# Patient Record
Sex: FEMALE | Race: WHITE | NOT HISPANIC OR LATINO | URBAN - METROPOLITAN AREA
[De-identification: names, ages, dates, MRNs, and addresses within clinical notes are randomized per-mention and may not be internally consistent; named-entity substitution may affect disease eponyms.]

---

## 2017-04-27 ENCOUNTER — EMERGENCY (EMERGENCY)
Facility: HOSPITAL | Age: 82
LOS: 1 days | Discharge: HOME | End: 2017-04-27
Attending: EMERGENCY MEDICINE | Admitting: EMERGENCY MEDICINE
Payer: MEDICARE

## 2017-04-27 VITALS
DIASTOLIC BLOOD PRESSURE: 70 MMHG | HEART RATE: 59 BPM | OXYGEN SATURATION: 98 % | RESPIRATION RATE: 17 BRPM | TEMPERATURE: 98 F | SYSTOLIC BLOOD PRESSURE: 178 MMHG

## 2017-04-27 VITALS
WEIGHT: 158.07 LBS | HEART RATE: 84 BPM | OXYGEN SATURATION: 97 % | DIASTOLIC BLOOD PRESSURE: 74 MMHG | TEMPERATURE: 99 F | RESPIRATION RATE: 16 BRPM | SYSTOLIC BLOOD PRESSURE: 168 MMHG

## 2017-04-27 DIAGNOSIS — I10 ESSENTIAL (PRIMARY) HYPERTENSION: ICD-10-CM

## 2017-04-27 DIAGNOSIS — Z79.01 LONG TERM (CURRENT) USE OF ANTICOAGULANTS: ICD-10-CM

## 2017-04-27 DIAGNOSIS — Z88.0 ALLERGY STATUS TO PENICILLIN: ICD-10-CM

## 2017-04-27 DIAGNOSIS — Z96.642 PRESENCE OF LEFT ARTIFICIAL HIP JOINT: ICD-10-CM

## 2017-04-27 DIAGNOSIS — E78.5 HYPERLIPIDEMIA, UNSPECIFIED: ICD-10-CM

## 2017-04-27 DIAGNOSIS — F41.9 ANXIETY DISORDER, UNSPECIFIED: ICD-10-CM

## 2017-04-27 DIAGNOSIS — Z90.710 ACQUIRED ABSENCE OF BOTH CERVIX AND UTERUS: Chronic | ICD-10-CM

## 2017-04-27 DIAGNOSIS — I63.9 CEREBRAL INFARCTION, UNSPECIFIED: ICD-10-CM

## 2017-04-27 DIAGNOSIS — Z96.60 PRESENCE OF UNSPECIFIED ORTHOPEDIC JOINT IMPLANT: Chronic | ICD-10-CM

## 2017-04-27 DIAGNOSIS — R29.6 REPEATED FALLS: ICD-10-CM

## 2017-04-27 DIAGNOSIS — R47.81 SLURRED SPEECH: ICD-10-CM

## 2017-04-27 LAB
ALBUMIN SERPL ELPH-MCNC: 3 G/DL — LOW (ref 3.3–5)
ALP SERPL-CCNC: 76 U/L — SIGNIFICANT CHANGE UP (ref 40–120)
ALT FLD-CCNC: 31 U/L — SIGNIFICANT CHANGE UP (ref 12–78)
ANION GAP SERPL CALC-SCNC: 6 MMOL/L — SIGNIFICANT CHANGE UP (ref 5–17)
APPEARANCE UR: CLEAR — SIGNIFICANT CHANGE UP
APTT BLD: 21.8 SEC — LOW (ref 27.5–37.4)
AST SERPL-CCNC: 53 U/L — HIGH (ref 15–37)
BASOPHILS # BLD AUTO: 0.1 K/UL — SIGNIFICANT CHANGE UP (ref 0–0.2)
BASOPHILS NFR BLD AUTO: 0.7 % — SIGNIFICANT CHANGE UP (ref 0–2)
BILIRUB SERPL-MCNC: 0.5 MG/DL — SIGNIFICANT CHANGE UP (ref 0.2–1.2)
BILIRUB UR-MCNC: NEGATIVE — SIGNIFICANT CHANGE UP
BUN SERPL-MCNC: 26 MG/DL — HIGH (ref 7–23)
CALCIUM SERPL-MCNC: 8.6 MG/DL — SIGNIFICANT CHANGE UP (ref 8.5–10.1)
CHLORIDE SERPL-SCNC: 104 MMOL/L — SIGNIFICANT CHANGE UP (ref 96–108)
CO2 SERPL-SCNC: 30 MMOL/L — SIGNIFICANT CHANGE UP (ref 22–31)
COLOR SPEC: YELLOW — SIGNIFICANT CHANGE UP
CREAT SERPL-MCNC: 0.88 MG/DL — SIGNIFICANT CHANGE UP (ref 0.5–1.3)
DIFF PNL FLD: NEGATIVE — SIGNIFICANT CHANGE UP
EOSINOPHIL # BLD AUTO: 0.2 K/UL — SIGNIFICANT CHANGE UP (ref 0–0.5)
EOSINOPHIL NFR BLD AUTO: 2.3 % — SIGNIFICANT CHANGE UP (ref 0–6)
GLUCOSE SERPL-MCNC: 108 MG/DL — HIGH (ref 70–99)
GLUCOSE UR QL: NEGATIVE — SIGNIFICANT CHANGE UP
HCT VFR BLD CALC: 36.9 % — SIGNIFICANT CHANGE UP (ref 34.5–45)
HGB BLD-MCNC: 12.1 G/DL — SIGNIFICANT CHANGE UP (ref 11.5–15.5)
INR BLD: 0.94 RATIO — SIGNIFICANT CHANGE UP (ref 0.88–1.16)
KETONES UR-MCNC: NEGATIVE — SIGNIFICANT CHANGE UP
LACTATE SERPL-SCNC: 0.8 MMOL/L — SIGNIFICANT CHANGE UP (ref 0.7–2)
LACTATE SERPL-SCNC: 2.1 MMOL/L — HIGH (ref 0.7–2)
LEUKOCYTE ESTERASE UR-ACNC: NEGATIVE — SIGNIFICANT CHANGE UP
LYMPHOCYTES # BLD AUTO: 1.7 K/UL — SIGNIFICANT CHANGE UP (ref 1–3.3)
LYMPHOCYTES # BLD AUTO: 17.4 % — SIGNIFICANT CHANGE UP (ref 13–44)
MCHC RBC-ENTMCNC: 32.8 GM/DL — SIGNIFICANT CHANGE UP (ref 32–36)
MCHC RBC-ENTMCNC: 34 PG — SIGNIFICANT CHANGE UP (ref 27–34)
MCV RBC AUTO: 103.4 FL — HIGH (ref 80–100)
MONOCYTES # BLD AUTO: 0.8 K/UL — SIGNIFICANT CHANGE UP (ref 0–0.9)
MONOCYTES NFR BLD AUTO: 7.9 % — SIGNIFICANT CHANGE UP (ref 1–9)
NEUTROPHILS # BLD AUTO: 7.1 K/UL — SIGNIFICANT CHANGE UP (ref 1.8–7.4)
NEUTROPHILS NFR BLD AUTO: 71.6 % — SIGNIFICANT CHANGE UP (ref 43–77)
NITRITE UR-MCNC: NEGATIVE — SIGNIFICANT CHANGE UP
PH UR: 7 — SIGNIFICANT CHANGE UP (ref 5–8)
PLATELET # BLD AUTO: 197 K/UL — SIGNIFICANT CHANGE UP (ref 150–400)
POTASSIUM SERPL-MCNC: 4.9 MMOL/L — SIGNIFICANT CHANGE UP (ref 3.5–5.3)
POTASSIUM SERPL-SCNC: 4.9 MMOL/L — SIGNIFICANT CHANGE UP (ref 3.5–5.3)
PROT SERPL-MCNC: 6.8 G/DL — SIGNIFICANT CHANGE UP (ref 6–8.3)
PROT UR-MCNC: NEGATIVE — SIGNIFICANT CHANGE UP
PROTHROM AB SERPL-ACNC: 10.2 SEC — SIGNIFICANT CHANGE UP (ref 9.8–12.7)
RBC # BLD: 3.57 M/UL — LOW (ref 3.8–5.2)
RBC # FLD: 12.8 % — SIGNIFICANT CHANGE UP (ref 10.3–14.5)
SODIUM SERPL-SCNC: 140 MMOL/L — SIGNIFICANT CHANGE UP (ref 135–145)
SP GR SPEC: 1.01 — SIGNIFICANT CHANGE UP (ref 1.01–1.02)
UROBILINOGEN FLD QL: NEGATIVE — SIGNIFICANT CHANGE UP
WBC # BLD: 9.9 K/UL — SIGNIFICANT CHANGE UP (ref 3.8–10.5)
WBC # FLD AUTO: 9.9 K/UL — SIGNIFICANT CHANGE UP (ref 3.8–10.5)

## 2017-04-27 PROCEDURE — 85610 PROTHROMBIN TIME: CPT

## 2017-04-27 PROCEDURE — 87040 BLOOD CULTURE FOR BACTERIA: CPT

## 2017-04-27 PROCEDURE — 99285 EMERGENCY DEPT VISIT HI MDM: CPT

## 2017-04-27 PROCEDURE — 99284 EMERGENCY DEPT VISIT MOD MDM: CPT | Mod: 25

## 2017-04-27 PROCEDURE — G8980: CPT | Mod: CK

## 2017-04-27 PROCEDURE — 85730 THROMBOPLASTIN TIME PARTIAL: CPT

## 2017-04-27 PROCEDURE — 71010: CPT | Mod: 26

## 2017-04-27 PROCEDURE — 93005 ELECTROCARDIOGRAM TRACING: CPT

## 2017-04-27 PROCEDURE — G8978: CPT | Mod: CK

## 2017-04-27 PROCEDURE — 80053 COMPREHEN METABOLIC PANEL: CPT

## 2017-04-27 PROCEDURE — 97162 PT EVAL MOD COMPLEX 30 MIN: CPT

## 2017-04-27 PROCEDURE — 71045 X-RAY EXAM CHEST 1 VIEW: CPT

## 2017-04-27 PROCEDURE — 85027 COMPLETE CBC AUTOMATED: CPT

## 2017-04-27 PROCEDURE — 83605 ASSAY OF LACTIC ACID: CPT

## 2017-04-27 PROCEDURE — G8979: CPT | Mod: CK

## 2017-04-27 PROCEDURE — 87086 URINE CULTURE/COLONY COUNT: CPT

## 2017-04-27 PROCEDURE — 81003 URINALYSIS AUTO W/O SCOPE: CPT

## 2017-04-27 RX ORDER — ASPIRIN/CALCIUM CARB/MAGNESIUM 324 MG
325 TABLET ORAL ONCE
Qty: 0 | Refills: 0 | Status: COMPLETED | OUTPATIENT
Start: 2017-04-27 | End: 2017-04-27

## 2017-04-27 RX ORDER — AMLODIPINE BESYLATE 2.5 MG/1
2.5 TABLET ORAL ONCE
Qty: 0 | Refills: 0 | Status: COMPLETED | OUTPATIENT
Start: 2017-04-27 | End: 2017-04-27

## 2017-04-27 RX ORDER — AMLODIPINE BESYLATE 2.5 MG/1
1 TABLET ORAL
Qty: 28 | Refills: 0 | OUTPATIENT
Start: 2017-04-27 | End: 2017-05-11

## 2017-04-27 RX ORDER — SODIUM CHLORIDE 9 MG/ML
1000 INJECTION INTRAMUSCULAR; INTRAVENOUS; SUBCUTANEOUS ONCE
Qty: 0 | Refills: 0 | Status: COMPLETED | OUTPATIENT
Start: 2017-04-27 | End: 2017-04-27

## 2017-04-27 RX ORDER — METOPROLOL TARTRATE 50 MG
25 TABLET ORAL ONCE
Qty: 0 | Refills: 0 | Status: COMPLETED | OUTPATIENT
Start: 2017-04-27 | End: 2017-04-27

## 2017-04-27 RX ADMIN — Medication 25 MILLIGRAM(S): at 14:56

## 2017-04-27 RX ADMIN — SODIUM CHLORIDE 1000 MILLILITER(S): 9 INJECTION INTRAMUSCULAR; INTRAVENOUS; SUBCUTANEOUS at 12:01

## 2017-04-27 RX ADMIN — Medication 325 MILLIGRAM(S): at 14:59

## 2017-04-27 RX ADMIN — AMLODIPINE BESYLATE 2.5 MILLIGRAM(S): 2.5 TABLET ORAL at 16:46

## 2017-04-27 NOTE — PHYSICAL THERAPY INITIAL EVALUATION ADULT - LEVEL OF INDEPENDENCE: GAIT, REHAB EVAL
NT secondary to BP = 203/86, MD made aware. NT secondary to BP = 203/86, MD made aware, JN Kolb made aware.

## 2017-04-27 NOTE — ED PROVIDER NOTE - MUSCULOSKELETAL, MLM
Spine appears normal, No spinal tend (c,t,l). range of motion is not limited, no muscle or joint tenderness noted

## 2017-04-27 NOTE — PHYSICAL THERAPY INITIAL EVALUATION ADULT - DID THE PATIENT HAVE SURGERY?
n/a n/a/G-code information.  The patient's primary goal was to improve mobility and ambulation.  Based on the subjective reports and objective findings the primary functional issue is related to G code 8978 CK modifier. Because the patient will be followed in another setting, the goal and discharge status is as follows:  CK for goal,  CH for discharge.  Recommend HCPT.

## 2017-04-27 NOTE — ED PROVIDER NOTE - OBJECTIVE STATEMENT
88 yo F p/w recent occasional falls. Pt again fell ~ 2 - 3 days ago. no loc. No HA//dizzy. Pt reportedly was having slurred speech, improved now. Pt had outpt MRI through PMD 2 days ago, shows acute CVA. Pt sent for neuro eval. no numb/ting/focal weak. no cp/sob/palp. no abd pain., No n/v/d. No recent illness. NO agg/allev factors. No other inj or co.

## 2017-04-27 NOTE — ED PROVIDER NOTE - DIAGNOSTIC INTERPRETATION
MRI Mai (4/25/17) - Small acute infarct post R coronata radiata with microvasc white matter dz, mild hydrocephalus, l mastoid inf change (see attached)

## 2017-04-27 NOTE — ED ADULT NURSE NOTE - OBJECTIVE STATEMENT
pt states she was sent in by PMD because "he didn't like her tests she just took" pt unable to tell me what is bothering her today.

## 2017-04-27 NOTE — ED PROVIDER NOTE - NEUROLOGICAL, MLM
Alert and oriented, no focal deficits, no motor or sensory deficits. normal speech. nihss = 0, dysphagia pass

## 2017-04-27 NOTE — ED PROVIDER NOTE - ENMT, MLM
Airway patent, Nasal mucosa clear. Mouth with normal mucosa. Throat has no vesicles, no oropharyngeal exudates and uvula is midline. MM Moist. Non-toxic, well appearing. Neck supple. no signs of trauma.

## 2017-04-27 NOTE — ED PROVIDER NOTE - CHPI ED SYMPTOMS NEG
no weakness/no change in level of consciousness/no dizziness/no nausea/no loss of consciousness/no confusion/no blurred vision/no fever/no numbness/no vomiting

## 2017-04-27 NOTE — ED PROVIDER NOTE - PROGRESS NOTE DETAILS
[facility] Mendoza office, faxed results of MRI Pt doing well, no acute co or changes. Pt seen by Dr Amaya, no need for inpatient patricia. Will need outpt fu for Echo / Carotid doppler. Pt awaiting PT / SW eval for possible home PT. Pt doing well, no acute co or changes. Pt seen by Dr Amaya, no need for inpatient patricia. Will need outpt fu for Echo / Carotid doppler. Pt awaiting PT / SW eval for possible home PT.  Dw Dr Mendoza's PA, will arrange outtp tests including Echo , Carotid doppler. Gamal George (for caridadof) - give Norvasc 2.5 BID, keep atenolol same. Pt to fu tomorrow with Fabrizio. Gamal pt and family re HTN - wish to go home now, do not want to wait for BP to normalize. They will call fabrizio in am for prompt fu.  Reevaluated patient at bedside.  Patient feeling much improved.  Discussed the results of all diagnostic testing in ED and copies of all reports given.   An opportunity to ask questions was given.  Discussed the importance of prompt, close medical follow-up.  Patient will return with any changes, concerns or persistent / worsening symptoms.  Understanding of all instructions verbalized. Gamal George (for katof) - give Norvasc 2.5 BID, keep atenolol same. Pt to fu tomorrow with Fabrizio. Gamal pt and family re HTN - wish to go home now, do not want to wait for BP to normalize. They will call fabrizio in am for prompt fu.  Reevaluated patient at bedside.  Patient feeling much improved.  Discussed the results of all diagnostic testing in ED and copies of all reports given.   An opportunity to ask questions was given.  Discussed the importance of prompt, close medical follow-up.  Patient will return with any changes, concerns or persistent / worsening symptoms.  Understanding of all instructions verbalized.  Pt seen by case management, being set up for home PT.

## 2017-04-28 LAB
CULTURE RESULTS: SIGNIFICANT CHANGE UP
SPECIMEN SOURCE: SIGNIFICANT CHANGE UP

## 2017-05-02 LAB
CULTURE RESULTS: SIGNIFICANT CHANGE UP
CULTURE RESULTS: SIGNIFICANT CHANGE UP
SPECIMEN SOURCE: SIGNIFICANT CHANGE UP
SPECIMEN SOURCE: SIGNIFICANT CHANGE UP

## 2017-05-19 ENCOUNTER — EMERGENCY (EMERGENCY)
Facility: HOSPITAL | Age: 82
LOS: 1 days | Discharge: HOME | End: 2017-05-19
Attending: EMERGENCY MEDICINE | Admitting: EMERGENCY MEDICINE
Payer: MEDICARE

## 2017-05-19 VITALS
RESPIRATION RATE: 15 BRPM | DIASTOLIC BLOOD PRESSURE: 74 MMHG | TEMPERATURE: 98 F | HEART RATE: 60 BPM | SYSTOLIC BLOOD PRESSURE: 168 MMHG | OXYGEN SATURATION: 96 %

## 2017-05-19 VITALS
WEIGHT: 125 LBS | OXYGEN SATURATION: 94 % | RESPIRATION RATE: 16 BRPM | DIASTOLIC BLOOD PRESSURE: 77 MMHG | SYSTOLIC BLOOD PRESSURE: 128 MMHG | HEIGHT: 58 IN | TEMPERATURE: 98 F | HEART RATE: 59 BPM

## 2017-05-19 DIAGNOSIS — R47.81 SLURRED SPEECH: ICD-10-CM

## 2017-05-19 DIAGNOSIS — Z90.710 ACQUIRED ABSENCE OF BOTH CERVIX AND UTERUS: Chronic | ICD-10-CM

## 2017-05-19 DIAGNOSIS — Z88.0 ALLERGY STATUS TO PENICILLIN: ICD-10-CM

## 2017-05-19 DIAGNOSIS — Z96.642 PRESENCE OF LEFT ARTIFICIAL HIP JOINT: ICD-10-CM

## 2017-05-19 DIAGNOSIS — Z90.710 ACQUIRED ABSENCE OF BOTH CERVIX AND UTERUS: ICD-10-CM

## 2017-05-19 DIAGNOSIS — Z96.60 PRESENCE OF UNSPECIFIED ORTHOPEDIC JOINT IMPLANT: Chronic | ICD-10-CM

## 2017-05-19 DIAGNOSIS — E78.5 HYPERLIPIDEMIA, UNSPECIFIED: ICD-10-CM

## 2017-05-19 DIAGNOSIS — I10 ESSENTIAL (PRIMARY) HYPERTENSION: ICD-10-CM

## 2017-05-19 LAB
ALBUMIN SERPL ELPH-MCNC: 3 G/DL — LOW (ref 3.3–5)
ALP SERPL-CCNC: 73 U/L — SIGNIFICANT CHANGE UP (ref 40–120)
ALT FLD-CCNC: 22 U/L — SIGNIFICANT CHANGE UP (ref 12–78)
ANION GAP SERPL CALC-SCNC: 8 MMOL/L — SIGNIFICANT CHANGE UP (ref 5–17)
APTT BLD: 26.7 SEC — LOW (ref 27.5–37.4)
AST SERPL-CCNC: 26 U/L — SIGNIFICANT CHANGE UP (ref 15–37)
BASOPHILS # BLD AUTO: 0.1 K/UL — SIGNIFICANT CHANGE UP (ref 0–0.2)
BASOPHILS NFR BLD AUTO: 0.8 % — SIGNIFICANT CHANGE UP (ref 0–2)
BILIRUB SERPL-MCNC: 0.3 MG/DL — SIGNIFICANT CHANGE UP (ref 0.2–1.2)
BUN SERPL-MCNC: 19 MG/DL — SIGNIFICANT CHANGE UP (ref 7–23)
CALCIUM SERPL-MCNC: 8.9 MG/DL — SIGNIFICANT CHANGE UP (ref 8.5–10.1)
CHLORIDE SERPL-SCNC: 101 MMOL/L — SIGNIFICANT CHANGE UP (ref 96–108)
CO2 SERPL-SCNC: 31 MMOL/L — SIGNIFICANT CHANGE UP (ref 22–31)
CREAT SERPL-MCNC: 0.84 MG/DL — SIGNIFICANT CHANGE UP (ref 0.5–1.3)
EOSINOPHIL # BLD AUTO: 0.2 K/UL — SIGNIFICANT CHANGE UP (ref 0–0.5)
EOSINOPHIL NFR BLD AUTO: 2.9 % — SIGNIFICANT CHANGE UP (ref 0–6)
GLUCOSE SERPL-MCNC: 85 MG/DL — SIGNIFICANT CHANGE UP (ref 70–99)
HCT VFR BLD CALC: 33 % — LOW (ref 34.5–45)
HGB BLD-MCNC: 11.1 G/DL — LOW (ref 11.5–15.5)
INR BLD: 0.99 RATIO — SIGNIFICANT CHANGE UP (ref 0.88–1.16)
LYMPHOCYTES # BLD AUTO: 1.4 K/UL — SIGNIFICANT CHANGE UP (ref 1–3.3)
LYMPHOCYTES # BLD AUTO: 17 % — SIGNIFICANT CHANGE UP (ref 13–44)
MCHC RBC-ENTMCNC: 33.6 GM/DL — SIGNIFICANT CHANGE UP (ref 32–36)
MCHC RBC-ENTMCNC: 34 PG — SIGNIFICANT CHANGE UP (ref 27–34)
MCV RBC AUTO: 101.1 FL — HIGH (ref 80–100)
MONOCYTES # BLD AUTO: 0.8 K/UL — SIGNIFICANT CHANGE UP (ref 0–0.9)
MONOCYTES NFR BLD AUTO: 9.4 % — HIGH (ref 1–9)
NEUTROPHILS # BLD AUTO: 5.7 K/UL — SIGNIFICANT CHANGE UP (ref 1.8–7.4)
NEUTROPHILS NFR BLD AUTO: 69.9 % — SIGNIFICANT CHANGE UP (ref 43–77)
PLATELET # BLD AUTO: 204 K/UL — SIGNIFICANT CHANGE UP (ref 150–400)
POTASSIUM SERPL-MCNC: 5 MMOL/L — SIGNIFICANT CHANGE UP (ref 3.5–5.3)
POTASSIUM SERPL-SCNC: 5 MMOL/L — SIGNIFICANT CHANGE UP (ref 3.5–5.3)
PROT SERPL-MCNC: 6.4 G/DL — SIGNIFICANT CHANGE UP (ref 6–8.3)
PROTHROM AB SERPL-ACNC: 10.8 SEC — SIGNIFICANT CHANGE UP (ref 9.8–12.7)
RBC # BLD: 3.27 M/UL — LOW (ref 3.8–5.2)
RBC # FLD: 13 % — SIGNIFICANT CHANGE UP (ref 10.3–14.5)
SODIUM SERPL-SCNC: 140 MMOL/L — SIGNIFICANT CHANGE UP (ref 135–145)
WBC # BLD: 8.1 K/UL — SIGNIFICANT CHANGE UP (ref 3.8–10.5)
WBC # FLD AUTO: 8.1 K/UL — SIGNIFICANT CHANGE UP (ref 3.8–10.5)

## 2017-05-19 PROCEDURE — 70544 MR ANGIOGRAPHY HEAD W/O DYE: CPT | Mod: 26,59

## 2017-05-19 PROCEDURE — 85610 PROTHROMBIN TIME: CPT

## 2017-05-19 PROCEDURE — 82962 GLUCOSE BLOOD TEST: CPT

## 2017-05-19 PROCEDURE — 85730 THROMBOPLASTIN TIME PARTIAL: CPT

## 2017-05-19 PROCEDURE — 36416 COLLJ CAPILLARY BLOOD SPEC: CPT

## 2017-05-19 PROCEDURE — 70551 MRI BRAIN STEM W/O DYE: CPT

## 2017-05-19 PROCEDURE — 70450 CT HEAD/BRAIN W/O DYE: CPT | Mod: 26

## 2017-05-19 PROCEDURE — 70551 MRI BRAIN STEM W/O DYE: CPT | Mod: 26

## 2017-05-19 PROCEDURE — 85027 COMPLETE CBC AUTOMATED: CPT

## 2017-05-19 PROCEDURE — 80053 COMPREHEN METABOLIC PANEL: CPT

## 2017-05-19 PROCEDURE — 70544 MR ANGIOGRAPHY HEAD W/O DYE: CPT

## 2017-05-19 PROCEDURE — 93005 ELECTROCARDIOGRAM TRACING: CPT

## 2017-05-19 PROCEDURE — 70450 CT HEAD/BRAIN W/O DYE: CPT

## 2017-05-19 PROCEDURE — 99284 EMERGENCY DEPT VISIT MOD MDM: CPT

## 2017-05-19 PROCEDURE — 36000 PLACE NEEDLE IN VEIN: CPT

## 2017-05-19 PROCEDURE — 99285 EMERGENCY DEPT VISIT HI MDM: CPT

## 2017-05-19 NOTE — ED ADULT NURSE NOTE - CHPI ED SYMPTOMS NEG
no change in level of consciousness/no dizziness/no numbness/no confusion/no fever/no nausea/no weakness/no loss of consciousness/no blurred vision/no vomiting

## 2017-05-19 NOTE — ED ADULT NURSE NOTE - OBJECTIVE STATEMENT
pt came in ED from home BIBA with daughter at the bedside. as per the daughter, the patient's Aide noted the patient having slurred speech at around 11:00am today. pt had Acute CVA 4 weeks ago. alert and oriented X 4. no facial droop, no slurred speech and no arm drift noted. non-labored respiration noted. patient denies chest pain. able to follow command.

## 2017-05-19 NOTE — CONSULT NOTE ADULT - PROBLEM SELECTOR RECOMMENDATION 9
Patient was seen and examined earlier. Case d/w neuro. If MRI negative, which it was, patient's symptoms are most likely related to extra dose of xanax. Patient symptoms actually resolved prior to discharge. She will f/u with PCP and neuro next week.

## 2017-05-19 NOTE — CONSULT NOTE ADULT - SUBJECTIVE AND OBJECTIVE BOX
HPI: This is an 87 F, who I saw earlier today in the ER, with PMH of HTN CVA (3 weeks ago) who was brought in by family because of slurred speech which they noticed at 11am. Patient was last seen normal at 930am. No other neurological deficits. No n/v/d, cp, sob, palpitations, h/a, or blurry vision. Patient also reports she took an extra dose of xanax this morning because her aide aggravates her.       PAST MEDICAL & SURGICAL HISTORY:  CVA (cerebral vascular accident)  Anxiety  Hip fracture  Fall  Hypertension  Hyperlipidemia  S/P hip replacement: left  S/P hysterectomy      REVIEW OF SYSTEMS: as above    CONSTITUTIONAL: No fever, weight loss, or fatigue  EYES: No eye pain, visual disturbances, or discharge  ENMT:  No difficulty hearing, tinnitus, vertigo; No sinus or throat pain  NECK: No pain or stiffness  BREASTS: No pain, masses, or nipple discharge  RESPIRATORY: No cough, wheezing, chills or hemoptysis; No shortness of breath  CARDIOVASCULAR: No chest pain, palpitations, dizziness, or leg swelling  GASTROINTESTINAL: No abdominal or epigastric pain. No nausea, vomiting, or hematemesis; No diarrhea or constipation. No melena or hematochezia.  GENITOURINARY: No dysuria, frequency, hematuria, or incontinence  NEUROLOGICAL: slurred speech  SKIN: No itching, burning, rashes, or lesions   LYMPH NODES: No enlarged glands  ENDOCRINE: No heat or cold intolerance; No hair loss  MUSCULOSKELETAL: No joint pain or swelling; No muscle, back, or extremity pain  PSYCHIATRIC: + anxiety  HEME/LYMPH: No easy bruising, or bleeding gums  ALLERGY AND IMMUNOLOGIC: No hives or eczema      MEDICATIONS  (STANDING):    MEDICATIONS  (PRN):      Allergies    mold, cheese and wine (Unknown)  penicillins (Unknown)    Intolerances        SOCIAL HISTORY:    FAMILY HISTORY:  No pertinent family history in first degree relatives      Vital Signs Last 24 Hrs  T(C): 36.7, Max: 36.9 (05-19 @ 13:09)  T(F): 98, Max: 98.4 (05-19 @ 13:09)  HR: 60 (58 - 60)  BP: 168/74 (128/77 - 213/85)  BP(mean): --  RR: 15 (14 - 16)  SpO2: 96% (94% - 96%)    PHYSICAL EXAM:    GENERAL: NAD, well-groomed, well-developed  HEAD:  Atraumatic, Normocephalic  EYES: EOMI, PERRLA, conjunctiva and sclera clear  ENMT: No tonsillar erythema, exudates, or enlargement; Moist mucous membranes, Good dentition, No lesions  NECK: Supple, No JVD, Normal thyroid  NERVOUS SYSTEM:  Alert & Oriented X3, Good concentration; Motor Strength 5/5 B/L upper and lower extremities; DTRs 2+ intact and symmetric; slurred speech  CHEST/LUNG: Clear to percussion bilaterally; No rales, rhonchi, wheezing, or rubs  HEART: Regular rate and rhythm; No murmurs, rubs, or gallops  ABDOMEN: Soft, Nontender, Nondistended; Bowel sounds present  EXTREMITIES:  2+ Peripheral Pulses, No clubbing, cyanosis, or edema      LABS:                        11.1   8.1   )-----------( 204      ( 19 May 2017 13:31 )             33.0     05-19    140  |  101  |  19  ----------------------------<  85  5.0   |  31  |  0.84    Ca    8.9      19 May 2017 13:32    TPro  6.4  /  Alb  3.0<L>  /  TBili  0.3  /  DBili  x   /  AST  26  /  ALT  22  /  AlkPhos  73  05-19    PT/INR - ( 19 May 2017 13:34 )   PT: 10.8 sec;   INR: 0.99 ratio         PTT - ( 19 May 2017 13:34 )  PTT:26.7 sec      RADIOLOGY & ADDITIONAL STUDIES:    EXAM:  MRI BRAIN W O CONTRAST                            PROCEDURE DATE:  05/19/2017        INTERPRETATION:  MRI brain without contrast    History CVA, slurred speech    Comparison CT performed at 1325 hours    There is moderately pronounced central and mild peripheral cortical   atrophy without cortical edema, mass effect or hydrocephalus. There is   mild to moderate chronic microvascular ischemic change in the   periventricular white matter. There is no evidence of acute infarct or   previousparenchymal hemorrhage. The orbital and sellar contents and   cerebellar tonsils are within normal limits. There is fluid signal in the   mastoid air cells on the left    IMPRESSION:    No acute intracranial abnormality. Left mastoiditis              CARLEY MCLEAN M.D., ATTENDING RADIOLOGIST  This document has been electronically signed. May 19 2017  4:18PM    EXAM:  CT BRAIN STROKE PROTOCOL                            PROCEDURE DATE:  05/19/2017        INTERPRETATION:  Clinical History: Slurred speech.    Noncontrast CT scan of brain is performed with axial images obtained from   skull base to vertex.    Motion artifact degrades image quality limiting evaluation; some   diagnostic information can still be obtained.    There is cerebral volume loss, without mass effect or midline shift.  Prominent periventricular white matter low attenuation extendinginto the   deep subcortical white matter tracts most compatible with chronic   microvascular ischemic disease.  There are chronic appearing lacunar infarcts right basal ganglion and   right corona radiata.    No acute parenchymal hemorrhage or large territorial infarction is noted.   No extra-axial fluid collection is demonstrated.    The visualized paranasal sinuses appear unremarkable.    Impression:    No acute intracranial pathology noted.  MRI is more sensitive for the evaluation of acute ischemic changes if   there are no clinical contraindications.    Findings were discussed by telephone with Dr. Wallace at the time of   interpretation on 5/19/2017.
dysarthria --? medication rule out CVA  plan for MRI head if normal only form neurologic standpoint cleared for discharge  continue asprin and statin  spoke to daughter at bedside   will follow up with DR LLAMAS neurology

## 2017-05-19 NOTE — ED PROVIDER NOTE - CHPI ED SYMPTOMS NEG
no fever/no nausea/no loss of consciousness/no numbness/no confusion/no weakness/no dizziness/no vomiting

## 2017-05-19 NOTE — ED PROVIDER NOTE - MEDICAL DECISION MAKING DETAILS
pt with cva 3 weeks ago bib ems for slurred speech noted by aide at 1100, last normal speech 0930 - ekg/ct/xr/labs/neuro

## 2017-05-19 NOTE — ED PROVIDER NOTE - OBJECTIVE STATEMENT
pt with cva 3 weeks ago bib ems for slurred speech noted by aide at 1100, last normal speech 0930, when spoke to daughter on phone. pt denies ha fevers, chills, d/n/v, cp, sob, abd pain, focal weakness or numbness. pt's family thought right arm might be weaker than left.  pmd - fabrizio pena - [facility]

## 2017-05-19 NOTE — ED ADULT NURSE NOTE - PMH
Anxiety    CVA (cerebral vascular accident)    Fall    Hip fracture    Hyperlipidemia    Hypertension

## 2017-05-19 NOTE — ED PROVIDER NOTE - PROGRESS NOTE DETAILS
chapito (neuro) seen and eval pt, will et MRI Pt resting comfortably.  No distress. Feels fine. Back to her USOH per daughter.  Wants to go home.  D/W neurology Dr Amaya. Will d/c home.  Pt will f/u with neurology Dr Chu.

## 2017-06-14 ENCOUNTER — EMERGENCY (EMERGENCY)
Facility: HOSPITAL | Age: 82
LOS: 1 days | Discharge: HOME | End: 2017-06-14
Attending: EMERGENCY MEDICINE | Admitting: EMERGENCY MEDICINE
Payer: MEDICARE

## 2017-06-14 VITALS
TEMPERATURE: 98 F | OXYGEN SATURATION: 97 % | WEIGHT: 113.98 LBS | HEIGHT: 58 IN | SYSTOLIC BLOOD PRESSURE: 168 MMHG | DIASTOLIC BLOOD PRESSURE: 100 MMHG | RESPIRATION RATE: 15 BRPM | HEART RATE: 66 BPM

## 2017-06-14 VITALS
TEMPERATURE: 98 F | HEART RATE: 53 BPM | SYSTOLIC BLOOD PRESSURE: 178 MMHG | DIASTOLIC BLOOD PRESSURE: 88 MMHG | OXYGEN SATURATION: 97 % | RESPIRATION RATE: 14 BRPM

## 2017-06-14 DIAGNOSIS — S09.90XA UNSPECIFIED INJURY OF HEAD, INITIAL ENCOUNTER: ICD-10-CM

## 2017-06-14 DIAGNOSIS — Z90.710 ACQUIRED ABSENCE OF BOTH CERVIX AND UTERUS: Chronic | ICD-10-CM

## 2017-06-14 DIAGNOSIS — E78.5 HYPERLIPIDEMIA, UNSPECIFIED: ICD-10-CM

## 2017-06-14 DIAGNOSIS — I10 ESSENTIAL (PRIMARY) HYPERTENSION: ICD-10-CM
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
## 2017-06-14 DIAGNOSIS — R47.9 UNSPECIFIED SPEECH DISTURBANCES: ICD-10-CM

## 2017-06-14 DIAGNOSIS — R53.1 WEAKNESS: ICD-10-CM

## 2017-06-14 DIAGNOSIS — H60.91 UNSPECIFIED OTITIS EXTERNA, RIGHT EAR: ICD-10-CM

## 2017-06-14 DIAGNOSIS — Z96.60 PRESENCE OF UNSPECIFIED ORTHOPEDIC JOINT IMPLANT: Chronic | ICD-10-CM
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
## 2017-06-14 PROCEDURE — 70450 CT HEAD/BRAIN W/O DYE: CPT | Mod: 26

## 2017-06-14 PROCEDURE — 70450 CT HEAD/BRAIN W/O DYE: CPT

## 2017-06-14 PROCEDURE — 99284 EMERGENCY DEPT VISIT MOD MDM: CPT

## 2017-06-14 PROCEDURE — 72125 CT NECK SPINE W/O DYE: CPT

## 2017-06-14 PROCEDURE — 72125 CT NECK SPINE W/O DYE: CPT | Mod: 26

## 2017-06-14 RX ORDER — CIPROFLOXACIN AND DEXAMETHASONE 3; 1 MG/ML; MG/ML
4 SUSPENSION/ DROPS AURICULAR (OTIC)
Qty: 1 | Refills: 0 | OUTPATIENT
Start: 2017-06-14 | End: 2017-06-21

## 2017-06-14 RX ORDER — CIPROFLOXACIN AND DEXAMETHASONE 3; 1 MG/ML; MG/ML
4 SUSPENSION/ DROPS AURICULAR (OTIC)
Qty: 1 | Refills: 0
Start: 2017-06-14 | End: 2017-06-21

## 2017-06-14 NOTE — ED PROVIDER NOTE - PROGRESS NOTE DETAILS
patient to f/u with PMD patient to f/u with PMD, feeling well, no acute distress, wants to go home, has 24 hour home health aide

## 2017-06-14 NOTE — ED PROVIDER NOTE - CARE PLAN
Principal Discharge DX:	Fall, initial encounter Principal Discharge DX:	Fall, initial encounter  Secondary Diagnosis:	Otitis externa of right ear, unspecified chronicity, unspecified type

## 2017-06-14 NOTE — ED PROVIDER NOTE - OBJECTIVE STATEMENT
87 female presents to ER by ambulance with daughter at the bedside, states she tripped and fell last night, hitting back of head, was helped up by the aides. Daughter called her PMD Dr. Mendoza and was advised to go to the ER. Patient states she feels well, denies headache, no syncope, no vomiting. Daughter was concerned patient was slow to speak, respond today.

## 2017-06-14 NOTE — ED ADULT NURSE NOTE - OBJECTIVE STATEMENT
Family states patient fell and hit her head yesterday and they think she is altered.  On assessment, she answers questions appropriately.

## 2018-02-12 ENCOUNTER — INPATIENT (INPATIENT)
Facility: HOSPITAL | Age: 83
LOS: 2 days | Discharge: HOME | DRG: 378 | End: 2018-02-15
Attending: FAMILY MEDICINE | Admitting: FAMILY MEDICINE
Payer: MEDICARE

## 2018-02-12 ENCOUNTER — TRANSCRIPTION ENCOUNTER (OUTPATIENT)
Age: 83
End: 2018-02-12

## 2018-02-12 VITALS
RESPIRATION RATE: 18 BRPM | WEIGHT: 119.93 LBS | HEIGHT: 59 IN | TEMPERATURE: 98 F | HEART RATE: 91 BPM | OXYGEN SATURATION: 96 % | DIASTOLIC BLOOD PRESSURE: 70 MMHG | SYSTOLIC BLOOD PRESSURE: 96 MMHG

## 2018-02-12 DIAGNOSIS — D72.829 ELEVATED WHITE BLOOD CELL COUNT, UNSPECIFIED: ICD-10-CM

## 2018-02-12 DIAGNOSIS — K92.2 GASTROINTESTINAL HEMORRHAGE, UNSPECIFIED: ICD-10-CM

## 2018-02-12 DIAGNOSIS — Z90.710 ACQUIRED ABSENCE OF BOTH CERVIX AND UTERUS: Chronic | ICD-10-CM

## 2018-02-12 DIAGNOSIS — Z96.60 PRESENCE OF UNSPECIFIED ORTHOPEDIC JOINT IMPLANT: Chronic | ICD-10-CM

## 2018-02-12 DIAGNOSIS — Z29.9 ENCOUNTER FOR PROPHYLACTIC MEASURES, UNSPECIFIED: ICD-10-CM

## 2018-02-12 DIAGNOSIS — E78.5 HYPERLIPIDEMIA, UNSPECIFIED: ICD-10-CM

## 2018-02-12 DIAGNOSIS — I10 ESSENTIAL (PRIMARY) HYPERTENSION: ICD-10-CM

## 2018-02-12 LAB
ALBUMIN SERPL ELPH-MCNC: 2.5 G/DL — LOW (ref 3.3–5)
ALP SERPL-CCNC: 68 U/L — SIGNIFICANT CHANGE UP (ref 40–120)
ALT FLD-CCNC: 18 U/L — SIGNIFICANT CHANGE UP (ref 12–78)
ANION GAP SERPL CALC-SCNC: 10 MMOL/L — SIGNIFICANT CHANGE UP (ref 5–17)
APPEARANCE UR: ABNORMAL
APTT BLD: 24.8 SEC — LOW (ref 27.5–37.4)
AST SERPL-CCNC: 19 U/L — SIGNIFICANT CHANGE UP (ref 15–37)
BACTERIA # UR AUTO: ABNORMAL
BASOPHILS # BLD AUTO: 0.1 K/UL — SIGNIFICANT CHANGE UP (ref 0–0.2)
BASOPHILS NFR BLD AUTO: 0.3 % — SIGNIFICANT CHANGE UP (ref 0–2)
BILIRUB SERPL-MCNC: 0.4 MG/DL — SIGNIFICANT CHANGE UP (ref 0.2–1.2)
BILIRUB UR-MCNC: NEGATIVE — SIGNIFICANT CHANGE UP
BUN SERPL-MCNC: 56 MG/DL — HIGH (ref 7–23)
CALCIUM SERPL-MCNC: 8.8 MG/DL — SIGNIFICANT CHANGE UP (ref 8.5–10.1)
CHLORIDE SERPL-SCNC: 101 MMOL/L — SIGNIFICANT CHANGE UP (ref 96–108)
CO2 SERPL-SCNC: 28 MMOL/L — SIGNIFICANT CHANGE UP (ref 22–31)
COLOR SPEC: YELLOW — SIGNIFICANT CHANGE UP
CREAT SERPL-MCNC: 0.9 MG/DL — SIGNIFICANT CHANGE UP (ref 0.5–1.3)
DIFF PNL FLD: ABNORMAL
EOSINOPHIL # BLD AUTO: 0.1 K/UL — SIGNIFICANT CHANGE UP (ref 0–0.5)
EOSINOPHIL NFR BLD AUTO: 0.4 % — SIGNIFICANT CHANGE UP (ref 0–6)
EPI CELLS # UR: SIGNIFICANT CHANGE UP
GLUCOSE SERPL-MCNC: 106 MG/DL — HIGH (ref 70–99)
GLUCOSE UR QL: NEGATIVE — SIGNIFICANT CHANGE UP
HCT VFR BLD CALC: 22.5 % — LOW (ref 34.5–45)
HCT VFR BLD CALC: 22.9 % — LOW (ref 34.5–45)
HCT VFR BLD CALC: 26.5 % — LOW (ref 34.5–45)
HGB BLD-MCNC: 7.5 G/DL — LOW (ref 11.5–15.5)
HGB BLD-MCNC: 7.8 G/DL — LOW (ref 11.5–15.5)
HGB BLD-MCNC: 8.8 G/DL — LOW (ref 11.5–15.5)
INR BLD: 1.2 RATIO — HIGH (ref 0.88–1.16)
KETONES UR-MCNC: ABNORMAL
LEUKOCYTE ESTERASE UR-ACNC: ABNORMAL
LYMPHOCYTES # BLD AUTO: 1.1 K/UL — SIGNIFICANT CHANGE UP (ref 1–3.3)
LYMPHOCYTES # BLD AUTO: 6.3 % — LOW (ref 13–44)
MCHC RBC-ENTMCNC: 32.4 PG — SIGNIFICANT CHANGE UP (ref 27–34)
MCHC RBC-ENTMCNC: 32.4 PG — SIGNIFICANT CHANGE UP (ref 27–34)
MCHC RBC-ENTMCNC: 32.8 PG — SIGNIFICANT CHANGE UP (ref 27–34)
MCHC RBC-ENTMCNC: 33.4 GM/DL — SIGNIFICANT CHANGE UP (ref 32–36)
MCHC RBC-ENTMCNC: 33.5 GM/DL — SIGNIFICANT CHANGE UP (ref 32–36)
MCHC RBC-ENTMCNC: 34.2 GM/DL — SIGNIFICANT CHANGE UP (ref 32–36)
MCV RBC AUTO: 95.9 FL — SIGNIFICANT CHANGE UP (ref 80–100)
MCV RBC AUTO: 96.7 FL — SIGNIFICANT CHANGE UP (ref 80–100)
MCV RBC AUTO: 97.2 FL — SIGNIFICANT CHANGE UP (ref 80–100)
MONOCYTES # BLD AUTO: 1.2 K/UL — HIGH (ref 0–0.9)
MONOCYTES NFR BLD AUTO: 6.7 % — SIGNIFICANT CHANGE UP (ref 1–9)
NEUTROPHILS # BLD AUTO: 15 K/UL — HIGH (ref 1.8–7.4)
NEUTROPHILS NFR BLD AUTO: 86.3 % — HIGH (ref 43–77)
NITRITE UR-MCNC: NEGATIVE — SIGNIFICANT CHANGE UP
OB PNL STL: POSITIVE
PH UR: 5 — SIGNIFICANT CHANGE UP (ref 5–8)
PLATELET # BLD AUTO: 231 K/UL — SIGNIFICANT CHANGE UP (ref 150–400)
PLATELET # BLD AUTO: 234 K/UL — SIGNIFICANT CHANGE UP (ref 150–400)
PLATELET # BLD AUTO: 280 K/UL — SIGNIFICANT CHANGE UP (ref 150–400)
POTASSIUM SERPL-MCNC: 4.3 MMOL/L — SIGNIFICANT CHANGE UP (ref 3.5–5.3)
POTASSIUM SERPL-SCNC: 4.3 MMOL/L — SIGNIFICANT CHANGE UP (ref 3.5–5.3)
PROT SERPL-MCNC: 6.2 G/DL — SIGNIFICANT CHANGE UP (ref 6–8.3)
PROT UR-MCNC: 25 MG/DL
PROTHROM AB SERPL-ACNC: 13.1 SEC — HIGH (ref 9.8–12.7)
RBC # BLD: 2.33 M/UL — LOW (ref 3.8–5.2)
RBC # BLD: 2.38 M/UL — LOW (ref 3.8–5.2)
RBC # BLD: 2.72 M/UL — LOW (ref 3.8–5.2)
RBC # FLD: 12.2 % — SIGNIFICANT CHANGE UP (ref 10.3–14.5)
RBC # FLD: 12.3 % — SIGNIFICANT CHANGE UP (ref 10.3–14.5)
RBC # FLD: 12.4 % — SIGNIFICANT CHANGE UP (ref 10.3–14.5)
RBC CASTS # UR COMP ASSIST: ABNORMAL /HPF (ref 0–4)
SODIUM SERPL-SCNC: 139 MMOL/L — SIGNIFICANT CHANGE UP (ref 135–145)
SP GR SPEC: 1.01 — SIGNIFICANT CHANGE UP (ref 1.01–1.02)
UROBILINOGEN FLD QL: NEGATIVE — SIGNIFICANT CHANGE UP
WBC # BLD: 15.3 K/UL — HIGH (ref 3.8–10.5)
WBC # BLD: 17.3 K/UL — HIGH (ref 3.8–10.5)
WBC # BLD: 18.6 K/UL — HIGH (ref 3.8–10.5)
WBC # FLD AUTO: 15.3 K/UL — HIGH (ref 3.8–10.5)
WBC # FLD AUTO: 17.3 K/UL — HIGH (ref 3.8–10.5)
WBC # FLD AUTO: 18.6 K/UL — HIGH (ref 3.8–10.5)
WBC UR QL: SIGNIFICANT CHANGE UP

## 2018-02-12 PROCEDURE — 71045 X-RAY EXAM CHEST 1 VIEW: CPT | Mod: 26

## 2018-02-12 PROCEDURE — 99285 EMERGENCY DEPT VISIT HI MDM: CPT | Mod: 25

## 2018-02-12 PROCEDURE — 99222 1ST HOSP IP/OBS MODERATE 55: CPT

## 2018-02-12 PROCEDURE — 93010 ELECTROCARDIOGRAM REPORT: CPT

## 2018-02-12 RX ORDER — PANTOPRAZOLE SODIUM 20 MG/1
8 TABLET, DELAYED RELEASE ORAL
Qty: 80 | Refills: 0 | Status: DISCONTINUED | OUTPATIENT
Start: 2018-02-12 | End: 2018-02-14

## 2018-02-12 RX ORDER — ACETAMINOPHEN 500 MG
650 TABLET ORAL EVERY 6 HOURS
Qty: 0 | Refills: 0 | Status: DISCONTINUED | OUTPATIENT
Start: 2018-02-12 | End: 2018-02-14

## 2018-02-12 RX ORDER — GABAPENTIN 400 MG/1
0 CAPSULE ORAL
Qty: 0 | Refills: 0 | COMMUNITY

## 2018-02-12 RX ORDER — GABAPENTIN 400 MG/1
100 CAPSULE ORAL AT BEDTIME
Qty: 0 | Refills: 0 | Status: DISCONTINUED | OUTPATIENT
Start: 2018-02-12 | End: 2018-02-15

## 2018-02-12 RX ORDER — SUCRALFATE 1 G
1 TABLET ORAL
Qty: 0 | Refills: 0 | Status: DISCONTINUED | OUTPATIENT
Start: 2018-02-12 | End: 2018-02-15

## 2018-02-12 RX ORDER — SODIUM CHLORIDE 9 MG/ML
1000 INJECTION INTRAMUSCULAR; INTRAVENOUS; SUBCUTANEOUS ONCE
Qty: 0 | Refills: 0 | Status: COMPLETED | OUTPATIENT
Start: 2018-02-12 | End: 2018-02-12

## 2018-02-12 RX ORDER — SODIUM CHLORIDE 9 MG/ML
1000 INJECTION INTRAMUSCULAR; INTRAVENOUS; SUBCUTANEOUS
Qty: 0 | Refills: 0 | Status: DISCONTINUED | OUTPATIENT
Start: 2018-02-12 | End: 2018-02-14

## 2018-02-12 RX ORDER — PANTOPRAZOLE SODIUM 20 MG/1
80 TABLET, DELAYED RELEASE ORAL ONCE
Qty: 0 | Refills: 0 | Status: COMPLETED | OUTPATIENT
Start: 2018-02-12 | End: 2018-02-12

## 2018-02-12 RX ORDER — ALPRAZOLAM 0.25 MG
0.25 TABLET ORAL
Qty: 0 | Refills: 0 | Status: DISCONTINUED | OUTPATIENT
Start: 2018-02-12 | End: 2018-02-15

## 2018-02-12 RX ORDER — CHOLECALCIFEROL (VITAMIN D3) 125 MCG
1000 CAPSULE ORAL DAILY
Qty: 0 | Refills: 0 | Status: DISCONTINUED | OUTPATIENT
Start: 2018-02-12 | End: 2018-02-15

## 2018-02-12 RX ORDER — ATORVASTATIN CALCIUM 80 MG/1
20 TABLET, FILM COATED ORAL AT BEDTIME
Qty: 0 | Refills: 0 | Status: DISCONTINUED | OUTPATIENT
Start: 2018-02-12 | End: 2018-02-15

## 2018-02-12 RX ADMIN — SODIUM CHLORIDE 80 MILLILITER(S): 9 INJECTION INTRAMUSCULAR; INTRAVENOUS; SUBCUTANEOUS at 18:43

## 2018-02-12 RX ADMIN — Medication 1 GRAM(S): at 21:27

## 2018-02-12 RX ADMIN — SODIUM CHLORIDE 1000 MILLILITER(S): 9 INJECTION INTRAMUSCULAR; INTRAVENOUS; SUBCUTANEOUS at 10:13

## 2018-02-12 RX ADMIN — PANTOPRAZOLE SODIUM 10 MG/HR: 20 TABLET, DELAYED RELEASE ORAL at 21:24

## 2018-02-12 RX ADMIN — GABAPENTIN 100 MILLIGRAM(S): 400 CAPSULE ORAL at 21:27

## 2018-02-12 RX ADMIN — ATORVASTATIN CALCIUM 20 MILLIGRAM(S): 80 TABLET, FILM COATED ORAL at 21:27

## 2018-02-12 RX ADMIN — PANTOPRAZOLE SODIUM 10 MG/HR: 20 TABLET, DELAYED RELEASE ORAL at 10:43

## 2018-02-12 RX ADMIN — PANTOPRAZOLE SODIUM 80 MILLIGRAM(S): 20 TABLET, DELAYED RELEASE ORAL at 10:13

## 2018-02-12 NOTE — CONSULT NOTE ADULT - ASSESSMENT
88F with PMH of HLD, HTN, and CVA presents to ED for melena admitted for GIB.     - Plan for EGD/Colonscopy in AM  - BP controlled. Continue amlodipine 2.5mg QD  - Continue atorvastatin 20mg QD   - Montior and replete electrolytes as needed  - RCRI index shows class III risk (6.6%)   - METS.. 88F with PMH of HLD, HTN, and CVA presents to ED for melena admitted for GIB.     - Plan for EGD/Colonscopy today   - BP controlled. Continue amlodipine 2.5mg QD  - Continue atorvastatin 20mg QD   - Continue aspirin 81mg QD   - Monitor and replete electrolytes as needed  - RCRI index shows class III risk (6.6%)   - No acute cardiovascular disease and able to perform 3 METS of exercise without symptoms   - Patient is low risk for low risk procedure 88F with PMH of HLD, HTN, and CVA presents to ED for melena admitted for GIB.     - Plan for EGD/Colonscopy today   - BP controlled. Continue amlodipine 2.5mg QD  - Continue atorvastatin 20mg QD   - Continue aspirin 81mg QD   - Monitor and replete electrolytes as needed  - RCRI index shows class III risk (6.6%)   - No acute cardiovascular disease and able to perform 2.5 METS of exercise without symptoms   - Patient is low risk for low risk procedure 88F with PMH of HLD, HTN, and CVA presents to ED for melena admitted for GIB.     - EGD today showed esophageal ulcers  - No anticoagulation or antiplatelets   - Avoid NSAIDS   - Monitor H/H, transfuse as needed  - BP controlled. Continue amlodipine 2.5mg QD  - Continue atorvastatin 20mg QD   - Continue aspirin 81mg QD   - Monitor and replete electrolytes as needed  - Will follow 88F with PMH of HLD, HTN, and CVA presents to ED for melena admitted for GIB.     - EGD today showed esophageal ulcers  - No anticoagulation or antiplatelets   - Avoid NSAIDS   - Trend H/H, transfuse as needed  - BP controlled. Continue amlodipine 2.5mg QD  - Check ECHO, may get as outpatient   - Continue atorvastatin 20mg QD   - Continue aspirin 81mg QD   - Monitor and replete electrolytes as needed  - Will follow 88F with PMH of HLD, HTN, and CVA presents to ED for melena admitted for GIB.     - EGD today showed esophageal ulcers  - No anticoagulation or antiplatelets   - Avoid NSAIDS   - Trend H/H, transfuse as needed  - BP controlled. Continue amlodipine 2.5mg QD  - Check ECHO, may get as outpatient in our office  - Continue atorvastatin 20mg QD   - Continue aspirin 81mg QD   - Monitor and replete electrolytes as needed  - Will follow

## 2018-02-12 NOTE — H&P ADULT - HISTORY OF PRESENT ILLNESS
This is an 88 F with PMH of HTN HLD PUD (>20 y/a secondary to ASA) who came in c/o one episode of maroon stool this morning. She also had an episode of coffee ground emesis afterwards. She reports that her stool has been dark over the past few days. She attributed it to being started on iron recently for anemia. She feels slightly dizzy. She denies CP, [place]/SOB, palpitations, dysuria, fevers, chills, cough or abdominal pain. She takes ASA 81mg qd and meloxicam prn.

## 2018-02-12 NOTE — ED ADULT NURSE NOTE - OBJECTIVE STATEMENT
Pt received on stretcher via EMS, alert and oriented x 3, [place] bilateral ears. As per aide recatal bleed and coffee ground emesis x 1 day. Pt denies ab pain, fever, chills, sob, chest pain. Pt c/o n/v/d. 20 g inserted to right ac, 1 liter ns infusing, blood specimen obtained sent to lab. No acute s/s of distress noted.

## 2018-02-12 NOTE — ED PROVIDER NOTE - OBJECTIVE STATEMENT
87 yo female BIBEMS for rectal bleeding and an episode of hematemesis this morning.  As per daughter, patient feeling weak, not eating for past few days, had h/h recently done by Dr. Mendoza her PMD who said it was low.  No fever/chills.  No chest pain, no shortness of breath.  No nausea/vomiting/diarrhea.  No abdominal pain.

## 2018-02-12 NOTE — CONSULT NOTE ADULT - PROBLEM SELECTOR RECOMMENDATION 9
NPO   IVF  PPI drip  Bun/Cr ratio elevated cw UGIB  will schedule pt for EGD today  if negative will need colonoscopy

## 2018-02-12 NOTE — CONSULT NOTE ADULT - SUBJECTIVE AND OBJECTIVE BOX
[facility] - Reena Cardenas, Ruddy, Dyllan, Marci, Bria Hooker  Office Number: [phone]    Initial Consult Note    CHIEF COMPLAINT: Patient is a 88y old  Female who presents with a chief complaint of GIB (12 Feb 2018 12:25)    HPI:  This is an 88 F with PMH of HTN HLD PUD (>20 y/a secondary to ASA) who came in c/o one episode of maroon stool this morning. She also had an episode of coffee ground emesis afterwards. She reports that her stool has been dark over the past few days. She attributed it to being started on iron recently for anemia. She feels slightly dizzy. She denies CP, VELÁZQUEZ/SOB, palpitations, dysuria, fevers, chills, cough or abdominal pain. She takes ASA 81mg qd and meloxicam prn.    EKG: NSR @77, Nonspecific ST changes     PAST MEDICAL & SURGICAL HISTORY:  CVA (cerebral vascular accident)  Anxiety  Hip fracture  Fall  Hypertension  Hyperlipidemia  S/P hip replacement: left  S/P hysterectomy    FAMILY HISTORY:  No pertinent family history in first degree relatives    No family history of acute MI or sudden cardiac death.    MEDICATIONS  (STANDING):  atorvastatin 20 milliGRAM(s) Oral at bedtime  cholecalciferol 1000 Unit(s) Oral daily  gabapentin 100 milliGRAM(s) Oral at bedtime  multivitamin 1 Tablet(s) Oral daily  pantoprazole Infusion 8 mG/Hr (10 mL/Hr) IV Continuous <Continuous>  sodium chloride 0.9%. 1000 milliLiter(s) (80 mL/Hr) IV Continuous <Continuous>  sucralfate suspension 1 Gram(s) Oral four times a day    MEDICATIONS  (PRN):  acetaminophen   Tablet 650 milliGRAM(s) Oral every 6 hours PRN For Temp greater than 38 C (100.4 F)  acetaminophen   Tablet. 650 milliGRAM(s) Oral every 6 hours PRN Mild Pain (1 - 3)  ALPRAZolam 0.25 milliGRAM(s) Oral two times a day PRN anxiety      Allergies:   mold, cheese and wine (Unknown)  penicillins (Unknown)      REVIEW OF SYSTEMS:  CONSTITUTIONAL: No weakness, fevers or chills  EYES/ENT: No visual changes;  No vertigo or throat pain   NECK: No pain or stiffness  RESPIRATORY: No cough, wheezing, hemoptysis; No shortness of breath  CARDIOVASCULAR: No chest pain or palpitations  GASTROINTESTINAL: No abdominal pain. No nausea, vomiting, or hematemesis; No diarrhea or constipation. No melena or hematochezia.  GENITOURINARY: No dysuria, frequency or hematuria  NEUROLOGICAL: No numbness or weakness  SKIN: No itching or rash  All other review of systems is negative unless indicated above    VITAL SIGNS:   Vital Signs Last 24 Hrs  T(C): 37.4 (12 Feb 2018 12:51), Max: 37.4 (12 Feb 2018 12:51)  T(F): 99.3 (12 Feb 2018 12:51), Max: 99.3 (12 Feb 2018 12:51)  HR: 85 (12 Feb 2018 12:51) (80 - 91)  BP: 114/66 (12 Feb 2018 12:51) (96/70 - 139/76)  RR: 14 (12 Feb 2018 12:51) (14 - 18)  SpO2: 98% (12 Feb 2018 12:51) (95% - 98%)      On Exam:  Constitutional: NAD, alert and oriented x 3  Lungs:  Non-labored, breath sounds are clear bilaterally, No wheezing, rales or rhonchi  Cardiovascular: RRR.  S1 and S2 positive.  No murmurs, rubs, gallops or clicks  Gastrointestinal: Bowel Sounds present, soft, nontender.   Lymph: No peripheral edema. No cervical lymphadenopathy.  Neurological: Alert, no focal deficits  Skin: No rashes or ulcers   Psych:  Mood & affect appropriate.      LABS: All Labs Reviewed:                        8.8    17.3  )-----------( 280      ( 12 Feb 2018 10:18 )             26.5     139    |  101    |  56     ----------------------------<  106   (12 Feb 2018 10:18)  4.3     |  28     |  0.90     Ca    8.8        12 Feb 2018 10:18    TPro  6.2    /  Alb  2.5    /  TBili  0.4    /  DBili  x      /  AST  19     /  ALT  18     /  AlkPhos  68     12 Feb 2018 10:18    PT/INR - ( 12 Feb 2018 10:18 )   PT: 13.1 sec;   INR: 1.20 ratio    PTT - ( 12 Feb 2018 10:18 )  PTT:24.8 sec    FOBT: Positive     RADIOLOGY:    EXAM:  XR CHEST AP OR PA 1V                        PROCEDURE DATE:  02/12/2018     INTERPRETATION:    INDICATION: July 20.    Single frontal view of chest dated 2/12/2018 9:40 AM, compared to prior   study of 4/27/2017.    FINDINGS/IMPRESSION:     There is shallow inspiration. Subsegmental atelectasis left lung base.   Cardiac and mediastinal structures are difficult to assess portable   technique.    There is osteopenia degenerative changes.     WILFRED PAYNE M.D., ATTENDING RADIOLOGIST  This document has been electronically signed. Feb 12 2018  9:43AM [facility] - Reena Cardenas, Ruddy, Dyllan, Marci, Bria Hooker  Office Number: [phone]    Initial Consult Note    CHIEF COMPLAINT: Patient is a 88y old  Female who presents with a chief complaint of GIB (12 Feb 2018 12:25)    HPI:  This is an 88 F with PMH of HTN HLD PUD (>20 y/a secondary to ASA) who came in c/o one episode of maroon stool this morning. She also had an episode of coffee ground emesis afterwards. She reports that her stool has been dark over the past few days. She attributed it to being started on iron recently for anemia. She feels slightly dizzy. She denies CP, VELÁZQUEZ/SOB, palpitations, dysuria, fevers, chills, cough or abdominal pain. She takes ASA 81mg qd and meloxicam prn.    EKG: NSR @77, Nonspecific ST changes     PAST MEDICAL & SURGICAL HISTORY:  CVA (cerebral vascular accident)  Anxiety  Hip fracture  Fall  Hypertension  Hyperlipidemia  S/P hip replacement: left  S/P hysterectomy    FAMILY HISTORY:  No pertinent family history in first degree relatives    No family history of acute MI or sudden cardiac death.    MEDICATIONS  (STANDING):  atorvastatin 20 milliGRAM(s) Oral at bedtime  cholecalciferol 1000 Unit(s) Oral daily  gabapentin 100 milliGRAM(s) Oral at bedtime  multivitamin 1 Tablet(s) Oral daily  pantoprazole Infusion 8 mG/Hr (10 mL/Hr) IV Continuous <Continuous>  sodium chloride 0.9%. 1000 milliLiter(s) (80 mL/Hr) IV Continuous <Continuous>  sucralfate suspension 1 Gram(s) Oral four times a day    MEDICATIONS  (PRN):  acetaminophen   Tablet 650 milliGRAM(s) Oral every 6 hours PRN For Temp greater than 38 C (100.4 F)  acetaminophen   Tablet. 650 milliGRAM(s) Oral every 6 hours PRN Mild Pain (1 - 3)  ALPRAZolam 0.25 milliGRAM(s) Oral two times a day PRN anxiety      Allergies:   mold, cheese and wine (Unknown)  penicillins (Unknown)      REVIEW OF SYSTEMS:  CONSTITUTIONAL: No weakness, fevers or chills  EYES/ENT: No visual changes;  No vertigo or throat pain   NECK: No pain or stiffness  RESPIRATORY: No cough, wheezing, hemoptysis; No shortness of breath  CARDIOVASCULAR: No chest pain or palpitations  GASTROINTESTINAL: No abdominal pain. No nausea, vomiting, or hematemesis; No diarrhea or constipation. No melena or hematochezia.  GENITOURINARY: No dysuria, frequency or hematuria  NEUROLOGICAL: No numbness or weakness  SKIN: No itching or rash  All other review of systems is negative unless indicated above    VITAL SIGNS:   Vital Signs Last 24 Hrs  T(C): 37.4 (12 Feb 2018 12:51), Max: 37.4 (12 Feb 2018 12:51)  T(F): 99.3 (12 Feb 2018 12:51), Max: 99.3 (12 Feb 2018 12:51)  HR: 85 (12 Feb 2018 12:51) (80 - 91)  BP: 114/66 (12 Feb 2018 12:51) (96/70 - 139/76)  RR: 14 (12 Feb 2018 12:51) (14 - 18)  SpO2: 98% (12 Feb 2018 12:51) (95% - 98%)      On Exam:  Constitutional: Pale, NAD, alert and oriented x 3  Lungs:  Non-labored, breath sounds are clear bilaterally, No wheezing, rales or rhonchi  Cardiovascular: RRR.  S1 and S2 positive.  No murmurs, rubs, gallops or clicks  Gastrointestinal: Bowel Sounds present, soft, nontender.   Lymph: No peripheral edema. No cervical lymphadenopathy.  Neurological: Alert, no focal deficits  Skin: No rashes or ulcers   Psych:  Mood & affect appropriate.      LABS: All Labs Reviewed:                        8.8    17.3  )-----------( 280      ( 12 Feb 2018 10:18 )             26.5     139    |  101    |  56     ----------------------------<  106   (12 Feb 2018 10:18)  4.3     |  28     |  0.90     Ca    8.8        12 Feb 2018 10:18    TPro  6.2    /  Alb  2.5    /  TBili  0.4    /  DBili  x      /  AST  19     /  ALT  18     /  AlkPhos  68     12 Feb 2018 10:18    PT/INR - ( 12 Feb 2018 10:18 )   PT: 13.1 sec;   INR: 1.20 ratio    PTT - ( 12 Feb 2018 10:18 )  PTT:24.8 sec    FOBT: Positive     RADIOLOGY:    EXAM:  XR CHEST AP OR PA 1V                        PROCEDURE DATE:  02/12/2018     INTERPRETATION:    INDICATION: July 20.    Single frontal view of chest dated 2/12/2018 9:40 AM, compared to prior   study of 4/27/2017.    FINDINGS/IMPRESSION:     There is shallow inspiration. Subsegmental atelectasis left lung base.   Cardiac and mediastinal structures are difficult to assess portable   technique.    There is osteopenia degenerative changes.     WILFRED PAYNE M.D., ATTENDING RADIOLOGIST  This document has been electronically signed. Feb 12 2018  9:43AM [facility] - Reena Cardenas, Ruddy, Dyllan, Marci, Bria Hooker  Office Number: [phone]    Initial Consult Note    CHIEF COMPLAINT: Patient is a 88y old  Female who presents with a chief complaint of GIB (12 Feb 2018 12:25)    HPI:  This is an 88 F with PMH of HTN HLD PUD (>20 y/a secondary to ASA) who came in c/o one episode of maroon stool this morning. She also had an episode of coffee ground emesis afterwards. She reports that her stool has been dark over the past few days. She attributed it to being started on iron recently for anemia. She feels slightly dizzy. She denies CP, VELÁZQUEZ/SOB, palpitations, dysuria, fevers, chills, cough or abdominal pain. She takes ASA 81mg qd and meloxicam prn.    EKG: NSR @77, Nonspecific ST changes     PAST MEDICAL & SURGICAL HISTORY:  CVA (cerebral vascular accident)  Anxiety  Hip fracture  Fall  Hypertension  Hyperlipidemia  S/P hip replacement: left  S/P hysterectomy    FAMILY HISTORY:  No pertinent family history in first degree relatives    No family history of acute MI or sudden cardiac death.    MEDICATIONS  (STANDING):  atorvastatin 20 milliGRAM(s) Oral at bedtime  cholecalciferol 1000 Unit(s) Oral daily  gabapentin 100 milliGRAM(s) Oral at bedtime  multivitamin 1 Tablet(s) Oral daily  pantoprazole Infusion 8 mG/Hr (10 mL/Hr) IV Continuous <Continuous>  sodium chloride 0.9%. 1000 milliLiter(s) (80 mL/Hr) IV Continuous <Continuous>  sucralfate suspension 1 Gram(s) Oral four times a day    MEDICATIONS  (PRN):  acetaminophen   Tablet 650 milliGRAM(s) Oral every 6 hours PRN For Temp greater than 38 C (100.4 F)  acetaminophen   Tablet. 650 milliGRAM(s) Oral every 6 hours PRN Mild Pain (1 - 3)  ALPRAZolam 0.25 milliGRAM(s) Oral two times a day PRN anxiety      Allergies:   mold, cheese and wine (Unknown)  penicillins (Unknown)      REVIEW OF SYSTEMS:  CONSTITUTIONAL: No weakness, fevers or chills  EYES/ENT: No visual changes;  No vertigo or throat pain   NECK: No pain or stiffness  RESPIRATORY: No cough, wheezing, hemoptysis; No shortness of breath  CARDIOVASCULAR: No chest pain or palpitations  GASTROINTESTINAL: No abdominal pain. No nausea, vomiting, or hematemesis; No diarrhea or constipation. No melena or hematochezia.  GENITOURINARY: No dysuria, frequency or hematuria  NEUROLOGICAL: No numbness or weakness  SKIN: No itching or rash  All other review of systems is negative unless indicated above    VITAL SIGNS:   Vital Signs Last 24 Hrs  T(C): 37.4 (12 Feb 2018 12:51), Max: 37.4 (12 Feb 2018 12:51)  T(F): 99.3 (12 Feb 2018 12:51), Max: 99.3 (12 Feb 2018 12:51)  HR: 85 (12 Feb 2018 12:51) (80 - 91)  BP: 114/66 (12 Feb 2018 12:51) (96/70 - 139/76)  RR: 14 (12 Feb 2018 12:51) (14 - 18)  SpO2: 98% (12 Feb 2018 12:51) (95% - 98%)      On Exam:  Constitutional: Pale, NAD, alert and oriented x 3  Lungs:  Non-labored, breath sounds are clear bilaterally, No wheezing, rales or rhonchi  Cardiovascular: RRR.  S1 and S2 positive. Systolic murmur +2  Gastrointestinal: Bowel Sounds present, soft, nontender.   Lymph: No peripheral edema. No cervical lymphadenopathy.  Neurological: Alert, no focal deficits  Skin: No rashes or ulcers   Psych:  Mood & affect appropriate.      LABS: All Labs Reviewed:                        8.8    17.3  )-----------( 280      ( 12 Feb 2018 10:18 )             26.5     139    |  101    |  56     ----------------------------<  106   (12 Feb 2018 10:18)  4.3     |  28     |  0.90     Ca    8.8        12 Feb 2018 10:18    TPro  6.2    /  Alb  2.5    /  TBili  0.4    /  DBili  x      /  AST  19     /  ALT  18     /  AlkPhos  68     12 Feb 2018 10:18    PT/INR - ( 12 Feb 2018 10:18 )   PT: 13.1 sec;   INR: 1.20 ratio    PTT - ( 12 Feb 2018 10:18 )  PTT:24.8 sec    FOBT: Positive     RADIOLOGY:    EXAM:  XR CHEST AP OR PA 1V                        PROCEDURE DATE:  02/12/2018     INTERPRETATION:    INDICATION: July 20.    Single frontal view of chest dated 2/12/2018 9:40 AM, compared to prior   study of 4/27/2017.    FINDINGS/IMPRESSION:     There is shallow inspiration. Subsegmental atelectasis left lung base.   Cardiac and mediastinal structures are difficult to assess portable   technique.    There is osteopenia degenerative changes.     WILFRED PAYNE M.D., ATTENDING RADIOLOGIST  This document has been electronically signed. Feb 12 2018  9:43AM

## 2018-02-12 NOTE — H&P ADULT - PROBLEM SELECTOR PLAN 1
Admit  NPO  IVF  IV PPI gtt  Serial cbcs  Transfuse prn  GI consult  May proceed for EGD once cleared by cardio  D/C aspirin and NSAIDs

## 2018-02-12 NOTE — ED PROVIDER NOTE - CONSTITUTIONAL, MLM
normal... pale appearing, awake, alert, oriented to person, place, time/situation and in no apparent distress.

## 2018-02-12 NOTE — CONSULT NOTE ADULT - SUBJECTIVE AND OBJECTIVE BOX
Chief Complaint:  Patient is a 88y old  Female who presents with a chief complaint of   CVA (cerebral vascular accident)  Anxiety  Hip fracture  Fall  Hypertension  Hyperlipidemia  S/P hip replacement  S/P hysterectomy     HPI:      mold, cheese and wine (Unknown)  penicillins (Unknown)      pantoprazole Infusion 8 mG/Hr IV Continuous <Continuous>        FAMILY HISTORY:  No pertinent family history in first degree relatives        Review of Systems:    General:  No wt loss, fevers, chills, night sweats,fatigue,   Eyes:  Good vision, no reported pain  ENT:  No sore throat, pain, runny nose, dysphagia  CV:  No pain, palpitatioins, hypo/hypertension  Resp:  No dyspnea, cough, tachypnea, wheezing  :  No pain, bleeding, incontinence, nocturia  Muscle:  No pain, weakness  Neuro:  No weakness, tingling, memory problems  Psych:  No fatigue, insomnia, mood problems, depression  Endocrine:  No polyuria, polydypsia, cold/heat intolerance  Heme:  No petechiae, ecchymosis, easy bruisability  Skin:  No rash, tattoos, scars, edema    Relevant Family History:       Relevant Social History:       Physical Exam:    Vital Signs:  Vital Signs Last 24 Hrs  T(C): 36.7 (12 Feb 2018 11:15), Max: 36.8 (12 Feb 2018 09:13)  T(F): 98.1 (12 Feb 2018 11:15), Max: 98.3 (12 Feb 2018 09:13)  HR: 80 (12 Feb 2018 11:15) (80 - 91)  BP: 139/76 (12 Feb 2018 11:15) (96/70 - 139/76)  BP(mean): --  RR: 18 (12 Feb 2018 11:15) (18 - 18)  SpO2: 96% (12 Feb 2018 11:15) (96% - 96%)  Daily Height in cm: 149.86 (12 Feb 2018 09:13)    Daily     General:  Appears stated age, well-groomed, well-nourished, no distress  HEENT:  NC/AT,  conjunctivae clear and pink, no thyromegaly, nodules, adenopathy, no JVD  Chest:  Full & symmetric excursion, no increased effort, breath sounds clear  Cardiovascular:  Regular rhythm, S1, S2, no murmur/rub/S3/S4, no abdominal bruit, no edema  Abdomen:  Soft, non-tender, non-distended, normoactive bowel sounds,  no masses ,no hepatosplenomeagaly, no signs of chronic liver disease  Extremities:  no cyanosis,clubbing or edema  Skin:  No rash/erythema/ecchymoses/petechiae/wounds/abscess/warm/dry  Neuro/Psych:  Alert, oriented, no asterixis, no tremor, no encephalopathy    Laboratory:                            8.8    17.3  )-----------( 280      ( 12 Feb 2018 10:18 )             26.5     02-12    139  |  101  |  56<H>  ----------------------------<  106<H>  4.3   |  28  |  0.90    Ca    8.8      12 Feb 2018 10:18    TPro  6.2  /  Alb  2.5<L>  /  TBili  0.4  /  DBili  x   /  AST  19  /  ALT  18  /  AlkPhos  68  02-12    LIVER FUNCTIONS - ( 12 Feb 2018 10:18 )  Alb: 2.5 g/dL / Pro: 6.2 g/dL / ALK PHOS: 68 U/L / ALT: 18 U/L / AST: 19 U/L / GGT: x           PT/INR - ( 12 Feb 2018 10:18 )   PT: 13.1 sec;   INR: 1.20 ratio         PTT - ( 12 Feb 2018 10:18 )  PTT:24.8 sec      Imaging:

## 2018-02-12 NOTE — H&P ADULT - PROBLEM SELECTOR PLAN 4
?etiology  Check U/A  Monitor CBC  Hold off abx for now as there is no source  Further work-up/management pending clinical course.

## 2018-02-12 NOTE — ED PROVIDER NOTE - GASTROINTESTINAL, MLM
Abdomen soft, non-tender, no guarding. rectal exam: + gross blood/melena on exam chaperone [facility] tech

## 2018-02-13 DIAGNOSIS — K26.4 CHRONIC OR UNSPECIFIED DUODENAL ULCER WITH HEMORRHAGE: ICD-10-CM

## 2018-02-13 LAB
ANION GAP SERPL CALC-SCNC: 7 MMOL/L — SIGNIFICANT CHANGE UP (ref 5–17)
BUN SERPL-MCNC: 29 MG/DL — HIGH (ref 7–23)
CALCIUM SERPL-MCNC: 8.1 MG/DL — LOW (ref 8.5–10.1)
CHLORIDE SERPL-SCNC: 104 MMOL/L — SIGNIFICANT CHANGE UP (ref 96–108)
CO2 SERPL-SCNC: 28 MMOL/L — SIGNIFICANT CHANGE UP (ref 22–31)
CREAT SERPL-MCNC: 0.64 MG/DL — SIGNIFICANT CHANGE UP (ref 0.5–1.3)
GLUCOSE SERPL-MCNC: 92 MG/DL — SIGNIFICANT CHANGE UP (ref 70–99)
HCT VFR BLD CALC: 20 % — CRITICAL LOW (ref 34.5–45)
HCT VFR BLD CALC: 28.1 % — LOW (ref 34.5–45)
HGB BLD-MCNC: 6.7 G/DL — CRITICAL LOW (ref 11.5–15.5)
HGB BLD-MCNC: 9.4 G/DL — LOW (ref 11.5–15.5)
MAGNESIUM SERPL-MCNC: 1.9 MG/DL — SIGNIFICANT CHANGE UP (ref 1.6–2.6)
MCHC RBC-ENTMCNC: 31.3 PG — SIGNIFICANT CHANGE UP (ref 27–34)
MCHC RBC-ENTMCNC: 32.7 PG — SIGNIFICANT CHANGE UP (ref 27–34)
MCHC RBC-ENTMCNC: 33.5 GM/DL — SIGNIFICANT CHANGE UP (ref 32–36)
MCHC RBC-ENTMCNC: 33.8 GM/DL — SIGNIFICANT CHANGE UP (ref 32–36)
MCV RBC AUTO: 93.5 FL — SIGNIFICANT CHANGE UP (ref 80–100)
MCV RBC AUTO: 96.8 FL — SIGNIFICANT CHANGE UP (ref 80–100)
PLATELET # BLD AUTO: 205 K/UL — SIGNIFICANT CHANGE UP (ref 150–400)
PLATELET # BLD AUTO: 210 K/UL — SIGNIFICANT CHANGE UP (ref 150–400)
POTASSIUM SERPL-MCNC: 3.6 MMOL/L — SIGNIFICANT CHANGE UP (ref 3.5–5.3)
POTASSIUM SERPL-SCNC: 3.6 MMOL/L — SIGNIFICANT CHANGE UP (ref 3.5–5.3)
RBC # BLD: 2.06 M/UL — LOW (ref 3.8–5.2)
RBC # BLD: 3 M/UL — LOW (ref 3.8–5.2)
RBC # FLD: 12.7 % — SIGNIFICANT CHANGE UP (ref 10.3–14.5)
RBC # FLD: 14.6 % — HIGH (ref 10.3–14.5)
SODIUM SERPL-SCNC: 139 MMOL/L — SIGNIFICANT CHANGE UP (ref 135–145)
WBC # BLD: 12.1 K/UL — HIGH (ref 3.8–10.5)
WBC # BLD: 13.7 K/UL — HIGH (ref 3.8–10.5)
WBC # FLD AUTO: 12.1 K/UL — HIGH (ref 3.8–10.5)
WBC # FLD AUTO: 13.7 K/UL — HIGH (ref 3.8–10.5)

## 2018-02-13 PROCEDURE — 99232 SBSQ HOSP IP/OBS MODERATE 35: CPT

## 2018-02-13 RX ORDER — POTASSIUM CHLORIDE 20 MEQ
10 PACKET (EA) ORAL DAILY
Qty: 0 | Refills: 0 | Status: DISCONTINUED | OUTPATIENT
Start: 2018-02-13 | End: 2018-02-15

## 2018-02-13 RX ADMIN — Medication 1 GRAM(S): at 17:44

## 2018-02-13 RX ADMIN — Medication 1 GRAM(S): at 05:19

## 2018-02-13 RX ADMIN — Medication 1 GRAM(S): at 00:30

## 2018-02-13 RX ADMIN — Medication 1 GRAM(S): at 12:23

## 2018-02-13 RX ADMIN — Medication 10 MILLIEQUIVALENT(S): at 12:23

## 2018-02-13 RX ADMIN — Medication 1 TABLET(S): at 12:23

## 2018-02-13 RX ADMIN — SODIUM CHLORIDE 50 MILLILITER(S): 9 INJECTION INTRAMUSCULAR; INTRAVENOUS; SUBCUTANEOUS at 21:45

## 2018-02-13 RX ADMIN — PANTOPRAZOLE SODIUM 10 MG/HR: 20 TABLET, DELAYED RELEASE ORAL at 10:10

## 2018-02-13 RX ADMIN — SODIUM CHLORIDE 50 MILLILITER(S): 9 INJECTION INTRAMUSCULAR; INTRAVENOUS; SUBCUTANEOUS at 10:09

## 2018-02-13 RX ADMIN — PANTOPRAZOLE SODIUM 10 MG/HR: 20 TABLET, DELAYED RELEASE ORAL at 21:45

## 2018-02-13 RX ADMIN — Medication 1 GRAM(S): at 23:14

## 2018-02-13 RX ADMIN — ATORVASTATIN CALCIUM 20 MILLIGRAM(S): 80 TABLET, FILM COATED ORAL at 21:41

## 2018-02-13 RX ADMIN — Medication 1000 UNIT(S): at 12:23

## 2018-02-13 NOTE — CHART NOTE - NSCHARTNOTEFT_GEN_A_CORE
Called by RN with recent CBC results with H/H 6.7/20. Patient admitted with GI bleed, had endoscopy yesterday which showed esophageal ulcers. Patient denies any bloody BMs or hematemesis since admission. Patient states she currently feels well, just a bit tired.        T(C): 36.6 (18 @ 20:39), Max: 37.4 (18 @ 12:51)  HR: 84 (18 @ 20:39) (80 - 91)  BP: 164/83 (18 @ 20:39) (96/70 - 164/83)  RR: 17 (18 @ 20:39) (14 - 18)  SpO2: 94% (18 @ 20:39) (94% - 98%)  Wt(kg): --    Physical :  Gen- NAD, ncat  Cardio - s+1,s+2, rrr  Lung - cta b/l, no wheeze, no rhonchi, no rales   Abdomen- +BS, NT/ND, no guarding, no rebound, no masses      LABS:                        6.7    13.7  )-----------( 205      ( 2018 01:44 )             20.0     02-12    139  |  101  |  56<H>  ----------------------------<  106<H>  4.3   |  28  |  0.90    Ca    8.8      2018 10:18    TPro  6.2  /  Alb  2.5<L>  /  TBili  0.4  /  DBili  x   /  AST  19  /  ALT  18  /  AlkPhos  68  02-12    PT/INR - ( 2018 10:18 )   PT: 13.1 sec;   INR: 1.20 ratio         PTT - ( 2018 10:18 )  PTT:24.8 sec  Urinalysis Basic - ( 2018 22:27 )    Color: Yellow / Appearance: Slightly Turbid / S.015 / pH: x  Gluc: x / Ketone: Small  / Bili: Negative / Urobili: Negative   Blood: x / Protein: 25 mg/dL / Nitrite: Negative   Leuk Esterase: Trace / RBC: 3-5 /HPF / WBC 3-5   Sq Epi: x / Non Sq Epi: Occasional / Bacteria: Moderate              Assessment/Plan  88yFemale admitted for GI bleed, now with H/H 6.7  1. Spoke with patient and patient's daughter (Kathy) regarding risks and benefits of blood transfusion. Patient and daughter agreeable for transfusion  2. Will transfuse 1 unit PRBCs STAT  3. Monitor H/H  4. Dr. Bañuelos made aware and agrees with plan  5. RN to call with any changes

## 2018-02-13 NOTE — PHYSICAL THERAPY INITIAL EVALUATION ADULT - ADDITIONAL COMMENTS
Pt ambulated with rolling walker at home. Patient lives in a private home with 3 steps to enter, +rails. Patient states she never does outside steps without assist.  Patient has a chair lift indoors.

## 2018-02-13 NOTE — GOALS OF CARE CONVERSATION - PERSONAL ADVANCE DIRECTIVE - CONVERSATION DETAILS
met pt, confirmed hcp from old chart, Johana is hcp, wants all measures at this time. contacted Johana on phone, family has had discussions w pt of her wishes. continue present treatment plan.

## 2018-02-14 ENCOUNTER — TRANSCRIPTION ENCOUNTER (OUTPATIENT)
Age: 83
End: 2018-02-14

## 2018-02-14 LAB
HCT VFR BLD CALC: 30 % — LOW (ref 34.5–45)
HGB BLD-MCNC: 10.2 G/DL — LOW (ref 11.5–15.5)
MCHC RBC-ENTMCNC: 31.6 PG — SIGNIFICANT CHANGE UP (ref 27–34)
MCHC RBC-ENTMCNC: 34.1 GM/DL — SIGNIFICANT CHANGE UP (ref 32–36)
MCV RBC AUTO: 92.7 FL — SIGNIFICANT CHANGE UP (ref 80–100)
PLATELET # BLD AUTO: 235 K/UL — SIGNIFICANT CHANGE UP (ref 150–400)
RBC # BLD: 3.24 M/UL — LOW (ref 3.8–5.2)
RBC # FLD: 13.8 % — SIGNIFICANT CHANGE UP (ref 10.3–14.5)
WBC # BLD: 12.7 K/UL — HIGH (ref 3.8–10.5)
WBC # FLD AUTO: 12.7 K/UL — HIGH (ref 3.8–10.5)

## 2018-02-14 PROCEDURE — 93306 TTE W/DOPPLER COMPLETE: CPT | Mod: 26

## 2018-02-14 PROCEDURE — 99232 SBSQ HOSP IP/OBS MODERATE 35: CPT

## 2018-02-14 RX ORDER — PANTOPRAZOLE SODIUM 20 MG/1
40 TABLET, DELAYED RELEASE ORAL
Qty: 0 | Refills: 0 | Status: DISCONTINUED | OUTPATIENT
Start: 2018-02-14 | End: 2018-02-15

## 2018-02-14 RX ORDER — ACETAMINOPHEN 500 MG
2 TABLET ORAL
Qty: 0 | Refills: 0 | COMMUNITY
Start: 2018-02-14

## 2018-02-14 RX ORDER — SUCRALFATE 1 G
10 TABLET ORAL
Qty: 0 | Refills: 0 | COMMUNITY
Start: 2018-02-14

## 2018-02-14 RX ORDER — PANTOPRAZOLE SODIUM 20 MG/1
1 TABLET, DELAYED RELEASE ORAL
Qty: 60 | Refills: 0 | OUTPATIENT
Start: 2018-02-14 | End: 2018-03-15

## 2018-02-14 RX ORDER — ONDANSETRON 8 MG/1
4 TABLET, FILM COATED ORAL EVERY 6 HOURS
Qty: 0 | Refills: 0 | Status: DISCONTINUED | OUTPATIENT
Start: 2018-02-14 | End: 2018-02-15

## 2018-02-14 RX ORDER — ASPIRIN/CALCIUM CARB/MAGNESIUM 324 MG
81 TABLET ORAL
Qty: 0 | Refills: 0 | COMMUNITY

## 2018-02-14 RX ORDER — MELOXICAM 15 MG/1
15 TABLET ORAL
Qty: 0 | Refills: 0 | COMMUNITY

## 2018-02-14 RX ADMIN — Medication 10 MILLIEQUIVALENT(S): at 12:21

## 2018-02-14 RX ADMIN — ONDANSETRON 4 MILLIGRAM(S): 8 TABLET, FILM COATED ORAL at 09:58

## 2018-02-14 RX ADMIN — Medication 1 GRAM(S): at 12:21

## 2018-02-14 RX ADMIN — Medication 1 GRAM(S): at 18:06

## 2018-02-14 RX ADMIN — PANTOPRAZOLE SODIUM 10 MG/HR: 20 TABLET, DELAYED RELEASE ORAL at 08:48

## 2018-02-14 RX ADMIN — PANTOPRAZOLE SODIUM 40 MILLIGRAM(S): 20 TABLET, DELAYED RELEASE ORAL at 18:05

## 2018-02-14 RX ADMIN — Medication 1000 UNIT(S): at 12:21

## 2018-02-14 RX ADMIN — Medication 1 TABLET(S): at 12:21

## 2018-02-14 RX ADMIN — Medication 1 GRAM(S): at 23:11

## 2018-02-14 RX ADMIN — Medication 1 GRAM(S): at 05:09

## 2018-02-14 RX ADMIN — ATORVASTATIN CALCIUM 20 MILLIGRAM(S): 80 TABLET, FILM COATED ORAL at 21:25

## 2018-02-14 NOTE — DISCHARGE NOTE ADULT - PATIENT PORTAL LINK FT
You can access the [facility] Portal, offered by [facility], by registering with the following website: [email or web address]

## 2018-02-14 NOTE — DISCHARGE NOTE ADULT - HOSPITAL COURSE
admitted for anemia  found to have GIB  underwent EGD  revealed gastric ulcer  diet advance per GI  S/p PRBC and H and H stable  Dc after GI clearance

## 2018-02-14 NOTE — DISCHARGE NOTE ADULT - MEDICATION SUMMARY - MEDICATIONS TO STOP TAKING
I will STOP taking the medications listed below when I get home from the hospital:    Aspirin Low Strength 81 mg oral tablet  -- 81 milligram(s) by mouth once a day    meloxicam 15 mg oral tablet  -- 15 milligram(s) by mouth once a day, As Needed

## 2018-02-14 NOTE — DISCHARGE NOTE ADULT - CARE PROVIDER_API CALL
Josue Mendoza (DO), Medicine  [address] [place] [address] [address]  [place] [address]  Phone: [phone]  Fax: [phone]    Kartik Bañuelos), Gastroenterology  [address] [place] [address]  Phone: [phone]  Fax: [phone]

## 2018-02-14 NOTE — DISCHARGE NOTE ADULT - CARE PLAN
Principal Discharge DX:	Gastrointestinal hemorrhage, unspecified gastrointestinal hemorrhage type  Goal:	free from bleeding  Assessment and plan of treatment:	follow up with GI Dr. BELL  Secondary Diagnosis:	Anxiety  Secondary Diagnosis:	Hyperlipidemia  Secondary Diagnosis:	Hypertension  Secondary Diagnosis:	Leukocytosis, unspecified type

## 2018-02-14 NOTE — DISCHARGE NOTE ADULT - MEDICATION SUMMARY - MEDICATIONS TO TAKE
I will START or STAY ON the medications listed below when I get home from the hospital:    acetaminophen 325 mg oral tablet  -- 2 tab(s) by mouth every 6 hours, As needed, Mild Pain (1 - 3)  -- Indication: For Pain    gabapentin 100 mg oral capsule  -- 100 milligram(s) by mouth once (at bedtime)    patient must use own, will bring in from home  -- Indication: For neurpathy    escitalopram 10 mg oral tablet  -- 10 milligram(s) by mouth once a day  -- Indication: For Depression    rosuvastatin 5 mg oral tablet  -- 5 milligram(s) by mouth once a day (at bedtime)  -- Indication: For Hyperlipidemia, unspecified hyperlipidemia type    ALPRAZolam 0.25 mg oral tablet  -- 0.25 milligram(s) by mouth 2 times a day    verified by Bautista Navarrete  -- Indication: For anxiety    Norvasc 2.5 mg oral tablet  -- 1 tab(s) by mouth 2 times a day  -- It is very important that you take or use this exactly as directed.  Do not skip doses or discontinue unless directed by your doctor.  Some non-prescription drugs may aggravate your condition.  Read all labels carefully.  If a warning appears, check with your doctor before taking.    -- Indication: For Hypertension    sucralfate 1 g/10 mL oral suspension  -- 10 milliliter(s) by mouth 4 times a day  -- Indication: For Gastritis    pantoprazole 40 mg oral delayed release tablet  -- 1 tab(s) by mouth 2 times a day   -- Indication: For PUD    Daily Multiple Vitamins oral tablet  -- 1 tab(s) by mouth once a day  -- Indication: For suplement    Vitamin D3 1000 intl units oral tablet  -- 1000 unit(s) by mouth once a day  -- Indication: For suplement I will START or STAY ON the medications listed below when I get home from the hospital:    acetaminophen 325 mg oral tablet  -- 2 tab(s) by mouth every 6 hours, As needed, Mild Pain (1 - 3)  -- Indication: For Pain    gabapentin 100 mg oral capsule  -- 100 milligram(s) by mouth once (at bedtime)    patient must use own, will bring in from home  -- Indication: For neurpathy    escitalopram 10 mg oral tablet  -- 10 milligram(s) by mouth once a day  -- Indication: For Depression    rosuvastatin 5 mg oral tablet  -- 5 milligram(s) by mouth once a day (at bedtime)  -- Indication: For Hyperlipidemia, unspecified hyperlipidemia type    ALPRAZolam 0.25 mg oral tablet  -- 0.25 milligram(s) by mouth 2 times a day    verified by Bautista Navarrete  -- Indication: For anxiety    Norvasc 2.5 mg oral tablet  -- 1 tab(s) by mouth 2 times a day  -- It is very important that you take or use this exactly as directed.  Do not skip doses or discontinue unless directed by your doctor.  Some non-prescription drugs may aggravate your condition.  Read all labels carefully.  If a warning appears, check with your doctor before taking.    -- Indication: For Hypertension    sucralfate 1 g/10 mL oral suspension  -- 10 milliliter(s) by mouth 4 times a day  -- Indication: For Gastritis    sucralfate 1 g oral tablet  -- 1 tab(s) by mouth 3 times a day (with meals)   -- Do not take dairy products, antacids, or iron preparations within one hour of this medication.  It is very important that you take or use this exactly as directed.  Do not skip doses or discontinue unless directed by your doctor.  Take medication on an empty stomach 1 hour before or 2 to 3 hours after a meal unless otherwise directed by your doctor.    -- Indication: For Gastritis    pantoprazole 40 mg oral delayed release tablet  -- 1 tab(s) by mouth 2 times a day   -- Indication: For PUD    Daily Multiple Vitamins oral tablet  -- 1 tab(s) by mouth once a day  -- Indication: For suplement    Vitamin D3 1000 intl units oral tablet  -- 1000 unit(s) by mouth once a day  -- Indication: For suplement

## 2018-02-14 NOTE — DISCHARGE NOTE ADULT - CARE PROVIDERS DIRECT ADDRESSES
,[email or web address]

## 2018-02-15 VITALS
DIASTOLIC BLOOD PRESSURE: 75 MMHG | SYSTOLIC BLOOD PRESSURE: 115 MMHG | RESPIRATION RATE: 18 BRPM | TEMPERATURE: 99 F | OXYGEN SATURATION: 92 % | HEART RATE: 104 BPM

## 2018-02-15 LAB
HCT VFR BLD CALC: 28.2 % — LOW (ref 34.5–45)
HGB BLD-MCNC: 9.6 G/DL — LOW (ref 11.5–15.5)

## 2018-02-15 PROCEDURE — 85018 HEMOGLOBIN: CPT

## 2018-02-15 PROCEDURE — 86900 BLOOD TYPING SEROLOGIC ABO: CPT

## 2018-02-15 PROCEDURE — 97162 PT EVAL MOD COMPLEX 30 MIN: CPT

## 2018-02-15 PROCEDURE — 81001 URINALYSIS AUTO W/SCOPE: CPT

## 2018-02-15 PROCEDURE — 86850 RBC ANTIBODY SCREEN: CPT

## 2018-02-15 PROCEDURE — 93005 ELECTROCARDIOGRAM TRACING: CPT

## 2018-02-15 PROCEDURE — 99285 EMERGENCY DEPT VISIT HI MDM: CPT | Mod: 25

## 2018-02-15 PROCEDURE — 80053 COMPREHEN METABOLIC PANEL: CPT

## 2018-02-15 PROCEDURE — 85027 COMPLETE CBC AUTOMATED: CPT

## 2018-02-15 PROCEDURE — P9016: CPT

## 2018-02-15 PROCEDURE — 85730 THROMBOPLASTIN TIME PARTIAL: CPT

## 2018-02-15 PROCEDURE — 96376 TX/PRO/DX INJ SAME DRUG ADON: CPT

## 2018-02-15 PROCEDURE — 80048 BASIC METABOLIC PNL TOTAL CA: CPT

## 2018-02-15 PROCEDURE — 82272 OCCULT BLD FECES 1-3 TESTS: CPT

## 2018-02-15 PROCEDURE — 96374 THER/PROPH/DIAG INJ IV PUSH: CPT | Mod: 59

## 2018-02-15 PROCEDURE — 85610 PROTHROMBIN TIME: CPT

## 2018-02-15 PROCEDURE — 93306 TTE W/DOPPLER COMPLETE: CPT

## 2018-02-15 PROCEDURE — 83735 ASSAY OF MAGNESIUM: CPT

## 2018-02-15 PROCEDURE — 99232 SBSQ HOSP IP/OBS MODERATE 35: CPT

## 2018-02-15 PROCEDURE — 36430 TRANSFUSION BLD/BLD COMPNT: CPT

## 2018-02-15 PROCEDURE — 71045 X-RAY EXAM CHEST 1 VIEW: CPT

## 2018-02-15 PROCEDURE — 86923 COMPATIBILITY TEST ELECTRIC: CPT

## 2018-02-15 PROCEDURE — 86901 BLOOD TYPING SEROLOGIC RH(D): CPT

## 2018-02-15 RX ORDER — SUCRALFATE 1 G
1 TABLET ORAL
Qty: 90 | Refills: 0 | OUTPATIENT
Start: 2018-02-15 | End: 2018-03-16

## 2018-02-15 RX ADMIN — Medication 1 TABLET(S): at 12:50

## 2018-02-15 RX ADMIN — Medication 0.25 MILLIGRAM(S): at 12:50

## 2018-02-15 RX ADMIN — Medication 1 GRAM(S): at 05:08

## 2018-02-15 RX ADMIN — Medication 10 MILLIEQUIVALENT(S): at 12:50

## 2018-02-15 RX ADMIN — Medication 1 GRAM(S): at 12:50

## 2018-02-15 RX ADMIN — Medication 1000 UNIT(S): at 12:50

## 2018-02-15 RX ADMIN — PANTOPRAZOLE SODIUM 40 MILLIGRAM(S): 20 TABLET, DELAYED RELEASE ORAL at 05:08

## 2018-02-15 NOTE — CHART NOTE - NSCHARTNOTEFT_GEN_A_CORE

## 2018-02-15 NOTE — PROGRESS NOTE ADULT - PROVIDER SPECIALTY LIST ADULT
Anesthesia
Cardiology
Cardiology
Family Medicine
Family Medicine
Gastroenterology
Gastroenterology
Cardiology

## 2018-02-15 NOTE — PROGRESS NOTE ADULT - PROBLEM SELECTOR PLAN 1
ppi bid  carafate qid  stable for dc
secondary to DUs  ppi drip x 72 hours  carafate qid  no nsaids  liquid diet today also   dw daughter at bedside
Admit  NPO  IVF  IV PPI gtt  Serial cbcs  Transfuse prn  GI consult with Dr. BLACKWELL  May proceed for EGD once cleared by cardio  D/C aspirin and NSAIDs
Admit  s/p egd  IV PPI gtt, change to PO  Serial cbcs  Transfuse prn  GI consult with Dr. BLACKWELL appreciated  May proceed for EGD once cleared by cardio  D/C aspirin and NSAIDs

## 2018-02-15 NOTE — PROGRESS NOTE ADULT - ASSESSMENT
88F with PMH of HLD, HTN, and CVA presents to ED for melena admitted for GIB.     - s/p EGD that showed esophageal ulcers  - No anticoagulation or antiplatelets   - Avoid NSAIDS   - H/H stable    - BP controlled, even off amlodipine. would not add any bp meds at this time  - echo with av sclerosis, but nothing significanat  - Continue atorvastatin 20mg QD   - Continue aspirin 81mg QD   - Monitor and replete electrolytes as needed  - Will follow
88F with PMH of HLD, HTN, and CVA presents to ED for melena admitted for GIB.     - EGD today showed esophageal ulcers  - No anticoagulation or antiplatelets   - Avoid NSAIDS   - Trend H/H, transfuse as needed  - BP controlled. Amlodipine held.  - Check ECHO, may get as outpatient in our office  - Continue atorvastatin 20mg QD   - Continue aspirin 81mg QD   - Monitor and replete electrolytes as needed  - Will follow
88F with PMH of HLD, HTN, and CVA presents to ED for melena admitted for GIB.     - s/p EGD that showed esophageal ulcers  - No anticoagulation or antiplatelets   - Avoid NSAIDS   - H/H stable s/p 3 units of PRBCs, Monitor CBC and transfuse as needed.   - BP elevated. Restart amlodipine 2.5mg BID.   - Check ECHO, may get as outpatient in our office  - Continue atorvastatin 20mg QD   - Continue aspirin 81mg QD   - Monitor and replete electrolytes as needed  - Will follow
This is an 88 F comes with UGI bleed
This is an 88 F comes with UGI bleed

## 2018-02-15 NOTE — PROGRESS NOTE ADULT - SUBJECTIVE AND OBJECTIVE BOX
[facility]    Reena Cardenas, Ruddy, Dyllan, Marci, Morro, Bria      [phone]    CHIEF COMPLAINT: Patient is a 88y old  Female who presents with a chief complaint of GIB (14 Feb 2018 16:08)      Follow Up: cardiac murmur, s/p gib    Interim history: The patient reports no new symptoms.  Denies chest discomfort and shortness of breath.  No abdominal pain.  No new neurologic symptoms.      MEDICATIONS  (STANDING):  atorvastatin 20 milliGRAM(s) Oral at bedtime  cholecalciferol 1000 Unit(s) Oral daily  gabapentin 100 milliGRAM(s) Oral at bedtime  multivitamin 1 Tablet(s) Oral daily  pantoprazole    Tablet 40 milliGRAM(s) Oral two times a day before meals  potassium chloride    Tablet ER 10 milliEquivalent(s) Oral daily  sucralfate suspension 1 Gram(s) Oral four times a day    MEDICATIONS  (PRN):  ALPRAZolam 0.25 milliGRAM(s) Oral two times a day PRN anxiety  ondansetron Injectable 4 milliGRAM(s) IV Push every 6 hours PRN Nausea and/or Vomiting      REVIEW OF SYSTEMS:  eye, ent, GI, , allergic, dermatologic, musculoskeletal and neurologic are negative except as described above    Vital Signs Last 24 Hrs  T(C): 36.9 (15 Feb 2018 04:43), Max: 36.9 (15 Feb 2018 04:43)  T(F): 98.5 (15 Feb 2018 04:43), Max: 98.5 (15 Feb 2018 04:43)  HR: 98 (15 Feb 2018 04:43) (98 - 106)  BP: 127/80 (15 Feb 2018 04:43) (126/85 - 127/80)  BP(mean): --  RR: 17 (15 Feb 2018 04:43) (17 - 17)  SpO2: 98% (15 Feb 2018 04:43) (94% - 98%)    I&O's Summary    14 Feb 2018 07:01  -  15 Feb 2018 07:00  --------------------------------------------------------  IN: 164 mL / OUT: 200 mL / NET: -36 mL        Telemetry past 24h:    PHYSICAL EXAM:    Constitutional: well-nourished, well-developed, NAD   HEENT:  MMM, sclerae anicteric, conjunctivae clear, no oral cyanosis.  Pulmonary: Non-labored, breath sounds are clear bilaterally, No wheezing, rales or rhonchi  Cardiovascular: Regular, S1 and S2.  1/6 sys murmur.  No rubs, gallops or clicks  Gastrointestinal: Bowel Sounds present, soft, nontender.   Lymph: No peripheral edema.   Neurological: Alert, no focal deficits  Skin: No rashes.  Psych:  Mood & affect appropriate    LABS: All Labs Reviewed:                        9.6    x     )-----------( x        ( 15 Feb 2018 06:12 )             28.2                         10.2   12.7  )-----------( 235      ( 14 Feb 2018 07:31 )             30.0                         9.4    12.1  )-----------( 210      ( 13 Feb 2018 08:12 )             28.1     13 Feb 2018 08:12    139    |  104    |  29     ----------------------------<  92     3.6     |  28     |  0.64     Ca    8.1        13 Feb 2018 08:12  Mg     1.9       13 Feb 2018 08:12            Blood Culture:         RADIOLOGY:    EKG:    Echo:    < from: TTE Echo Doppler w/o Cont (02.14.18 @ 15:08) >     EXAM:  ECHO TTE W/O CON COMP W/DOPPLR         PROCEDURE DATE:  02/14/2018        INTERPRETATION:  INDICATION: Cardiac murmur    Blood Pressure 157/90    Height 149.8     Weight 54       BSA 1.6    Dimensions:    LA 3.3       Normal Values: 2.0 - 4.0 cm    Ao 3.1        Normal Values: 2.0 - 3.8 cm  SEPTUM 0.9       Normal Values: 0.6 - 1.2 cm  PWT 0.9       Normal Values: 0.6 - 1.1 cm  LVIDd 3.4         Normal Values: 3.0 - 5.6 cm  LVIDs 2.0         Normal Values: 1.8 - 4.0 cm    Derived Variables:  LVMI     g/m2  RWT      Fractional Short      Ejection Fraction >75    Doppler Peak v. AoV=   (m/sec)    OBSERVATIONS: This is a technically limited study with suboptimal   endocardial definition, without subcostal views, and with limited apical  views. Within these limitations, the left ventricle is normal in size   with vigorous systolic function, estimated LVEF >75%. Normal RV size and   systolic function. Grade 1 diastolic dysfunction. The aortic valve   leaflets are not well seen, but appear calcified without stenosis. Other   valves appear structurally and functionally normal as visualized.   Inadequate TR jet to estimate PA systolic pressure. No significant   pericardial effusion.                  AMALIA BROWNE M.D., ATTENDING CARDIOLOGIST  This document has been electronically signed. Feb 14 2018  7:53PM                < end of copied text >
[facility] - Reena Cardenas, Ruddy, Dyllan, Marci, Bria Hooker  Office Number:  [phone]    Patient resting comfortably in bed in NAD.  Laying flat with no respiratory distress.  No complaints of chest pain, dyspnea, palpitations, PND, or orthopnea. Hb is stable    ROS: negative unless otherwise mentioned.    Telemetry:  not on tele    MEDICATIONS  (STANDING):  atorvastatin 20 milliGRAM(s) Oral at bedtime  cholecalciferol 1000 Unit(s) Oral daily  gabapentin 100 milliGRAM(s) Oral at bedtime  multivitamin 1 Tablet(s) Oral daily  pantoprazole Infusion 8 mG/Hr (10 mL/Hr) IV Continuous <Continuous>  potassium chloride    Tablet ER 10 milliEquivalent(s) Oral daily  sodium chloride 0.9%. 1000 milliLiter(s) (50 mL/Hr) IV Continuous <Continuous>  sucralfate suspension 1 Gram(s) Oral four times a day    MEDICATIONS  (PRN):  acetaminophen   Tablet 650 milliGRAM(s) Oral every 6 hours PRN For Temp greater than 38 C (100.4 F)  acetaminophen   Tablet. 650 milliGRAM(s) Oral every 6 hours PRN Mild Pain (1 - 3)  ALPRAZolam 0.25 milliGRAM(s) Oral two times a day PRN anxiety      Allergies    mold, cheese and wine (Unknown)  penicillins (Unknown)    Intolerances        Vital Signs Last 24 Hrs  T(C): 36.9 (13 Feb 2018 04:57), Max: 37.3 (12 Feb 2018 18:18)  T(F): 98.4 (13 Feb 2018 04:57), Max: 99.2 (12 Feb 2018 18:18)  HR: 82 (13 Feb 2018 04:57) (82 - 87)  BP: 128/77 (13 Feb 2018 04:57) (128/77 - 164/83)  BP(mean): --  RR: 17 (13 Feb 2018 04:57) (16 - 17)  SpO2: 98% (13 Feb 2018 04:57) (94% - 98%)    I&O's Summary    12 Feb 2018 07:01  -  13 Feb 2018 07:00  --------------------------------------------------------  IN: 1440 mL / OUT: 900 mL / NET: 540 mL        ON EXAM:    Constitutional: Pale, NAD, alert and oriented x 3  Lungs:  Non-labored, breath sounds are clear bilaterally, No wheezing, rales or rhonchi  Cardiovascular: RRR.  S1 and S2 positive. Systolic murmur II/VI at RUSB  Gastrointestinal: Bowel Sounds present, soft, nontender.   Lymph: No peripheral edema. No cervical lymphadenopathy.  Neurological: Alert, no focal deficits  Skin: No rashes or ulcers   Psych:  Mood & affect appropriate.    LABS: All Labs Reviewed:                        9.4    12.1  )-----------( 210      ( 13 Feb 2018 08:12 )             28.1                         6.7    13.7  )-----------( 205      ( 13 Feb 2018 01:44 )             20.0                         7.8    18.6  )-----------( 234      ( 12 Feb 2018 21:02 )             22.9     13 Feb 2018 08:12    139    |  104    |  29     ----------------------------<  92     3.6     |  28     |  0.64   12 Feb 2018 10:18    139    |  101    |  56     ----------------------------<  106    4.3     |  28     |  0.90     Ca    8.1        13 Feb 2018 08:12  Ca    8.8        12 Feb 2018 10:18  Mg     1.9       13 Feb 2018 08:12    TPro  6.2    /  Alb  2.5    /  TBili  0.4    /  DBili  x      /  AST  19     /  ALT  18     /  AlkPhos  68     12 Feb 2018 10:18    PT/INR - ( 12 Feb 2018 10:18 )   PT: 13.1 sec;   INR: 1.20 ratio         PTT - ( 12 Feb 2018 10:18 )  PTT:24.8 sec      Blood Culture:
INTERVAL HPI/OVERNIGHT EVENTS:  No new overnight event.  No N/V/D.  Tolerating diet.     MEDICATIONS  (STANDING):  atorvastatin 20 milliGRAM(s) Oral at bedtime  cholecalciferol 1000 Unit(s) Oral daily  gabapentin 100 milliGRAM(s) Oral at bedtime  multivitamin 1 Tablet(s) Oral daily  pantoprazole    Tablet 40 milliGRAM(s) Oral two times a day before meals  potassium chloride    Tablet ER 10 milliEquivalent(s) Oral daily  sucralfate suspension 1 Gram(s) Oral four times a day    MEDICATIONS  (PRN):  ALPRAZolam 0.25 milliGRAM(s) Oral two times a day PRN anxiety  ondansetron Injectable 4 milliGRAM(s) IV Push every 6 hours PRN Nausea and/or Vomiting      Allergies    mold, cheese and wine (Unknown)  penicillins (Unknown)    Intolerances        Review of Systems:    General:  No wt loss, fevers, chills, night sweats,fatigue,   Eyes:  Good vision, no reported pain  ENT:  No sore throat, pain, runny nose, dysphagia  CV:  No pain, palpitatioins, hypo/hypertension  Resp:  No dyspnea, cough, tachypnea, wheezing  GI:  No pain, No nausea, No vomiting, No diarrhea, No constipatiion, No weight loss, No fever, No pruritis, No rectal bleeding, No tarry stools, No dysphagia,  :  No pain, bleeding, incontinence, nocturia  Muscle:  No pain, weakness  Neuro:  No weakness, tingling, memory problems  Psych:  No fatigue, insomnia, mood problems, depression  Endocrine:  No polyuria, polydypsia, cold/heat intolerance  Heme:  No petechiae, ecchymosis, easy bruisability  Skin:  No rash, tattoos, scars, edema      Vital Signs Last 24 Hrs  T(C): 36.9 (15 Feb 2018 04:43), Max: 36.9 (15 Feb 2018 04:43)  T(F): 98.5 (15 Feb 2018 04:43), Max: 98.5 (15 Feb 2018 04:43)  HR: 98 (15 Feb 2018 04:43) (98 - 106)  BP: 127/80 (15 Feb 2018 04:43) (126/85 - 127/80)  BP(mean): --  RR: 17 (15 Feb 2018 04:43) (17 - 17)  SpO2: 98% (15 Feb 2018 04:43) (94% - 98%)    PHYSICAL EXAM:    Constitutional: NAD, well-developed  HEENT: EOMI, throat clear  Neck: No LAD, supple  Respiratory: CTA and P  Cardiovascular: S1 and S2, RRR, no M  Gastrointestinal: BS+, soft, NT/ND, neg HSM,  Extremities: No peripheral edema, neg clubing, cyanosis  Vascular: 2+ peripheral pulses  Neurological: A/O x 3, no focal deficits  Psychiatric: Normal mood, normal affect  Skin: No rashes      LABS:                        9.6    x     )-----------( x        ( 15 Feb 2018 06:12 )             28.2                 RADIOLOGY & ADDITIONAL TESTS:
INTERVAL HPI/OVERNIGHT EVENTS:  No new overnight event.  No N/V/D.  Tolerating diet.   some black stool    MEDICATIONS  (STANDING):  atorvastatin 20 milliGRAM(s) Oral at bedtime  cholecalciferol 1000 Unit(s) Oral daily  gabapentin 100 milliGRAM(s) Oral at bedtime  multivitamin 1 Tablet(s) Oral daily  pantoprazole Infusion 8 mG/Hr (10 mL/Hr) IV Continuous <Continuous>  sodium chloride 0.9%. 1000 milliLiter(s) (50 mL/Hr) IV Continuous <Continuous>  sucralfate suspension 1 Gram(s) Oral four times a day    MEDICATIONS  (PRN):  acetaminophen   Tablet 650 milliGRAM(s) Oral every 6 hours PRN For Temp greater than 38 C (100.4 F)  acetaminophen   Tablet. 650 milliGRAM(s) Oral every 6 hours PRN Mild Pain (1 - 3)  ALPRAZolam 0.25 milliGRAM(s) Oral two times a day PRN anxiety      Allergies    mold, cheese and wine (Unknown)  penicillins (Unknown)    Intolerances        Review of Systems:    General:  No wt loss, fevers, chills, night sweats,fatigue,   Eyes:  Good vision, no reported pain  ENT:  No sore throat, pain, runny nose, dysphagia  CV:  No pain, palpitatioins, hypo/hypertension  Resp:  No dyspnea, cough, tachypnea, wheezing  GI:  No pain, No nausea, No vomiting, No diarrhea, No constipatiion, No weight loss, No fever, No pruritis, No rectal bleeding, No tarry stools, No dysphagia,  :  No pain, bleeding, incontinence, nocturia  Muscle:  No pain, weakness  Neuro:  No weakness, tingling, memory problems  Psych:  No fatigue, insomnia, mood problems, depression  Endocrine:  No polyuria, polydypsia, cold/heat intolerance  Heme:  No petechiae, ecchymosis, easy bruisability  Skin:  No rash, tattoos, scars, edema      Vital Signs Last 24 Hrs  T(C): 36.9 (2018 04:57), Max: 37.4 (2018 12:51)  T(F): 98.4 (2018 04:57), Max: 99.3 (2018 12:51)  HR: 82 (2018 04:57) (80 - 87)  BP: 128/77 (2018 04:57) (107/72 - 164/83)  BP(mean): --  RR: 17 (2018 04:57) (14 - 18)  SpO2: 98% (2018 04:57) (94% - 98%)    PHYSICAL EXAM:    Constitutional: NAD, well-developed  HEENT: EOMI, throat clear  Neck: No LAD, supple  Respiratory: CTA and P  Cardiovascular: S1 and S2, RRR, no M  Gastrointestinal: BS+, soft, NT/ND, neg HSM,  Extremities: No peripheral edema, neg clubing, cyanosis  Vascular: 2+ peripheral pulses  Neurological: A/O x 3, no focal deficits  Psychiatric: Normal mood, normal affect  Skin: No rashes      LABS:                        9.4    12.1  )-----------( 210      ( 2018 08:12 )             28.1     02-13    139  |  104  |  29<H>  ----------------------------<  92  3.6   |  28  |  0.64    Ca    8.1<L>      2018 08:12  Mg     1.9     02-13    TPro  6.2  /  Alb  2.5<L>  /  TBili  0.4  /  DBili  x   /  AST  19  /  ALT  18  /  AlkPhos  68  02-12    PT/INR - ( 2018 10:18 )   PT: 13.1 sec;   INR: 1.20 ratio         PTT - ( 2018 10:18 )  PTT:24.8 sec  Urinalysis Basic - ( 2018 22:27 )    Color: Yellow / Appearance: Slightly Turbid / S.015 / pH: x  Gluc: x / Ketone: Small  / Bili: Negative / Urobili: Negative   Blood: x / Protein: 25 mg/dL / Nitrite: Negative   Leuk Esterase: Trace / RBC: 3-5 /HPF / WBC 3-5   Sq Epi: x / Non Sq Epi: Occasional / Bacteria: Moderate        RADIOLOGY & ADDITIONAL TESTS:    Advanced care planning was discussed with patient and family.  Advanced care planning forms were reviewed and discussed.  Risks, benefits and alternatives of gastroenterologic procedures were discussed in detail and all questions were answered.    25 minutes spent.
Patient is a 88y old  Female who presents with a chief complaint of GIB (14 Feb 2018 16:08)      INTERVAL /OVERNIGHT EVENTS: no further bleeding    MEDICATIONS  (STANDING):  atorvastatin 20 milliGRAM(s) Oral at bedtime  cholecalciferol 1000 Unit(s) Oral daily  gabapentin 100 milliGRAM(s) Oral at bedtime  multivitamin 1 Tablet(s) Oral daily  pantoprazole    Tablet 40 milliGRAM(s) Oral two times a day before meals  potassium chloride    Tablet ER 10 milliEquivalent(s) Oral daily  sucralfate suspension 1 Gram(s) Oral four times a day    MEDICATIONS  (PRN):  ALPRAZolam 0.25 milliGRAM(s) Oral two times a day PRN anxiety  ondansetron Injectable 4 milliGRAM(s) IV Push every 6 hours PRN Nausea and/or Vomiting      Allergies    mold, cheese and wine (Unknown)  penicillins (Unknown)    Intolerances        REVIEW OF SYSTEMS:  CONSTITUTIONAL: No fever, weight loss, or fatigue  EYES: No eye pain, visual disturbances, or discharge  ENMT:  No difficulty hearing, tinnitus, vertigo; No sinus or throat pain  NECK: No pain or stiffness  RESPIRATORY: No cough, wheezing, chills or hemoptysis; No shortness of breath  CARDIOVASCULAR: No chest pain, palpitations, dizziness, or leg swelling  GASTROINTESTINAL: No abdominal or epigastric pain. No nausea, vomiting, or hematemesis; No diarrhea or constipation. No melena or hematochezia.  GENITOURINARY: No dysuria, frequency, hematuria, or incontinence  NEUROLOGICAL: No headaches, memory loss, loss of strength, numbness, or tremors  SKIN: No itching, burning, rashes, or lesions   LYMPH NODES: No enlarged glands  ENDOCRINE: No heat or cold intolerance; No hair loss; No polydipsia or polyuria  MUSCULOSKELETAL: No joint pain or swelling; No muscle, back, or extremity pain  PSYCHIATRIC: No depression, anxiety, mood swings, or difficulty sleeping  HEME/LYMPH: No easy bruising, or bleeding gums  ALLERGY AND IMMUNOLOGIC: No hives or eczema    Vital Signs Last 24 Hrs  T(C): 36.6 (14 Feb 2018 20:27), Max: 36.6 (14 Feb 2018 04:34)  T(F): 97.9 (14 Feb 2018 20:27), Max: 97.9 (14 Feb 2018 04:34)  HR: 106 (14 Feb 2018 20:27) (90 - 106)  BP: 126/85 (14 Feb 2018 20:27) (126/85 - 157/90)  BP(mean): --  RR: 17 (14 Feb 2018 20:27) (17 - 17)  SpO2: 94% (14 Feb 2018 20:27) (94% - 95%)    PHYSICAL EXAM:  GENERAL: NAD, well-groomed, well-developed  HEAD:  Atraumatic, Normocephalic  EYES: EOMI, PERRLA, conjunctiva and sclera clear  ENMT: No tonsillar erythema, exudates, or enlargement; Moist mucous membranes, Good dentition, No lesions  NECK: Supple, No JVD, Normal thyroid  NERVOUS SYSTEM:  Alert & Oriented X3, Good concentration; Motor Strength 5/5 B/L upper and lower extremities; DTRs 2+ intact and symmetric  CHEST/LUNG: Clear to auscultation bilaterally; No rales, rhonchi, wheezing, or rubs  HEART: Regular rate and rhythm; No murmurs, rubs, or gallops  ABDOMEN: Soft, Nontender, Nondistended; Bowel sounds present  EXTREMITIES:  2+ Peripheral Pulses, No clubbing, cyanosis, or edema  LYMPH: No lymphadenopathy noted  SKIN: No rashes or lesions    LABS:                        10.2   12.7  )-----------( 235      ( 14 Feb 2018 07:31 )             30.0       Ca    8.1        13 Feb 2018 08:12          CAPILLARY BLOOD GLUCOSE          RADIOLOGY & ADDITIONAL TESTS:    Notes Reviewed:  [x ] YES  [ ] NO    Care Discussed with Consultants/Other Providers [x ] YES  [ ] NO
Patient is a 88y old  Female who presents with a chief complaint of GIB (2018 12:25)      INTERVAL /OVERNIGHT EVENTS: feels better, denies dizziness    MEDICATIONS  (STANDING):  atorvastatin 20 milliGRAM(s) Oral at bedtime  cholecalciferol 1000 Unit(s) Oral daily  gabapentin 100 milliGRAM(s) Oral at bedtime  multivitamin 1 Tablet(s) Oral daily  pantoprazole Infusion 8 mG/Hr (10 mL/Hr) IV Continuous <Continuous>  potassium chloride    Tablet ER 10 milliEquivalent(s) Oral daily  sodium chloride 0.9%. 1000 milliLiter(s) (50 mL/Hr) IV Continuous <Continuous>  sucralfate suspension 1 Gram(s) Oral four times a day    MEDICATIONS  (PRN):  acetaminophen   Tablet 650 milliGRAM(s) Oral every 6 hours PRN For Temp greater than 38 C (100.4 F)  acetaminophen   Tablet. 650 milliGRAM(s) Oral every 6 hours PRN Mild Pain (1 - 3)  ALPRAZolam 0.25 milliGRAM(s) Oral two times a day PRN anxiety      Allergies    mold, cheese and wine (Unknown)  penicillins (Unknown)    Intolerances        REVIEW OF SYSTEMS:  CONSTITUTIONAL: No fever, weight loss, or fatigue  EYES: No eye pain, visual disturbances, or discharge  ENMT:  No difficulty hearing, tinnitus, vertigo; No sinus or throat pain  NECK: No pain or stiffness  RESPIRATORY: No cough, wheezing, chills or hemoptysis; No shortness of breath  CARDIOVASCULAR: No chest pain, palpitations, dizziness, or leg swelling  GASTROINTESTINAL: No abdominal or epigastric pain. No nausea, vomiting, or hematemesis; No diarrhea or constipation. No melena or hematochezia.  GENITOURINARY: No dysuria, frequency, hematuria, or incontinence  NEUROLOGICAL: No headaches, memory loss, loss of strength, numbness, or tremors  SKIN: No itching, burning, rashes, or lesions   LYMPH NODES: No enlarged glands  ENDOCRINE: No heat or cold intolerance; No hair loss; No polydipsia or polyuria  MUSCULOSKELETAL: No joint pain or swelling; No muscle, back, or extremity pain  PSYCHIATRIC: No depression, anxiety, mood swings, or difficulty sleeping  HEME/LYMPH: No easy bruising, or bleeding gums  ALLERGY AND IMMUNOLOGIC: No hives or eczema    Vital Signs Last 24 Hrs  T(C): 36.6 (2018 14:00), Max: 37.3 (2018 18:18)  T(F): 97.9 (2018 14:00), Max: 99.2 (2018 18:18)  HR: 78 (2018 14:00) (78 - 87)  BP: 176/94 (2018 14:00) (128/77 - 176/94)  BP(mean): --  RR: 17 (2018 14:00) (16 - 17)  SpO2: 96% (2018 14:00) (94% - 98%)    PHYSICAL EXAM:  GENERAL: NAD, well-groomed, well-developed  HEAD:  Atraumatic, Normocephalic  EYES: EOMI, PERRLA, conjunctiva and sclera clear  ENMT: No tonsillar erythema, exudates, or enlargement; Moist mucous membranes, Good dentition, No lesions  NECK: Supple, No JVD, Normal thyroid  NERVOUS SYSTEM:  Alert & Oriented X3, Good concentration; Motor Strength 5/5 B/L upper and lower extremities; DTRs 2+ intact and symmetric  CHEST/LUNG: Clear to auscultation bilaterally; No rales, rhonchi, wheezing, or rubs  HEART: Regular rate and rhythm; No murmurs, rubs, or gallops  ABDOMEN: Soft, Nontender, Nondistended; Bowel sounds present  EXTREMITIES:  2+ Peripheral Pulses, No clubbing, cyanosis, or edema  LYMPH: No lymphadenopathy noted  SKIN: No rashes or lesions    LABS:                        9.4    12.1  )-----------( 210      ( 2018 08:12 )             28.1     2018 08:12    139    |  104    |  29     ----------------------------<  92     3.6     |  28     |  0.64     Ca    8.1        2018 08:12  Mg     1.9       2018 08:12      PT/INR - ( 2018 10:18 )   PT: 13.1 sec;   INR: 1.20 ratio         PTT - ( 2018 10:18 )  PTT:24.8 sec  Urinalysis Basic - ( 2018 22:27 )    Color: Yellow / Appearance: Slightly Turbid / S.015 / pH: x  Gluc: x / Ketone: Small  / Bili: Negative / Urobili: Negative   Blood: x / Protein: 25 mg/dL / Nitrite: Negative   Leuk Esterase: Trace / RBC: 3-5 /HPF / WBC 3-5   Sq Epi: x / Non Sq Epi: Occasional / Bacteria: Moderate      CAPILLARY BLOOD GLUCOSE          RADIOLOGY & ADDITIONAL TESTS:    Notes Reviewed:  [x ] YES  [ ] NO    Care Discussed with Consultants/Other Providers [x ] YES  [ ] NO
[facility]    Reena Cardenas, Ruddy, Dyllan, Marci, Morro, Bria      [phone]    CHIEF COMPLAINT: Patient is a 88y old  Female who presents with a chief complaint of GIB (12 Feb 2018 12:25)    Follow Up: Patient doing well with no complains of further bleeding. Denies CP, SOB, palpitations, LE edema.     Telemetry: off monitor     MEDICATIONS  (STANDING):  atorvastatin 20 milliGRAM(s) Oral at bedtime  cholecalciferol 1000 Unit(s) Oral daily  gabapentin 100 milliGRAM(s) Oral at bedtime  multivitamin 1 Tablet(s) Oral daily  pantoprazole Infusion 8 mG/Hr (10 mL/Hr) IV Continuous <Continuous>  potassium chloride    Tablet ER 10 milliEquivalent(s) Oral daily  sucralfate suspension 1 Gram(s) Oral four times a day    MEDICATIONS  (PRN):  ALPRAZolam 0.25 milliGRAM(s) Oral two times a day PRN anxiety  ondansetron Injectable 4 milliGRAM(s) IV Push every 6 hours PRN Nausea and/or Vomiting      REVIEW OF SYSTEMS:  Negative except as described above    Vital Signs Last 24 Hrs  T(C): 36.6 (14 Feb 2018 04:34), Max: 36.6 (13 Feb 2018 14:00)  T(F): 97.9 (14 Feb 2018 04:34), Max: 97.9 (13 Feb 2018 14:00)  HR: 90 (14 Feb 2018 04:34) (78 - 92)  BP: 157/90 (14 Feb 2018 04:34) (157/90 - 176/94)  RR: 17 (14 Feb 2018 04:34) (17 - 17)  SpO2: 95% (14 Feb 2018 04:34) (95% - 96%)    I&O's Summary    13 Feb 2018 07:01  -  14 Feb 2018 07:00  --------------------------------------------------------  IN: 720 mL / OUT: 1125 mL / NET: -405 mL    PHYSICAL EXAM:    Constitutional: Still pale appearing, NAD, alert and oriented x 3  Lungs:  Non-labored, breath sounds are clear bilaterally, No wheezing, rales or rhonchi  Cardiovascular: RRR. S1 and S2 positive. Systolic murmur +2  Gastrointestinal: Bowel Sounds present, soft, nontender.   Lymph: No peripheral edema. No cervical lymphadenopathy.  Neurological: Alert, no focal deficits  Skin: No rashes or ulcers   Psych:  Mood & affect appropriate.    LABS: All Labs Reviewed:                        10.2   12.7  )-----------( 235      ( 14 Feb 2018 07:31 )             30.0                         9.4    12.1  )-----------( 210      ( 13 Feb 2018 08:12 )             28.1                         6.7    13.7  )-----------( 205      ( 13 Feb 2018 01:44 )             20.0     13 Feb 2018 08:12    139    |  104    |  29     ----------------------------<  92     3.6     |  28     |  0.64   12 Feb 2018 10:18    139    |  101    |  56     ----------------------------<  106    4.3     |  28     |  0.90     Ca    8.1        13 Feb 2018 08:12  Ca    8.8        12 Feb 2018 10:18  Mg     1.9       13 Feb 2018 08:12    TPro  6.2    /  Alb  2.5    /  TBili  0.4    /  DBili  x      /  AST  19     /  ALT  18     /  AlkPhos  68     12 Feb 2018 10:18    PT/INR - ( 12 Feb 2018 10:18 )   PT: 13.1 sec;   INR: 1.20 ratio    PTT - ( 12 Feb 2018 10:18 )  PTT:24.8 sec    RADIOLOGY:    EKG:    Echo: Ordered and pending.
s/p general anesthesia    No complications noted

## 2018-02-15 NOTE — PROGRESS NOTE ADULT - PROBLEM SELECTOR PROBLEM 1
Duodenal ulcer with hemorrhage
Gastrointestinal hemorrhage, unspecified gastrointestinal hemorrhage type

## 2018-02-17 ENCOUNTER — INPATIENT (INPATIENT)
Facility: HOSPITAL | Age: 83
LOS: 0 days | Discharge: ANOTHER HOSPITAL | DRG: 378 | End: 2018-02-18
Attending: INTERNAL MEDICINE | Admitting: INTERNAL MEDICINE
Payer: MEDICARE

## 2018-02-17 VITALS — RESPIRATION RATE: 20 BRPM | OXYGEN SATURATION: 89 % | TEMPERATURE: 87 F

## 2018-02-17 DIAGNOSIS — Z96.60 PRESENCE OF UNSPECIFIED ORTHOPEDIC JOINT IMPLANT: Chronic | ICD-10-CM

## 2018-02-17 DIAGNOSIS — I10 ESSENTIAL (PRIMARY) HYPERTENSION: ICD-10-CM

## 2018-02-17 DIAGNOSIS — Z88.0 ALLERGY STATUS TO PENICILLIN: ICD-10-CM

## 2018-02-17 DIAGNOSIS — F41.9 ANXIETY DISORDER, UNSPECIFIED: ICD-10-CM

## 2018-02-17 DIAGNOSIS — E78.5 HYPERLIPIDEMIA, UNSPECIFIED: ICD-10-CM

## 2018-02-17 DIAGNOSIS — K92.2 GASTROINTESTINAL HEMORRHAGE, UNSPECIFIED: ICD-10-CM

## 2018-02-17 DIAGNOSIS — N17.9 ACUTE KIDNEY FAILURE, UNSPECIFIED: ICD-10-CM

## 2018-02-17 DIAGNOSIS — I95.9 HYPOTENSION, UNSPECIFIED: ICD-10-CM

## 2018-02-17 DIAGNOSIS — Z90.710 ACQUIRED ABSENCE OF BOTH CERVIX AND UTERUS: Chronic | ICD-10-CM

## 2018-02-17 DIAGNOSIS — E86.0 DEHYDRATION: ICD-10-CM

## 2018-02-17 DIAGNOSIS — Z86.73 PERSONAL HISTORY OF TRANSIENT ISCHEMIC ATTACK (TIA), AND CEREBRAL INFARCTION WITHOUT RESIDUAL DEFICITS: ICD-10-CM

## 2018-02-17 DIAGNOSIS — E87.2 ACIDOSIS: ICD-10-CM

## 2018-02-17 DIAGNOSIS — K27.9 PEPTIC ULCER, SITE UNSPECIFIED, UNSPECIFIED AS ACUTE OR CHRONIC, WITHOUT HEMORRHAGE OR PERFORATION: ICD-10-CM

## 2018-02-17 DIAGNOSIS — K26.4 CHRONIC OR UNSPECIFIED DUODENAL ULCER WITH HEMORRHAGE: ICD-10-CM

## 2018-02-17 DIAGNOSIS — D62 ACUTE POSTHEMORRHAGIC ANEMIA: ICD-10-CM

## 2018-02-17 LAB
ALBUMIN SERPL ELPH-MCNC: 2.3 G/DL — LOW (ref 3.3–5)
ALP SERPL-CCNC: 60 U/L — SIGNIFICANT CHANGE UP (ref 40–120)
ALT FLD-CCNC: 22 U/L — SIGNIFICANT CHANGE UP (ref 12–78)
ANION GAP SERPL CALC-SCNC: 14 MMOL/L — SIGNIFICANT CHANGE UP (ref 5–17)
APTT BLD: 27.5 SEC — SIGNIFICANT CHANGE UP (ref 27.5–37.4)
AST SERPL-CCNC: 34 U/L — SIGNIFICANT CHANGE UP (ref 15–37)
BASE EXCESS BLDA CALC-SCNC: -3 MMOL/L — LOW (ref -2–2)
BILIRUB SERPL-MCNC: 0.2 MG/DL — SIGNIFICANT CHANGE UP (ref 0.2–1.2)
BLOOD GAS COMMENTS ARTERIAL: SIGNIFICANT CHANGE UP
BLOOD GAS COMMENTS ARTERIAL: SIGNIFICANT CHANGE UP
BUN SERPL-MCNC: 74 MG/DL — HIGH (ref 7–23)
CALCIUM SERPL-MCNC: 8.3 MG/DL — LOW (ref 8.5–10.1)
CHLORIDE SERPL-SCNC: 103 MMOL/L — SIGNIFICANT CHANGE UP (ref 96–108)
CO2 SERPL-SCNC: 23 MMOL/L — SIGNIFICANT CHANGE UP (ref 22–31)
CREAT SERPL-MCNC: 1.5 MG/DL — HIGH (ref 0.5–1.3)
GLUCOSE SERPL-MCNC: 152 MG/DL — HIGH (ref 70–99)
HCO3 BLDA-SCNC: 22 MMOL/L — LOW (ref 23–27)
HCT VFR BLD CALC: 23 % — LOW (ref 34.5–45)
HGB BLD-MCNC: 7.9 G/DL — LOW (ref 11.5–15.5)
HOROWITZ INDEX BLDA+IHG-RTO: 21 — SIGNIFICANT CHANGE UP
INR BLD: 1.13 RATIO — SIGNIFICANT CHANGE UP (ref 0.88–1.16)
LACTATE SERPL-SCNC: 5.1 MMOL/L — CRITICAL HIGH (ref 0.7–2)
LYMPHOCYTES # BLD AUTO: 9 % — LOW (ref 13–44)
MCHC RBC-ENTMCNC: 32.2 PG — SIGNIFICANT CHANGE UP (ref 27–34)
MCHC RBC-ENTMCNC: 34.2 GM/DL — SIGNIFICANT CHANGE UP (ref 32–36)
MCV RBC AUTO: 94.4 FL — SIGNIFICANT CHANGE UP (ref 80–100)
MONOCYTES NFR BLD AUTO: 3 % — SIGNIFICANT CHANGE UP (ref 1–9)
NEUTROPHILS NFR BLD AUTO: 71 % — SIGNIFICANT CHANGE UP (ref 43–77)
PCO2 BLDA: 36 MMHG — SIGNIFICANT CHANGE UP (ref 32–46)
PH BLDA: 7.39 — SIGNIFICANT CHANGE UP (ref 7.35–7.45)
PLATELET # BLD AUTO: 286 K/UL — SIGNIFICANT CHANGE UP (ref 150–400)
PO2 BLDA: 65 MMHG — LOW (ref 74–108)
POTASSIUM SERPL-MCNC: 3.6 MMOL/L — SIGNIFICANT CHANGE UP (ref 3.5–5.3)
POTASSIUM SERPL-SCNC: 3.6 MMOL/L — SIGNIFICANT CHANGE UP (ref 3.5–5.3)
PROT SERPL-MCNC: 6 G/DL — SIGNIFICANT CHANGE UP (ref 6–8.3)
PROTHROM AB SERPL-ACNC: 12.4 SEC — SIGNIFICANT CHANGE UP (ref 9.8–12.7)
RBC # BLD: 2.44 M/UL — LOW (ref 3.8–5.2)
RBC # FLD: 14 % — SIGNIFICANT CHANGE UP (ref 10.3–14.5)
SAO2 % BLDA: 92 % — SIGNIFICANT CHANGE UP (ref 92–96)
SODIUM SERPL-SCNC: 140 MMOL/L — SIGNIFICANT CHANGE UP (ref 135–145)
TROPONIN I SERPL-MCNC: 0.07 NG/ML — HIGH (ref 0.01–0.04)
WBC # BLD: 27.7 K/UL — HIGH (ref 3.8–10.5)
WBC # FLD AUTO: 27.7 K/UL — HIGH (ref 3.8–10.5)

## 2018-02-17 PROCEDURE — 71045 X-RAY EXAM CHEST 1 VIEW: CPT | Mod: 26

## 2018-02-17 PROCEDURE — 93010 ELECTROCARDIOGRAM REPORT: CPT

## 2018-02-17 PROCEDURE — 99291 CRITICAL CARE FIRST HOUR: CPT

## 2018-02-17 RX ORDER — ONDANSETRON 8 MG/1
4 TABLET, FILM COATED ORAL ONCE
Qty: 0 | Refills: 0 | Status: COMPLETED | OUTPATIENT
Start: 2018-02-17 | End: 2018-02-17

## 2018-02-17 RX ORDER — PANTOPRAZOLE SODIUM 20 MG/1
8 TABLET, DELAYED RELEASE ORAL
Qty: 80 | Refills: 0 | Status: DISCONTINUED | OUTPATIENT
Start: 2018-02-17 | End: 2018-02-18

## 2018-02-17 RX ORDER — SODIUM CHLORIDE 9 MG/ML
1000 INJECTION INTRAMUSCULAR; INTRAVENOUS; SUBCUTANEOUS
Qty: 0 | Refills: 0 | Status: DISCONTINUED | OUTPATIENT
Start: 2018-02-17 | End: 2018-02-18

## 2018-02-17 RX ORDER — SODIUM CHLORIDE 9 MG/ML
1000 INJECTION INTRAMUSCULAR; INTRAVENOUS; SUBCUTANEOUS ONCE
Qty: 0 | Refills: 0 | Status: COMPLETED | OUTPATIENT
Start: 2018-02-17 | End: 2018-02-17

## 2018-02-17 RX ORDER — SODIUM CHLORIDE 9 MG/ML
3 INJECTION INTRAMUSCULAR; INTRAVENOUS; SUBCUTANEOUS ONCE
Qty: 0 | Refills: 0 | Status: COMPLETED | OUTPATIENT
Start: 2018-02-17 | End: 2018-02-17

## 2018-02-17 RX ADMIN — SODIUM CHLORIDE 333.33 MILLILITER(S): 9 INJECTION INTRAMUSCULAR; INTRAVENOUS; SUBCUTANEOUS at 22:15

## 2018-02-17 RX ADMIN — PANTOPRAZOLE SODIUM 10 MG/HR: 20 TABLET, DELAYED RELEASE ORAL at 22:41

## 2018-02-17 RX ADMIN — ONDANSETRON 4 MILLIGRAM(S): 8 TABLET, FILM COATED ORAL at 22:30

## 2018-02-17 RX ADMIN — Medication 100 MILLIGRAM(S): at 21:30

## 2018-02-17 RX ADMIN — SODIUM CHLORIDE 3 MILLILITER(S): 9 INJECTION INTRAMUSCULAR; INTRAVENOUS; SUBCUTANEOUS at 22:32

## 2018-02-17 NOTE — PROVIDER CONTACT NOTE (EICU) - RECOMMENDATIONS
Per PA patient appears to be responding to resuscitation effort.  But organ dysfunction/lactic acidosis and history of change in Mental state ? syncope is concerning for brisk bleeding.   GI consult /ICU admission  Protonix gtt  hemodynamic support  correct coagulopathy

## 2018-02-17 NOTE — ED ADULT TRIAGE NOTE - CHIEF COMPLAINT QUOTE
"She has been coughing up bloody vomitus."  pt presents with blood and vomitus around mouth, some cyanosis around lips, skin cold and pale, pt recently D/C from this hospital after GI bleed

## 2018-02-17 NOTE — CONSULT NOTE ADULT - SUBJECTIVE AND OBJECTIVE BOX
88F with PMHx of anxiety, multiple falls. HIP fx s/p repair, htn, hld, s/p hysterectomy, PUD (due to ASA use), with recent admission [facility] for UGIB on 2/12/18, s/p EGD, PRBC transfusions. Pt DC'd home on 2/17/18. EMS called to Pt's house this PM as caretaker found her with melena. Upon arrival of EMS, Pt unresponsive, thready pulse, never lost pulse. In ER, Pt hypotensive, 1 episode of melena, received 1 of 2 prbc, 2 liters NS bolus with improvement in MS and BP. ER work up further revealed hbg 7.9, wbc 27.7, lactate 5.1, trop 0.066, abg 7.39/36/65/22/-3 on RA. Per Daughter (HCP), Pt has not been eating or drinking for the past week. Pt has had dark stools since friday, and productive cough that started on friday. Pt's daughter denies fever, chills, N/V/D, CP.      PAST MEDICAL & SURGICAL HISTORY:  CVA (cerebral vascular accident)  Anxiety  Hip fracture  Fall  Hypertension  Hyperlipidemia  S/P hip replacement: left  S/P hysterectomy      Review of Systems:  Unable to obtain    T(F): 98.3 (02-17-18 @ 21:25), Max: 98.3 (02-17-18 @ 21:25)  HR: 86 (02-17-18 @ 21:25) (86 - 86)  BP: 100/62 (02-17-18 @ 21:51) (100/62 - 107/61)  RR: 16 (02-17-18 @ 21:51) (16 - 20)  SpO2: 97% (02-17-18 @ 21:51) (89% - 97%)  Wt(kg): --        CAPILLARY BLOOD GLUCOSE          I&O's Summary      Physical Exam:     Gen: pale, confused  Neuro: Awake, confused  HEENT: NC/AT  Resp: Coarse BS bilaterally  CVS: nl S1/S2, RRR  Abd: soft, nt, nd, +bs  Ext: no edema, +pulses  Skin: Pale, warm, Poor skin turgor    Meds:    clindamycin IVPB IV Intermittent  pantoprazole Infusion IV Continuous  sodium chloride 0.9%. IV Continuous                              7.9    27.7  )-----------( 286      ( 17 Feb 2018 21:01 )             23.0     Bands 17.0    02-17    140  |  103  |  74<H>  ----------------------------<  152<H>  3.6   |  23  |  1.50<H>    Ca    8.3<L>      17 Feb 2018 21:01    TPro  6.0  /  Alb  2.3<L>  /  TBili  0.2  /  DBili  x   /  AST  34  /  ALT  22  /  AlkPhos  60  02-17    Lactate 5.1           02-17 @ 21:01      CARDIAC MARKERS ( 17 Feb 2018 21:01 )  .066 ng/mL / x     / 82 U/L / x     / x          PT/INR - ( 17 Feb 2018 21:01 )   PT: 12.4 sec;   INR: 1.13 ratio         PTT - ( 17 Feb 2018 21:01 )  PTT:27.5 sec        CXR: pending official report. No obvious infiltrate/effusion        CODE STATUS: Full  GOC discussion: Y  Critical care time spent (mins): 45  (Reviewing data, imaging, discussing with multidisciplinary team, non inclusive of procedures, discussing goals of care with patient/family)

## 2018-02-17 NOTE — ED PROVIDER NOTE - OBJECTIVE STATEMENT
89 yo female with with hx htn, hyperlipidemia bibems with bloody vomitus and black tarry stool. Patient c/o feeling weak and dizzy. EMS found patient in blood, with tarry stool in past. Patient became unresponsive when EMS took her out of bed. EMS was initially unable to palpate a pulse  patient denies any chest pain or shortness  denies abdominal pain  patient was recently admitted to [place] for GI bleed and had endoscopy  patient was discharged home x 2 days ago

## 2018-02-17 NOTE — PROVIDER CONTACT NOTE (EICU) - ASSESSMENT
GIB likely recurrent upper GIB from Duodenal ulcer, with hemodynamic instability and end organ dysfunction.

## 2018-02-17 NOTE — ED ADULT NURSE NOTE - ED STAT RN HANDOFF DETAILS 2
transported to unit accompanied by this writer without incident . pt reports improvement of symptoms

## 2018-02-17 NOTE — CONSULT NOTE ADULT - ASSESSMENT
88F with PMHx as above, admitted with ABLA due to UGIB, AMS, MARYLOU, dehydration, lactic acidosis. Pt being transfused 1 of 2 prbc and 2 liters NS given. Pt admitted to ICU for further management    -Neuro checks per ICU protocol  -Monitor HD's  -NPO. PPI gtt  -Serial cbc and transfuse prn  -GI consult in am. Possible EGD  -Monitor UOP/Lytes  -Empiric Abx. F/U cultures  -Repeat lactate  -Continue IVF's  -Supportive care

## 2018-02-17 NOTE — ED ADULT NURSE REASSESSMENT NOTE - NS ED NURSE REASSESS COMMENT FT1
1st unit of pRBC infusing without complication. micu consult at bedside, will continue to monitor closely

## 2018-02-17 NOTE — PROVIDER CONTACT NOTE (EICU) - SITUATION
89yo with recent UGIB, s/p Endoscopy on 2/12 which showed bleeding duodenal ulcer.   Pt readmitted with recurrent GIB, + melena and hematemesis.  Per report pt also had change in MS ? syncope.  Labs showing signs of multiorgan failure.

## 2018-02-17 NOTE — ED ADULT NURSE NOTE - OBJECTIVE STATEMENT
@2050 received and assumed care of pt at this time, 87 y/o f presents to ed bib ems c/o bloody emesis & black tarry stools c/o feeling dizziness. recent admission to icu. denies chest pain, fever, chills or any other distress. seen and evaluated on arrival by dr. gonzales. 2 large bore IV established with labs collected & sent, IVF's infusing, Will transfuse as ordered, ekg called, placed on continuous cardiac and pulse oximetry monitor, will continue to monitor closely

## 2018-02-17 NOTE — ED PROVIDER NOTE - CARE PLAN
Principal Discharge DX:	GIB (gastrointestinal bleeding)  Secondary Diagnosis:	Hypotension Principal Discharge DX:	GIB (gastrointestinal bleeding)  Secondary Diagnosis:	Hypotension  Secondary Diagnosis:	Aspiration pneumonia

## 2018-02-17 NOTE — ED PROVIDER NOTE - PROGRESS NOTE DETAILS
patient actively bleeding, hypotensive, passing out several times during evaluation  will transfuse uncrossed emergency blood due to severe hypotension  ICU eval  d/w Dr. Bañuelos

## 2018-02-17 NOTE — ED PROVIDER NOTE - MEDICAL DECISION MAKING DETAILS
gi bleed, syncope gi bleed, syncope Transfuse PRBCS, IV clinda to cover for possible aspiration pneumonia

## 2018-02-17 NOTE — ED ADULT NURSE NOTE - ED STAT RN HANDOFF DETAILS
patient admitted to icu for further management and care of GIB/aspiration PNA. Report given tony liu for continuum of care.

## 2018-02-18 ENCOUNTER — INPATIENT (INPATIENT)
Facility: HOSPITAL | Age: 83
LOS: 4 days | Discharge: HOME | DRG: 357 | End: 2018-02-23
Attending: SURGERY | Admitting: SURGERY
Payer: MEDICARE

## 2018-02-18 ENCOUNTER — TRANSCRIPTION ENCOUNTER (OUTPATIENT)
Age: 83
End: 2018-02-18

## 2018-02-18 VITALS
RESPIRATION RATE: 22 BRPM | DIASTOLIC BLOOD PRESSURE: 61 MMHG | OXYGEN SATURATION: 96 % | HEART RATE: 92 BPM | SYSTOLIC BLOOD PRESSURE: 136 MMHG

## 2018-02-18 DIAGNOSIS — K57.91 DIVERTICULOSIS OF INTESTINE, PART UNSPECIFIED, WITHOUT PERFORATION OR ABSCESS WITH BLEEDING: ICD-10-CM

## 2018-02-18 DIAGNOSIS — Z90.710 ACQUIRED ABSENCE OF BOTH CERVIX AND UTERUS: Chronic | ICD-10-CM

## 2018-02-18 DIAGNOSIS — K92.1 MELENA: ICD-10-CM

## 2018-02-18 DIAGNOSIS — Z96.60 PRESENCE OF UNSPECIFIED ORTHOPEDIC JOINT IMPLANT: Chronic | ICD-10-CM

## 2018-02-18 LAB
ALBUMIN SERPL ELPH-MCNC: 2.1 G/DL — LOW (ref 3.3–5)
ALP SERPL-CCNC: 50 U/L — SIGNIFICANT CHANGE UP (ref 40–120)
ALT FLD-CCNC: 20 U/L — SIGNIFICANT CHANGE UP (ref 12–78)
ANION GAP SERPL CALC-SCNC: 10 MMOL/L — SIGNIFICANT CHANGE UP (ref 5–17)
AST SERPL-CCNC: 31 U/L — SIGNIFICANT CHANGE UP (ref 15–37)
BILIRUB SERPL-MCNC: 0.4 MG/DL — SIGNIFICANT CHANGE UP (ref 0.2–1.2)
BUN SERPL-MCNC: 64 MG/DL — HIGH (ref 7–23)
CALCIUM SERPL-MCNC: 7.5 MG/DL — LOW (ref 8.5–10.1)
CHLORIDE SERPL-SCNC: 111 MMOL/L — HIGH (ref 96–108)
CO2 SERPL-SCNC: 23 MMOL/L — SIGNIFICANT CHANGE UP (ref 22–31)
CREAT SERPL-MCNC: 0.86 MG/DL — SIGNIFICANT CHANGE UP (ref 0.5–1.3)
GLUCOSE SERPL-MCNC: 102 MG/DL — HIGH (ref 70–99)
HCT VFR BLD CALC: 22.8 % — LOW (ref 34.5–45)
HCT VFR BLD CALC: 28.7 % — LOW (ref 34.5–45)
HCT VFR BLD CALC: 30.1 % — LOW (ref 34.5–45)
HCT VFR BLD CALC: 31.2 % — LOW (ref 34.5–45)
HGB BLD-MCNC: 10.5 G/DL — LOW (ref 11.5–15.5)
HGB BLD-MCNC: 10.7 G/DL — LOW (ref 11.5–15.5)
HGB BLD-MCNC: 8.3 G/DL — LOW (ref 11.5–15.5)
HGB BLD-MCNC: 9.9 G/DL — LOW (ref 11.5–15.5)
LACTATE SERPL-SCNC: 2.3 MMOL/L — HIGH (ref 0.7–2)
MCHC RBC-ENTMCNC: 30.6 PG — SIGNIFICANT CHANGE UP (ref 27–34)
MCHC RBC-ENTMCNC: 31 PG — SIGNIFICANT CHANGE UP (ref 27–34)
MCHC RBC-ENTMCNC: 31.1 PG — SIGNIFICANT CHANGE UP (ref 27–34)
MCHC RBC-ENTMCNC: 33 PG — SIGNIFICANT CHANGE UP (ref 27–34)
MCHC RBC-ENTMCNC: 34.3 GM/DL — SIGNIFICANT CHANGE UP (ref 32–36)
MCHC RBC-ENTMCNC: 34.5 GM/DL — SIGNIFICANT CHANGE UP (ref 32–36)
MCHC RBC-ENTMCNC: 34.8 GM/DL — SIGNIFICANT CHANGE UP (ref 32–36)
MCHC RBC-ENTMCNC: 36.5 GM/DL — HIGH (ref 32–36)
MCV RBC AUTO: 89.1 FL — SIGNIFICANT CHANGE UP (ref 80–100)
MCV RBC AUTO: 89.2 FL — SIGNIFICANT CHANGE UP (ref 80–100)
MCV RBC AUTO: 90 FL — SIGNIFICANT CHANGE UP (ref 80–100)
MCV RBC AUTO: 90.5 FL — SIGNIFICANT CHANGE UP (ref 80–100)
PLATELET # BLD AUTO: 158 K/UL — SIGNIFICANT CHANGE UP (ref 150–400)
PLATELET # BLD AUTO: 175 K/UL — SIGNIFICANT CHANGE UP (ref 150–400)
PLATELET # BLD AUTO: 190 K/UL — SIGNIFICANT CHANGE UP (ref 150–400)
PLATELET # BLD AUTO: 194 K/UL — SIGNIFICANT CHANGE UP (ref 150–400)
POTASSIUM SERPL-MCNC: 3.5 MMOL/L — SIGNIFICANT CHANGE UP (ref 3.5–5.3)
POTASSIUM SERPL-SCNC: 3.5 MMOL/L — SIGNIFICANT CHANGE UP (ref 3.5–5.3)
PROT SERPL-MCNC: 5.2 G/DL — LOW (ref 6–8.3)
RBC # BLD: 2.52 M/UL — LOW (ref 3.8–5.2)
RBC # BLD: 3.19 M/UL — LOW (ref 3.8–5.2)
RBC # BLD: 3.38 M/UL — LOW (ref 3.8–5.2)
RBC # BLD: 3.5 M/UL — LOW (ref 3.8–5.2)
RBC # FLD: 14.1 % — SIGNIFICANT CHANGE UP (ref 10.3–14.5)
RBC # FLD: 14.4 % — SIGNIFICANT CHANGE UP (ref 10.3–14.5)
RBC # FLD: 14.5 % — SIGNIFICANT CHANGE UP (ref 10.3–14.5)
RBC # FLD: 14.9 % — HIGH (ref 10.3–14.5)
SODIUM SERPL-SCNC: 144 MMOL/L — SIGNIFICANT CHANGE UP (ref 135–145)
WBC # BLD: 22.6 K/UL — HIGH (ref 3.8–10.5)
WBC # BLD: 24.7 K/UL — HIGH (ref 3.8–10.5)
WBC # BLD: 26 K/UL — HIGH (ref 3.8–10.5)
WBC # BLD: 26.7 K/UL — HIGH (ref 3.8–10.5)
WBC # FLD AUTO: 22.6 K/UL — HIGH (ref 3.8–10.5)
WBC # FLD AUTO: 24.7 K/UL — HIGH (ref 3.8–10.5)
WBC # FLD AUTO: 26 K/UL — HIGH (ref 3.8–10.5)
WBC # FLD AUTO: 26.7 K/UL — HIGH (ref 3.8–10.5)

## 2018-02-18 PROCEDURE — 86900 BLOOD TYPING SEROLOGIC ABO: CPT

## 2018-02-18 PROCEDURE — 85730 THROMBOPLASTIN TIME PARTIAL: CPT

## 2018-02-18 PROCEDURE — 83605 ASSAY OF LACTIC ACID: CPT

## 2018-02-18 PROCEDURE — 36430 TRANSFUSION BLD/BLD COMPNT: CPT

## 2018-02-18 PROCEDURE — 84484 ASSAY OF TROPONIN QUANT: CPT

## 2018-02-18 PROCEDURE — 93005 ELECTROCARDIOGRAM TRACING: CPT

## 2018-02-18 PROCEDURE — 86923 COMPATIBILITY TEST ELECTRIC: CPT

## 2018-02-18 PROCEDURE — 71045 X-RAY EXAM CHEST 1 VIEW: CPT

## 2018-02-18 PROCEDURE — 85027 COMPLETE CBC AUTOMATED: CPT

## 2018-02-18 PROCEDURE — 99223 1ST HOSP IP/OBS HIGH 75: CPT

## 2018-02-18 PROCEDURE — 86901 BLOOD TYPING SEROLOGIC RH(D): CPT

## 2018-02-18 PROCEDURE — 99291 CRITICAL CARE FIRST HOUR: CPT

## 2018-02-18 PROCEDURE — 82803 BLOOD GASES ANY COMBINATION: CPT

## 2018-02-18 PROCEDURE — 85610 PROTHROMBIN TIME: CPT

## 2018-02-18 PROCEDURE — 99291 CRITICAL CARE FIRST HOUR: CPT | Mod: 25

## 2018-02-18 PROCEDURE — 36415 COLL VENOUS BLD VENIPUNCTURE: CPT

## 2018-02-18 PROCEDURE — 87040 BLOOD CULTURE FOR BACTERIA: CPT

## 2018-02-18 PROCEDURE — 82550 ASSAY OF CK (CPK): CPT

## 2018-02-18 PROCEDURE — 86850 RBC ANTIBODY SCREEN: CPT

## 2018-02-18 PROCEDURE — 80053 COMPREHEN METABOLIC PANEL: CPT

## 2018-02-18 PROCEDURE — 96365 THER/PROPH/DIAG IV INF INIT: CPT | Mod: XU

## 2018-02-18 PROCEDURE — P9016: CPT

## 2018-02-18 RX ORDER — GABAPENTIN 400 MG/1
100 CAPSULE ORAL
Qty: 0 | Refills: 0 | COMMUNITY

## 2018-02-18 RX ORDER — VANCOMYCIN HCL 1 G
500 VIAL (EA) INTRAVENOUS EVERY 12 HOURS
Qty: 0 | Refills: 0 | Status: DISCONTINUED | OUTPATIENT
Start: 2018-02-18 | End: 2018-02-18

## 2018-02-18 RX ORDER — MEROPENEM 1 G/30ML
500 INJECTION INTRAVENOUS EVERY 8 HOURS
Qty: 0 | Refills: 0 | Status: DISCONTINUED | OUTPATIENT
Start: 2018-02-18 | End: 2018-02-18

## 2018-02-18 RX ORDER — ESCITALOPRAM OXALATE 10 MG/1
10 TABLET, FILM COATED ORAL
Qty: 0 | Refills: 0 | COMMUNITY

## 2018-02-18 RX ORDER — CHOLECALCIFEROL (VITAMIN D3) 125 MCG
1000 CAPSULE ORAL
Qty: 0 | Refills: 0 | COMMUNITY

## 2018-02-18 RX ORDER — MEROPENEM 1 G/30ML
500 INJECTION INTRAVENOUS EVERY 12 HOURS
Qty: 0 | Refills: 0 | Status: DISCONTINUED | OUTPATIENT
Start: 2018-02-18 | End: 2018-02-18

## 2018-02-18 RX ORDER — ALPRAZOLAM 0.25 MG
0.25 TABLET ORAL
Qty: 0 | Refills: 0 | COMMUNITY

## 2018-02-18 RX ORDER — ROSUVASTATIN CALCIUM 5 MG/1
5 TABLET ORAL
Qty: 0 | Refills: 0 | COMMUNITY

## 2018-02-18 RX ORDER — MEROPENEM 1 G/30ML
INJECTION INTRAVENOUS
Qty: 0 | Refills: 0 | Status: DISCONTINUED | OUTPATIENT
Start: 2018-02-18 | End: 2018-02-18

## 2018-02-18 RX ORDER — SUCRALFATE 1 G
1 TABLET ORAL
Qty: 0 | Refills: 0 | Status: DISCONTINUED | OUTPATIENT
Start: 2018-02-18 | End: 2018-02-18

## 2018-02-18 RX ORDER — SODIUM CHLORIDE 9 MG/ML
500 INJECTION INTRAMUSCULAR; INTRAVENOUS; SUBCUTANEOUS ONCE
Qty: 0 | Refills: 0 | Status: COMPLETED | OUTPATIENT
Start: 2018-02-18 | End: 2018-02-18

## 2018-02-18 RX ORDER — VANCOMYCIN HCL 1 G
1000 VIAL (EA) INTRAVENOUS ONCE
Qty: 0 | Refills: 0 | Status: COMPLETED | OUTPATIENT
Start: 2018-02-18 | End: 2018-02-18

## 2018-02-18 RX ORDER — MEROPENEM 1 G/30ML
500 INJECTION INTRAVENOUS ONCE
Qty: 0 | Refills: 0 | Status: COMPLETED | OUTPATIENT
Start: 2018-02-18 | End: 2018-02-18

## 2018-02-18 RX ORDER — VANCOMYCIN HCL 1 G
VIAL (EA) INTRAVENOUS
Qty: 0 | Refills: 0 | Status: DISCONTINUED | OUTPATIENT
Start: 2018-02-18 | End: 2018-02-18

## 2018-02-18 RX ORDER — ONDANSETRON 8 MG/1
4 TABLET, FILM COATED ORAL ONCE
Qty: 0 | Refills: 0 | Status: COMPLETED | OUTPATIENT
Start: 2018-02-18 | End: 2018-02-18

## 2018-02-18 RX ORDER — POTASSIUM CHLORIDE 20 MEQ
40 PACKET (EA) ORAL ONCE
Qty: 0 | Refills: 0 | Status: COMPLETED | OUTPATIENT
Start: 2018-02-18 | End: 2018-02-18

## 2018-02-18 RX ADMIN — Medication 250 MILLIGRAM(S): at 02:36

## 2018-02-18 RX ADMIN — SODIUM CHLORIDE 75 MILLILITER(S): 9 INJECTION INTRAMUSCULAR; INTRAVENOUS; SUBCUTANEOUS at 16:36

## 2018-02-18 RX ADMIN — PANTOPRAZOLE SODIUM 10 MG/HR: 20 TABLET, DELAYED RELEASE ORAL at 07:53

## 2018-02-18 RX ADMIN — Medication 40 MILLIEQUIVALENT(S): at 16:36

## 2018-02-18 RX ADMIN — SODIUM CHLORIDE 75 MILLILITER(S): 9 INJECTION INTRAMUSCULAR; INTRAVENOUS; SUBCUTANEOUS at 02:44

## 2018-02-18 RX ADMIN — SODIUM CHLORIDE 500 MILLILITER(S): 9 INJECTION INTRAMUSCULAR; INTRAVENOUS; SUBCUTANEOUS at 15:00

## 2018-02-18 RX ADMIN — Medication 1 GRAM(S): at 16:37

## 2018-02-18 RX ADMIN — ONDANSETRON 4 MILLIGRAM(S): 8 TABLET, FILM COATED ORAL at 20:25

## 2018-02-18 RX ADMIN — MEROPENEM 100 MILLIGRAM(S): 1 INJECTION INTRAVENOUS at 02:35

## 2018-02-18 NOTE — DIETITIAN INITIAL EVALUATION ADULT. - NS AS NUTRI INTERV MEDICAL AND FOOD SUPPLEMENTS
Recommend providing No Carb Prosource when placed on Clear Liquid diet and Ensure Enlive shakes three times daily when diet advanced.

## 2018-02-18 NOTE — DIETITIAN INITIAL EVALUATION ADULT. - ORAL INTAKE PTA
Pt with poor po intake x 2 days PTA following limited po intake x 3 days during previous admission./poor

## 2018-02-18 NOTE — H&P ADULT - HISTORY OF PRESENT ILLNESS
89 yo female with with hx htn, hyperlipidemia, PUD bibems with bloody vomitus and black tarry stool. Patient c/o feeling weak and dizzy. EMS found patient in blood, with tarry stool in past. Patient became unresponsive when EMS took her out of bed. EMS was initially unable to palpate a pulse. Patient denies any chest pain or shortness. Denies abdominal pain. Patient was recently admitted to [place] for GI bleed and had endoscopy (revealed gastric ulcer). Patient was discharged home x 2 days ago.

## 2018-02-18 NOTE — H&P ADULT - PMH
Anxiety    CVA (cerebral vascular accident)    Fall    GI bleed    Hip fracture    Hyperlipidemia    Hypertension    PUD (peptic ulcer disease)

## 2018-02-18 NOTE — CONSULT NOTE ADULT - SUBJECTIVE AND OBJECTIVE BOX
[facility] - Reena Cardenas, Ruddy, Dyllan, Marci, Bria Hooker  Office Number: [phone]    Initial Consult Note    CHIEF COMPLAINT: Patient is a 88y old  Female who presents with a chief complaint of vomiting coffee ground and bloody stools (17 Feb 2018 23:10)      HPI:  87 yo female htn, hyperlipidemia, PUD BIBEMS with bloody vomitus and black tarry stool. Patient c/o feeling weak and dizzy. EMS found patient in blood, with tarry stool in past. Patient became unresponsive when EMS took her out of bed. EMS was initially unable to palpate a pulse. Patient denies any chest pain or shortness. Denies abdominal pain. Patient was recently admitted to [place] for GI bleed and had endoscopy (revealed gastric ulcer). Patient was discharged home x 2 days ago. (18 Feb 2018 12:53)    Called to evaluate for clearance. Recently tolerated endoscopy without issue. No chest pain or difficulty breathing, or palpitations.  Feeling weak, and upset that she is bleeding again.    PAST MEDICAL & SURGICAL HISTORY:  GI bleed  PUD (peptic ulcer disease)  CVA (cerebral vascular accident)  Anxiety  Hip fracture  Fall  Hypertension  Hyperlipidemia  S/P hip replacement: left  S/P hysterectomy      SOCIAL HISTORY:  No tobacco, ethanol, or drug abuse.    FAMILY HISTORY:  No pertinent family history in first degree relatives    No family history of acute MI or sudden cardiac death.    MEDICATIONS  (STANDING):  pantoprazole Infusion 8 mG/Hr (10 mL/Hr) IV Continuous <Continuous>  potassium chloride    Tablet ER 40 milliEquivalent(s) Oral once  sodium chloride 0.9%. 1000 milliLiter(s) (75 mL/Hr) IV Continuous <Continuous>  sucralfate 1 Gram(s) Oral four times a day    MEDICATIONS  (PRN):      Allergies    mold, cheese and wine (Unknown)  penicillins (Unknown)    Intolerances        REVIEW OF SYSTEMS:    CONSTITUTIONAL: + weakness, no fevers or chills  EYES/ENT: No visual changes;  No vertigo or throat pain   NECK: No pain or stiffness  RESPIRATORY: No cough, wheezing, hemoptysis; No shortness of breath  CARDIOVASCULAR: No chest pain or palpitations  GASTROINTESTINAL: No abdominal pain. No nausea, vomiting, or hematemesis; No diarrhea or constipation. +melena, no hematochezia.  GENITOURINARY: No dysuria, frequency or hematuria  NEUROLOGICAL: No numbness or weakness  SKIN: No itching or rash  All other review of systems is negative unless indicated above    VITAL SIGNS:   Vital Signs Last 24 Hrs  T(C): 36.4 (18 Feb 2018 12:01), Max: 36.8 (17 Feb 2018 21:25)  T(F): 97.5 (18 Feb 2018 12:01), Max: 98.3 (17 Feb 2018 21:25)  HR: 95 (18 Feb 2018 13:00) (68 - 100)  BP: 123/69 (18 Feb 2018 13:00) (99/57 - 139/69)  BP(mean): 93 (18 Feb 2018 13:00) (72 - 94)  RR: 28 (18 Feb 2018 13:00) (15 - 43)  SpO2: 95% (18 Feb 2018 13:00) (77% - 99%)    I&O's Summary    17 Feb 2018 07:01  -  18 Feb 2018 07:00  --------------------------------------------------------  IN: 1130 mL / OUT: 0 mL / NET: 1130 mL    18 Feb 2018 07:01  -  18 Feb 2018 15:21  --------------------------------------------------------  IN: 255 mL / OUT: 0 mL / NET: 255 mL        On Exam:    Constitutional: Pale, NAD, alert and oriented x 3  Lungs:  Non-labored, breath sounds are clear bilaterally, No wheezing, rales or rhonchi  Cardiovascular: RRR.  S1 and S2 positive. Systolic murmur +2  Gastrointestinal: Bowel Sounds present, soft, nontender.   Lymph: No peripheral edema. No cervical lymphadenopathy.  Neurological: Alert, no focal deficits  Skin: No rashes or ulcers   Psych:  Mood & affect appropriate.    LABS: All Labs Reviewed:                        9.9    26.0  )-----------( 190      ( 18 Feb 2018 14:35 )             28.7                         10.5   22.6  )-----------( 194      ( 18 Feb 2018 09:24 )             30.1                         10.7   24.7  )-----------( 175      ( 18 Feb 2018 05:12 )             31.2     18 Feb 2018 11:11    144    |  111    |  64     ----------------------------<  102    3.5     |  23     |  0.86   17 Feb 2018 21:01    140    |  103    |  74     ----------------------------<  152    3.6     |  23     |  1.50     Ca    7.5        18 Feb 2018 11:11  Ca    8.3        17 Feb 2018 21:01    TPro  5.2    /  Alb  2.1    /  TBili  0.4    /  DBili  x      /  AST  31     /  ALT  20     /  AlkPhos  50     18 Feb 2018 11:11  TPro  6.0    /  Alb  2.3    /  TBili  0.2    /  DBili  x      /  AST  34     /  ALT  22     /  AlkPhos  60     17 Feb 2018 21:01    PT/INR - ( 17 Feb 2018 21:01 )   PT: 12.4 sec;   INR: 1.13 ratio         PTT - ( 17 Feb 2018 21:01 )  PTT:27.5 sec  CARDIAC MARKERS ( 17 Feb 2018 21:01 )  .066 ng/mL / x     / 82 U/L / x     / x          Blood Culture:         RADIOLOGY:    EKG: , lateral STD I, aVL, unchanged from prior

## 2018-02-18 NOTE — CHART NOTE - NSCHARTNOTEFT_GEN_A_CORE
upper gastrointestinal endoscopy showed large deep cratered ulcer in the anterior wall of the duodenal bulb ~80% of the bulb was involved.   no active bleeding but large fresh clot was adherent   5cc of 1:10,000 epinephrine was injected circumferentially to the mucosa of the ulcer.     npo  proton pump inhibitor drip  transfer to tertiary care center for GDA embolization  d/w family  d/w ICU

## 2018-02-18 NOTE — DIETITIAN INITIAL EVALUATION ADULT. - OTHER INFO
Pt admitted last week (2/12) for GI bleed and found with duodenal ulcer with hemorrhage and discharged once stable on 2/15. Admitted 2/15 for GI hemorrhage following episode of bloody emesis. Reports very poor appetite with poor po intake during two days between hospital admissions. Denies nausea and abdominal pain at present and reports good appetite however complains of dry mouth. Plan for EGD tomorrow morning, currently NPO. Reports food allergy to penicillin which daughter states includes any foods with mold ie cheeses as well as wine and ketchup (?) which is already present in EMR.

## 2018-02-18 NOTE — CONSULT NOTE ADULT - SUBJECTIVE AND OBJECTIVE BOX
[facility]  Gagandeep Lynsey Calderon PA-C   [address] [place] [address] [phone]      88F with PMHx of anxiety, multiple falls. HIP fx s/p repair, htn, hld, s/p hysterectomy, PUD (due to ASA use), with [facility] 2/12/18, s/p EGD, PRBC transfusions. Pt DC'd home on 2/17/18. EMS called to Pt's house this PM as caretaker found her with melena.  intially was noted to be unresponsive and hypotensive was admitted to icu for gi bleed  patient responded to transfusion and currently on proton pump inhibitor drip     PAST MEDICAL & SURGICAL HISTORY:  CVA (cerebral vascular accident)  Anxiety  Hip fracture  Fall  Hypertension  Hyperlipidemia  S/P hip replacement: left  S/P hysterectomy      Review of Systems:  + axiety, + weakness + melena + confusion  all other negative      T(F): 98.3 (02-17-18 @ 21:25), Max: 98.3 (02-17-18 @ 21:25)  HR: 86 (02-17-18 @ 21:25) (86 - 86)  BP: 100/62 (02-17-18 @ 21:51) (100/62 - 107/61)  RR: 16 (02-17-18 @ 21:51) (16 - 20)  SpO2: 97% (02-17-18 @ 21:51) (89% - 97%)  Wt(kg): --        CAPILLARY BLOOD GLUCOSE          I&O's Summary      Physical Exam:     Gen: pale, confused  Neuro: Awake, confused  HEENT: NC/AT  Resp: Coarse BS bilaterally  CVS: nl S1/S2, RRR  Abd: soft, nt, nd, +bs  Ext: no edema, +pulses  Skin: Pale, warm,     Meds:    clindamycin IVPB IV Intermittent  pantoprazole Infusion IV Continuous  sodium chloride 0.9%. IV Continuous                              7.9    27.7  )-----------( 286      ( 17 Feb 2018 21:01 )             23.0     Bands 17.0    02-17    140  |  103  |  74<H>  ----------------------------<  152<H>  3.6   |  23  |  1.50<H>    Ca    8.3<L>      17 Feb 2018 21:01    TPro  6.0  /  Alb  2.3<L>  /  TBili  0.2  /  DBili  x   /  AST  34  /  ALT  22  /  AlkPhos  60  02-17    Lactate 5.1           02-17 @ 21:01      CARDIAC MARKERS ( 17 Feb 2018 21:01 )  .066 ng/mL / x     / 82 U/L / x     / x          PT/INR - ( 17 Feb 2018 21:01 )   PT: 12.4 sec;   INR: 1.13 ratio         PTT - ( 17 Feb 2018 21:01 )  PTT:27.5 sec        CXR: pending official report. No obvious infiltrate/effusion        CODE STATUS: Full  GOC discussion: Y  Critical care time spent (mins): 45  (Reviewing data, imaging, discussing with multidisciplinary team, non inclusive of procedures, discussing goals of care with patient/family)

## 2018-02-18 NOTE — PROGRESS NOTE ADULT - ATTENDING COMMENTS
87 yo female with htn, hyperlipidemia, recent admission 2/12 to 2/15 for GIB from DU, now readmitted with melena, syncope, acute blood loss anemia.    --mentating well  --hemodynamically stable though mildly tachycardic  continue IVF  --stable respiratory status  --suspected UGIB  PPI gtt, carafate  serial CBCs  bleeding worsening this afternoon with dropping Hb (after initial response), PRBCs 3 and 4 ordered  urgent bedside EGD shows large DU  IR embolization recommended by GI as this is rebleed  --doubt infection, d/c Abx  --called [facility] transfer center but no beds available, arranged for transfer to [facility] for IR embolization  optimized for transport  valeria gerard  --pt critically ill. CC time 60min

## 2018-02-18 NOTE — CONSULT NOTE ADULT - ASSESSMENT
88F HLD, HTN, and CVA presents to ED for melena, s/p EGD esophageal ulcer    - EGD last week with esophageal ulcers  - No anticoagulation or antiplatelets. Hold aspirin   - EKG is SR/ST and is unchanged  - Mild troponin leak with normal CK. Presumably, this is demand related, in the setting of GI bleed.  - No chest pain at current time.  - Avoid NSAIDS   - Trend H/H, transfuse as needed  - BP controlled. Hold antihypertensives at this time  - Continue atorvastatin if desired  - Monitor and replete electrolytes as needed  - There is no sign of acute ischemia, decompensated heart failure, critical valvular disease or significant arrhthymias There is no cardiac contraindication to proceeding with repeat EGD. Routine cardiac monitoring is recommended.  - Will follow 88F HLD, HTN, and CVA presents to ED for melena, s/p EGD with esophageal ulcer    - EGD last week with esophageal ulcers  - No anticoagulation or antiplatelets. Hold aspirin   - EKG is SR/ST and is unchanged; mild ST depressions in aVL and I is not far from baseline  - Mild troponin leak with normal CK. Presumably, this is demand related, in the setting of GI bleed.  - No chest pain at current time.  - Avoid NSAIDS   - Trend H/H, transfuse as needed to keep Hb>8  - BP controlled. Hold antihypertensives at this time  - Continue atorvastatin if desired  - Monitor and replete electrolytes as needed  - There is no sign of acute ischemia, decompensated heart failure, critical valvular disease or significant arrhthymias There is no cardiac contraindication to proceeding with repeat EGD. Routine cardiac monitoring is recommended.  - Will follow 88F HLD, HTN, and CVA, with GIB s/p EGD with esophageal ulcer, returns with melena and drop in Hb    - EGD last week with esophageal ulcers  - No anticoagulation or antiplatelets. Hold aspirin   - EKG is SR/ST and is unchanged; mild ST depressions in aVL and I is not far from baseline  - Mild troponin leak with normal CK. Presumably, this is demand related, in the setting of GI bleed.  - No chest pain at current time.  - Avoid NSAIDS   - Trend H/H, transfuse as needed to keep Hb>8  - BP controlled. Hold antihypertensives at this time  - Continue atorvastatin if desired  - Monitor and replete electrolytes as needed  - There is no sign of acute ischemia, decompensated heart failure, critical valvular disease or significant arrhthymias There is no cardiac contraindication to proceeding with repeat EGD. Routine cardiac monitoring is recommended.  - Will follow

## 2018-02-18 NOTE — CONSULT NOTE ADULT - PROBLEM SELECTOR RECOMMENDATION 9
npo x ice chips  agree with proton pump inhibitor   responding to transfusions  will need repeat  upper gastrointestinal endoscopy in am  hold all antiplatlet agents  no nsaid  may need GDA embolization if  upper gastrointestinal endoscopy fails to control bleeding

## 2018-02-18 NOTE — DIETITIAN INITIAL EVALUATION ADULT. - PHYSICAL APPEARANCE
well nourished/BMI 25.0; Noted with 1+ generalized edema; Stage I sacral and stage I yohana heel pressure ulcers noted.

## 2018-02-18 NOTE — CONSULT NOTE ADULT - SUBJECTIVE AND OBJECTIVE BOX
Sent from [facility] for IR/possible sx    88F hx HTN HLD CVA hiatal hernia was on ASA    Admit to  hospital 2/12 with tarry stools.  She has EGD that showed HH with Jose De Jesus ulcer (non bleeding)  and 2 DU's with stigmata of bleeding.  She was managed with PPI.  Was stable for 3 days without bleeding , and discharged 2/15.    She returned  to  2/17 in the evening with syncope, hematemesis, black stools and MARYLOU.     Continued to pass black tarry stools through last night and today.  She received 4 units of packed RBC's hgb 7.9 to 8.3.    Had repeat EGD today showing Large DU (80%) of duodenal bulb. with adherent clot.  Epi injected.      GI recc for IR GDA embo and  sx back up.    Arrived pale but HD stable.    Transported to IR for urgent embolization.    Case discussed with IR/ACS.    ACS will assume care post procedure.                          CCT . Sent from [facility] for IR/possible sx    88F hx HTN HLD CVA hiatal hernia was on ASA    Admit to  hospital 2/12 with tarry stools.  She has EGD that showed HH with Jose De Jesus ulcer (non bleeding)  and 2 DU's with stigmata of bleeding.  She was managed with PPI.  Was stable for 3 days without bleeding , and discharged 2/15.    She returned  to  2/17 in the evening with syncope, hematemesis, black stools and MARYLOU.     Continued to pass black tarry stools through last night and today.  She received 4 units of packed RBC's hgb 7.9 to 8.3.    Had repeat EGD today showing Large DU (80%) of duodenal bulb. with adherent clot.  Epi injected.      GI recc for IR GDA embo and  sx back up.    2 units transfused between this evening and arrival here for a total of 6.    Arrived pale but HD stable.    Transported to IR for urgent embolization.    Case discussed with IR/ACS.    ACS will assume care post procedure.    Would give plts ffp and cryo at this point.  A dose of DDAVP owing to ASA..     PPI drip.                           CCT .

## 2018-02-18 NOTE — BRIEF OPERATIVE NOTE - PROCEDURE
<<-----Click on this checkbox to enter Procedure Angiographic guidance for transcatheter embolization therapy  02/18/2018    Active  HALPER

## 2018-02-18 NOTE — PROGRESS NOTE ADULT - SUBJECTIVE AND OBJECTIVE BOX
24 hour events:   admitted to ICU with recurrent UGIB, acute blood loss anemia, syncope (recently discharged 2/15 from GIB from )  transfused 2 PRBC  Hb: 7.9 > 2 PRBC > 10.7 > 10.5    T(F): 97.5 (02-18-18 @ 12:01), Max: 98.3 (02-17-18 @ 21:25)  HR: 95 (02-18-18 @ 13:00) (68 - 100)  BP: 123/69 (02-18-18 @ 13:00) (99/57 - 139/69)  RR: 28 (02-18-18 @ 13:00) (15 - 43)  SpO2: 95% (02-18-18 @ 13:00) (77% - 99%)    I&O's Summary    17 Feb 2018 07:01  -  18 Feb 2018 07:00  --------------------------------------------------------  IN: 1130 mL / OUT: 0 mL / NET: 1130 mL      Physical Exam:   Gen:  Neuro:  HEENT:  Resp:  CVS:  Abd:  Ext:  Skin:    Meds:    pantoprazole Infusion IV Continuous  sucralfate Oral      potassium chloride    Tablet ER Oral  sodium chloride 0.9%. IV Continuous                                  10.5   22.6  )-----------( 194      ( 18 Feb 2018 09:24 )             30.1     Bands 17.0    02-18    144  |  111<H>  |  64<H>  ----------------------------<  102<H>  3.5   |  23  |  0.86    Ca    7.5<L>      18 Feb 2018 11:11    TPro  5.2<L>  /  Alb  2.1<L>  /  TBili  0.4  /  DBili  x   /  AST  31  /  ALT  20  /  AlkPhos  50  02-18    Lactate 5.1           02-17 @ 21:01      CARDIAC MARKERS ( 17 Feb 2018 21:01 )  .066 ng/mL / x     / 82 U/L / x     / x          PT/INR - ( 17 Feb 2018 21:01 )   PT: 12.4 sec;   INR: 1.13 ratio         PTT - ( 17 Feb 2018 21:01 )  PTT:27.5 sec                Radiology: ***    Bedside ultrasound: ***    CENTRAL LINE: N/Y          DATE INSERTED:              REMOVE: Y/N  DANIELS: N/Y                       DATE INSERTED:              REMOVE: Y/N  A-LINE: N/Y                       DATE INSERTED:              REMOVE: Y/N    GLOBAL ISSUE/BEST PRACTICE:  Analgesia:  Sedation:  CAM-ICU:   HOB elevation: yes  Stress ulcer prophylaxis:  VTE prophylaxis:  Glycemic control:  Nutrition:    CODE STATUS: *** 24 hour events:   admitted to ICU with recurrent UGIB, acute blood loss anemia, syncope (recently discharged 2/15 from GIB from )  transfused 2 PRBC  Hb: 7.9 > 2 PRBC > 10.7 > 10.5    T(F): 97.5 (02-18-18 @ 12:01), Max: 98.3 (02-17-18 @ 21:25)  HR: 95 (02-18-18 @ 13:00) (68 - 100)  BP: 123/69 (02-18-18 @ 13:00) (99/57 - 139/69)  RR: 28 (02-18-18 @ 13:00) (15 - 43)  SpO2: 95% (02-18-18 @ 13:00) (77% - 99%)    I&O's Summary    17 Feb 2018 07:01  -  18 Feb 2018 07:00  --------------------------------------------------------  IN: 1130 mL / OUT: 0 mL / NET: 1130 mL      Physical Exam:   Gen: elderly F, NAD  Neuro: A and O x3  Resp: CTABL anteriorly  CVS:  tachy, regular   Abd: soft, NTND  Ext: no edema    Meds:    pantoprazole Infusion IV Continuous  sucralfate Oral      potassium chloride    Tablet ER Oral  sodium chloride 0.9%. IV Continuous                                  10.5   22.6  )-----------( 194      ( 18 Feb 2018 09:24 )             30.1     Bands 17.0    02-18    144  |  111<H>  |  64<H>  ----------------------------<  102<H>  3.5   |  23  |  0.86    Ca    7.5<L>      18 Feb 2018 11:11    TPro  5.2<L>  /  Alb  2.1<L>  /  TBili  0.4  /  DBili  x   /  AST  31  /  ALT  20  /  AlkPhos  50  02-18    Lactate 5.1           02-17 @ 21:01      CARDIAC MARKERS ( 17 Feb 2018 21:01 )  .066 ng/mL / x     / 82 U/L / x     / x          PT/INR - ( 17 Feb 2018 21:01 )   PT: 12.4 sec;   INR: 1.13 ratio         PTT - ( 17 Feb 2018 21:01 )  PTT:27.5 sec      Radiology:   Xray Chest 1 View AP/PA (02.17.18 @ 21:34) >  AP radiograph of the chest demonstrates no evidence of infiltrate,   pleural effusion or vascular congestion. No atelectasis is seen. The   cardiac silhouette is normal in size. Osseous structures are intact.   There is a large hiatal hernia.    Impression: No active pulmonary disease. Large hiatal hernia    < end of copied text >      CENTRAL LINE: N  DANIELS: N  A-LINE: N    GLOBAL ISSUE/BEST PRACTICE:  Analgesia: N  Sedation:N  CAM-ICU: NEG  HOB elevation: yes  Stress ulcer prophylaxis:Y  VTE prophylaxis: SCD  Glycemic control: Y  Nutrition: NPO    CODE STATUS: FULL

## 2018-02-18 NOTE — BRIEF OPERATIVE NOTE - OPERATION/FINDINGS
coil embo of superior pancreatic duodenal artery, GDA and Gastroepiploic.  Good angiographic result.  5F sheath left in CFA for arterial line (sutured).  very tortuous aorta.  61 cc contrast

## 2018-02-19 VITALS
RESPIRATION RATE: 27 BRPM | OXYGEN SATURATION: 100 % | SYSTOLIC BLOOD PRESSURE: 130 MMHG | DIASTOLIC BLOOD PRESSURE: 86 MMHG | WEIGHT: 129.19 LBS | TEMPERATURE: 97 F | HEIGHT: 58 IN | HEART RATE: 85 BPM

## 2018-02-19 DIAGNOSIS — K26.4 CHRONIC OR UNSPECIFIED DUODENAL ULCER WITH HEMORRHAGE: ICD-10-CM

## 2018-02-19 DIAGNOSIS — N17.9 ACUTE KIDNEY FAILURE, UNSPECIFIED: ICD-10-CM

## 2018-02-19 DIAGNOSIS — I10 ESSENTIAL (PRIMARY) HYPERTENSION: ICD-10-CM

## 2018-02-19 LAB
ANION GAP SERPL CALC-SCNC: 11 MMOL/L — SIGNIFICANT CHANGE UP (ref 5–17)
ANION GAP SERPL CALC-SCNC: 13 MMOL/L — SIGNIFICANT CHANGE UP (ref 5–17)
BLD GP AB SCN SERPL QL: SIGNIFICANT CHANGE UP
BUN SERPL-MCNC: 44 MG/DL — HIGH (ref 8–20)
BUN SERPL-MCNC: 48 MG/DL — HIGH (ref 8–20)
CALCIUM SERPL-MCNC: 7.3 MG/DL — LOW (ref 8.6–10.2)
CALCIUM SERPL-MCNC: 7.4 MG/DL — LOW (ref 8.6–10.2)
CHLORIDE SERPL-SCNC: 114 MMOL/L — HIGH (ref 98–107)
CHLORIDE SERPL-SCNC: 116 MMOL/L — HIGH (ref 98–107)
CO2 SERPL-SCNC: 17 MMOL/L — LOW (ref 22–29)
CO2 SERPL-SCNC: 18 MMOL/L — LOW (ref 22–29)
CREAT SERPL-MCNC: 0.69 MG/DL — SIGNIFICANT CHANGE UP (ref 0.5–1.3)
CREAT SERPL-MCNC: 0.71 MG/DL — SIGNIFICANT CHANGE UP (ref 0.5–1.3)
FIBRINOGEN PPP-MCNC: 463 MG/DL — SIGNIFICANT CHANGE UP (ref 310–510)
GAS PNL BLDA: SIGNIFICANT CHANGE UP
GAS PNL BLDA: SIGNIFICANT CHANGE UP
GLUCOSE SERPL-MCNC: 112 MG/DL — SIGNIFICANT CHANGE UP (ref 70–115)
GLUCOSE SERPL-MCNC: 137 MG/DL — HIGH (ref 70–115)
HCT VFR BLD CALC: 30 % — LOW (ref 37–47)
HCT VFR BLD CALC: 30.5 % — LOW (ref 37–47)
HCT VFR BLD CALC: 34 % — LOW (ref 37–47)
HGB BLD-MCNC: 10.4 G/DL — LOW (ref 12–16)
HGB BLD-MCNC: 10.4 G/DL — LOW (ref 12–16)
HGB BLD-MCNC: 12.1 G/DL — SIGNIFICANT CHANGE UP (ref 12–16)
INR BLD: 1.09 RATIO — SIGNIFICANT CHANGE UP (ref 0.88–1.16)
MAGNESIUM SERPL-MCNC: 1.8 MG/DL — SIGNIFICANT CHANGE UP (ref 1.6–2.6)
MAGNESIUM SERPL-MCNC: 2.6 MG/DL — SIGNIFICANT CHANGE UP (ref 1.6–2.6)
MCHC RBC-ENTMCNC: 29.3 PG — SIGNIFICANT CHANGE UP (ref 27–31)
MCHC RBC-ENTMCNC: 29.4 PG — SIGNIFICANT CHANGE UP (ref 27–31)
MCHC RBC-ENTMCNC: 30 PG — SIGNIFICANT CHANGE UP (ref 27–31)
MCHC RBC-ENTMCNC: 34.1 G/DL — SIGNIFICANT CHANGE UP (ref 32–36)
MCHC RBC-ENTMCNC: 34.7 G/DL — SIGNIFICANT CHANGE UP (ref 32–36)
MCHC RBC-ENTMCNC: 35.6 G/DL — SIGNIFICANT CHANGE UP (ref 32–36)
MCV RBC AUTO: 84.2 FL — SIGNIFICANT CHANGE UP (ref 81–99)
MCV RBC AUTO: 84.7 FL — SIGNIFICANT CHANGE UP (ref 81–99)
MCV RBC AUTO: 85.9 FL — SIGNIFICANT CHANGE UP (ref 81–99)
PHOSPHATE SERPL-MCNC: 2.3 MG/DL — LOW (ref 2.4–4.7)
PHOSPHATE SERPL-MCNC: 3.3 MG/DL — SIGNIFICANT CHANGE UP (ref 2.4–4.7)
PLATELET # BLD AUTO: 132 K/UL — LOW (ref 150–400)
PLATELET # BLD AUTO: 133 K/UL — LOW (ref 150–400)
PLATELET # BLD AUTO: 138 K/UL — LOW (ref 150–400)
POTASSIUM SERPL-MCNC: 3.7 MMOL/L — SIGNIFICANT CHANGE UP (ref 3.5–5.3)
POTASSIUM SERPL-MCNC: 4 MMOL/L — SIGNIFICANT CHANGE UP (ref 3.5–5.3)
POTASSIUM SERPL-SCNC: 3.7 MMOL/L — SIGNIFICANT CHANGE UP (ref 3.5–5.3)
POTASSIUM SERPL-SCNC: 4 MMOL/L — SIGNIFICANT CHANGE UP (ref 3.5–5.3)
PROTHROM AB SERPL-ACNC: 12 SEC — SIGNIFICANT CHANGE UP (ref 9.8–12.7)
RBC # BLD: 3.54 M/UL — LOW (ref 4.4–5.2)
RBC # BLD: 3.55 M/UL — LOW (ref 4.4–5.2)
RBC # BLD: 4.04 M/UL — LOW (ref 4.4–5.2)
RBC # FLD: 14.9 % — SIGNIFICANT CHANGE UP (ref 11–15.6)
RBC # FLD: 15.3 % — SIGNIFICANT CHANGE UP (ref 11–15.6)
RBC # FLD: 16.4 % — HIGH (ref 11–15.6)
SODIUM SERPL-SCNC: 144 MMOL/L — SIGNIFICANT CHANGE UP (ref 135–145)
SODIUM SERPL-SCNC: 145 MMOL/L — SIGNIFICANT CHANGE UP (ref 135–145)
TROPONIN T SERPL-MCNC: <0.01 NG/ML — SIGNIFICANT CHANGE UP (ref 0–0.06)
TROPONIN T SERPL-MCNC: <0.01 NG/ML — SIGNIFICANT CHANGE UP (ref 0–0.06)
TYPE + AB SCN PNL BLD: SIGNIFICANT CHANGE UP
WBC # BLD: 16.7 K/UL — HIGH (ref 4.8–10.8)
WBC # BLD: 18.2 K/UL — HIGH (ref 4.8–10.8)
WBC # BLD: 21.6 K/UL — HIGH (ref 4.8–10.8)
WBC # FLD AUTO: 16.7 K/UL — HIGH (ref 4.8–10.8)
WBC # FLD AUTO: 18.2 K/UL — HIGH (ref 4.8–10.8)
WBC # FLD AUTO: 21.6 K/UL — HIGH (ref 4.8–10.8)

## 2018-02-19 PROCEDURE — 36245 INS CATH ABD/L-EXT ART 1ST: CPT | Mod: XS

## 2018-02-19 PROCEDURE — 36248 INS CATH ABD/L-EXT ART ADDL: CPT

## 2018-02-19 PROCEDURE — 76937 US GUIDE VASCULAR ACCESS: CPT | Mod: 26

## 2018-02-19 PROCEDURE — 37244 VASC EMBOLIZE/OCCLUDE BLEED: CPT

## 2018-02-19 PROCEDURE — 99233 SBSQ HOSP IP/OBS HIGH 50: CPT

## 2018-02-19 PROCEDURE — 36247 INS CATH ABD/L-EXT ART 3RD: CPT

## 2018-02-19 PROCEDURE — 93010 ELECTROCARDIOGRAM REPORT: CPT

## 2018-02-19 RX ORDER — MAGNESIUM SULFATE 500 MG/ML
2 VIAL (ML) INJECTION ONCE
Qty: 0 | Refills: 0 | Status: COMPLETED | OUTPATIENT
Start: 2018-02-19 | End: 2018-02-19

## 2018-02-19 RX ORDER — CLARITHROMYCIN 500 MG
500 TABLET ORAL
Qty: 0 | Refills: 0 | Status: DISCONTINUED | OUTPATIENT
Start: 2018-02-19 | End: 2018-02-22

## 2018-02-19 RX ORDER — ESCITALOPRAM OXALATE 10 MG/1
10 TABLET, FILM COATED ORAL DAILY
Qty: 0 | Refills: 0 | Status: DISCONTINUED | OUTPATIENT
Start: 2018-02-19 | End: 2018-02-23

## 2018-02-19 RX ORDER — METRONIDAZOLE 500 MG
500 TABLET ORAL THREE TIMES A DAY
Qty: 0 | Refills: 0 | Status: DISCONTINUED | OUTPATIENT
Start: 2018-02-19 | End: 2018-02-22

## 2018-02-19 RX ORDER — ACETAMINOPHEN 500 MG
1000 TABLET ORAL ONCE
Qty: 0 | Refills: 0 | Status: COMPLETED | OUTPATIENT
Start: 2018-02-19 | End: 2018-02-19

## 2018-02-19 RX ORDER — ATORVASTATIN CALCIUM 80 MG/1
20 TABLET, FILM COATED ORAL AT BEDTIME
Qty: 0 | Refills: 0 | Status: DISCONTINUED | OUTPATIENT
Start: 2018-02-19 | End: 2018-02-23

## 2018-02-19 RX ORDER — IPRATROPIUM/ALBUTEROL SULFATE 18-103MCG
3 AEROSOL WITH ADAPTER (GRAM) INHALATION EVERY 6 HOURS
Qty: 0 | Refills: 0 | Status: DISCONTINUED | OUTPATIENT
Start: 2018-02-19 | End: 2018-02-23

## 2018-02-19 RX ORDER — PANTOPRAZOLE SODIUM 20 MG/1
40 TABLET, DELAYED RELEASE ORAL
Qty: 0 | Refills: 0 | Status: DISCONTINUED | OUTPATIENT
Start: 2018-02-19 | End: 2018-02-20

## 2018-02-19 RX ORDER — SODIUM CHLORIDE 9 MG/ML
1000 INJECTION, SOLUTION INTRAVENOUS
Qty: 0 | Refills: 0 | Status: DISCONTINUED | OUTPATIENT
Start: 2018-02-19 | End: 2018-02-19

## 2018-02-19 RX ORDER — HEPARIN SODIUM 5000 [USP'U]/ML
5000 INJECTION INTRAVENOUS; SUBCUTANEOUS EVERY 8 HOURS
Qty: 0 | Refills: 0 | Status: DISCONTINUED | OUTPATIENT
Start: 2018-02-19 | End: 2018-02-23

## 2018-02-19 RX ORDER — AMLODIPINE BESYLATE 2.5 MG/1
2.5 TABLET ORAL DAILY
Qty: 0 | Refills: 0 | Status: DISCONTINUED | OUTPATIENT
Start: 2018-02-19 | End: 2018-02-23

## 2018-02-19 RX ORDER — METOPROLOL TARTRATE 50 MG
2.5 TABLET ORAL EVERY 6 HOURS
Qty: 0 | Refills: 0 | Status: DISCONTINUED | OUTPATIENT
Start: 2018-02-19 | End: 2018-02-19

## 2018-02-19 RX ADMIN — HEPARIN SODIUM 5000 UNIT(S): 5000 INJECTION INTRAVENOUS; SUBCUTANEOUS at 22:48

## 2018-02-19 RX ADMIN — Medication 3 MILLILITER(S): at 08:25

## 2018-02-19 RX ADMIN — Medication 500 MILLIGRAM(S): at 18:34

## 2018-02-19 RX ADMIN — Medication 3 MILLILITER(S): at 14:49

## 2018-02-19 RX ADMIN — Medication 500 MILLIGRAM(S): at 22:46

## 2018-02-19 RX ADMIN — Medication 1000 MILLIGRAM(S): at 18:54

## 2018-02-19 RX ADMIN — HEPARIN SODIUM 5000 UNIT(S): 5000 INJECTION INTRAVENOUS; SUBCUTANEOUS at 13:19

## 2018-02-19 RX ADMIN — Medication 500 MILLIGRAM(S): at 01:49

## 2018-02-19 RX ADMIN — ESCITALOPRAM OXALATE 10 MILLIGRAM(S): 10 TABLET, FILM COATED ORAL at 18:34

## 2018-02-19 RX ADMIN — PANTOPRAZOLE SODIUM 40 MILLIGRAM(S): 20 TABLET, DELAYED RELEASE ORAL at 01:49

## 2018-02-19 RX ADMIN — Medication 50 GRAM(S): at 03:45

## 2018-02-19 RX ADMIN — PANTOPRAZOLE SODIUM 40 MILLIGRAM(S): 20 TABLET, DELAYED RELEASE ORAL at 18:35

## 2018-02-19 RX ADMIN — ATORVASTATIN CALCIUM 20 MILLIGRAM(S): 80 TABLET, FILM COATED ORAL at 22:46

## 2018-02-19 RX ADMIN — Medication 500 MILLIGRAM(S): at 06:10

## 2018-02-19 RX ADMIN — Medication 3 MILLILITER(S): at 20:14

## 2018-02-19 RX ADMIN — Medication 62.5 MILLIMOLE(S): at 04:07

## 2018-02-19 RX ADMIN — Medication 400 MILLIGRAM(S): at 18:36

## 2018-02-19 RX ADMIN — AMLODIPINE BESYLATE 2.5 MILLIGRAM(S): 2.5 TABLET ORAL at 18:34

## 2018-02-19 RX ADMIN — Medication 3 MILLILITER(S): at 02:33

## 2018-02-19 RX ADMIN — Medication 500 MILLIGRAM(S): at 13:19

## 2018-02-19 RX ADMIN — Medication 2.5 MILLIGRAM(S): at 12:46

## 2018-02-19 RX ADMIN — Medication 2.5 MILLIGRAM(S): at 06:10

## 2018-02-19 NOTE — H&P ADULT - ASSESSMENT
87 yo F Transfer from [facility].    DX Duodenal ulcers with recent bleeding and Epi injection but had required 6 PRBC.  No SP Successful embo    Neuro: Pain control PRN.  Non sedating medications if possible    CV: Was recently on BB at home.  Will start low dose BB IV.  Attempt to maintain SBP < 180 or lower.  If hypotension I will highly consider hemorrhagic shock and re-bleed on DDX.  Therefore we will keep Products on hold.  Would likely need OR at that time. Serial H/H and STAT Coags, CBC, fibrinogen, type. NV Checks while Arterial line in fem. Will do serial EKG and Troponin.  Likely demand ischemia but will consult cardiology and ECHO if increasing and Start ASA.  Maintain 3 PIV of 18 gauge or better. If can not maintain this then I will place Indroducer.  Keep IR Fem A-line at this time.  Will DC in AM if stable.    Pulm: IS, Duonebs. O2 PRN    GI: Maintain NPO for risk of requiring OR.  Give gentle IVF. BID Protonix IVP    : Place Soliz for tight I/Os, U/O monitoring and FU Cr.  Likely MARYLOU from hypoperfusion/low flow    ID: H.Pylori TX.  FU Results.    Heme: SCD Only at this time given high risk of bleed.    Dispo: Admit to ICU.  Case discussed with Dr Garcia.

## 2018-02-19 NOTE — H&P ADULT - HISTORY OF PRESENT ILLNESS
Pt is a transfer from [facility] for GI Bleed requiring IR.    She had presented to [facility] on 2/12 for dark tarry stools, generalized weakness for a few days prior.  She had initial EGD there which showed hiatal hernia with non bleeding ulcer but two duodenal ulcers with stigmata of bleeding but no active bleed.  No intervention at that time performed.  Pt was managed with PPI and did well.  Was DC home on 2/15.  She returned for syncope, hematemasis, black stools and hemorrhagic shock on 2/17. She was given 4 units PRBC.  She had a repeat EGD which showed worse ulcer with clot but no active bleed.  Was given Epi injection.  More stable.  Received 2 more units for total of 6 and was transferred here on 2/18, accepted to MICU and IR.    She is immediately post procedure of IR with successful embolization of Gastroduodenal artery, Pancreaticoduodenal artery and gastroepiploic artery.   Pt is sleepy after Fentantyl and Versed but answers most questions appropriately and has no CO.  Denies CP, SOB, Ab pain, NV or other CO.  States she would like a coffee and have oatmeal in the morning for breakfast.  Daughter, who states she is health care proxy, helps with HX. States that pt has been more confused since initial visit to hospital.   + HX  of CVA, HTN, Anxiety, HLD, NSAID/ ASA use.

## 2018-02-19 NOTE — PROGRESS NOTE ADULT - SUBJECTIVE AND OBJECTIVE BOX
INTERVAL HPI/OVERNIGHT EVENTS:    PRESSORS: [ ] YES [ ] NO  WHICH:  DOSE:    ANTIBIOTICS:                  DATE STARTED:  ANTIBIOTICS:                  DATE STARTED:  ANTIBIOTICS:                  DATE STARTED:    MEDICATIONS  (STANDING):  ALBUTerol/ipratropium for Nebulization 3 milliLiter(s) Nebulizer every 6 hours  clarithromycin 500 milliGRAM(s) Oral two times a day  heparin  Injectable 5000 Unit(s) SubCutaneous every 8 hours  metoprolol    tartrate Injectable 2.5 milliGRAM(s) IV Push every 6 hours  metroNIDAZOLE    Tablet 500 milliGRAM(s) Oral three times a day  pantoprazole  Injectable 40 milliGRAM(s) IV Push two times a day    MEDICATIONS  (PRN):      Drug Dosing Weight  Height (cm): 152.4 (19 Feb 2018 00:22)  Weight (kg): 58 (19 Feb 2018 00:22)  BMI (kg/m2): 25 (19 Feb 2018 00:22)  BSA (m2): 1.54 (19 Feb 2018 00:22)    CENTRAL LINE: [ ] YES [ ] NO  LOCATION:   DATE INSERTED:  REMOVE: [ ] YES [ ] NO  EXPLAIN:    DANIELS: [ ] YES [ ] NO    DATE INSERTED:  REMOVE: [ ] YES [ ] NO  EXPLAIN:    A-LINE: [ ] YES [ ] NO  LOCATION:   DATE INSERTED:  REMOVE: [ ] YES [ ] NO  EXPLAIN:    PAST MEDICAL & SURGICAL HISTORY:  GI bleed  PUD (peptic ulcer disease)  CVA (cerebral vascular accident)  Anxiety  Hip fracture  Fall  Hypertension  Hyperlipidemia  S/P hip replacement: left  S/P hysterectomy      ICU Vital Signs Last 24 Hrs  T(C): 36.3 (19 Feb 2018 08:00), Max: 37.1 (18 Feb 2018 18:45)  T(F): 97.3 (19 Feb 2018 08:00), Max: 98.7 (18 Feb 2018 18:45)  HR: 99 (19 Feb 2018 10:00) (73 - 112)  BP: 125/60 (19 Feb 2018 08:00) (75/39 - 145/81)  BP(mean): 86 (19 Feb 2018 08:00) (53 - 103)  ABP: 142/64 (19 Feb 2018 10:00) (95/39 - 162/76)  ABP(mean): 81 (19 Feb 2018 08:00) (59 - 106)  RR: 20 (19 Feb 2018 10:00) (14 - 32)  SpO2: 98% (19 Feb 2018 10:00) (92% - 100%)      ABG - ( 19 Feb 2018 06:42 )  pH: 7.37  /  pCO2: 34    /  pO2: 66    / HCO3: 20    / Base Excess: -5.0  /  SaO2: 95                  I&O's Detail    18 Feb 2018 07:01  -  19 Feb 2018 07:00  --------------------------------------------------------  IN:    IV PiggyBack: 50 mL    lactated ringers.: 420 mL    Oral Fluid: 170 mL    Solution: 250 mL  Total IN: 890 mL    OUT:    Incontinent per Diaper: 2 mL    Indwelling Catheter - Urethral: 335 mL  Total OUT: 337 mL    Total NET: 553 mL      19 Feb 2018 07:01  -  19 Feb 2018 10:26  --------------------------------------------------------  IN:    lactated ringers.: 180 mL  Total IN: 180 mL    OUT:    Indwelling Catheter - Urethral: 88 mL  Total OUT: 88 mL    Total NET: 92 mL          LABS:  CBC Full  -  ( 19 Feb 2018 06:55 )  WBC Count : 18.2 K/uL  Hemoglobin : 10.4 g/dL  Hematocrit : 30.0 %  Platelet Count - Automated : 133 K/uL  Mean Cell Volume : 84.7 fl  Mean Cell Hemoglobin : 29.4 pg  Mean Cell Hemoglobin Concentration : 34.7 g/dL  Auto Neutrophil # : x  Auto Lymphocyte # : x  Auto Monocyte # : x  Auto Eosinophil # : x  Auto Basophil # : x  Auto Neutrophil % : x  Auto Lymphocyte % : x  Auto Monocyte % : x  Auto Eosinophil % : x  Auto Basophil % : x    02-19    145  |  114<H>  |  44.0<H>  ----------------------------<  137<H>  3.7   |  18.0<L>  |  0.71    Ca    7.3<L>      19 Feb 2018 06:55  Phos  3.3     02-19  Mg     2.6     02-19    TPro  5.2<L>  /  Alb  2.1<L>  /  TBili  0.4  /  DBili  x   /  AST  31  /  ALT  20  /  AlkPhos  50  02-18    PT/INR - ( 19 Feb 2018 01:22 )   PT: 12.0 sec;   INR: 1.09 ratio         PTT - ( 17 Feb 2018 21:01 )  PTT:27.5 sec      Assessment and Plan:    The patient was seen and examined  Events noted  AE of GDA  Hemodynamically normal  Hgb 10.6  Abdomen is soft, non-tender  Will give a diet  Remove femoral sheath  Mobilize  H pyloi treatment / follow h pylori titer  Send gastrin level  DVT prophylaxis

## 2018-02-19 NOTE — H&P ADULT - MENTAL STATUS
A&O x person and place.  Slightly lethargic.    GCS 3/3/6.  Eyes will close if not maintaining conversation.

## 2018-02-19 NOTE — PROGRESS NOTE ADULT - SUBJECTIVE AND OBJECTIVE BOX
Rt femoral sheath pulled earlier today, 5min manual pressure held, good hemostatis. Re-evaluated now, no hematoma.dressing dry. Patient tolerated well.

## 2018-02-19 NOTE — H&P ADULT - ATTENDING COMMENTS
Patient transferred from OSH for duodenal ulcer and GI bleed, transfused 5 units of blood during her hospital stay at [facility], MARYLOU, st changes, mildly elevated troponin.      GIB  Duodenal ulcer  CAD  MARYLOU  ?NSTEMI, ? demand ischemia    Neuro: provide adequate pain control, limit narcotics  CV: repeat EKG and trend troponins. Maintain MAPS>65.   RESP: maintain O2 saturations >95%  GI: s/p GDA embolization. Continue PPI, trend H&H, treat for H pylori empirically  : maintain UOP 0.5 cc/kg/hr  Heme: hold anticoagulants, trend H&H  ID: treat for h pylori empirically  Code status discussed with daughter: FULL  Dispo: on going ICU care needs

## 2018-02-19 NOTE — PROGRESS NOTE ADULT - SUBJECTIVE AND OBJECTIVE BOX
88y Female     T(C): 36.3 (02-19-18 @ 08:00), Max: 37.1 (02-18-18 @ 18:45)  HR: 78 (02-19-18 @ 08:00) (73 - 112)  BP: 125/60 (02-19-18 @ 08:00) (75/39 - 145/81)  RR: 20 (02-19-18 @ 08:00) (14 - 32)  SpO2: 95% (02-19-18 @ 08:00) (77% - 100%)  Wt(kg): --    Pt seen, doing well, no anesthesia complications or complaints noted or reported.   No Nausea  Pain well controlled

## 2018-02-20 LAB
ANION GAP SERPL CALC-SCNC: 13 MMOL/L — SIGNIFICANT CHANGE UP (ref 5–17)
BUN SERPL-MCNC: 31 MG/DL — HIGH (ref 8–20)
CALCIUM SERPL-MCNC: 7.8 MG/DL — LOW (ref 8.6–10.2)
CHLORIDE SERPL-SCNC: 111 MMOL/L — HIGH (ref 98–107)
CO2 SERPL-SCNC: 20 MMOL/L — LOW (ref 22–29)
CREAT SERPL-MCNC: 0.78 MG/DL — SIGNIFICANT CHANGE UP (ref 0.5–1.3)
GLUCOSE SERPL-MCNC: 95 MG/DL — SIGNIFICANT CHANGE UP (ref 70–115)
H PYLORI AB SER-ACNC: 13.2 UNITS — SIGNIFICANT CHANGE UP
H PYLORI AG STL QL: NEGATIVE — SIGNIFICANT CHANGE UP
H PYLORI IGA SER-ACNC: 23.4 UNITS — HIGH
HCT VFR BLD CALC: 29.5 % — LOW (ref 37–47)
HGB BLD-MCNC: 10.1 G/DL — LOW (ref 12–16)
MAGNESIUM SERPL-MCNC: 2.3 MG/DL — SIGNIFICANT CHANGE UP (ref 1.6–2.6)
MCHC RBC-ENTMCNC: 29.2 PG — SIGNIFICANT CHANGE UP (ref 27–31)
MCHC RBC-ENTMCNC: 34.2 G/DL — SIGNIFICANT CHANGE UP (ref 32–36)
MCV RBC AUTO: 85.3 FL — SIGNIFICANT CHANGE UP (ref 81–99)
PHOSPHATE SERPL-MCNC: 2.4 MG/DL — SIGNIFICANT CHANGE UP (ref 2.4–4.7)
PLATELET # BLD AUTO: 130 K/UL — LOW (ref 150–400)
POTASSIUM SERPL-MCNC: 3.8 MMOL/L — SIGNIFICANT CHANGE UP (ref 3.5–5.3)
POTASSIUM SERPL-SCNC: 3.8 MMOL/L — SIGNIFICANT CHANGE UP (ref 3.5–5.3)
RBC # BLD: 3.46 M/UL — LOW (ref 4.4–5.2)
RBC # FLD: 16.4 % — HIGH (ref 11–15.6)
SODIUM SERPL-SCNC: 144 MMOL/L — SIGNIFICANT CHANGE UP (ref 135–145)
T3 SERPL-MCNC: 66 NG/DL — LOW (ref 80–200)
T4 AB SER-ACNC: 5.4 UG/DL — SIGNIFICANT CHANGE UP (ref 4.5–12)
TROPONIN T SERPL-MCNC: <0.01 NG/ML — SIGNIFICANT CHANGE UP (ref 0–0.06)
TSH SERPL-MCNC: 1.31 UIU/ML — SIGNIFICANT CHANGE UP (ref 0.27–4.2)
WBC # BLD: 13.5 K/UL — HIGH (ref 4.8–10.8)
WBC # FLD AUTO: 13.5 K/UL — HIGH (ref 4.8–10.8)

## 2018-02-20 PROCEDURE — 99233 SBSQ HOSP IP/OBS HIGH 50: CPT

## 2018-02-20 PROCEDURE — 93010 ELECTROCARDIOGRAM REPORT: CPT

## 2018-02-20 RX ORDER — METOPROLOL TARTRATE 50 MG
2.5 TABLET ORAL ONCE
Qty: 0 | Refills: 0 | Status: COMPLETED | OUTPATIENT
Start: 2018-02-20 | End: 2018-02-20

## 2018-02-20 RX ORDER — LANOLIN ALCOHOL/MO/W.PET/CERES
5 CREAM (GRAM) TOPICAL AT BEDTIME
Qty: 0 | Refills: 0 | Status: DISCONTINUED | OUTPATIENT
Start: 2018-02-20 | End: 2018-02-23

## 2018-02-20 RX ORDER — PANTOPRAZOLE SODIUM 20 MG/1
40 TABLET, DELAYED RELEASE ORAL
Qty: 0 | Refills: 0 | Status: DISCONTINUED | OUTPATIENT
Start: 2018-02-20 | End: 2018-02-23

## 2018-02-20 RX ORDER — POTASSIUM CHLORIDE 20 MEQ
40 PACKET (EA) ORAL ONCE
Qty: 0 | Refills: 0 | Status: COMPLETED | OUTPATIENT
Start: 2018-02-20 | End: 2018-02-20

## 2018-02-20 RX ADMIN — Medication 500 MILLIGRAM(S): at 18:32

## 2018-02-20 RX ADMIN — Medication 500 MILLIGRAM(S): at 06:27

## 2018-02-20 RX ADMIN — Medication 40 MILLIEQUIVALENT(S): at 11:04

## 2018-02-20 RX ADMIN — HEPARIN SODIUM 5000 UNIT(S): 5000 INJECTION INTRAVENOUS; SUBCUTANEOUS at 06:28

## 2018-02-20 RX ADMIN — Medication 100 MILLIGRAM(S): at 21:34

## 2018-02-20 RX ADMIN — Medication 500 MILLIGRAM(S): at 16:08

## 2018-02-20 RX ADMIN — Medication 3 MILLILITER(S): at 21:04

## 2018-02-20 RX ADMIN — Medication 5 MILLIGRAM(S): at 21:34

## 2018-02-20 RX ADMIN — Medication 500 MILLIGRAM(S): at 21:34

## 2018-02-20 RX ADMIN — HEPARIN SODIUM 5000 UNIT(S): 5000 INJECTION INTRAVENOUS; SUBCUTANEOUS at 21:34

## 2018-02-20 RX ADMIN — HEPARIN SODIUM 5000 UNIT(S): 5000 INJECTION INTRAVENOUS; SUBCUTANEOUS at 16:07

## 2018-02-20 RX ADMIN — ESCITALOPRAM OXALATE 10 MILLIGRAM(S): 10 TABLET, FILM COATED ORAL at 11:04

## 2018-02-20 RX ADMIN — PANTOPRAZOLE SODIUM 40 MILLIGRAM(S): 20 TABLET, DELAYED RELEASE ORAL at 16:08

## 2018-02-20 RX ADMIN — Medication 3 MILLILITER(S): at 03:13

## 2018-02-20 RX ADMIN — AMLODIPINE BESYLATE 2.5 MILLIGRAM(S): 2.5 TABLET ORAL at 06:32

## 2018-02-20 RX ADMIN — Medication 3 MILLILITER(S): at 08:26

## 2018-02-20 RX ADMIN — PANTOPRAZOLE SODIUM 40 MILLIGRAM(S): 20 TABLET, DELAYED RELEASE ORAL at 06:27

## 2018-02-20 RX ADMIN — ATORVASTATIN CALCIUM 20 MILLIGRAM(S): 80 TABLET, FILM COATED ORAL at 21:34

## 2018-02-20 RX ADMIN — Medication 3 MILLILITER(S): at 16:11

## 2018-02-20 RX ADMIN — Medication 2.5 MILLIGRAM(S): at 06:26

## 2018-02-20 NOTE — PROGRESS NOTE ADULT - SUBJECTIVE AND OBJECTIVE BOX
INTERVAL HPI/OVERNIGHT EVENTS:    PRESSORS: [ ] YES [ ] NO  WHICH:  DOSE:    ANTIBIOTICS:                  DATE STARTED:  ANTIBIOTICS:                  DATE STARTED:  ANTIBIOTICS:                  DATE STARTED:    MEDICATIONS  (STANDING):  ALBUTerol/ipratropium for Nebulization 3 milliLiter(s) Nebulizer every 6 hours  amLODIPine   Tablet 2.5 milliGRAM(s) Oral daily  atorvastatin 20 milliGRAM(s) Oral at bedtime  clarithromycin 500 milliGRAM(s) Oral two times a day  escitalopram 10 milliGRAM(s) Oral daily  heparin  Injectable 5000 Unit(s) SubCutaneous every 8 hours  metroNIDAZOLE    Tablet 500 milliGRAM(s) Oral three times a day  pantoprazole  Injectable 40 milliGRAM(s) IV Push two times a day  potassium chloride    Tablet ER 40 milliEquivalent(s) Oral once    MEDICATIONS  (PRN):      Drug Dosing Weight  Height (cm): 152.4 (19 Feb 2018 00:22)  Weight (kg): 58 (19 Feb 2018 00:22)  BMI (kg/m2): 25 (19 Feb 2018 00:22)  BSA (m2): 1.54 (19 Feb 2018 00:22)    CENTRAL LINE: [ ] YES [ ] NO  LOCATION:   DATE INSERTED:  REMOVE: [ ] YES [ ] NO  EXPLAIN:    DANIELS: [ ] YES [ ] NO    DATE INSERTED:  REMOVE: [ ] YES [ ] NO  EXPLAIN:    A-LINE: [ ] YES [ ] NO  LOCATION:   DATE INSERTED:  REMOVE: [ ] YES [ ] NO  EXPLAIN:    PAST MEDICAL & SURGICAL HISTORY:  GI bleed  PUD (peptic ulcer disease)  CVA (cerebral vascular accident)  Anxiety  Hip fracture  Fall  Hypertension  Hyperlipidemia  S/P hip replacement: left  S/P hysterectomy      ICU Vital Signs Last 24 Hrs  T(C): 36.6 (20 Feb 2018 04:00), Max: 36.7 (19 Feb 2018 16:00)  T(F): 97.9 (20 Feb 2018 04:00), Max: 98.1 (19 Feb 2018 16:00)  HR: 105 (20 Feb 2018 09:00) (72 - 118)  BP: 126/65 (20 Feb 2018 09:00) (93/59 - 142/62)  BP(mean): 88 (20 Feb 2018 09:00) (59 - 97)  ABP: --  ABP(mean): --  RR: 19 (20 Feb 2018 09:00) (17 - 27)  SpO2: 95% (20 Feb 2018 09:00) (94% - 100%)      ABG - ( 19 Feb 2018 06:42 )  pH: 7.37  /  pCO2: 34    /  pO2: 66    / HCO3: 20    / Base Excess: -5.0  /  SaO2: 95                  I&O's Detail    19 Feb 2018 07:01  -  20 Feb 2018 07:00  --------------------------------------------------------  IN:    IV PiggyBack: 100 mL    lactated ringers.: 180 mL  Total IN: 280 mL    OUT:    Indwelling Catheter - Urethral: 328 mL  Total OUT: 328 mL    Total NET: -48 mL      20 Feb 2018 07:01  -  20 Feb 2018 10:43  --------------------------------------------------------  IN:    Oral Fluid: 100 mL  Total IN: 100 mL    OUT:  Total OUT: 0 mL    Total NET: 100 mL          LABS:  CBC Full  -  ( 20 Feb 2018 05:17 )  WBC Count : 13.5 K/uL  Hemoglobin : 10.1 g/dL  Hematocrit : 29.5 %  Platelet Count - Automated : 130 K/uL  Mean Cell Volume : 85.3 fl  Mean Cell Hemoglobin : 29.2 pg  Mean Cell Hemoglobin Concentration : 34.2 g/dL  Auto Neutrophil # : x  Auto Lymphocyte # : x  Auto Monocyte # : x  Auto Eosinophil # : x  Auto Basophil # : x  Auto Neutrophil % : x  Auto Lymphocyte % : x  Auto Monocyte % : x  Auto Eosinophil % : x  Auto Basophil % : x    02-20    144  |  111<H>  |  31.0<H>  ----------------------------<  95  3.8   |  20.0<L>  |  0.78    Ca    7.8<L>      20 Feb 2018 04:28  Phos  2.4     02-20  Mg     2.3     02-20    TPro  5.2<L>  /  Alb  2.1<L>  /  TBili  0.4  /  DBili  x   /  AST  31  /  ALT  20  /  AlkPhos  50  02-18    PT/INR - ( 19 Feb 2018 01:22 )   PT: 12.0 sec;   INR: 1.09 ratio               Assessment and Plan:    The patient was seen and examined  No new problems  She is tolerating her diet  Hgb has remained stable  Transfer to the floor

## 2018-02-21 LAB
ANION GAP SERPL CALC-SCNC: 9 MMOL/L — SIGNIFICANT CHANGE UP (ref 5–17)
ANISOCYTOSIS BLD QL: SLIGHT — SIGNIFICANT CHANGE UP
BUN SERPL-MCNC: 22 MG/DL — HIGH (ref 8–20)
CALCIUM SERPL-MCNC: 7.5 MG/DL — LOW (ref 8.6–10.2)
CHLORIDE SERPL-SCNC: 110 MMOL/L — HIGH (ref 98–107)
CO2 SERPL-SCNC: 23 MMOL/L — SIGNIFICANT CHANGE UP (ref 22–29)
CREAT SERPL-MCNC: 0.68 MG/DL — SIGNIFICANT CHANGE UP (ref 0.5–1.3)
GASTRIN SERPL-MCNC: 171 PG/ML — HIGH
GLUCOSE SERPL-MCNC: 103 MG/DL — SIGNIFICANT CHANGE UP (ref 70–115)
HCT VFR BLD CALC: 25.9 % — LOW (ref 37–47)
HGB BLD-MCNC: 8.6 G/DL — LOW (ref 12–16)
LYMPHOCYTES # BLD AUTO: 2 % — LOW (ref 20–55)
MACROCYTES BLD QL: SLIGHT — SIGNIFICANT CHANGE UP
MAGNESIUM SERPL-MCNC: 2 MG/DL — SIGNIFICANT CHANGE UP (ref 1.6–2.6)
MCHC RBC-ENTMCNC: 29.2 PG — SIGNIFICANT CHANGE UP (ref 27–31)
MCHC RBC-ENTMCNC: 33.2 G/DL — SIGNIFICANT CHANGE UP (ref 32–36)
MCV RBC AUTO: 87.8 FL — SIGNIFICANT CHANGE UP (ref 81–99)
MONOCYTES NFR BLD AUTO: 2 % — LOW (ref 3–10)
MYELOCYTES NFR BLD: 1 % — HIGH (ref 0–0)
NEUTROPHILS NFR BLD AUTO: 93 % — HIGH (ref 37–73)
OVALOCYTES BLD QL SMEAR: SLIGHT — SIGNIFICANT CHANGE UP
PHOSPHATE SERPL-MCNC: 2.4 MG/DL — SIGNIFICANT CHANGE UP (ref 2.4–4.7)
PLAT MORPH BLD: NORMAL — SIGNIFICANT CHANGE UP
PLATELET # BLD AUTO: 136 K/UL — LOW (ref 150–400)
POIKILOCYTOSIS BLD QL AUTO: SLIGHT — SIGNIFICANT CHANGE UP
POTASSIUM SERPL-MCNC: 4.1 MMOL/L — SIGNIFICANT CHANGE UP (ref 3.5–5.3)
POTASSIUM SERPL-SCNC: 4.1 MMOL/L — SIGNIFICANT CHANGE UP (ref 3.5–5.3)
RBC # BLD: 2.95 M/UL — LOW (ref 4.4–5.2)
RBC # FLD: 16.8 % — HIGH (ref 11–15.6)
RBC BLD AUTO: ABNORMAL
SODIUM SERPL-SCNC: 142 MMOL/L — SIGNIFICANT CHANGE UP (ref 135–145)
VARIANT LYMPHS # BLD: 2 % — SIGNIFICANT CHANGE UP (ref 0–6)
WBC # BLD: 11.6 K/UL — HIGH (ref 4.8–10.8)
WBC # FLD AUTO: 11.6 K/UL — HIGH (ref 4.8–10.8)

## 2018-02-21 PROCEDURE — 99222 1ST HOSP IP/OBS MODERATE 55: CPT

## 2018-02-21 RX ORDER — GABAPENTIN 400 MG/1
100 CAPSULE ORAL AT BEDTIME
Qty: 0 | Refills: 0 | Status: DISCONTINUED | OUTPATIENT
Start: 2018-02-21 | End: 2018-02-23

## 2018-02-21 RX ADMIN — PANTOPRAZOLE SODIUM 40 MILLIGRAM(S): 20 TABLET, DELAYED RELEASE ORAL at 17:31

## 2018-02-21 RX ADMIN — Medication 3 MILLILITER(S): at 09:32

## 2018-02-21 RX ADMIN — AMLODIPINE BESYLATE 2.5 MILLIGRAM(S): 2.5 TABLET ORAL at 05:15

## 2018-02-21 RX ADMIN — Medication 500 MILLIGRAM(S): at 05:15

## 2018-02-21 RX ADMIN — Medication 500 MILLIGRAM(S): at 17:31

## 2018-02-21 RX ADMIN — PANTOPRAZOLE SODIUM 40 MILLIGRAM(S): 20 TABLET, DELAYED RELEASE ORAL at 05:15

## 2018-02-21 RX ADMIN — Medication 500 MILLIGRAM(S): at 15:40

## 2018-02-21 RX ADMIN — Medication 3 MILLILITER(S): at 21:38

## 2018-02-21 RX ADMIN — ESCITALOPRAM OXALATE 10 MILLIGRAM(S): 10 TABLET, FILM COATED ORAL at 15:40

## 2018-02-21 RX ADMIN — HEPARIN SODIUM 5000 UNIT(S): 5000 INJECTION INTRAVENOUS; SUBCUTANEOUS at 14:30

## 2018-02-21 RX ADMIN — Medication 3 MILLILITER(S): at 03:33

## 2018-02-21 RX ADMIN — HEPARIN SODIUM 5000 UNIT(S): 5000 INJECTION INTRAVENOUS; SUBCUTANEOUS at 05:15

## 2018-02-21 NOTE — SWALLOW BEDSIDE ASSESSMENT ADULT - SWALLOW EVAL: DIAGNOSIS
Mild oral dysphagia characterized by slow mastication and delayed oral transit time with solids. Pharyngeal stage WFL with no overt s/s aspiration at bedside.

## 2018-02-21 NOTE — SWALLOW BEDSIDE ASSESSMENT ADULT - ASR SWALLOW ASPIRATION MONITOR
gurgly voice/pneumonia/position upright (90Y)/cough/oral hygiene/fever/change of breathing pattern/throat clearing

## 2018-02-21 NOTE — SWALLOW BEDSIDE ASSESSMENT ADULT - SLP GENERAL OBSERVATIONS
Pt received A&A in bed, +[place], poor historian and requiring encouragement for participation as she initially stated, "just leave me alone."

## 2018-02-21 NOTE — CONSULT NOTE ADULT - SUBJECTIVE AND OBJECTIVE BOX
88F was transferred from [place] on 2/18 with syncope, hematemesis black stools and hemorrhagic shock, s/p 4 units PRBC.  She had an EGD which showed worse ulcer with clot but no active bleed s/p epinephrine injection.  She is s/p 2 additional units of PRBC. She is s/p embolization of Gastroduodenal artery, Pancreaticoduodenal artery and gastroepiploic artery. Course complicated by confusion. Transferred out of the ICU on 2/20.    REVIEW OF SYSTEMS  Constitutional - No fever, No weight loss, No fatigue  HEENT - No eye pain, No visual disturbances, No difficulty hearing, No tinnitus, No vertigo, No neck pain  Respiratory - No cough, No wheezing, No shortness of breath  Cardiovascular - No chest pain, No palpitations  Gastrointestinal - No abdominal pain, No nausea, No vomiting, No diarrhea, No constipation  Genitourinary - No dysuria, No frequency, No hematuria, No incontinence  Neurological - No headaches, No memory loss, No loss of strength, No numbness, No tremors  Skin - No itching, No rashes, No lesions   Endocrine - No temperature intolerance  Musculoskeletal - No joint pain, No joint swelling, No muscle pain  Psychiatric - No depression, No anxiety    VITALS  T(C): 36.8 (02-21-18 @ 08:37), Max: 37.6 (02-20-18 @ 23:45)  HR: 85 (02-21-18 @ 08:37) (85 - 126)  BP: 112/72 (02-21-18 @ 08:37) (102/68 - 133/80)  RR: 19 (02-21-18 @ 08:37) (18 - 20)  SpO2: 96% (02-21-18 @ 08:37) (94% - 96%)  Wt(kg): --    PAST MEDICAL & SURGICAL HISTORY  GI bleed  PUD (peptic ulcer disease)  CVA (cerebral vascular accident)  Anxiety  Hip fracture  Fall  Hypertension  Hyperlipidemia  S/P hip replacement  S/P hysterectomy      SOCIAL HISTORY  Smoking - Denied  EtOH - Denied   Drugs - Denied    FUNCTIONAL HISTORY  Lives   Independent    CURRENT FUNCTIONAL STATUS      FAMILY HISTORY   No pertinent family history in first degree relatives      RECENT LABS/IMAGING  CBC Full  -  ( 21 Feb 2018 06:15 )  WBC Count : 11.6 K/uL  Hemoglobin : 8.6 g/dL  Hematocrit : 25.9 %  Platelet Count - Automated : 136 K/uL  Mean Cell Volume : 87.8 fl  Mean Cell Hemoglobin : 29.2 pg  Mean Cell Hemoglobin Concentration : 33.2 g/dL  Auto Neutrophil # : x  Auto Lymphocyte # : x  Auto Monocyte # : x  Auto Eosinophil # : x  Auto Basophil # : x  Auto Neutrophil % : 93.0 %  Auto Lymphocyte % : 2.0 %  Auto Monocyte % : 2.0 %  Auto Eosinophil % : x  Auto Basophil % : x    02-21    142  |  110<H>  |  22.0<H>  ----------------------------<  103  4.1   |  23.0  |  0.68    Ca    7.5<L>      21 Feb 2018 06:15  Phos  2.4     02-21  Mg     2.0     02-21          ALLERGIES  mold, cheese and wine (Unknown)  penicillins (Unknown)      MEDICATIONS   ALBUTerol/ipratropium for Nebulization 3 milliLiter(s) Nebulizer every 6 hours  amLODIPine   Tablet 2.5 milliGRAM(s) Oral daily  atorvastatin 20 milliGRAM(s) Oral at bedtime  clarithromycin 500 milliGRAM(s) Oral two times a day  escitalopram 10 milliGRAM(s) Oral daily  guaiFENesin    Syrup 100 milliGRAM(s) Oral every 6 hours PRN  heparin  Injectable 5000 Unit(s) SubCutaneous every 8 hours  melatonin 5 milliGRAM(s) Oral at bedtime  metroNIDAZOLE    Tablet 500 milliGRAM(s) Oral three times a day  pantoprazole    Tablet 40 milliGRAM(s) Oral two times a day before meals 88F was transferred from [place] on 2/18 with syncope, hematemesis black stools and hemorrhagic shock, s/p 4 units PRBC.  She had an EGD which showed worse ulcer with clot but no active bleed s/p epinephrine injection.  She is s/p 2 additional units of PRBC. She is s/p embolization of Gastroduodenal artery, Pancreaticoduodenal artery and gastroepiploic artery. Course complicated by confusion and new onset AFIb. Transferred out of the ICU on 2/20.    REVIEW OF SYSTEMS  Constitutional - No fever, No weight loss, No fatigue  HEENT - No eye pain, No visual disturbances, No difficulty hearing, No tinnitus, No vertigo, No neck pain  Respiratory - No cough, No wheezing, No shortness of breath  Cardiovascular - No chest pain, No palpitations  Gastrointestinal - No abdominal pain, No nausea, No vomiting, No diarrhea, No constipation  Genitourinary - No dysuria, No frequency, No hematuria, No incontinence  Neurological - No headaches, No memory loss, No loss of strength, No numbness, No tremors  Skin - No itching, No rashes, No lesions   Endocrine - No temperature intolerance  Musculoskeletal - No joint pain, No joint swelling, No muscle pain  Psychiatric - No depression, No anxiety    VITALS  T(C): 36.8 (02-21-18 @ 08:37), Max: 37.6 (02-20-18 @ 23:45)  HR: 85 (02-21-18 @ 08:37) (85 - 126)  BP: 112/72 (02-21-18 @ 08:37) (102/68 - 133/80)  RR: 19 (02-21-18 @ 08:37) (18 - 20)  SpO2: 96% (02-21-18 @ 08:37) (94% - 96%)  Wt(kg): --    PAST MEDICAL & SURGICAL HISTORY  GI bleed  PUD (peptic ulcer disease)  CVA (cerebral vascular accident)  Anxiety  Hip fracture  Fall  Hypertension  Hyperlipidemia  S/P hip replacement  S/P hysterectomy      SOCIAL HISTORY  Smoking - Denied  EtOH - Denied   Drugs - Denied    FUNCTIONAL HISTORY  Lives   Independent    CURRENT FUNCTIONAL STATUS      FAMILY HISTORY   No pertinent family history in first degree relatives      RECENT LABS/IMAGING  CBC Full  -  ( 21 Feb 2018 06:15 )  WBC Count : 11.6 K/uL  Hemoglobin : 8.6 g/dL  Hematocrit : 25.9 %  Platelet Count - Automated : 136 K/uL  Mean Cell Volume : 87.8 fl  Mean Cell Hemoglobin : 29.2 pg  Mean Cell Hemoglobin Concentration : 33.2 g/dL  Auto Neutrophil # : x  Auto Lymphocyte # : x  Auto Monocyte # : x  Auto Eosinophil # : x  Auto Basophil # : x  Auto Neutrophil % : 93.0 %  Auto Lymphocyte % : 2.0 %  Auto Monocyte % : 2.0 %  Auto Eosinophil % : x  Auto Basophil % : x    02-21    142  |  110<H>  |  22.0<H>  ----------------------------<  103  4.1   |  23.0  |  0.68    Ca    7.5<L>      21 Feb 2018 06:15  Phos  2.4     02-21  Mg     2.0     02-21          ALLERGIES  mold, cheese and wine (Unknown)  penicillins (Unknown)      MEDICATIONS   ALBUTerol/ipratropium for Nebulization 3 milliLiter(s) Nebulizer every 6 hours  amLODIPine   Tablet 2.5 milliGRAM(s) Oral daily  atorvastatin 20 milliGRAM(s) Oral at bedtime  clarithromycin 500 milliGRAM(s) Oral two times a day  escitalopram 10 milliGRAM(s) Oral daily  guaiFENesin    Syrup 100 milliGRAM(s) Oral every 6 hours PRN  heparin  Injectable 5000 Unit(s) SubCutaneous every 8 hours  melatonin 5 milliGRAM(s) Oral at bedtime  metroNIDAZOLE    Tablet 500 milliGRAM(s) Oral three times a day  pantoprazole    Tablet 40 milliGRAM(s) Oral two times a day before meals 88F was transferred from [place] on 2/18 with syncope, hematemesis black stools and hemorrhagic shock, s/p 4 units PRBC.  She had an EGD which showed worse ulcer with clot but no active bleed s/p epinephrine injection.  She is s/p 2 additional units of PRBC. She is s/p embolization of Gastroduodenal artery, Pancreaticoduodenal artery and gastroepiploic artery. Course complicated by confusion and new onset AFIb. Transferred out of the ICU on 2/20.    Patient in bed, daughter at bedside, stating that this "is her mom".  Patient is easily agitated and starts yelling when asked "how are you doing".  Was able to redirect the conversation to discuss goals of rehab for DC home.     REVIEW OF SYSTEMS  Constitutional - No fever, No weight loss, +fatigue  HEENT - No eye pain, No visual disturbances, No difficulty hearing, No tinnitus, No vertigo, No neck pain  Respiratory - No cough, No wheezing, No shortness of breath  Cardiovascular - No chest pain, No palpitations  Gastrointestinal - +abdominal pain, No nausea, No vomiting, No diarrhea, No constipation  Genitourinary - No dysuria, No frequency, No hematuria, No incontinence  Neurological - No headaches, No memory loss, +loss of strength, +numbness, No tremors  Skin - No itching, No rashes, No lesions   Endocrine - No temperature intolerance  Musculoskeletal - +joint pain, No joint swelling, No muscle pain  Psychiatric - No depression, +anxiety    VITALS  T(C): 36.8 (02-21-18 @ 08:37), Max: 37.6 (02-20-18 @ 23:45)  HR: 85 (02-21-18 @ 08:37) (85 - 126)  BP: 112/72 (02-21-18 @ 08:37) (102/68 - 133/80)  RR: 19 (02-21-18 @ 08:37) (18 - 20)  SpO2: 96% (02-21-18 @ 08:37) (94% - 96%)  Wt(kg): --    PAST MEDICAL & SURGICAL HISTORY  GI bleed  PUD (peptic ulcer disease)  CVA (cerebral vascular accident)  Anxiety  Hip fracture  Fall  Hypertension  Hyperlipidemia  S/P hip replacement  S/P hysterectomy      SOCIAL HISTORY  Smoking - Denied  EtOH - Denied   Drugs - Denied    FUNCTIONAL HISTORY  Lives at home with spouse, 24hrs aide  Needed assistance with ADLs and ambulation    CURRENT FUNCTIONAL STATUS  Mod A for toileting    FAMILY HISTORY   No pertinent family history in first degree relatives      RECENT LABS/IMAGING  CBC Full  -  ( 21 Feb 2018 06:15 )  WBC Count : 11.6 K/uL  Hemoglobin : 8.6 g/dL  Hematocrit : 25.9 %  Platelet Count - Automated : 136 K/uL  Mean Cell Volume : 87.8 fl  Mean Cell Hemoglobin : 29.2 pg  Mean Cell Hemoglobin Concentration : 33.2 g/dL  Auto Neutrophil # : x  Auto Lymphocyte # : x  Auto Monocyte # : x  Auto Eosinophil # : x  Auto Basophil # : x  Auto Neutrophil % : 93.0 %  Auto Lymphocyte % : 2.0 %  Auto Monocyte % : 2.0 %  Auto Eosinophil % : x  Auto Basophil % : x    02-21    142  |  110<H>  |  22.0<H>  ----------------------------<  103  4.1   |  23.0  |  0.68    Ca    7.5<L>      21 Feb 2018 06:15  Phos  2.4     02-21  Mg     2.0     02-21          ALLERGIES  mold, cheese and wine (Unknown)  penicillins (Unknown)      MEDICATIONS   ALBUTerol/ipratropium for Nebulization 3 milliLiter(s) Nebulizer every 6 hours  amLODIPine   Tablet 2.5 milliGRAM(s) Oral daily  atorvastatin 20 milliGRAM(s) Oral at bedtime  clarithromycin 500 milliGRAM(s) Oral two times a day  escitalopram 10 milliGRAM(s) Oral daily  guaiFENesin    Syrup 100 milliGRAM(s) Oral every 6 hours PRN  heparin  Injectable 5000 Unit(s) SubCutaneous every 8 hours  melatonin 5 milliGRAM(s) Oral at bedtime  metroNIDAZOLE    Tablet 500 milliGRAM(s) Oral three times a day  pantoprazole    Tablet 40 milliGRAM(s) Oral two times a day before meals      ----------------------------------------------------------------------------------------  PHYSICAL EXAM  Constitutional - Easily irritable  HEENT - NCAT, EOMI  Neck - Supple, No limited ROM  Chest - Breathing comfortably, No wheezing  Cardiovascular - S1S2   Abdomen - Soft, BS+  Extremities - Mild edema of BLE  Neurologic Exam -                    Cognitive - AAOx self     Communication - Fluent, No dysarthria     Motor -                     LEFT    UE - ShAB 1/5, EF 4/5, EE 3/5, WE 4/5,  4/5                    RIGHT UE - ShAB 1/5, EF 4/5, EE 3/5, WE 4/5,  4/5                    LEFT    LE - HF 1/5, KE 3/5, DF 5/5, PF 5/5                    RIGHT LE - HF 1/5, KE 3/5, DF 5/5, PF 5/5        Sensory - Intact to LT  Psychiatric - Easily irritable  ----------------------------------------------------------------------------------------  ASSESSMENT/PLAN  88y Female with functional deficits after UGI bleed s/p IR embolization  Pain - Minimize use of narcotics  DVT PPX - Heparin  Sleep - Melatonin  Rehab - Expect patient to achieve goals for DC home. Patient already has 24hr aides and recommend expedited DC home, given behavioral disturbance which could be related to delirium. Will sign off.

## 2018-02-21 NOTE — SWALLOW BEDSIDE ASSESSMENT ADULT - SWALLOW EVAL: RECOMMENDED FEEDING/EATING TECHNIQUES
crush medication (when feasible)/position upright (90 degrees)/oral hygiene/alternate food with liquid

## 2018-02-21 NOTE — SWALLOW BEDSIDE ASSESSMENT ADULT - ORAL PHASE
Delayed oral transit time/c/o solid "getting stuck in her teeth." Sensation cleared after sip of water Within functional limits Delayed oral transit time

## 2018-02-21 NOTE — PROGRESS NOTE ADULT - SUBJECTIVE AND OBJECTIVE BOX
INTERVAL HPI/OVERNIGHT EVENTS:  no issues overnight. no nasuea/ no vomiting. tolerating diete; passing flatus.      MEDICATIONS  (STANDING):  ALBUTerol/ipratropium for Nebulization 3 milliLiter(s) Nebulizer every 6 hours  amLODIPine   Tablet 2.5 milliGRAM(s) Oral daily  atorvastatin 20 milliGRAM(s) Oral at bedtime  clarithromycin 500 milliGRAM(s) Oral two times a day  escitalopram 10 milliGRAM(s) Oral daily  heparin  Injectable 5000 Unit(s) SubCutaneous every 8 hours  melatonin 5 milliGRAM(s) Oral at bedtime  metroNIDAZOLE    Tablet 500 milliGRAM(s) Oral three times a day  pantoprazole    Tablet 40 milliGRAM(s) Oral two times a day before meals    MEDICATIONS  (PRN):  guaiFENesin    Syrup 100 milliGRAM(s) Oral every 6 hours PRN Cough      Vital Signs Last 24 Hrs  T(C): 36.8 (21 Feb 2018 08:37), Max: 37.6 (20 Feb 2018 23:45)  T(F): 98.2 (21 Feb 2018 08:37), Max: 99.7 (20 Feb 2018 23:45)  HR: 85 (21 Feb 2018 08:37) (85 - 126)  BP: 112/72 (21 Feb 2018 08:37) (102/68 - 133/80)  BP(mean): --  RR: 19 (21 Feb 2018 08:37) (18 - 19)  SpO2: 96% (21 Feb 2018 08:37) (94% - 96%)    PE  Gen: NAD  Pulm: no respiratory distress  CV: no tachycardia   Abd: s/nd/nt, no rebound tenderness or guarding  Neuro: no focal deficits      I&O's Detail    20 Feb 2018 07:01  -  21 Feb 2018 07:00  --------------------------------------------------------  IN:    Oral Fluid: 780 mL  Total IN: 780 mL    OUT:    Voided: 200 mL  Total OUT: 200 mL    Total NET: 580 mL      21 Feb 2018 07:01  -  21 Feb 2018 14:11  --------------------------------------------------------  IN:  Total IN: 0 mL    OUT:    Voided: 100 mL  Total OUT: 100 mL    Total NET: -100 mL          LABS:                        8.6    11.6  )-----------( 136      ( 21 Feb 2018 06:15 )             25.9     02-21    142  |  110<H>  |  22.0<H>  ----------------------------<  103  4.1   |  23.0  |  0.68    Ca    7.5<L>      21 Feb 2018 06:15  Phos  2.4     02-21  Mg     2.0     02-21            RADIOLOGY & ADDITIONAL STUDIES:

## 2018-02-21 NOTE — PROGRESS NOTE ADULT - ASSESSMENT
89 yo f w/ UGIB s/p GDA embolization no more GI bleeding; no further issues.    - f/u h/h  - monitor vitals  - cont to follow bowel function  - f/u speech and swallow  - DVT ppx  - PT/Dispo planning

## 2018-02-21 NOTE — PROGRESS NOTE ADULT - ATTENDING COMMENTS
Patient seen and examined  no new issues.  h/H stable.  follow speech and swallow eval  dispo planning

## 2018-02-22 ENCOUNTER — TRANSCRIPTION ENCOUNTER (OUTPATIENT)
Age: 83
End: 2018-02-22

## 2018-02-22 LAB
ANION GAP SERPL CALC-SCNC: 9 MMOL/L — SIGNIFICANT CHANGE UP (ref 5–17)
BASOPHILS # BLD AUTO: 0 K/UL — SIGNIFICANT CHANGE UP (ref 0–0.2)
BASOPHILS NFR BLD AUTO: 0.1 % — SIGNIFICANT CHANGE UP (ref 0–2)
BUN SERPL-MCNC: 16 MG/DL — SIGNIFICANT CHANGE UP (ref 8–20)
CALCIUM SERPL-MCNC: 7.7 MG/DL — LOW (ref 8.6–10.2)
CHLORIDE SERPL-SCNC: 106 MMOL/L — SIGNIFICANT CHANGE UP (ref 98–107)
CO2 SERPL-SCNC: 26 MMOL/L — SIGNIFICANT CHANGE UP (ref 22–29)
CREAT SERPL-MCNC: 0.61 MG/DL — SIGNIFICANT CHANGE UP (ref 0.5–1.3)
EOSINOPHIL # BLD AUTO: 0 K/UL — SIGNIFICANT CHANGE UP (ref 0–0.5)
EOSINOPHIL NFR BLD AUTO: 0.4 % — SIGNIFICANT CHANGE UP (ref 0–6)
GLUCOSE SERPL-MCNC: 94 MG/DL — SIGNIFICANT CHANGE UP (ref 70–115)
HCT VFR BLD CALC: 25.1 % — LOW (ref 37–47)
HGB BLD-MCNC: 8.3 G/DL — LOW (ref 12–16)
LYMPHOCYTES # BLD AUTO: 1 K/UL — SIGNIFICANT CHANGE UP (ref 1–4.8)
LYMPHOCYTES # BLD AUTO: 9.2 % — LOW (ref 20–55)
MAGNESIUM SERPL-MCNC: 1.9 MG/DL — SIGNIFICANT CHANGE UP (ref 1.6–2.6)
MCHC RBC-ENTMCNC: 29.5 PG — SIGNIFICANT CHANGE UP (ref 27–31)
MCHC RBC-ENTMCNC: 33.1 G/DL — SIGNIFICANT CHANGE UP (ref 32–36)
MCV RBC AUTO: 89.3 FL — SIGNIFICANT CHANGE UP (ref 81–99)
MONOCYTES # BLD AUTO: 0.9 K/UL — HIGH (ref 0–0.8)
MONOCYTES NFR BLD AUTO: 8.3 % — SIGNIFICANT CHANGE UP (ref 3–10)
NEUTROPHILS # BLD AUTO: 8.9 K/UL — HIGH (ref 1.8–8)
NEUTROPHILS NFR BLD AUTO: 80.6 % — HIGH (ref 37–73)
PHOSPHATE SERPL-MCNC: 2.9 MG/DL — SIGNIFICANT CHANGE UP (ref 2.4–4.7)
PLATELET # BLD AUTO: 158 K/UL — SIGNIFICANT CHANGE UP (ref 150–400)
POTASSIUM SERPL-MCNC: 4 MMOL/L — SIGNIFICANT CHANGE UP (ref 3.5–5.3)
POTASSIUM SERPL-SCNC: 4 MMOL/L — SIGNIFICANT CHANGE UP (ref 3.5–5.3)
RBC # BLD: 2.81 M/UL — LOW (ref 4.4–5.2)
RBC # FLD: 16.8 % — HIGH (ref 11–15.6)
SODIUM SERPL-SCNC: 141 MMOL/L — SIGNIFICANT CHANGE UP (ref 135–145)
WBC # BLD: 11.1 K/UL — HIGH (ref 4.8–10.8)
WBC # FLD AUTO: 11.1 K/UL — HIGH (ref 4.8–10.8)

## 2018-02-22 RX ORDER — ROSUVASTATIN CALCIUM 5 MG/1
1 TABLET ORAL
Qty: 0 | Refills: 0 | COMMUNITY

## 2018-02-22 RX ORDER — CLARITHROMYCIN 500 MG
1 TABLET ORAL
Qty: 20 | Refills: 0 | OUTPATIENT
Start: 2018-02-22 | End: 2018-03-03

## 2018-02-22 RX ORDER — METRONIDAZOLE 500 MG
1 TABLET ORAL
Qty: 30 | Refills: 0 | OUTPATIENT
Start: 2018-02-22 | End: 2018-03-03

## 2018-02-22 RX ORDER — PANTOPRAZOLE SODIUM 20 MG/1
1 TABLET, DELAYED RELEASE ORAL
Qty: 48 | Refills: 0 | OUTPATIENT
Start: 2018-02-22 | End: 2018-03-17

## 2018-02-22 RX ORDER — PANTOPRAZOLE SODIUM 20 MG/1
8 TABLET, DELAYED RELEASE ORAL
Qty: 80 | Refills: 0 | Status: DISCONTINUED | OUTPATIENT
Start: 2018-02-22 | End: 2018-02-22

## 2018-02-22 RX ADMIN — HEPARIN SODIUM 5000 UNIT(S): 5000 INJECTION INTRAVENOUS; SUBCUTANEOUS at 00:04

## 2018-02-22 RX ADMIN — HEPARIN SODIUM 5000 UNIT(S): 5000 INJECTION INTRAVENOUS; SUBCUTANEOUS at 05:33

## 2018-02-22 RX ADMIN — PANTOPRAZOLE SODIUM 40 MILLIGRAM(S): 20 TABLET, DELAYED RELEASE ORAL at 05:33

## 2018-02-22 RX ADMIN — Medication 5 MILLIGRAM(S): at 22:50

## 2018-02-22 RX ADMIN — Medication 3 MILLILITER(S): at 15:00

## 2018-02-22 RX ADMIN — HEPARIN SODIUM 5000 UNIT(S): 5000 INJECTION INTRAVENOUS; SUBCUTANEOUS at 22:50

## 2018-02-22 RX ADMIN — ATORVASTATIN CALCIUM 20 MILLIGRAM(S): 80 TABLET, FILM COATED ORAL at 00:04

## 2018-02-22 RX ADMIN — Medication 500 MILLIGRAM(S): at 00:04

## 2018-02-22 RX ADMIN — PANTOPRAZOLE SODIUM 40 MILLIGRAM(S): 20 TABLET, DELAYED RELEASE ORAL at 16:35

## 2018-02-22 RX ADMIN — Medication 500 MILLIGRAM(S): at 05:33

## 2018-02-22 RX ADMIN — ESCITALOPRAM OXALATE 10 MILLIGRAM(S): 10 TABLET, FILM COATED ORAL at 12:19

## 2018-02-22 RX ADMIN — Medication 5 MILLIGRAM(S): at 00:04

## 2018-02-22 RX ADMIN — GABAPENTIN 100 MILLIGRAM(S): 400 CAPSULE ORAL at 22:50

## 2018-02-22 RX ADMIN — Medication 3 MILLILITER(S): at 21:18

## 2018-02-22 RX ADMIN — ATORVASTATIN CALCIUM 20 MILLIGRAM(S): 80 TABLET, FILM COATED ORAL at 22:50

## 2018-02-22 RX ADMIN — HEPARIN SODIUM 5000 UNIT(S): 5000 INJECTION INTRAVENOUS; SUBCUTANEOUS at 14:20

## 2018-02-22 RX ADMIN — AMLODIPINE BESYLATE 2.5 MILLIGRAM(S): 2.5 TABLET ORAL at 05:33

## 2018-02-22 RX ADMIN — Medication 3 MILLILITER(S): at 09:10

## 2018-02-22 RX ADMIN — GABAPENTIN 100 MILLIGRAM(S): 400 CAPSULE ORAL at 00:04

## 2018-02-22 NOTE — DISCHARGE NOTE ADULT - PLAN OF CARE
Alleviation of pain and symptoms Follow up: Please call and make an appointment with the [facility] 10-14 days after discharge. Also, please call and make an appointment with your primary care physician as per your usual schedule.   Activity: May return to normal activities as tolerated.  Diet: Please continue MECHANICAL SOFT diet with thin liquids. Encourage ensure supplementation.  Medications: Please take all home medications as prescribed by your primary care doctor.  Patient is advised to RETURN TO THE EMERGENCY DEPARTMENT for any of the following - worsening pain, fever/chills, nausea/vomiting, altered mental status, chest pain, shortness of breath, or any other new / worsening symptom. Prescriptions for the following medications have been sent to your pharmacy for treatment of your H. pylori infection - CLARITHROMYCIN, METRONIDAZOLE, and PANTOPRAZOLE. Please take all as prescribed. Please resume all home blood pressure medications at current doses, monitor blood pressure at home, and follow-up with your cardiologist and/or your primary care provider as per your usual schedule. Follow up: Please call and make an appointment with the [facility] 10-14 days after discharge. Also, please call and make an appointment with your primary care physician as per your usual schedule.   Activity: May return to normal activities as tolerated.  Diet: Please continue MECHANICAL SOFT diet with thin liquids. Encourage ensure supplementation.  Medications: Please take all home medications as prescribed by your primary care doctor. Continue PROTONIX as prescribed above, until 3/6/18.  Patient is advised to RETURN TO THE EMERGENCY DEPARTMENT for any of the following - worsening pain, fever/chills, nausea/vomiting, altered mental status, chest pain, shortness of breath, or any other new / worsening symptom.

## 2018-02-22 NOTE — DISCHARGE NOTE ADULT - PATIENT PORTAL LINK FT
You can access the [facility] Patient Portal, offered by [facility], by registering with the following website: [email or web address]

## 2018-02-22 NOTE — DISCHARGE NOTE ADULT - HOSPITAL COURSE
Pt is a transfer from [facility] for GI Bleed requiring IR.  She had presented to [facility] on 2/12 for dark tarry stools, generalized weakness for a few days prior.  She had initial EGD there which showed hiatal hernia with non bleeding ulcer but two duodenal ulcers with stigmata of bleeding but no active bleed.  No intervention at that time performed.  Pt was managed with PPI and did well.  Was DC home on 2/15.  She returned for syncope, hematemasis, black stools and hemorrhagic shock on 2/17. She was given 4 units PRBC.  She had a repeat EGD which showed worse ulcer with clot but no active bleed.  Was given Epi injection.  More stable.  Received 2 more units for total of 6 and was transferred here on 2/18, accepted to MICU and IR.  She is immediately post procedure of IR with successful embolization of Gastroduodenal artery, Pancreaticoduodenal artery and gastroepiploic artery.   Pt is sleepy after Fentantyl and Versed but answers most questions appropriately and has no CO.  Denies CP, SOB, Ab pain, NV or other CO.  States she would like a coffee and have oatmeal in the morning for breakfast.  Daughter, who states she is health care proxy, helps with HX. States that pt has been more confused since initial visit to hospital. + HX  of CVA, HTN, Anxiety, HLD, NSAID/ ASA use.    Transferred to SICU, H&H trended, started on H. pylori tx. Patient started on diet. No further episodes of bloody BMs. Transferred to the floor in stable condition. S&S consulted after pt's daughter reported pt was c/o difficulty swallowing - bedside swallow assessment done. S&S recommended mechanical soft with thin liquids for which she has been tolerating well. PT & PM&R eval'ed patient - recommend she be d/c'ed home and continue with 24 hour private aides that she had prior to admission. Pain controlled, tolerating diet, ambulating with assistance, voiding, having non-bloody bowel fxn. Stable for d/c home with outpatient follow-up as outlined above.    Patient is advised to RETURN TO THE EMERGENCY DEPARTMENT for any of the following - worsening pain, fever/chills, nausea/vomiting, altered mental status, chest pain, shortness of breath, or any other new / worsening symptom. Pt is a transfer from [facility] for GI Bleed requiring IR.  She had presented to [facility] on 2/12 for dark tarry stools, generalized weakness for a few days prior.  She had initial EGD there which showed hiatal hernia with non bleeding ulcer but two duodenal ulcers with stigmata of bleeding but no active bleed.  No intervention at that time performed.  Pt was managed with PPI and did well.  Was DC home on 2/15.  She returned for syncope, hematemasis, black stools and hemorrhagic shock on 2/17. She was given 4 units PRBC.  She had a repeat EGD which showed worse ulcer with clot but no active bleed.  Was given Epi injection.  More stable.  Received 2 more units for total of 6 and was transferred here on 2/18, accepted to MICU and IR.  She is immediately post procedure of IR with successful embolization of Gastroduodenal artery, Pancreaticoduodenal artery and gastroepiploic artery.   Pt is sleepy after Fentantyl and Versed but answers most questions appropriately and has no CO.  Denies CP, SOB, Ab pain, NV or other CO.  States she would like a coffee and have oatmeal in the morning for breakfast.  Daughter, who states she is health care proxy, helps with HX. States that pt has been more confused since initial visit to hospital. + HX  of CVA, HTN, Anxiety, HLD, NSAID/ ASA use.    Transferred to SICU, H&H trended, started on H. pylori tx until diagnostic tests resulted Patient started on diet. No further episodes of bloody BMs. Transferred to the floor in stable condition. S&S consulted after pt's daughter reported pt was c/o difficulty swallowing - bedside swallow assessment done. S&S recommended mechanical soft with thin liquids for which she has been tolerating well. PT & PM&R eval'ed patient - recommend she be d/c'ed home and continue with 24 hour private aides that she had prior to admission. Pain controlled, tolerating diet, ambulating with assistance, voiding, having non-bloody bowel fxn. Stable for d/c home with outpatient follow-up as outlined above.    Patient is advised to RETURN TO THE EMERGENCY DEPARTMENT for any of the following - worsening pain, fever/chills, nausea/vomiting, altered mental status, chest pain, shortness of breath, or any other new / worsening symptom. Pt is a transfer from [facility] for GI Bleed requiring IR.  She had presented to [facility] on 2/12 for dark tarry stools, generalized weakness for a few days prior.  She had initial EGD there which showed hiatal hernia with non bleeding ulcer but two duodenal ulcers with stigmata of bleeding but no active bleed.  No intervention at that time performed.  Pt was managed with PPI and did well.  Was DC home on 2/15.  She returned for syncope, hematemasis, black stools and hemorrhagic shock on 2/17. She was given 4 units PRBC.  She had a repeat EGD which showed worse ulcer with clot but no active bleed.  Was given Epi injection.  More stable.  Received 2 more units for total of 6 and was transferred here on 2/18, accepted to MICU and IR.  She is immediately post procedure of IR with successful embolization of Gastroduodenal artery, Pancreaticoduodenal artery and gastroepiploic artery.   Pt is sleepy after Fentantyl and Versed but answers most questions appropriately and has no CO.  Denies CP, SOB, Ab pain, NV or other CO.  States she would like a coffee and have oatmeal in the morning for breakfast.  Daughter, who states she is health care proxy, helps with HX. States that pt has been more confused since initial visit to hospital. + HX  of CVA, HTN, Anxiety, HLD, NSAID/ ASA use.    Transferred to SICU, H&H trended, started on H. pylori tx until diagnostic tests resulted. Patient started on diet. No further episodes of bloody BMs. Transferred to the floor in stable condition. S&S consulted after pt's daughter reported pt was c/o difficulty swallowing - bedside swallow assessment done. S&S recommended mechanical soft with thin liquids for which she has been tolerating well. PT & PM&R eval'ed patient. FÁTIMA vs. discharge home with 24 hour private aides that she had prior to admission. Pain controlled, tolerating diet, ambulating with assistance, voiding, having non-bloody bowel fxn. Stable for d/c home with outpatient follow-up as outlined above.    Patient is advised to RETURN TO THE EMERGENCY DEPARTMENT for any of the following - worsening pain, fever/chills, nausea/vomiting, altered mental status, chest pain, shortness of breath, or any other new / worsening symptom.

## 2018-02-22 NOTE — PROGRESS NOTE ADULT - ASSESSMENT
89 y/o F s/p GDA embolization for UGIB. No further evidence of bleeding. Hemodynamically stable.    Plan:  -Continue pain control as needed  -Continue soft mechanical diet as per speech and swallow recommendations  -Monitor bowel function  -Monitor vitals, I/Os  -DVT ppx  -Dispo: working with PT, anticipating discharge home

## 2018-02-22 NOTE — DISCHARGE NOTE ADULT - MEDICATION SUMMARY - MEDICATIONS TO TAKE
I will START or STAY ON the medications listed below when I get home from the hospital:    Neurontin 100 mg oral capsule  -- orally once a day (at bedtime)  -- Indication: For Pain    escitalopram 10 mg oral tablet  -- 1 tab(s) by mouth once a day  -- Indication: For Depression    amLODIPine 2.5 mg oral tablet  -- 1 tab(s) by mouth once a day  -- Indication: For Hypertension, unspecified type    amLODIPine 2.5 mg oral tablet  -- orally once a day (at bedtime)  -- Indication: For Hypertension, unspecified type    pantoprazole 40 mg oral delayed release tablet  -- 1 tab(s) by mouth 2 times a day (before meals)  -- Indication: For GI bleed

## 2018-02-22 NOTE — DISCHARGE NOTE ADULT - CARE PROVIDER_API CALL
[facility],   [facility] [address] - [address] [place] [address]  Phone: [phone]  Fax: (   )    -

## 2018-02-22 NOTE — DISCHARGE NOTE ADULT - CARE PLAN
Principal Discharge DX:	GI bleed  Goal:	Alleviation of pain and symptoms  Assessment and plan of treatment:	Follow up: Please call and make an appointment with the [facility] 10-14 days after discharge. Also, please call and make an appointment with your primary care physician as per your usual schedule.   Activity: May return to normal activities as tolerated.  Diet: Please continue MECHANICAL SOFT diet with thin liquids. Encourage ensure supplementation.  Medications: Please take all home medications as prescribed by your primary care doctor.  Patient is advised to RETURN TO THE EMERGENCY DEPARTMENT for any of the following - worsening pain, fever/chills, nausea/vomiting, altered mental status, chest pain, shortness of breath, or any other new / worsening symptom.  Secondary Diagnosis:	H. pylori infection  Assessment and plan of treatment:	Prescriptions for the following medications have been sent to your pharmacy for treatment of your H. pylori infection - CLARITHROMYCIN, METRONIDAZOLE, and PANTOPRAZOLE. Please take all as prescribed.  Secondary Diagnosis:	Essential hypertension  Assessment and plan of treatment:	Please resume all home blood pressure medications at current doses, monitor blood pressure at home, and follow-up with your cardiologist and/or your primary care provider as per your usual schedule. Principal Discharge DX:	GI bleed  Goal:	Alleviation of pain and symptoms  Assessment and plan of treatment:	Follow up: Please call and make an appointment with the [facility] 10-14 days after discharge. Also, please call and make an appointment with your primary care physician as per your usual schedule.   Activity: May return to normal activities as tolerated.  Diet: Please continue MECHANICAL SOFT diet with thin liquids. Encourage ensure supplementation.  Medications: Please take all home medications as prescribed by your primary care doctor.  Patient is advised to RETURN TO THE EMERGENCY DEPARTMENT for any of the following - worsening pain, fever/chills, nausea/vomiting, altered mental status, chest pain, shortness of breath, or any other new / worsening symptom.  Secondary Diagnosis:	Essential hypertension  Assessment and plan of treatment:	Please resume all home blood pressure medications at current doses, monitor blood pressure at home, and follow-up with your cardiologist and/or your primary care provider as per your usual schedule. Principal Discharge DX:	GI bleed  Goal:	Alleviation of pain and symptoms  Assessment and plan of treatment:	Follow up: Please call and make an appointment with the [facility] 10-14 days after discharge. Also, please call and make an appointment with your primary care physician as per your usual schedule.   Activity: May return to normal activities as tolerated.  Diet: Please continue MECHANICAL SOFT diet with thin liquids. Encourage ensure supplementation.  Medications: Please take all home medications as prescribed by your primary care doctor. Continue PROTONIX as prescribed above, until 3/6/18.  Patient is advised to RETURN TO THE EMERGENCY DEPARTMENT for any of the following - worsening pain, fever/chills, nausea/vomiting, altered mental status, chest pain, shortness of breath, or any other new / worsening symptom.  Secondary Diagnosis:	Essential hypertension  Assessment and plan of treatment:	Please resume all home blood pressure medications at current doses, monitor blood pressure at home, and follow-up with your cardiologist and/or your primary care provider as per your usual schedule.

## 2018-02-22 NOTE — PROGRESS NOTE ADULT - SUBJECTIVE AND OBJECTIVE BOX
INTERVAL HPI/OVERNIGHT EVENTS: No acute events overnight.    SUBJECTIVE: Tolerating mechanical soft diet as per speech & swallow recommendations, no n/v. Pain controlled. Working with physical therapy. No fevers, chills, chest pain, dyspnea. Passing flatus.       MEDICATIONS  (STANDING):  ALBUTerol/ipratropium for Nebulization 3 milliLiter(s) Nebulizer every 6 hours  amLODIPine   Tablet 2.5 milliGRAM(s) Oral daily  atorvastatin 20 milliGRAM(s) Oral at bedtime  clarithromycin 500 milliGRAM(s) Oral two times a day  escitalopram 10 milliGRAM(s) Oral daily  gabapentin 100 milliGRAM(s) Oral at bedtime  heparin  Injectable 5000 Unit(s) SubCutaneous every 8 hours  melatonin 5 milliGRAM(s) Oral at bedtime  metroNIDAZOLE    Tablet 500 milliGRAM(s) Oral three times a day  pantoprazole    Tablet 40 milliGRAM(s) Oral two times a day before meals    MEDICATIONS  (PRN):  guaiFENesin    Syrup 100 milliGRAM(s) Oral every 6 hours PRN Cough      Vital Signs Last 24 Hrs  T(C): 37.4 (22 Feb 2018 00:05), Max: 37.4 (22 Feb 2018 00:05)  T(F): 99.3 (22 Feb 2018 00:05), Max: 99.3 (22 Feb 2018 00:05)  HR: 102 (22 Feb 2018 00:05) (85 - 102)  BP: 109/70 (22 Feb 2018 00:05) (109/70 - 123/76)  BP(mean): --  RR: 19 (22 Feb 2018 00:05) (18 - 19)  SpO2: 96% (22 Feb 2018 00:05) (93% - 96%)    Physical exam:  General: NAD, AOx3, resting comfortably in bed  HEENT: PERRLA, EOMI  Neck: supple, nontender  Respiratory: no respiratory distress, lungs CTAB  Heart: regular rate and rhythm, no murmurs  Abdomen: soft, nontender, nondistended. Normal bowel sounds. No guarding or rebound.  Extremities: no peripheral edema. Normal ROM.      I&O's Detail    21 Feb 2018 07:01  -  22 Feb 2018 07:00  --------------------------------------------------------  IN:  Total IN: 0 mL    OUT:    Voided: 300 mL  Total OUT: 300 mL    Total NET: -300 mL          LABS:                        8.3    11.1  )-----------( 158      ( 22 Feb 2018 07:40 )             25.1     02-22    141  |  106  |  16.0  ----------------------------<  94  4.0   |  26.0  |  0.61    Ca    7.7<L>      22 Feb 2018 07:40  Phos  2.9     02-22  Mg     1.9     02-22

## 2018-02-22 NOTE — DISCHARGE NOTE ADULT - PROVIDER TOKENS
FREE:[LAST:[[facility]],PHONE:[phone]],FAX:[(   )    -],ADDRESS:[[address] [place] [address] - [address] [place] [address]]]

## 2018-02-22 NOTE — PROGRESS NOTE ADULT - ATTENDING COMMENTS
Unable to provide useful hx because of dementia.      Non toxic appearing.  HAD stable, Abd soft and NT, no reports of melena or BRBPR.  H&H stable.      Stop Abx, h. pylori tests neg.  Elevated gastrin level - nondiagnostic as pt on PPI when tested.  Cont PPI.

## 2018-02-22 NOTE — PHYSICAL THERAPY INITIAL EVALUATION ADULT - ADDITIONAL COMMENTS
Pt is a poor historian. Pt lives with  and 24/7 aide in a 2 story house.  Has a chair lift. Reports amb with a RW  and requiring assist with all.

## 2018-02-23 VITALS
OXYGEN SATURATION: 95 % | HEART RATE: 99 BPM | SYSTOLIC BLOOD PRESSURE: 151 MMHG | RESPIRATION RATE: 18 BRPM | DIASTOLIC BLOOD PRESSURE: 95 MMHG | TEMPERATURE: 99 F

## 2018-02-23 LAB
ANION GAP SERPL CALC-SCNC: 12 MMOL/L — SIGNIFICANT CHANGE UP (ref 5–17)
BASOPHILS # BLD AUTO: 0 K/UL — SIGNIFICANT CHANGE UP (ref 0–0.2)
BASOPHILS NFR BLD AUTO: 0.1 % — SIGNIFICANT CHANGE UP (ref 0–2)
BUN SERPL-MCNC: 17 MG/DL — SIGNIFICANT CHANGE UP (ref 8–20)
CA-I BLD-SCNC: 1.14 MMOL/L — SIGNIFICANT CHANGE UP (ref 1.12–1.3)
CALCIUM SERPL-MCNC: 7.8 MG/DL — LOW (ref 8.6–10.2)
CHLORIDE SERPL-SCNC: 103 MMOL/L — SIGNIFICANT CHANGE UP (ref 98–107)
CO2 SERPL-SCNC: 26 MMOL/L — SIGNIFICANT CHANGE UP (ref 22–29)
CREAT SERPL-MCNC: 0.63 MG/DL — SIGNIFICANT CHANGE UP (ref 0.5–1.3)
EOSINOPHIL # BLD AUTO: 0.1 K/UL — SIGNIFICANT CHANGE UP (ref 0–0.5)
EOSINOPHIL NFR BLD AUTO: 1.2 % — SIGNIFICANT CHANGE UP (ref 0–6)
GLUCOSE SERPL-MCNC: 97 MG/DL — SIGNIFICANT CHANGE UP (ref 70–115)
HCT VFR BLD CALC: 27.8 % — LOW (ref 37–47)
HGB BLD-MCNC: 9.1 G/DL — LOW (ref 12–16)
INR BLD: 1.12 RATIO — SIGNIFICANT CHANGE UP (ref 0.88–1.16)
LYMPHOCYTES # BLD AUTO: 0.9 K/UL — LOW (ref 1–4.8)
LYMPHOCYTES # BLD AUTO: 10.3 % — LOW (ref 20–55)
MCHC RBC-ENTMCNC: 29.4 PG — SIGNIFICANT CHANGE UP (ref 27–31)
MCHC RBC-ENTMCNC: 32.7 G/DL — SIGNIFICANT CHANGE UP (ref 32–36)
MCV RBC AUTO: 90 FL — SIGNIFICANT CHANGE UP (ref 81–99)
MONOCYTES # BLD AUTO: 1 K/UL — HIGH (ref 0–0.8)
MONOCYTES NFR BLD AUTO: 10.6 % — HIGH (ref 3–10)
NEUTROPHILS # BLD AUTO: 7 K/UL — SIGNIFICANT CHANGE UP (ref 1.8–8)
NEUTROPHILS NFR BLD AUTO: 76.3 % — HIGH (ref 37–73)
PLATELET # BLD AUTO: 206 K/UL — SIGNIFICANT CHANGE UP (ref 150–400)
POTASSIUM SERPL-MCNC: 4 MMOL/L — SIGNIFICANT CHANGE UP (ref 3.5–5.3)
POTASSIUM SERPL-SCNC: 4 MMOL/L — SIGNIFICANT CHANGE UP (ref 3.5–5.3)
PROTHROM AB SERPL-ACNC: 12.3 SEC — SIGNIFICANT CHANGE UP (ref 9.8–12.7)
RBC # BLD: 3.09 M/UL — LOW (ref 4.4–5.2)
RBC # FLD: 16.7 % — HIGH (ref 11–15.6)
SODIUM SERPL-SCNC: 141 MMOL/L — SIGNIFICANT CHANGE UP (ref 135–145)
WBC # BLD: 9.1 K/UL — SIGNIFICANT CHANGE UP (ref 4.8–10.8)
WBC # FLD AUTO: 9.1 K/UL — SIGNIFICANT CHANGE UP (ref 4.8–10.8)

## 2018-02-23 PROCEDURE — 76000 FLUOROSCOPY <1 HR PHYS/QHP: CPT

## 2018-02-23 PROCEDURE — 36600 WITHDRAWAL OF ARTERIAL BLOOD: CPT

## 2018-02-23 PROCEDURE — 97116 GAIT TRAINING THERAPY: CPT

## 2018-02-23 PROCEDURE — 83605 ASSAY OF LACTIC ACID: CPT

## 2018-02-23 PROCEDURE — 82803 BLOOD GASES ANY COMBINATION: CPT

## 2018-02-23 PROCEDURE — 84132 ASSAY OF SERUM POTASSIUM: CPT

## 2018-02-23 PROCEDURE — 86850 RBC ANTIBODY SCREEN: CPT

## 2018-02-23 PROCEDURE — 86923 COMPATIBILITY TEST ELECTRIC: CPT

## 2018-02-23 PROCEDURE — 94640 AIRWAY INHALATION TREATMENT: CPT

## 2018-02-23 PROCEDURE — 93005 ELECTROCARDIOGRAM TRACING: CPT

## 2018-02-23 PROCEDURE — 83735 ASSAY OF MAGNESIUM: CPT

## 2018-02-23 PROCEDURE — 84480 ASSAY TRIIODOTHYRONINE (T3): CPT

## 2018-02-23 PROCEDURE — 75894 X-RAYS TRANSCATH THERAPY: CPT

## 2018-02-23 PROCEDURE — 80048 BASIC METABOLIC PNL TOTAL CA: CPT

## 2018-02-23 PROCEDURE — 84436 ASSAY OF TOTAL THYROXINE: CPT

## 2018-02-23 PROCEDURE — 85014 HEMATOCRIT: CPT

## 2018-02-23 PROCEDURE — 82947 ASSAY GLUCOSE BLOOD QUANT: CPT

## 2018-02-23 PROCEDURE — 84100 ASSAY OF PHOSPHORUS: CPT

## 2018-02-23 PROCEDURE — 84295 ASSAY OF SERUM SODIUM: CPT

## 2018-02-23 PROCEDURE — 97163 PT EVAL HIGH COMPLEX 45 MIN: CPT

## 2018-02-23 PROCEDURE — 85384 FIBRINOGEN ACTIVITY: CPT

## 2018-02-23 PROCEDURE — 85027 COMPLETE CBC AUTOMATED: CPT

## 2018-02-23 PROCEDURE — C1769: CPT

## 2018-02-23 PROCEDURE — 92610 EVALUATE SWALLOWING FUNCTION: CPT

## 2018-02-23 PROCEDURE — 85610 PROTHROMBIN TIME: CPT

## 2018-02-23 PROCEDURE — 84443 ASSAY THYROID STIM HORMONE: CPT

## 2018-02-23 PROCEDURE — 82941 ASSAY OF GASTRIN: CPT

## 2018-02-23 PROCEDURE — C1889: CPT

## 2018-02-23 PROCEDURE — 36415 COLL VENOUS BLD VENIPUNCTURE: CPT

## 2018-02-23 PROCEDURE — 76942 ECHO GUIDE FOR BIOPSY: CPT

## 2018-02-23 PROCEDURE — 86900 BLOOD TYPING SEROLOGIC ABO: CPT

## 2018-02-23 PROCEDURE — 82330 ASSAY OF CALCIUM: CPT

## 2018-02-23 PROCEDURE — 82435 ASSAY OF BLOOD CHLORIDE: CPT

## 2018-02-23 PROCEDURE — 84484 ASSAY OF TROPONIN QUANT: CPT

## 2018-02-23 PROCEDURE — C1894: CPT

## 2018-02-23 PROCEDURE — 86677 HELICOBACTER PYLORI ANTIBODY: CPT

## 2018-02-23 PROCEDURE — 87338 HPYLORI STOOL AG IA: CPT

## 2018-02-23 PROCEDURE — 86901 BLOOD TYPING SEROLOGIC RH(D): CPT

## 2018-02-23 RX ORDER — PANTOPRAZOLE SODIUM 20 MG/1
1 TABLET, DELAYED RELEASE ORAL
Qty: 0 | Refills: 0 | DISCHARGE
Start: 2018-02-23

## 2018-02-23 RX ADMIN — PANTOPRAZOLE SODIUM 40 MILLIGRAM(S): 20 TABLET, DELAYED RELEASE ORAL at 17:47

## 2018-02-23 RX ADMIN — HEPARIN SODIUM 5000 UNIT(S): 5000 INJECTION INTRAVENOUS; SUBCUTANEOUS at 13:38

## 2018-02-23 RX ADMIN — PANTOPRAZOLE SODIUM 40 MILLIGRAM(S): 20 TABLET, DELAYED RELEASE ORAL at 05:41

## 2018-02-23 RX ADMIN — HEPARIN SODIUM 5000 UNIT(S): 5000 INJECTION INTRAVENOUS; SUBCUTANEOUS at 05:41

## 2018-02-23 RX ADMIN — ESCITALOPRAM OXALATE 10 MILLIGRAM(S): 10 TABLET, FILM COATED ORAL at 12:01

## 2018-02-23 RX ADMIN — AMLODIPINE BESYLATE 2.5 MILLIGRAM(S): 2.5 TABLET ORAL at 05:41

## 2018-02-23 NOTE — PROGRESS NOTE ADULT - ASSESSMENT
87 y/o F s/p GDA embolization for UGIB. No further evidence of bleeding. H/H stable. Hemodynamically stable. Baseline dementia.     Plan:  -Continue pain control as needed  -Continue soft mechanical diet as per speech and swallow recommendations  -Continue PPI; triple therapy antibiotics stopped yesterday  -Monitor bowel function  -Monitor vitals, I/Os  -DVT ppx  -Dispo: working with PT, anticipating discharge home

## 2018-02-23 NOTE — PROGRESS NOTE ADULT - SUBJECTIVE AND OBJECTIVE BOX
INTERVAL HPI/OVERNIGHT EVENTS: No acute events overnight.     SUBJECTIVE: Baseline dementia. Tolerating soft diet without n/v. No fevers, chills, chest pain, dyspnea. No pain. No reports of melena or blood per rectum overnight.      MEDICATIONS  (STANDING):  ALBUTerol/ipratropium for Nebulization 3 milliLiter(s) Nebulizer every 6 hours  amLODIPine   Tablet 2.5 milliGRAM(s) Oral daily  atorvastatin 20 milliGRAM(s) Oral at bedtime  escitalopram 10 milliGRAM(s) Oral daily  gabapentin 100 milliGRAM(s) Oral at bedtime  heparin  Injectable 5000 Unit(s) SubCutaneous every 8 hours  melatonin 5 milliGRAM(s) Oral at bedtime  pantoprazole    Tablet 40 milliGRAM(s) Oral two times a day before meals    MEDICATIONS  (PRN):  guaiFENesin    Syrup 100 milliGRAM(s) Oral every 6 hours PRN Cough      Vital Signs Last 24 Hrs  T(C): 37.3 (23 Feb 2018 00:40), Max: 37.3 (23 Feb 2018 00:40)  T(F): 99.2 (23 Feb 2018 00:40), Max: 99.2 (23 Feb 2018 00:40)  HR: 103 (23 Feb 2018 00:40) (89 - 103)  BP: 132/78 (23 Feb 2018 00:40) (124/70 - 132/78)  BP(mean): --  RR: 18 (23 Feb 2018 00:40) (18 - 18)  SpO2: 95% (23 Feb 2018 00:40) (95% - 97%)    Physical exam:  General: NAD, alert, baseline dementia, resting comfortably in bed  HEENT: PERRLA, EOMI  Neck: supple, nontender  Respiratory: no respiratory distress, lungs CTAB  Heart: regular rate and rhythm, no murmurs  Abdomen: soft, nontender, nondistended. Normal bowel sounds. No guarding or rebound.  Extremities: no peripheral edema. Normal ROM.      I&O's Detail    22 Feb 2018 07:01  -  23 Feb 2018 07:00  --------------------------------------------------------  IN:  Total IN: 0 mL    OUT:    Voided: 200 mL  Total OUT: 200 mL    Total NET: -200 mL          LABS:                        9.1    9.1   )-----------( 206      ( 23 Feb 2018 06:31 )             27.8     02-23    141  |  103  |  17.0  ----------------------------<  97  4.0   |  26.0  |  0.63    Ca    7.8<L>      23 Feb 2018 06:31  Phos  2.9     02-22  Mg     1.9     02-22      PT/INR - ( 23 Feb 2018 06:31 )   PT: 12.3 sec;   INR: 1.12 ratio

## 2018-05-03 NOTE — PATIENT PROFILE ADULT. - NS PRO TALK SOMEONE YN
Post-Care Instructions: I reviewed with the patient in detail post-care instructions. Patient is to wear sunprotection, and avoid picking at any of the treated lesions. Pt may apply Vaseline to crusted or scabbing areas. Duration Of Freeze Thaw-Cycle (Seconds): 1 Render Post-Care Instructions In Note?: no Consent: The patient's consent was obtained including but not limited to risks of crusting, scabbing, blistering, scarring, darker or lighter pigmentary change, recurrence, incomplete removal and infection. Detail Level: Simple no

## 2018-08-01 NOTE — ED ADULT NURSE NOTE - DISCHARGE DATE/TIME
Problem: Discharge Planning:  Goal: Discharged to appropriate level of care  Discharged to appropriate level of care  Outcome: Ongoing  Pt not in labor      Comments: Care plan reviewed with patient and . Patient and  verbalize understanding of the plan of care and contribute to goal setting. 19-May-2017 17:30

## 2018-08-21 NOTE — ED ADULT TRIAGE NOTE - HEIGHT IN INCHES
August 21, 2018      Park Gomez MD  [address] [facility] [address]           [facility] beds at the [facility] - [facility]  [address] [place] [id number]  Phone: [phone]  Fax: [phone]          Patient: Iza Esquivel   MR Number: [id number]   YOB: 1958   Date of Visit: 8/21/2018       Dear Dr. Park Gomez:    Thank you for referring Iza Esquivel to me for evaluation. Attached you will find relevant portions of my assessment and plan of care.    If you have questions, please do not hesitate to call me. I look forward to following Iza Esquivel along with you.    Sincerely,    Wiley Goldstein MD    Enclosure  CC:  No Recipients    If you would like to receive this communication electronically, please contact [email or web address] or [phone] to request more information on [employer] access.    For providers and/or their staff who would like to refer a patient to [facility], please contact us through our one-stop-shop provider referral line, [facility], at [phone].    If you feel you have received this communication in error or would no longer like to receive these types of communications, please e-mail [email or web address]          10

## 2019-01-31 ENCOUNTER — RECORD ABSTRACTING (OUTPATIENT)
Age: 84
End: 2019-01-31

## 2019-01-31 DIAGNOSIS — Z80.0 FAMILY HISTORY OF MALIGNANT NEOPLASM OF DIGESTIVE ORGANS: ICD-10-CM

## 2019-01-31 DIAGNOSIS — G51.39 CLONIC HEMIFACIAL SPASM, UNSPECIFIED: ICD-10-CM

## 2019-01-31 DIAGNOSIS — Z87.19 PERSONAL HISTORY OF OTHER DISEASES OF THE DIGESTIVE SYSTEM: ICD-10-CM

## 2019-01-31 DIAGNOSIS — J30.89 OTHER ALLERGIC RHINITIS: ICD-10-CM

## 2019-01-31 DIAGNOSIS — Z82.49 FAMILY HISTORY OF ISCHEMIC HEART DISEASE AND OTHER DISEASES OF THE CIRCULATORY SYSTEM: ICD-10-CM

## 2019-02-07 PROBLEM — K27.9 PEPTIC ULCER, SITE UNSPECIFIED, UNSPECIFIED AS ACUTE OR CHRONIC, WITHOUT HEMORRHAGE OR PERFORATION: Chronic | Status: ACTIVE | Noted: 2018-02-18

## 2019-02-07 PROBLEM — I63.9 CEREBRAL INFARCTION, UNSPECIFIED: Chronic | Status: ACTIVE | Noted: 2017-05-19

## 2019-02-07 PROBLEM — K92.2 GASTROINTESTINAL HEMORRHAGE, UNSPECIFIED: Chronic | Status: ACTIVE | Noted: 2018-02-18

## 2019-02-08 ENCOUNTER — RX RENEWAL (OUTPATIENT)
Age: 84
End: 2019-02-08

## 2019-02-13 ENCOUNTER — RESULT CHARGE (OUTPATIENT)
Age: 84
End: 2019-02-13

## 2019-02-14 ENCOUNTER — NON-APPOINTMENT (OUTPATIENT)
Age: 84
End: 2019-02-14

## 2019-02-14 ENCOUNTER — APPOINTMENT (OUTPATIENT)
Dept: INTERNAL MEDICINE | Facility: CLINIC | Age: 84
End: 2019-02-14
Payer: MEDICARE

## 2019-02-14 VITALS
TEMPERATURE: 97.9 F | DIASTOLIC BLOOD PRESSURE: 80 MMHG | OXYGEN SATURATION: 97 % | SYSTOLIC BLOOD PRESSURE: 108 MMHG | BODY MASS INDEX: 26.87 KG/M2 | RESPIRATION RATE: 16 BRPM | HEART RATE: 76 BPM | WEIGHT: 128 LBS | HEIGHT: 58 IN

## 2019-02-14 DIAGNOSIS — N39.41 URGE INCONTINENCE: ICD-10-CM

## 2019-02-14 LAB
BILIRUB UR QL STRIP: NEGATIVE
CLARITY UR: CLEAR
COLLECTION METHOD: NORMAL
GLUCOSE UR-MCNC: NEGATIVE
HCG UR QL: 0.2 EU/DL
HGB UR QL STRIP.AUTO: NEGATIVE
KETONES UR-MCNC: NEGATIVE
LEUKOCYTE ESTERASE UR QL STRIP: NEGATIVE
NITRITE UR QL STRIP: NEGATIVE
PH UR STRIP: 6
PROT UR STRIP-MCNC: NEGATIVE
SP GR UR STRIP: 1.03

## 2019-02-14 PROCEDURE — 93000 ELECTROCARDIOGRAM COMPLETE: CPT

## 2019-02-14 PROCEDURE — 36415 COLL VENOUS BLD VENIPUNCTURE: CPT

## 2019-02-14 PROCEDURE — 99214 OFFICE O/P EST MOD 30 MIN: CPT | Mod: 25

## 2019-02-14 PROCEDURE — 81003 URINALYSIS AUTO W/O SCOPE: CPT | Mod: QW

## 2019-02-14 NOTE — PAST MEDICAL HISTORY
[Postmenopausal] : history of menopause having occurred [Total Preg ___] : G: [unfilled] [Live Births___] : P: [unfilled] [Full Term ___] : Full Term: [unfilled]

## 2019-02-14 NOTE — COUNSELING
[Behavioral health counseling provided] : behavioral health  [Transportation Issues] : Transportation issues [Needs reinforcement, provided] : Patient needs reinforcement on understanding lifestyle changes and  the steps needed to achieve self management goals and reinforcement was provided

## 2019-02-15 ENCOUNTER — RX RENEWAL (OUTPATIENT)
Age: 84
End: 2019-02-15

## 2019-02-15 LAB
25(OH)D3 SERPL-MCNC: 46.4 NG/ML
ALBUMIN SERPL ELPH-MCNC: 3.9 G/DL
ALP BLD-CCNC: 104 U/L
ALT SERPL-CCNC: 17 U/L
ANION GAP SERPL CALC-SCNC: 10 MMOL/L
AST SERPL-CCNC: 24 U/L
BASOPHILS # BLD AUTO: 0.02 K/UL
BASOPHILS NFR BLD AUTO: 0.2 %
BILIRUB SERPL-MCNC: 0.2 MG/DL
BUN SERPL-MCNC: 27 MG/DL
CALCIUM SERPL-MCNC: 9.4 MG/DL
CHLORIDE SERPL-SCNC: 98 MMOL/L
CHOLEST SERPL-MCNC: 185 MG/DL
CHOLEST/HDLC SERPL: 2.4 RATIO
CO2 SERPL-SCNC: 31 MMOL/L
CREAT SERPL-MCNC: 0.79 MG/DL
EOSINOPHIL # BLD AUTO: 0.19 K/UL
EOSINOPHIL NFR BLD AUTO: 2.1 %
FERRITIN SERPL-MCNC: 88 NG/ML
GLUCOSE SERPL-MCNC: 88 MG/DL
HCT VFR BLD CALC: 40.6 %
HDLC SERPL-MCNC: 76 MG/DL
HGB BLD-MCNC: 12.5 G/DL
IMM GRANULOCYTES NFR BLD AUTO: 0.2 %
IRON SATN MFR SERPL: 21 %
IRON SERPL-MCNC: 59 UG/DL
LDLC SERPL CALC-MCNC: 88 MG/DL
LYMPHOCYTES # BLD AUTO: 1.66 K/UL
LYMPHOCYTES NFR BLD AUTO: 18.6 %
MAN DIFF?: NORMAL
MCHC RBC-ENTMCNC: 30.8 GM/DL
MCHC RBC-ENTMCNC: 31.6 PG
MCV RBC AUTO: 102.8 FL
MONOCYTES # BLD AUTO: 0.8 K/UL
MONOCYTES NFR BLD AUTO: 9 %
NEUTROPHILS # BLD AUTO: 6.22 K/UL
NEUTROPHILS NFR BLD AUTO: 69.9 %
PLATELET # BLD AUTO: 252 K/UL
POTASSIUM SERPL-SCNC: 4.4 MMOL/L
PROT SERPL-MCNC: 7.2 G/DL
RBC # BLD: 3.95 M/UL
RBC # FLD: 14.4 %
SODIUM SERPL-SCNC: 139 MMOL/L
TIBC SERPL-MCNC: 279 UG/DL
TRIGL SERPL-MCNC: 105 MG/DL
TSH SERPL-ACNC: 1.84 UIU/ML
UIBC SERPL-MCNC: 220 UG/DL
WBC # FLD AUTO: 8.91 K/UL

## 2019-02-15 NOTE — PHYSICAL EXAM
"  SUBJECTIVE:                                                    Juve Donis is a 33 year old male who presents to clinic today for the following health issues:      RESPIRATORY SYMPTOMS      Duration: Yesterday morning.     Description  nasal congestion, rhinorrhea, sore throat, cough on and off, fever, headache and fatigue/malaise, patient reports that he feel like he is going to pass out due to fatigue.   Severity: moderate    Accompanying signs and symptoms: None    History (predisposing factors):  none    Precipitating or alleviating factors: None    Therapies tried and outcome:  Amoxicillin that pt had and tylenol.            Problem list and histories reviewed & adjusted, as indicated.  Additional history: as documented    Patient Active Problem List   Diagnosis   (none) - all problems resolved or deleted     History reviewed. No pertinent past surgical history.    Social History   Substance Use Topics     Smoking status: Never Smoker      Smokeless tobacco: Never Used     Alcohol Use: 0.0 oz/week     0 Standard drinks or equivalent per week     History reviewed. No pertinent family history.      Current Outpatient Prescriptions   Medication Sig Dispense Refill     oseltamivir (TAMIFLU) 75 MG capsule Take 1 capsule (75 mg) by mouth 2 times daily 10 capsule 0     Acetaminophen (TYLENOL PO)        fluticasone (FLONASE) 50 MCG/ACT nasal spray Spray 1-2 sprays into both nostrils daily 16 g 0     Problem list, Medication list, Allergies, and Medical/Social/Surgical histories reviewed in EPIC and updated as appropriate.    ROS:  C: positive for fever  ROS otherwise negative other than mention in the HPI    OBJECTIVE:                                                    /56 mmHg  Pulse 108  Temp(Src) 102.6  F (39.2  C) (Tympanic)  Ht 5' 9\" (1.753 m)  Wt 201 lb (91.173 kg)  BMI 29.67 kg/m2  SpO2 98%  Body mass index is 29.67 kg/(m^2).   GENERAL: healthy, alert, well nourished, well hydrated, no " distress  HENT: ear canals- normal; TMs- normal; Nose- normal; Mouth- no ulcers, no lesions  NECK: no tenderness, no adenopathy, no asymmetry, no masses, no stiffness; thyroid- normal to palpation  RESP: lungs clear to auscultation - no rales, no rhonchi, no wheezes  CV: regular rates and rhythm, normal S1 S2, no S3 or S4 and no murmur, no click or rub -         ASSESSMENT/PLAN:                                                        ICD-10-CM    1. Fever, unspecified R50.9 Influenza A/B antigen   2. Influenza A J10.1 oseltamivir (TAMIFLU) 75 MG capsule       Patient has positive flu, with symptoms. His symptoms started almost 24 hours ago. Advice tamiflu might help to decrease symptoms, symptomatic care dicussed with the patient.    Xander Matthews MD  [facility]   [No Acute Distress] : no acute distress [Well Nourished] : well nourished [Well Developed] : well developed [Well-Appearing] : well-appearing [Normal Sclera/Conjunctiva] : normal sclera/conjunctiva [PERRL] : pupils equal round and reactive to light [EOMI] : extraocular movements intact [Normal Outer Ear/Nose] : the outer ears and nose were normal in appearance [Normal Oropharynx] : the oropharynx was normal [No JVD] : no jugular venous distention [Supple] : supple [No Lymphadenopathy] : no lymphadenopathy [Thyroid Normal, No Nodules] : the thyroid was normal and there were no nodules present [No Respiratory Distress] : no respiratory distress  [Clear to Auscultation] : lungs were clear to auscultation bilaterally [No Accessory Muscle Use] : no accessory muscle use [Normal Rate] : normal rate  [Regular Rhythm] : with a regular rhythm [Normal S1, S2] : normal S1 and S2 [No Abdominal Bruit] : a ~M bruit was not heard ~T in the abdomen [No Varicosities] : no varicosities [Pedal Pulses Present] : the pedal pulses are present [No Edema] : there was no peripheral edema [No Extremity Clubbing/Cyanosis] : no extremity clubbing/cyanosis [No Palpable Aorta] : no palpable aorta [Soft] : abdomen soft [Non Tender] : non-tender [Non-distended] : non-distended [No Masses] : no abdominal mass palpated [No HSM] : no HSM [Normal Bowel Sounds] : normal bowel sounds [Normal Posterior Cervical Nodes] : no posterior cervical lymphadenopathy [Normal Anterior Cervical Nodes] : no anterior cervical lymphadenopathy [No CVA Tenderness] : no CVA  tenderness [No Spinal Tenderness] : no spinal tenderness [No Joint Swelling] : no joint swelling [Grossly Normal Strength/Tone] : grossly normal strength/tone [No Rash] : no rash [Normal Gait] : normal gait [Coordination Grossly Intact] : coordination grossly intact [No Focal Deficits] : no focal deficits [Deep Tendon Reflexes (DTR)] : deep tendon reflexes were 2+ and symmetric [Normal Affect] : the affect was normal [Alert and Oriented x3] : oriented to person, place, and time [Normal Insight/Judgement] : insight and judgment were intact [Agitated] : agitated [Depressed] : depressed [de-identified] : questionable carotid bruit left ICA  [de-identified] : limited mobility in wheelchair

## 2019-02-15 NOTE — HISTORY OF PRESENT ILLNESS
[Formal Caregiver] : formal caregiver [FreeTextEntry8] : 89-year-old female who presents to the office today with her caretaker with concerns of her being fatigued, lightheaded and dizzy at times. Patient states that she is under a lot of stress taking care of her  who has end-stage dementia and is currently under hospice. Patient denies having any chest pain, shortness of breath, nausea or vomiting. Patient denies any recent falls. Patient does feel that she is overwhelmed secondary to her 's condition.  \par Patient's caretaker states that she is waking up frequently at night to urinate. Denies any pain with the urination, follow odor, fever or back pain.\par \par \par

## 2019-02-15 NOTE — PLAN
[FreeTextEntry1] : Psychiatry-general anxiety disorder and depression  PSQ9 was completed.-Counseling given to patient. Patient was advised to continue with Lexapro. We'll check thyroid function test today. Advised patient to consider counseling.\par \par - nocturia - U/A was negative.  Advised to decrease fluid intake at night time. \par \par Cardiology- hypertension- short of breath-reviewed EKG. Advised patient there was no acute changes in her EKG. Will check comprehensive metabolic as well this a lipid panel. Continue with aspirin 81 mg daily. Will check echocardiogram to evaluate any cardiomyopathies.\par \par Neurology-dizziness-history of CVA- advised patient to followup with neurology. We'll check carotid sonogram. Again continue with baby aspirin.\par \par \par Endocrinology- vitamin D deficiency-continue with vitamin D supplementation we'll check levels today.  \par \par Hematology- fatigue-we'll check CBC to rule out any anemia. In the past patient had slight deficiency and iron. We'll check iron studies.\par \par Advised patient I will call her once the ancillary tests are completed to review the results with her.

## 2019-02-15 NOTE — HEALTH RISK ASSESSMENT
[No falls in past year] : Patient reported no falls in the past year [0] : 1) Little interest or pleasure doing things: Not at all (0) [2] : 2) Feeling down, depressed, or hopeless for more than half of the days (2) [] : No [[id number]] : 2

## 2019-02-26 ENCOUNTER — APPOINTMENT (OUTPATIENT)
Dept: INTERNAL MEDICINE | Facility: CLINIC | Age: 84
End: 2019-02-26

## 2019-03-01 ENCOUNTER — APPOINTMENT (OUTPATIENT)
Dept: INTERNAL MEDICINE | Facility: CLINIC | Age: 84
End: 2019-03-01
Payer: MEDICARE

## 2019-03-01 VITALS
DIASTOLIC BLOOD PRESSURE: 80 MMHG | TEMPERATURE: 98 F | HEIGHT: 55 IN | WEIGHT: 128 LBS | OXYGEN SATURATION: 96 % | RESPIRATION RATE: 18 BRPM | SYSTOLIC BLOOD PRESSURE: 110 MMHG | HEART RATE: 68 BPM | BODY MASS INDEX: 29.62 KG/M2

## 2019-03-01 DIAGNOSIS — R42 DIZZINESS AND GIDDINESS: ICD-10-CM

## 2019-03-01 PROCEDURE — 99214 OFFICE O/P EST MOD 30 MIN: CPT

## 2019-03-01 NOTE — PHYSICAL EXAM
[No Acute Distress] : no acute distress [Well Nourished] : well nourished [Well Developed] : well developed [Well-Appearing] : well-appearing [Normal Sclera/Conjunctiva] : normal sclera/conjunctiva [PERRL] : pupils equal round and reactive to light [EOMI] : extraocular movements intact [Normal Outer Ear/Nose] : the outer ears and nose were normal in appearance [Normal Oropharynx] : the oropharynx was normal [Normal TMs] : both tympanic membranes were normal [No JVD] : no jugular venous distention [Supple] : supple [No Lymphadenopathy] : no lymphadenopathy [Thyroid Normal, No Nodules] : the thyroid was normal and there were no nodules present [No Respiratory Distress] : no respiratory distress  [Clear to Auscultation] : lungs were clear to auscultation bilaterally [No Accessory Muscle Use] : no accessory muscle use [Normal Rate] : normal rate  [Regular Rhythm] : with a regular rhythm [Normal S1, S2] : normal S1 and S2 [No Abdominal Bruit] : a ~M bruit was not heard ~T in the abdomen [No Varicosities] : no varicosities [Pedal Pulses Present] : the pedal pulses are present [No Edema] : there was no peripheral edema [No Extremity Clubbing/Cyanosis] : no extremity clubbing/cyanosis [No Palpable Aorta] : no palpable aorta [Soft] : abdomen soft [Non Tender] : non-tender [Non-distended] : non-distended [No Masses] : no abdominal mass palpated [No HSM] : no HSM [Normal Bowel Sounds] : normal bowel sounds [Normal Posterior Cervical Nodes] : no posterior cervical lymphadenopathy [Normal Anterior Cervical Nodes] : no anterior cervical lymphadenopathy [No CVA Tenderness] : no CVA  tenderness [No Spinal Tenderness] : no spinal tenderness [No Joint Swelling] : no joint swelling [Grossly Normal Strength/Tone] : grossly normal strength/tone [Normal Gait] : normal gait [Coordination Grossly Intact] : coordination grossly intact [No Focal Deficits] : no focal deficits [Deep Tendon Reflexes (DTR)] : deep tendon reflexes were 2+ and symmetric [Normal Affect] : the affect was normal [Alert and Oriented x3] : oriented to person, place, and time [Normal Insight/Judgement] : insight and judgment were intact [Agitated] : agitated [Depressed] : depressed [de-identified] : with murmur  [de-identified] :  carotid bruit left ICA  [de-identified] : limited mobility in wheelchair  [de-identified] : Sharply demarcated erythematous rash under bilateral breasts

## 2019-03-01 NOTE — DATA REVIEWED
[FreeTextEntry1] :  checked \par Reviewed echo and carotid sono with the pt and care taker.  Also call her daughter

## 2019-03-01 NOTE — PLAN
[FreeTextEntry1] : Psychiatry-general anxiety disorder and depression  PSQ9 was completed.-Counseling given to patient. Patient was advised to continue with Lexapro. checked , refilled xanax.  Advised side effects. \par \par Cardiology- hypertension- Carotid stenosis- Advise patient to continue with current medications. Reviewed recent blood work, carotid sonogram, and echocardiogram with the patient and caregiver. Also discussed the results with her daughter. Advised patient to start taking aspirin 81 mg enteric-coated daily with food. Increase Crestor to 10 mg daily to get an LDL goal less than 70 secondary to history of CVA and carotid stenosis.  Repeat sono in one year. \par \par Neurology- CVA  Advise patient to followup with her neurologist. \par \par Endocrinology- vitamin D deficiency-continue with vitamin D supplementation we'll check levels today.  \par \par

## 2019-03-01 NOTE — HEALTH RISK ASSESSMENT
[] : No [No falls in past year] : Patient reported no falls in the past year [0] : 1) Little interest or pleasure doing things: Not at all (0) [2] : 2) Feeling down, depressed, or hopeless for more than half of the days (2) [de-identified] : no [de-identified] : regular  [[id number]] : 2

## 2019-03-01 NOTE — REVIEW OF SYSTEMS
[Nocturia] : no nocturia [Frequency] : no frequency [Skin Rash] : skin rash [Dizziness] : no dizziness [Anxiety] : anxiety [Negative] : Heme/Lymph

## 2019-03-14 ENCOUNTER — MED ADMIN CHARGE (OUTPATIENT)
Age: 84
End: 2019-03-14

## 2019-03-14 ENCOUNTER — APPOINTMENT (OUTPATIENT)
Dept: INTERNAL MEDICINE | Facility: CLINIC | Age: 84
End: 2019-03-14
Payer: MEDICARE

## 2019-03-14 VITALS
SYSTOLIC BLOOD PRESSURE: 106 MMHG | TEMPERATURE: 98.7 F | HEIGHT: 58 IN | OXYGEN SATURATION: 97 % | HEART RATE: 89 BPM | DIASTOLIC BLOOD PRESSURE: 62 MMHG | RESPIRATION RATE: 16 BRPM | WEIGHT: 128 LBS | BODY MASS INDEX: 26.87 KG/M2

## 2019-03-14 DIAGNOSIS — L03.115 CELLULITIS OF RIGHT LOWER LIMB: ICD-10-CM

## 2019-03-14 PROCEDURE — 90715 TDAP VACCINE 7 YRS/> IM: CPT

## 2019-03-14 PROCEDURE — 90471 IMMUNIZATION ADMIN: CPT

## 2019-03-14 PROCEDURE — 99214 OFFICE O/P EST MOD 30 MIN: CPT | Mod: 25

## 2019-03-14 RX ORDER — MUPIROCIN 20 MG/G
2 OINTMENT TOPICAL 3 TIMES DAILY
Qty: 2 | Refills: 0 | Status: COMPLETED | COMMUNITY
Start: 2019-03-14 | End: 2019-03-24

## 2019-03-14 NOTE — HEALTH RISK ASSESSMENT
[No falls in past year] : Patient reported no falls in the past year [0] : 1) Little interest or pleasure doing things: Not at all (0) [2] : 2) Feeling down, depressed, or hopeless for more than half of the days (2) [] : No [de-identified] : no [de-identified] : regular  [[id number]] : 2

## 2019-03-14 NOTE — PHYSICAL EXAM
[No Acute Distress] : no acute distress [Well Nourished] : well nourished [Well Developed] : well developed [Well-Appearing] : well-appearing [Normal Sclera/Conjunctiva] : normal sclera/conjunctiva [PERRL] : pupils equal round and reactive to light [EOMI] : extraocular movements intact [Normal Outer Ear/Nose] : the outer ears and nose were normal in appearance [Normal Oropharynx] : the oropharynx was normal [Normal TMs] : both tympanic membranes were normal [No JVD] : no jugular venous distention [Supple] : supple [No Lymphadenopathy] : no lymphadenopathy [Thyroid Normal, No Nodules] : the thyroid was normal and there were no nodules present [No Respiratory Distress] : no respiratory distress  [Clear to Auscultation] : lungs were clear to auscultation bilaterally [No Accessory Muscle Use] : no accessory muscle use [Normal Rate] : normal rate  [Regular Rhythm] : with a regular rhythm [Normal S1, S2] : normal S1 and S2 [No Abdominal Bruit] : a ~M bruit was not heard ~T in the abdomen [No Varicosities] : no varicosities [Pedal Pulses Present] : the pedal pulses are present [No Edema] : there was no peripheral edema [No Extremity Clubbing/Cyanosis] : no extremity clubbing/cyanosis [No Palpable Aorta] : no palpable aorta [Soft] : abdomen soft [Non Tender] : non-tender [Non-distended] : non-distended [No Masses] : no abdominal mass palpated [No HSM] : no HSM [Normal Bowel Sounds] : normal bowel sounds [Normal Posterior Cervical Nodes] : no posterior cervical lymphadenopathy [Normal Anterior Cervical Nodes] : no anterior cervical lymphadenopathy [No CVA Tenderness] : no CVA  tenderness [No Spinal Tenderness] : no spinal tenderness [No Joint Swelling] : no joint swelling [Grossly Normal Strength/Tone] : grossly normal strength/tone [Normal Gait] : normal gait [Coordination Grossly Intact] : coordination grossly intact [No Focal Deficits] : no focal deficits [Deep Tendon Reflexes (DTR)] : deep tendon reflexes were 2+ and symmetric [Normal Affect] : the affect was normal [Alert and Oriented x3] : oriented to person, place, and time [Normal Insight/Judgement] : insight and judgment were intact [Agitated] : agitated [Depressed] : depressed [Speech Grossly Normal] : speech grossly normal [Memory Grossly Normal] : memory grossly normal [Normal Mood] : the mood was normal [de-identified] : with murmur  [de-identified] :  carotid bruit left ICA  [de-identified] : limited mobility in wheelchair  [de-identified] : Right lower extremity-patient has a skin tear that is erythematous, and warm to touch with slight cellulitic borders

## 2019-03-14 NOTE — PLAN
[FreeTextEntry1] : Psychiatry-general anxiety disorder and depression  Continue with current medication. Counseling given to the patient regarding recent loss of her .\par \par Infectious disease- patient with cellulitis right lower extremity and a skin tear.  Wound was cleaned and apply Bactroban ointment to right lower extremity.  Advise to the caretaker wound care directions.  Advise patient to start Levaquin 500 mg daily and Bactroban cream b.i.d. Advised patient to elevate leg.  Advised caretaker and patient if they  notice that the redness is extending above the shin that she would need to go to hospital for IV antibiotics.\par Tdap given \par \par GI GERD-  bland diet-  Zantac 150 mg daily\par \par Cardiology- hypertension- Carotid stenosis- Advise patient to continue with current medications. Reviewed recent blood work, carotid sonogram, and echocardiogram with the patient and caregiver. Also discussed the results with her daughter. Advised patient to start taking aspirin 81 mg enteric-coated daily with food. Increase Crestor to 10 mg daily to get an LDL goal less than 70 secondary to history of CVA and carotid stenosis.  Repeat sono in one year. \par \par Neurology- CVA  Advise patient to followup with her neurologist. \par \par Endocrinology- vitamin D deficiency-continue with vitamin D supplementation we'll check levels today.  \par \par

## 2019-03-14 NOTE — REVIEW OF SYSTEMS
[Anxiety] : anxiety [Heartburn] : heartburn [Nocturia] : no nocturia [Frequency] : no frequency [Dizziness] : no dizziness [Negative] : Musculoskeletal [de-identified] : abrasion  right leg with redness

## 2019-03-14 NOTE — HISTORY OF PRESENT ILLNESS
[Formal Caregiver] : formal caregiver [FreeTextEntry8] : 89-year-old female who presents to the office her swelling and redness in the right lower extremity. Patient states that her  recently passed away.  States since his death she has been getting in and out of cars in which she banged her shin against a car door jam.  Leg suffered a cut and started to swell.  Abrasions happened about 3-4 days ago and now it's turning red and warm.\par Also been very upset about her  passed away that she's been getting some acid reflux.\par Patient denies fever, chills, night sweats, nausea or vomiting [FreeTextEntry1] : A 89- year- old female who presents to the office for followup and prescription renewal [de-identified] : 89-year-old female who presents to the office today for followup of her recent sonograms as well as a prescription renewal for Xanax. Patient states since her last visit her vertigo/dizziness has resolved. Patient continues to state that she is extremely anxious and stress secondary to her  being on hospice and have been end-stage dementia.  \par Patient denies any chest pain, shortness of breath dyspnea on exertion nausea, vomiting or blood in the stools. Patient denies having any allergies to aspirin.\par \par Patient recently saw dermatology for a fungal rash underneath bilateral breasts. Is using a prescribed cream which seems to be working.

## 2019-03-14 NOTE — ASSESSMENT
[FreeTextEntry1] : 89-year-old female who presents to office for right lower extremity cellulitis and acid reflux

## 2019-03-22 ENCOUNTER — APPOINTMENT (OUTPATIENT)
Dept: INTERNAL MEDICINE | Facility: CLINIC | Age: 84
End: 2019-03-22
Payer: MEDICARE

## 2019-03-22 VITALS
DIASTOLIC BLOOD PRESSURE: 70 MMHG | TEMPERATURE: 98.7 F | RESPIRATION RATE: 16 BRPM | HEIGHT: 58 IN | SYSTOLIC BLOOD PRESSURE: 110 MMHG | OXYGEN SATURATION: 98 % | HEART RATE: 85 BPM

## 2019-03-22 DIAGNOSIS — L08.9 ABRASION, RIGHT LOWER LEG, INITIAL ENCOUNTER: ICD-10-CM

## 2019-03-22 DIAGNOSIS — S80.811A ABRASION, RIGHT LOWER LEG, INITIAL ENCOUNTER: ICD-10-CM

## 2019-03-22 PROCEDURE — 99213 OFFICE O/P EST LOW 20 MIN: CPT

## 2019-03-22 NOTE — ASSESSMENT
[FreeTextEntry1] : 89-year-old female who presents to office for follow up on right lower extremity cellulitis

## 2019-03-22 NOTE — REVIEW OF SYSTEMS
[Anxiety] : anxiety [Heartburn] : no heartburn [Nocturia] : no nocturia [Frequency] : no frequency [Dizziness] : no dizziness [Depression] : depression [Negative] : Gastrointestinal [de-identified] : abrasion  right leg, bruising

## 2019-03-22 NOTE — COUNSELING
[Behavioral health counseling provided] : behavioral health  [Transportation Issues] : Transportation issues [Needs reinforcement, provided] : Patient needs reinforcement on understanding lifestyle changes and  the steps needed to achieve self management goals and reinforcement was provided [Healthy eating counseling provided] : healthy eating [Low Fat Diet] : Low fat diet [Low Salt Diet] : Low salt diet [Decrease Portions] : Decrease food portions [Walking] : Walking

## 2019-03-22 NOTE — HISTORY OF PRESENT ILLNESS
[FreeTextEntry8] : 89-year-old female who presents to the office her swelling and redness in the right lower extremity. Patient states that her  recently passed away.  States since his death she has been getting in and out of cars in which she banged her shin against a car door jam.  Leg suffered a cut and started to swell.  Abrasions happened about 3-4 days ago and now it's turning red and warm.\par Also been very upset about her  passed away that she's been getting some acid reflux.\par Patient denies fever, chills, night sweats, nausea or vomiting [Formal Caregiver] : formal caregiver [FreeTextEntry1] : A 89- year- old female who presents to the office for followup of cellulitis lower ext [de-identified] : 89-year-old female who presents to the office for a follow up on lesion on right lower ext.  Patient states she finished the antibiotics.  The aide that is with her states that the lesion is getting smaller and the redness is gone away.    Denies fever, or chills. \par \par Saw urologist exam was within normal limits.\par \par Patient states she's been depressed since her  passed away.  Just wants to be left alone at times.  Does not want to go to support groups. \par \par

## 2019-03-22 NOTE — HEALTH RISK ASSESSMENT
[No falls in past year] : Patient reported no falls in the past year [0] : 1) Little interest or pleasure doing things: Not at all (0) [2] : 2) Feeling down, depressed, or hopeless for more than half of the days (2) [] : No [de-identified] : no [de-identified] : regular  [[id number]] : 2

## 2019-03-22 NOTE — PLAN
[FreeTextEntry1] : Psychiatry-general anxiety disorder and depression  Continue with current medication. Counseling given to the patient regarding recent loss of her .\par \par Infectious disease- cellulitis resolved Abrasion is much smaller and healing. Continue with wound care. If noticed cellulitis returning advised patient to return to office immediately for evaluation.\par \par GI GERD-  bland diet-  Zantac 150 mg daily. Advised diet \par \par Cardiology- hypertension- Carotid stenosis- Advise patient to continue with current medications.r. Advised patient to take aspirin 81 mg enteric-coated every other day with food. Increase Crestor to 10 mg daily to get an LDL goal less than 70 secondary to history of CVA and carotid stenosis.  Repeat sono in one year. \par \par Neurology- CVA  Advise patient to followup with her neurologist. \par \par Endocrinology- vitamin D deficiency-continue with vitamin D supplementation we'll check levels today.  \par \par

## 2019-03-22 NOTE — PHYSICAL EXAM
[No Acute Distress] : no acute distress [Well Nourished] : well nourished [Well Developed] : well developed [Well-Appearing] : well-appearing [Normal Sclera/Conjunctiva] : normal sclera/conjunctiva [PERRL] : pupils equal round and reactive to light [EOMI] : extraocular movements intact [Normal Outer Ear/Nose] : the outer ears and nose were normal in appearance [Normal Oropharynx] : the oropharynx was normal [Normal TMs] : both tympanic membranes were normal [No JVD] : no jugular venous distention [Supple] : supple [No Lymphadenopathy] : no lymphadenopathy [Thyroid Normal, No Nodules] : the thyroid was normal and there were no nodules present [No Respiratory Distress] : no respiratory distress  [Clear to Auscultation] : lungs were clear to auscultation bilaterally [No Accessory Muscle Use] : no accessory muscle use [Normal Rate] : normal rate  [Regular Rhythm] : with a regular rhythm [Normal S1, S2] : normal S1 and S2 [No Abdominal Bruit] : a ~M bruit was not heard ~T in the abdomen [No Varicosities] : no varicosities [Pedal Pulses Present] : the pedal pulses are present [No Edema] : there was no peripheral edema [No Extremity Clubbing/Cyanosis] : no extremity clubbing/cyanosis [No Palpable Aorta] : no palpable aorta [Soft] : abdomen soft [Non Tender] : non-tender [Non-distended] : non-distended [No Masses] : no abdominal mass palpated [No HSM] : no HSM [Normal Bowel Sounds] : normal bowel sounds [Normal Posterior Cervical Nodes] : no posterior cervical lymphadenopathy [Normal Anterior Cervical Nodes] : no anterior cervical lymphadenopathy [No CVA Tenderness] : no CVA  tenderness [No Spinal Tenderness] : no spinal tenderness [No Joint Swelling] : no joint swelling [Grossly Normal Strength/Tone] : grossly normal strength/tone [Normal Gait] : normal gait [Coordination Grossly Intact] : coordination grossly intact [No Focal Deficits] : no focal deficits [Deep Tendon Reflexes (DTR)] : deep tendon reflexes were 2+ and symmetric [Speech Grossly Normal] : speech grossly normal [Memory Grossly Normal] : memory grossly normal [Normal Affect] : the affect was normal [Alert and Oriented x3] : oriented to person, place, and time [Normal Mood] : the mood was normal [Normal Insight/Judgement] : insight and judgment were intact [Agitated] : agitated [Depressed] : depressed [de-identified] : with murmur  [de-identified] :  carotid bruit left ICA  [de-identified] : limited mobility in wheelchair  [de-identified] : Right lower extremity-patient has a skin tear that is erythematous, and warm to touch with slight cellulitic borders

## 2019-03-29 ENCOUNTER — RX RENEWAL (OUTPATIENT)
Age: 84
End: 2019-03-29

## 2019-03-29 DIAGNOSIS — K27.9 PEPTIC ULCER, SITE UNSPECIFIED, UNSPECIFIED AS ACUTE OR CHRONIC, W/OUT HEMORRHAGE OR PERFORATION: ICD-10-CM

## 2019-04-10 ENCOUNTER — RX RENEWAL (OUTPATIENT)
Age: 84
End: 2019-04-10

## 2019-04-16 ENCOUNTER — APPOINTMENT (OUTPATIENT)
Dept: INTERNAL MEDICINE | Facility: CLINIC | Age: 84
End: 2019-04-16
Payer: MEDICARE

## 2019-04-16 ENCOUNTER — RX RENEWAL (OUTPATIENT)
Age: 84
End: 2019-04-16

## 2019-04-16 VITALS
DIASTOLIC BLOOD PRESSURE: 62 MMHG | OXYGEN SATURATION: 99 % | RESPIRATION RATE: 16 BRPM | SYSTOLIC BLOOD PRESSURE: 104 MMHG | HEART RATE: 86 BPM | TEMPERATURE: 97.9 F | HEIGHT: 58 IN

## 2019-04-16 PROCEDURE — 99213 OFFICE O/P EST LOW 20 MIN: CPT

## 2019-04-16 RX ORDER — ESCITALOPRAM OXALATE 20 MG/1
20 TABLET ORAL
Refills: 0 | Status: DISCONTINUED | COMMUNITY
Start: 1900-01-01 | End: 2019-04-16

## 2019-04-16 NOTE — COUNSELING
[Healthy eating counseling provided] : healthy eating [Behavioral health counseling provided] : behavioral health  [Low Fat Diet] : Low fat diet [Low Salt Diet] : Low salt diet [Decrease Portions] : Decrease food portions [Needs reinforcement, provided] : Patient needs reinforcement on understanding lifestyle changes and  the steps needed to achieve self management goals and reinforcement was provided [Transportation Issues] : Transportation issues [Patient motivation] : Patient motivation [de-identified] : consider counseling

## 2019-04-16 NOTE — PLAN
[FreeTextEntry1] : Psychiatry-general anxiety disorder and depression  Continue with current medication. Counseling given to the patient regarding recent loss of her .  Checked .  Increase xanax to TID as needed prn.  Advised only to take if needed for anxiety and Agitation.   \par \par Infectious disease- cellulitis resolved \par \par GI GERD-  bland diet-  Zantac 150 mg daily. Advised diet \par \par Cardiology- hypertension- Carotid stenosis- Advise patient to continue with current medications.  Advised patient to take aspirin 81 mg enteric-coated every other day with food. Increase Crestor to 10 mg daily to get an LDL goal less than 70 secondary to history of CVA and carotid stenosis.  Repeat Carotid  sonogram  in one year. \par \par Neurology- CVA  Advise patient to followup with her neurologist. \par \par Endocrinology- vitamin D deficiency-continue with vitamin D supplementation.\par \par

## 2019-04-16 NOTE — PAST MEDICAL HISTORY
[Postmenopausal] : history of menopause having occurred [Live Births___] : P: [unfilled] [Total Preg ___] : G: [unfilled] [Full Term ___] : Full Term: [unfilled]

## 2019-04-16 NOTE — PHYSICAL EXAM
[No Acute Distress] : no acute distress [Well Nourished] : well nourished [Well Developed] : well developed [Well-Appearing] : well-appearing [PERRL] : pupils equal round and reactive to light [Normal Sclera/Conjunctiva] : normal sclera/conjunctiva [EOMI] : extraocular movements intact [Normal Outer Ear/Nose] : the outer ears and nose were normal in appearance [Normal Oropharynx] : the oropharynx was normal [Normal TMs] : both tympanic membranes were normal [No JVD] : no jugular venous distention [Supple] : supple [No Lymphadenopathy] : no lymphadenopathy [Thyroid Normal, No Nodules] : the thyroid was normal and there were no nodules present [No Respiratory Distress] : no respiratory distress  [No Accessory Muscle Use] : no accessory muscle use [Clear to Auscultation] : lungs were clear to auscultation bilaterally [Regular Rhythm] : with a regular rhythm [Normal Rate] : normal rate  [Normal S1, S2] : normal S1 and S2 [No Abdominal Bruit] : a ~M bruit was not heard ~T in the abdomen [Pedal Pulses Present] : the pedal pulses are present [No Varicosities] : no varicosities [No Palpable Aorta] : no palpable aorta [No Extremity Clubbing/Cyanosis] : no extremity clubbing/cyanosis [No Edema] : there was no peripheral edema [Non Tender] : non-tender [Soft] : abdomen soft [Non-distended] : non-distended [Normal Bowel Sounds] : normal bowel sounds [No HSM] : no HSM [No Masses] : no abdominal mass palpated [Normal Posterior Cervical Nodes] : no posterior cervical lymphadenopathy [No CVA Tenderness] : no CVA  tenderness [Normal Anterior Cervical Nodes] : no anterior cervical lymphadenopathy [No Joint Swelling] : no joint swelling [No Spinal Tenderness] : no spinal tenderness [Coordination Grossly Intact] : coordination grossly intact [Grossly Normal Strength/Tone] : grossly normal strength/tone [Normal Gait] : normal gait [Speech Grossly Normal] : speech grossly normal [No Focal Deficits] : no focal deficits [Deep Tendon Reflexes (DTR)] : deep tendon reflexes were 2+ and symmetric [Memory Grossly Normal] : memory grossly normal [Normal Affect] : the affect was normal [Alert and Oriented x3] : oriented to person, place, and time [Normal Insight/Judgement] : insight and judgment were intact [Agitated] : agitated [Depressed] : depressed [No Rash] : no rash [No Skin Lesions] : no skin lesions [de-identified] : limited mobility in wheelchair  [de-identified] :  carotid bruit left ICA  [de-identified] : with murmur  [de-identified] : Pt upset secondary to her loss

## 2019-04-16 NOTE — HEALTH RISK ASSESSMENT
[No falls in past year] : Patient reported no falls in the past year [0] : 1) Little interest or pleasure doing things: Not at all (0) [2] : 2) Feeling down, depressed, or hopeless for more than half of the days (2) [] : No [de-identified] : no [de-identified] : regular  [[id number]] : 2

## 2019-04-16 NOTE — HISTORY OF PRESENT ILLNESS
[Formal Caregiver] : formal caregiver [de-identified] : 89-year-old female who presents to the office for a follow up and renewal of xanax.  Patient's caretaker states that she has been more anxious and agitated since her  passed away.  Xanax seems to help slightly.  \par Patient states she's been depressed since her  passed away.  Just wants to be left alone at times.  Does not want to go to support groups. \par \par Rashes have resolved [FreeTextEntry1] : A 89- year- old female who presents to the office for fa check up and refill on medication.

## 2019-04-19 ENCOUNTER — RX RENEWAL (OUTPATIENT)
Age: 84
End: 2019-04-19

## 2019-05-02 ENCOUNTER — RX RENEWAL (OUTPATIENT)
Age: 84
End: 2019-05-02

## 2019-05-03 ENCOUNTER — RX RENEWAL (OUTPATIENT)
Age: 84
End: 2019-05-03

## 2019-05-16 ENCOUNTER — RX RENEWAL (OUTPATIENT)
Age: 84
End: 2019-05-16

## 2019-05-22 ENCOUNTER — RX RENEWAL (OUTPATIENT)
Age: 84
End: 2019-05-22

## 2019-06-20 ENCOUNTER — APPOINTMENT (OUTPATIENT)
Dept: INTERNAL MEDICINE | Facility: CLINIC | Age: 84
End: 2019-06-20

## 2019-06-21 ENCOUNTER — RX RENEWAL (OUTPATIENT)
Age: 84
End: 2019-06-21

## 2019-06-24 ENCOUNTER — APPOINTMENT (OUTPATIENT)
Dept: INTERNAL MEDICINE | Facility: CLINIC | Age: 84
End: 2019-06-24
Payer: MEDICARE

## 2019-06-24 VITALS
OXYGEN SATURATION: 98 % | TEMPERATURE: 98.6 F | HEIGHT: 58 IN | SYSTOLIC BLOOD PRESSURE: 90 MMHG | RESPIRATION RATE: 16 BRPM | DIASTOLIC BLOOD PRESSURE: 64 MMHG | HEART RATE: 82 BPM

## 2019-06-24 PROCEDURE — 36415 COLL VENOUS BLD VENIPUNCTURE: CPT

## 2019-06-24 PROCEDURE — 96372 THER/PROPH/DIAG INJ SC/IM: CPT

## 2019-06-24 PROCEDURE — 99214 OFFICE O/P EST MOD 30 MIN: CPT | Mod: 25

## 2019-06-24 PROCEDURE — G0444 DEPRESSION SCREEN ANNUAL: CPT

## 2019-06-24 NOTE — PAST MEDICAL HISTORY
[Total Preg ___] : G: [unfilled] [Postmenopausal] : history of menopause having occurred [Full Term ___] : Full Term: [unfilled]  [Live Births___] : P: [unfilled]

## 2019-06-24 NOTE — PHYSICAL EXAM
[Well Nourished] : well nourished [No Acute Distress] : no acute distress [Normal Sclera/Conjunctiva] : normal sclera/conjunctiva [Well Developed] : well developed [Well-Appearing] : well-appearing [Normal Outer Ear/Nose] : the outer ears and nose were normal in appearance [PERRL] : pupils equal round and reactive to light [EOMI] : extraocular movements intact [No JVD] : no jugular venous distention [Normal TMs] : both tympanic membranes were normal [Normal Oropharynx] : the oropharynx was normal [No Lymphadenopathy] : no lymphadenopathy [Thyroid Normal, No Nodules] : the thyroid was normal and there were no nodules present [Supple] : supple [No Accessory Muscle Use] : no accessory muscle use [No Respiratory Distress] : no respiratory distress  [Clear to Auscultation] : lungs were clear to auscultation bilaterally [Normal S1, S2] : normal S1 and S2 [Normal Rate] : normal rate  [Regular Rhythm] : with a regular rhythm [No Varicosities] : no varicosities [Pedal Pulses Present] : the pedal pulses are present [No Abdominal Bruit] : a ~M bruit was not heard ~T in the abdomen [No Edema] : there was no peripheral edema [No Extremity Clubbing/Cyanosis] : no extremity clubbing/cyanosis [Non Tender] : non-tender [Soft] : abdomen soft [No Palpable Aorta] : no palpable aorta [Non-distended] : non-distended [No Masses] : no abdominal mass palpated [No HSM] : no HSM [Normal Anterior Cervical Nodes] : no anterior cervical lymphadenopathy [Normal Bowel Sounds] : normal bowel sounds [Normal Posterior Cervical Nodes] : no posterior cervical lymphadenopathy [No CVA Tenderness] : no CVA  tenderness [No Spinal Tenderness] : no spinal tenderness [No Joint Swelling] : no joint swelling [No Rash] : no rash [Grossly Normal Strength/Tone] : grossly normal strength/tone [Normal Gait] : normal gait [Coordination Grossly Intact] : coordination grossly intact [No Skin Lesions] : no skin lesions [No Focal Deficits] : no focal deficits [Deep Tendon Reflexes (DTR)] : deep tendon reflexes were 2+ and symmetric [Normal Affect] : the affect was normal [Memory Grossly Normal] : memory grossly normal [Speech Grossly Normal] : speech grossly normal [Agitated] : agitated [Normal Insight/Judgement] : insight and judgment were intact [Alert and Oriented x3] : oriented to person, place, and time [Depressed] : depressed [de-identified] : with murmur  [de-identified] :  carotid bruit left ICA  [de-identified] : Pt upset secondary to her loss  [de-identified] : limited mobility in wheelchair

## 2019-06-24 NOTE — COUNSELING
[Healthy eating counseling provided] : healthy eating [Behavioral health counseling provided] : behavioral health  [Low Fat Diet] : Low fat diet [Low Salt Diet] : Low salt diet [Patient motivation] : Patient motivation [Decrease Portions] : Decrease food portions [Transportation Issues] : Transportation issues [ - Annual Depression Screening] : Annual Depression Screening [Needs reinforcement, provided] : Patient needs reinforcement on understanding lifestyle changes and  the steps needed to achieve self management goals and reinforcement was provided [de-identified] : consider counseling \par Follow up with Derm and audiologist.

## 2019-06-24 NOTE — HEALTH RISK ASSESSMENT
[0] : 1) Little interest or pleasure doing things: Not at all (0) [No falls in past year] : Patient reported no falls in the past year [2] : 2) Feeling down, depressed, or hopeless for more than half of the days (2) [Transportation] : transportation [Independent] : using telephone [Some assistance needed] : preparing meals [Full assistance needed] : doing laundry [Reports changes in hearing] : Reports changes in hearing [No] : No [de-identified] : no [] : No [de-identified] : regular  [[id number]] : 2 [Change in mental status noted] : No change in mental status noted

## 2019-06-24 NOTE — ASSESSMENT
[FreeTextEntry1] : 89-year-old female who presents to office for a check up and renewal of medication \par

## 2019-06-24 NOTE — REVIEW OF SYSTEMS
[Anxiety] : anxiety [Depression] : depression [Negative] : Heme/Lymph [Heartburn] : no heartburn [Frequency] : no frequency [Nocturia] : no nocturia [Dizziness] : no dizziness

## 2019-06-24 NOTE — PLAN
[FreeTextEntry1] : Psychiatry-general anxiety disorder and depression  Continue with current medication. Counseling given to the patient regarding loss of her .  Checked .  Increase xanax to TID as needed prn.  Advised only to take if needed for anxiety and Agitation.   \par \par Heme Anemia check cbc, Check Vitamin B 12 levels \par Vitamin B 12 deficiency  Vitamin B 12 given.  will check labs \par \par GI GERD-  bland diet-  \par \par Cardiology- hypertension- Carotid stenosis- Advise patient to continue with current medications.  Advised patient to take aspirin 81 mg enteric-coated every other day with food. Increase Crestor to 10 mg daily to get an LDL goal less than 70 secondary to history of CVA and carotid stenosis.  Repeat Carotid  sonogram  in one year. \par Check cbc and cmp today \par \par Neurology- CVA  Advise patient to followup with her neurologist.  On Gabapentin (unaware of the dose) \par \par Endocrinology- VItamin D deficiency-continue with vitamin D supplementation.\par \par Declined PCV 13 today  education given  \par \par

## 2019-06-24 NOTE — HISTORY OF PRESENT ILLNESS
[Formal Caregiver] : formal caregiver [FreeTextEntry1] : A 89- year- old female who presents to the office for fa check up and refill on medication.   [de-identified] : 89-year-old female who presents to the office for a follow up and renewal of xanax.  Patient's caretaker states that she has been more anxious and agitated since her  passed away.  Xanax seems to help. \par States she is always tired and would like to have B12 tested.  \par Denies chest pain.  Denies change in Bowel movements.  \par \par States hearing got worse and need to get hearing aids.  \par \par Rashes have resolved

## 2019-06-28 LAB
25(OH)D3 SERPL-MCNC: 43.8 NG/ML
ALBUMIN SERPL ELPH-MCNC: 3.8 G/DL
ALP BLD-CCNC: 88 U/L
ALT SERPL-CCNC: 20 U/L
ANION GAP SERPL CALC-SCNC: 13 MMOL/L
AST SERPL-CCNC: 25 U/L
BASOPHILS # BLD AUTO: 0.06 K/UL
BASOPHILS NFR BLD AUTO: 0.7 %
BILIRUB SERPL-MCNC: 0.2 MG/DL
BUN SERPL-MCNC: 21 MG/DL
CALCIUM SERPL-MCNC: 9.1 MG/DL
CHLORIDE SERPL-SCNC: 99 MMOL/L
CO2 SERPL-SCNC: 29 MMOL/L
CREAT SERPL-MCNC: 0.83 MG/DL
EOSINOPHIL # BLD AUTO: 0.19 K/UL
EOSINOPHIL NFR BLD AUTO: 2.3 %
GLUCOSE SERPL-MCNC: 91 MG/DL
HCT VFR BLD CALC: 36.2 %
HGB BLD-MCNC: 11.4 G/DL
IMM GRANULOCYTES NFR BLD AUTO: 0.1 %
LYMPHOCYTES # BLD AUTO: 1.37 K/UL
LYMPHOCYTES NFR BLD AUTO: 16.5 %
MAN DIFF?: NORMAL
MCHC RBC-ENTMCNC: 31.5 GM/DL
MCHC RBC-ENTMCNC: 31.8 PG
MCV RBC AUTO: 100.8 FL
MONOCYTES # BLD AUTO: 0.78 K/UL
MONOCYTES NFR BLD AUTO: 9.4 %
NEUTROPHILS # BLD AUTO: 5.89 K/UL
NEUTROPHILS NFR BLD AUTO: 71 %
PLATELET # BLD AUTO: 247 K/UL
POTASSIUM SERPL-SCNC: 4.2 MMOL/L
PROT SERPL-MCNC: 6.6 G/DL
RBC # BLD: 3.59 M/UL
RBC # FLD: 15.1 %
SODIUM SERPL-SCNC: 141 MMOL/L
VIT B12 SERPL-MCNC: 1351 PG/ML
WBC # FLD AUTO: 8.3 K/UL

## 2019-07-01 ENCOUNTER — APPOINTMENT (OUTPATIENT)
Dept: INTERNAL MEDICINE | Facility: CLINIC | Age: 84
End: 2019-07-01
Payer: MEDICARE

## 2019-07-01 ENCOUNTER — RX RENEWAL (OUTPATIENT)
Age: 84
End: 2019-07-01

## 2019-07-01 VITALS
SYSTOLIC BLOOD PRESSURE: 110 MMHG | TEMPERATURE: 98.3 F | HEIGHT: 58 IN | HEART RATE: 78 BPM | RESPIRATION RATE: 16 BRPM | DIASTOLIC BLOOD PRESSURE: 68 MMHG | OXYGEN SATURATION: 96 %

## 2019-07-01 DIAGNOSIS — M81.0 AGE-RELATED OSTEOPOROSIS W/OUT CURRENT PATHOLOGICAL FRACTURE: ICD-10-CM

## 2019-07-01 PROCEDURE — 99214 OFFICE O/P EST MOD 30 MIN: CPT | Mod: 25

## 2019-07-01 PROCEDURE — 69210 REMOVE IMPACTED EAR WAX UNI: CPT

## 2019-07-01 NOTE — REVIEW OF SYSTEMS
[Anxiety] : anxiety [Depression] : depression [Negative] : Heme/Lymph [Hearing Loss] : hearing loss [Heartburn] : no heartburn [Nocturia] : no nocturia [Frequency] : no frequency [Dizziness] : no dizziness

## 2019-07-01 NOTE — PHYSICAL EXAM
[No Acute Distress] : no acute distress [Well Nourished] : well nourished [Well Developed] : well developed [Well-Appearing] : well-appearing [Normal Sclera/Conjunctiva] : normal sclera/conjunctiva [PERRL] : pupils equal round and reactive to light [EOMI] : extraocular movements intact [Normal Oropharynx] : the oropharynx was normal [No JVD] : no jugular venous distention [Supple] : supple [No Lymphadenopathy] : no lymphadenopathy [Thyroid Normal, No Nodules] : the thyroid was normal and there were no nodules present [No Respiratory Distress] : no respiratory distress  [Clear to Auscultation] : lungs were clear to auscultation bilaterally [No Accessory Muscle Use] : no accessory muscle use [Normal Rate] : normal rate  [Regular Rhythm] : with a regular rhythm [Normal S1, S2] : normal S1 and S2 [No Abdominal Bruit] : a ~M bruit was not heard ~T in the abdomen [No Varicosities] : no varicosities [Pedal Pulses Present] : the pedal pulses are present [No Edema] : there was no peripheral edema [No Extremity Clubbing/Cyanosis] : no extremity clubbing/cyanosis [No Palpable Aorta] : no palpable aorta [Soft] : abdomen soft [Non Tender] : non-tender [Non-distended] : non-distended [No Masses] : no abdominal mass palpated [No HSM] : no HSM [Normal Bowel Sounds] : normal bowel sounds [Normal Posterior Cervical Nodes] : no posterior cervical lymphadenopathy [Normal Anterior Cervical Nodes] : no anterior cervical lymphadenopathy [No CVA Tenderness] : no CVA  tenderness [No Spinal Tenderness] : no spinal tenderness [No Joint Swelling] : no joint swelling [Grossly Normal Strength/Tone] : grossly normal strength/tone [No Rash] : no rash [No Skin Lesions] : no skin lesions [Normal Gait] : normal gait [Coordination Grossly Intact] : coordination grossly intact [No Focal Deficits] : no focal deficits [Deep Tendon Reflexes (DTR)] : deep tendon reflexes were 2+ and symmetric [Speech Grossly Normal] : speech grossly normal [Memory Grossly Normal] : memory grossly normal [Normal Affect] : the affect was normal [Alert and Oriented x3] : oriented to person, place, and time [Normal Insight/Judgement] : insight and judgment were intact [Agitated] : agitated [Depressed] : depressed [Kyphosis] : kyphosis [Normal Mood] : the mood was normal [de-identified] : right TMI wax noted TM not visualized fully tight canal   [de-identified] : with murmur  [de-identified] :  carotid bruit left ICA  [de-identified] : limited mobility in wheelchair  [de-identified] : Pt upset secondary to her loss

## 2019-07-01 NOTE — PLAN
[FreeTextEntry1] : Otolaryngology\par Hearing loss-cerumen impaction right ear.  Removed 90% of the impacted cerumen.  Still residual cerumen patient started to be intolerant to the procedure.  Advised patient to followup with ears nose and throat for wax removal secondary to having  small canals.\par \par Following audiology for hearing evaluation.\par \par Peripheral neuropathy-refilled Neurontin we'll send in d.a.w. as per patient request.\par \par Rheumatology\par Advised patient the need for bone density scan to evaluate for postmenopausal osteoporosis

## 2019-07-01 NOTE — ASSESSMENT
[FreeTextEntry1] : 89-year-old female who presents to office for  renewal of medication and ear irrigation \par

## 2019-07-01 NOTE — HISTORY OF PRESENT ILLNESS
[Family Member] : family member [FreeTextEntry1] : Hearing loss and wax build up [de-identified] : A 89 year old  femaleWith a past medical history as noted below, presents to the office for hearing loss. Patient states she was down at the audiologist office and was advised to come upstairs to have her ears  irrigated secondary to wax buildup. Patient in the process of getting evaluated for hearing aids.  Also patient requested a prescription for Neurontin but wants branded medication only states generic gabapentin does not work for her.

## 2019-07-01 NOTE — COUNSELING
[Healthy eating counseling provided] : healthy eating [Behavioral health counseling provided] : behavioral health  [Needs reinforcement, provided] : Patient needs reinforcement on understanding of disease, goals and obesity follow-up plan; reinforcement was provided [Low Fat Diet] : Low fat diet [Low Salt Diet] : Low salt diet [Decrease Portions] : Decrease food portions [Transportation Issues] : Transportation issues [Patient motivation] : Patient motivation [Walking] : Walking [de-identified] : consider counseling \par Follow up with Derm and audiologist.  \par Advised to get BDS

## 2019-07-01 NOTE — HEALTH RISK ASSESSMENT
[No] : In the past 12 months have you used drugs other than those required for medical reasons? No [No falls in past year] : Patient reported no falls in the past year [0] : 1) Little interest or pleasure doing things: Not at all (0) [2] : 2) Feeling down, depressed, or hopeless for more than half of the days (2) [Transportation] : transportation [Independent] : managing medications [Some assistance needed] : managing finances [Full assistance needed] : using transportation [Reports changes in hearing] : Reports changes in hearing [] : No [Audit-CScore] : 0 [de-identified] : no [de-identified] : regular  [[id number]] : 2 [Change in mental status noted] : No change in mental status noted [BoneDensityComments] : rxn given today

## 2019-07-12 ENCOUNTER — RX RENEWAL (OUTPATIENT)
Age: 84
End: 2019-07-12

## 2019-07-15 ENCOUNTER — RX RENEWAL (OUTPATIENT)
Age: 84
End: 2019-07-15

## 2019-07-16 ENCOUNTER — APPOINTMENT (OUTPATIENT)
Dept: DERMATOLOGY | Facility: CLINIC | Age: 84
End: 2019-07-16
Payer: MEDICARE

## 2019-07-16 ENCOUNTER — RX RENEWAL (OUTPATIENT)
Age: 84
End: 2019-07-16

## 2019-07-16 DIAGNOSIS — L81.4 OTHER MELANIN HYPERPIGMENTATION: ICD-10-CM

## 2019-07-16 DIAGNOSIS — L82.1 OTHER SEBORRHEIC KERATOSIS: ICD-10-CM

## 2019-07-16 DIAGNOSIS — D22.9 MELANOCYTIC NEVI, UNSPECIFIED: ICD-10-CM

## 2019-07-16 PROCEDURE — 99203 OFFICE O/P NEW LOW 30 MIN: CPT

## 2019-07-29 ENCOUNTER — RX RENEWAL (OUTPATIENT)
Age: 84
End: 2019-07-29

## 2019-07-31 NOTE — ED ADULT NURSE NOTE - NS ED NURSE LEVEL OF CONSCIOUSNESS SPEECH
"Called pt's mother back. Spoke to mother. Mother reports that her pain is worse. No emesis. Decreased PO intake. No fever. + HA. Pt is reportedly tolerating liquids. Pt with 1 week of pain. Worsening x 24h. Not sleeping well.     I requested to speak with Mariluz on the phone. She reports that \"it is hurting a lot\". She is tearful. She states that today's pain is worse than before.     Advised mother that given the hour of the day and the reported severity of pain I would suggest eval emergently in UC or ER. Mother verbalizes an understanding  " Speaking Coherently

## 2019-08-21 ENCOUNTER — RX RENEWAL (OUTPATIENT)
Age: 84
End: 2019-08-21

## 2019-09-03 ENCOUNTER — RX RENEWAL (OUTPATIENT)
Age: 84
End: 2019-09-03

## 2019-09-04 ENCOUNTER — APPOINTMENT (OUTPATIENT)
Dept: INTERNAL MEDICINE | Facility: CLINIC | Age: 84
End: 2019-09-04
Payer: MEDICARE

## 2019-09-04 VITALS
DIASTOLIC BLOOD PRESSURE: 68 MMHG | HEART RATE: 91 BPM | OXYGEN SATURATION: 94 % | HEIGHT: 58 IN | TEMPERATURE: 98.2 F | RESPIRATION RATE: 16 BRPM | BODY MASS INDEX: 26.87 KG/M2 | SYSTOLIC BLOOD PRESSURE: 114 MMHG | WEIGHT: 128 LBS

## 2019-09-04 DIAGNOSIS — E53.8 DEFICIENCY OF OTHER SPECIFIED B GROUP VITAMINS: ICD-10-CM

## 2019-09-04 PROCEDURE — 96372 THER/PROPH/DIAG INJ SC/IM: CPT

## 2019-09-04 PROCEDURE — 90670 PCV13 VACCINE IM: CPT

## 2019-09-04 PROCEDURE — 99214 OFFICE O/P EST MOD 30 MIN: CPT | Mod: 25

## 2019-09-04 PROCEDURE — G0009: CPT

## 2019-09-05 ENCOUNTER — MED ADMIN CHARGE (OUTPATIENT)
Age: 84
End: 2019-09-05

## 2019-09-05 PROBLEM — E53.8 VITAMIN B12 DEFICIENCY: Status: ACTIVE | Noted: 2019-06-24

## 2019-09-05 RX ORDER — CYANOCOBALAMIN 1000 UG/ML
1000 INJECTION INTRAMUSCULAR; SUBCUTANEOUS
Qty: 0 | Refills: 0 | Status: COMPLETED | OUTPATIENT
Start: 2019-09-04

## 2019-09-05 NOTE — COUNSELING
[AUDIT-C Screening administered and reviewed] : AUDIT-C Screening administered and reviewed [Healthy eating counseling provided] : healthy eating [Needs reinforcement, provided] : Patient needs reinforcement on understanding of disease, goals and obesity follow-up plan; reinforcement was provided [Behavioral health counseling provided] : behavioral health  [Low Salt Diet] : Low salt diet [Low Fat Diet] : Low fat diet [Decrease Portions] : Decrease food portions [Transportation Issues] : Transportation issues [Walking] : Walking [Patient motivation] : Patient motivation [de-identified] : Low fat/cholesterol diet

## 2019-09-05 NOTE — HISTORY OF PRESENT ILLNESS
[FreeTextEntry1] : Check up and renewal of medication for HALI [de-identified] : A 89-year-old female, with a past medical history as noted below, presents to the office today for a checkup and renewal of medication. Patient states she's still having a tough time after the loss of her . Denies having chest pain, shortness of breath, rash or change in bowel habits patient denies having blood in the stools. Patient denies weight loss.\par \par Patient states she takes Xanax as directed which control some of her anxiety. Denies side effects of the medication.

## 2019-09-05 NOTE — HEALTH RISK ASSESSMENT
[No] : No [0] : 1) Little interest or pleasure doing things: Not at all (0) [No falls in past year] : Patient reported no falls in the past year [2] : 2) Feeling down, depressed, or hopeless for more than half of the days (2) [] : No [Audit-CScore] : 0 [de-identified] : no [de-identified] : regular  [[id number]] : 2

## 2019-09-05 NOTE — PHYSICAL EXAM
[No Acute Distress] : no acute distress [Well Nourished] : well nourished [Well Developed] : well developed [Well-Appearing] : well-appearing [Normal Sclera/Conjunctiva] : normal sclera/conjunctiva [EOMI] : extraocular movements intact [PERRL] : pupils equal round and reactive to light [Normal TMs] : both tympanic membranes were normal [Normal Oropharynx] : the oropharynx was normal [Supple] : supple [No JVD] : no jugular venous distention [No Respiratory Distress] : no respiratory distress  [Thyroid Normal, No Nodules] : the thyroid was normal and there were no nodules present [No Lymphadenopathy] : no lymphadenopathy [Clear to Auscultation] : lungs were clear to auscultation bilaterally [Normal Rate] : normal rate  [No Accessory Muscle Use] : no accessory muscle use [Regular Rhythm] : with a regular rhythm [Normal S1, S2] : normal S1 and S2 [No Abdominal Bruit] : a ~M bruit was not heard ~T in the abdomen [Pedal Pulses Present] : the pedal pulses are present [No Edema] : there was no peripheral edema [No Extremity Clubbing/Cyanosis] : no extremity clubbing/cyanosis [No Palpable Aorta] : no palpable aorta [Soft] : abdomen soft [Non-distended] : non-distended [Non Tender] : non-tender [No Masses] : no abdominal mass palpated [No HSM] : no HSM [Normal Posterior Cervical Nodes] : no posterior cervical lymphadenopathy [Normal Bowel Sounds] : normal bowel sounds [Normal Anterior Cervical Nodes] : no anterior cervical lymphadenopathy [No CVA Tenderness] : no CVA  tenderness [Kyphosis] : kyphosis [No Spinal Tenderness] : no spinal tenderness [No Joint Swelling] : no joint swelling [No Rash] : no rash [Normal Gait] : normal gait [No Skin Lesions] : no skin lesions [Coordination Grossly Intact] : coordination grossly intact [No Focal Deficits] : no focal deficits [Deep Tendon Reflexes (DTR)] : deep tendon reflexes were 2+ and symmetric [Speech Grossly Normal] : speech grossly normal [Memory Grossly Normal] : memory grossly normal [Normal Affect] : the affect was normal [Alert and Oriented x3] : oriented to person, place, and time [Normal Insight/Judgement] : insight and judgment were intact [Normal Mood] : the mood was normal [Agitated] : agitated [Depressed] : depressed [de-identified] : with murmur  [de-identified] : limited mobility in wheelchair  [de-identified] :  carotid bruit left ICA  [de-identified] : Pt upset secondary to her loss

## 2019-09-05 NOTE — REVIEW OF SYSTEMS
[Anxiety] : anxiety [Depression] : depression [Negative] : Heme/Lymph [Hearing Loss] : no hearing loss [Heartburn] : no heartburn [Nocturia] : no nocturia [Frequency] : no frequency [Dizziness] : no dizziness

## 2019-09-05 NOTE — PLAN
[FreeTextEntry1] : Psych [facility] - Extra counseling was given to the pt about depression and anxiety.  Continue with current medications   Consider therapy.   Checked .  \par \par Hearing loss \par Following audiology for hearing evaluation.\par \par Peripheral neuropathy -Continue with Neurontin ADRIANA \par \par Rheumatology\par Advised patient the need for bone density scan to evaluate for postmenopausal osteoporosis\par \par Immunization PCV 13 given \par Advised flu shot and zoster vax.  \par \par GI hx of positive stool guaiac   Advised Fit test \par \par We discussed the importance of healthy lifestyles which include exercise, weight control and good diet.\par Patient's questions were answered in full detail. Advise patient if any other concerns arise to please call office to have a discussion.

## 2019-09-30 ENCOUNTER — RX RENEWAL (OUTPATIENT)
Age: 84
End: 2019-09-30

## 2019-10-04 ENCOUNTER — RX RENEWAL (OUTPATIENT)
Age: 84
End: 2019-10-04

## 2019-10-04 RX ORDER — AMLODIPINE BESYLATE 2.5 MG/1
2.5 TABLET ORAL
Qty: 180 | Refills: 0 | Status: DISCONTINUED | COMMUNITY
Start: 2019-02-08 | End: 2019-10-04

## 2019-10-15 ENCOUNTER — OTHER (OUTPATIENT)
Age: 84
End: 2019-10-15

## 2019-10-27 ENCOUNTER — RX RENEWAL (OUTPATIENT)
Age: 84
End: 2019-10-27

## 2019-11-06 ENCOUNTER — RX RENEWAL (OUTPATIENT)
Age: 84
End: 2019-11-06

## 2019-11-11 ENCOUNTER — APPOINTMENT (OUTPATIENT)
Dept: INTERNAL MEDICINE | Facility: CLINIC | Age: 84
End: 2019-11-11
Payer: MEDICARE

## 2019-11-11 ENCOUNTER — MED ADMIN CHARGE (OUTPATIENT)
Age: 84
End: 2019-11-11

## 2019-11-11 VITALS
HEART RATE: 102 BPM | WEIGHT: 128 LBS | RESPIRATION RATE: 14 BRPM | HEIGHT: 58 IN | TEMPERATURE: 97.7 F | OXYGEN SATURATION: 96 % | DIASTOLIC BLOOD PRESSURE: 70 MMHG | BODY MASS INDEX: 26.87 KG/M2 | SYSTOLIC BLOOD PRESSURE: 122 MMHG

## 2019-11-11 DIAGNOSIS — L03.115 CELLULITIS OF RIGHT LOWER LIMB: ICD-10-CM

## 2019-11-11 PROCEDURE — 99213 OFFICE O/P EST LOW 20 MIN: CPT | Mod: 25

## 2019-11-11 PROCEDURE — G0008: CPT

## 2019-11-11 PROCEDURE — 90662 IIV NO PRSV INCREASED AG IM: CPT

## 2019-11-11 NOTE — COUNSELING
[AUDIT-C Screening administered and reviewed] : AUDIT-C Screening administered and reviewed [Healthy eating counseling provided] : healthy eating [Needs reinforcement, provided] : Patient needs reinforcement on understanding of disease, goals and obesity follow-up plan; reinforcement was provided [Low Fat Diet] : Low fat diet [Walking] : Walking [Low Salt Diet] : Low salt diet [Decrease Portions] : Decrease food portions [Patient motivation] : Patient motivation [Transportation Issues] : Transportation issues [Behavioral health counseling provided] : Behavioral health counseling provided [Engage in a relaxing activity] : Engage in a relaxing activity [de-identified] : consider counseling \par Follow up with Derm and audiologist.  \par Advised to get BDS

## 2019-11-11 NOTE — HISTORY OF PRESENT ILLNESS
[FreeTextEntry1] : Check up and renewal of medication for HALI [de-identified] : A 89-year-old female, with a past medical history as noted below, presents to the office today for a checkup and renewal of medication. Patient states she's still having a tough time after the loss of her . Denies having chest pain, shortness of breath, rash or change in bowel habits patient denies having blood in the stools. Patient denies weight loss.\par \par Patient states she takes Xanax as directed which control some of her anxiety. Denies side effects of the medication.\par did cut back on trintellix because she always feels tired.  Did not see any improvement

## 2019-11-11 NOTE — HEALTH RISK ASSESSMENT
[No] : In the past 12 months have you used drugs other than those required for medical reasons? No [0] : 1) Little interest or pleasure doing things: Not at all (0) [No falls in past year] : Patient reported no falls in the past year [2] : 2) Feeling down, depressed, or hopeless for more than half of the days (2) [] : No [Audit-CScore] : 0 [de-identified] : no [de-identified] : regular  [[id number]] : 2

## 2019-11-11 NOTE — PLAN
[FreeTextEntry1] : Psych [facility] - Extra counseling was given to the pt about depression and anxiety.  Continue with current medications   Consider therapy.   Checked .  \par Rxn for labs .  Advised labfly senia\par \par Hearing loss \par Following audiology for hearing evaluation.\par \par Peripheral neuropathy -Continue with Neurontin ADRIANA \par \par Rheumatology\par Advised patient the need for bone density scan to evaluate for postmenopausal osteoporosis\par \par Immunization PCV 13 given \par Immunization Flu shot HD given.  Consent form filled out.  Education about the vaccination was provided \par \par GI hx of positive stool guaiac   Advised Fit test \par \par We discussed the importance of healthy lifestyles which include exercise, weight control and good diet.\par Patient's questions were answered in full detail. Advise patient if any other concerns arise to please call office to have a discussion.

## 2019-11-11 NOTE — PHYSICAL EXAM
[Well Nourished] : well nourished [No Acute Distress] : no acute distress [Normal Sclera/Conjunctiva] : normal sclera/conjunctiva [Well-Appearing] : well-appearing [Well Developed] : well developed [PERRL] : pupils equal round and reactive to light [EOMI] : extraocular movements intact [Normal TMs] : both tympanic membranes were normal [Normal Oropharynx] : the oropharynx was normal [Supple] : supple [No JVD] : no jugular venous distention [No Lymphadenopathy] : no lymphadenopathy [No Respiratory Distress] : no respiratory distress  [Thyroid Normal, No Nodules] : the thyroid was normal and there were no nodules present [Clear to Auscultation] : lungs were clear to auscultation bilaterally [No Accessory Muscle Use] : no accessory muscle use [Regular Rhythm] : with a regular rhythm [Normal Rate] : normal rate  [Normal S1, S2] : normal S1 and S2 [No Edema] : there was no peripheral edema [Pedal Pulses Present] : the pedal pulses are present [No Abdominal Bruit] : a ~M bruit was not heard ~T in the abdomen [No Extremity Clubbing/Cyanosis] : no extremity clubbing/cyanosis [No Palpable Aorta] : no palpable aorta [Soft] : abdomen soft [Non-distended] : non-distended [Non Tender] : non-tender [Normal Bowel Sounds] : normal bowel sounds [No HSM] : no HSM [No Masses] : no abdominal mass palpated [Normal Anterior Cervical Nodes] : no anterior cervical lymphadenopathy [Normal Posterior Cervical Nodes] : no posterior cervical lymphadenopathy [No CVA Tenderness] : no CVA  tenderness [No Spinal Tenderness] : no spinal tenderness [Kyphosis] : kyphosis [No Joint Swelling] : no joint swelling [Normal Gait] : normal gait [No Rash] : no rash [No Skin Lesions] : no skin lesions [Coordination Grossly Intact] : coordination grossly intact [No Focal Deficits] : no focal deficits [Deep Tendon Reflexes (DTR)] : deep tendon reflexes were 2+ and symmetric [Speech Grossly Normal] : speech grossly normal [Memory Grossly Normal] : memory grossly normal [Normal Affect] : the affect was normal [Normal Mood] : the mood was normal [Normal Insight/Judgement] : insight and judgment were intact [Alert and Oriented x3] : oriented to person, place, and time [Depressed] : depressed [Agitated] : agitated [de-identified] : with murmur  [de-identified] :  carotid bruit left ICA  [de-identified] : limited mobility in wheelchair  [de-identified] : Pt upset secondary to her loss

## 2019-11-18 ENCOUNTER — RX RENEWAL (OUTPATIENT)
Age: 84
End: 2019-11-18

## 2019-11-25 ENCOUNTER — RX RENEWAL (OUTPATIENT)
Age: 84
End: 2019-11-25

## 2019-11-26 ENCOUNTER — RX RENEWAL (OUTPATIENT)
Age: 84
End: 2019-11-26

## 2019-12-09 ENCOUNTER — RX RENEWAL (OUTPATIENT)
Age: 84
End: 2019-12-09

## 2020-01-10 ENCOUNTER — RX RENEWAL (OUTPATIENT)
Age: 85
End: 2020-01-10

## 2020-01-13 ENCOUNTER — APPOINTMENT (OUTPATIENT)
Dept: INTERNAL MEDICINE | Facility: CLINIC | Age: 85
End: 2020-01-13
Payer: MEDICARE

## 2020-01-13 VITALS
WEIGHT: 128 LBS | SYSTOLIC BLOOD PRESSURE: 120 MMHG | BODY MASS INDEX: 26.87 KG/M2 | TEMPERATURE: 98.4 F | RESPIRATION RATE: 14 BRPM | HEIGHT: 58 IN | HEART RATE: 86 BPM | OXYGEN SATURATION: 97 % | DIASTOLIC BLOOD PRESSURE: 68 MMHG

## 2020-01-13 DIAGNOSIS — Z12.11 ENCOUNTER FOR SCREENING FOR MALIGNANT NEOPLASM OF COLON: ICD-10-CM

## 2020-01-13 DIAGNOSIS — E55.9 VITAMIN D DEFICIENCY, UNSPECIFIED: ICD-10-CM

## 2020-01-13 PROCEDURE — 99213 OFFICE O/P EST LOW 20 MIN: CPT

## 2020-01-13 NOTE — PLAN
[FreeTextEntry1] : Psych [facility] - Extra counseling was given to the pt about depression and anxiety.  Continue with current medications   Consider therapy.   Checked .  Refilled medication.  Last week gave 5 day supply.  \par Advised pt to get blood work done \par \par Hearing loss \par Following audiology for hearing evaluation.\par \par Peripheral neuropathy -Continue with Neurontin ADRIANA \par \par Rheumatology\par Advised patient the need for bone density scan to evaluate for postmenopausal osteoporosis\par \par GI hx of positive stool guaiac   Advised Fit test  gave new rx.  \par \par We discussed the importance of healthy lifestyles which include exercise, weight control and good diet.\par Patient's questions were answered in full detail. Advise patient if any other concerns arise to please call office to have a discussion. \par \par counseling given to the pt

## 2020-01-13 NOTE — HISTORY OF PRESENT ILLNESS
[FreeTextEntry1] : Check up and renewal of medication for HALI [de-identified] : A 89-year-old female, with a past medical history as noted below, presents to the office today for a checkup and renewal of medication. Patient states she's still having a tough time after the loss of her . Denies having chest pain, shortness of breath, rash or change in bowel habits patient denies having blood in the stools. Patient denies weight loss.\par \par Patient states she takes Xanax as directed which control some of her anxiety. Denies side effects of the medication.\par did cut back on trintellix because she always feels tired.  Did not see any improvement

## 2020-01-13 NOTE — COUNSELING
[Engage in a relaxing activity] : Engage in a relaxing activity [AUDIT-C Screening administered and reviewed] : AUDIT-C Screening administered and reviewed [Healthy eating counseling provided] : healthy eating [Needs reinforcement, provided] : Patient needs reinforcement on understanding of disease, goals and obesity follow-up plan; reinforcement was provided [Behavioral health counseling provided] : behavioral health  [Low Fat Diet] : Low fat diet [Low Salt Diet] : Low salt diet [Walking] : Walking [Decrease Portions] : Decrease food portions [Transportation Issues] : Transportation issues [Patient motivation] : Patient motivation [de-identified] : consider counseling \par Follow up with Derm and audiologist.  \par Advised to get BDS

## 2020-01-13 NOTE — HEALTH RISK ASSESSMENT
[] : No [No] : No [No falls in past year] : Patient reported no falls in the past year [0] : 1) Little interest or pleasure doing things: Not at all (0) [2] : 2) Feeling down, depressed, or hopeless for more than half of the days (2) [de-identified] : no [Audit-CScore] : 0 [de-identified] : regular  [[id number]] : 2

## 2020-01-13 NOTE — PHYSICAL EXAM
[No Acute Distress] : no acute distress [Well-Appearing] : well-appearing [Well Nourished] : well nourished [Well Developed] : well developed [Normal Sclera/Conjunctiva] : normal sclera/conjunctiva [PERRL] : pupils equal round and reactive to light [EOMI] : extraocular movements intact [Normal Oropharynx] : the oropharynx was normal [Normal TMs] : both tympanic membranes were normal [No JVD] : no jugular venous distention [Supple] : supple [No Lymphadenopathy] : no lymphadenopathy [No Respiratory Distress] : no respiratory distress  [Thyroid Normal, No Nodules] : the thyroid was normal and there were no nodules present [Clear to Auscultation] : lungs were clear to auscultation bilaterally [No Accessory Muscle Use] : no accessory muscle use [Regular Rhythm] : with a regular rhythm [Normal Rate] : normal rate  [No Abdominal Bruit] : a ~M bruit was not heard ~T in the abdomen [Normal S1, S2] : normal S1 and S2 [Pedal Pulses Present] : the pedal pulses are present [No Edema] : there was no peripheral edema [No Extremity Clubbing/Cyanosis] : no extremity clubbing/cyanosis [No Palpable Aorta] : no palpable aorta [Soft] : abdomen soft [Non Tender] : non-tender [Non-distended] : non-distended [No HSM] : no HSM [No Masses] : no abdominal mass palpated [Normal Bowel Sounds] : normal bowel sounds [Normal Posterior Cervical Nodes] : no posterior cervical lymphadenopathy [Normal Anterior Cervical Nodes] : no anterior cervical lymphadenopathy [Kyphosis] : kyphosis [No CVA Tenderness] : no CVA  tenderness [No Spinal Tenderness] : no spinal tenderness [No Joint Swelling] : no joint swelling [No Rash] : no rash [No Skin Lesions] : no skin lesions [Coordination Grossly Intact] : coordination grossly intact [Normal Gait] : normal gait [No Focal Deficits] : no focal deficits [Deep Tendon Reflexes (DTR)] : deep tendon reflexes were 2+ and symmetric [Speech Grossly Normal] : speech grossly normal [Memory Grossly Normal] : memory grossly normal [Normal Affect] : the affect was normal [Alert and Oriented x3] : oriented to person, place, and time [Normal Insight/Judgement] : insight and judgment were intact [Normal Mood] : the mood was normal [Depressed] : depressed [Agitated] : agitated [de-identified] : with murmur  [de-identified] :  carotid bruit left ICA  [de-identified] : limited mobility in wheelchair  [de-identified] : Pt upset secondary to her loss

## 2020-01-13 NOTE — REVIEW OF SYSTEMS
[Heartburn] : no heartburn [Hearing Loss] : no hearing loss [Nocturia] : no nocturia [Frequency] : no frequency [Dizziness] : no dizziness [Depression] : depression [Anxiety] : anxiety [Negative] : Heme/Lymph

## 2020-01-28 ENCOUNTER — RX RENEWAL (OUTPATIENT)
Age: 85
End: 2020-01-28

## 2020-02-06 ENCOUNTER — APPOINTMENT (OUTPATIENT)
Dept: INTERNAL MEDICINE | Facility: CLINIC | Age: 85
End: 2020-02-06
Payer: MEDICARE

## 2020-02-06 VITALS
HEIGHT: 58 IN | RESPIRATION RATE: 16 BRPM | SYSTOLIC BLOOD PRESSURE: 88 MMHG | OXYGEN SATURATION: 96 % | DIASTOLIC BLOOD PRESSURE: 60 MMHG | TEMPERATURE: 98.5 F | HEART RATE: 90 BPM

## 2020-02-06 PROCEDURE — 99213 OFFICE O/P EST LOW 20 MIN: CPT

## 2020-02-06 NOTE — HISTORY OF PRESENT ILLNESS
[Joint Pain, Localized In The Shoulder] : shoulder [___ Days ago] : [unfilled] days ago [Constant] : constant [Mild] : mild [Worsening] : worsening [FreeTextEntry8] : Patient states she woke up with a bruise on her right hands.  Unaware of any trauma.  Denies any loss of strength.\par States she also has right shoulder pain for many years.  Was seeing orthopedic for injections.  Denies going for a period of time but having a difficult time lifting arm up.  \par \par Denies having blood work done that was ordered last visit.  \par Denies doing the FIT DNA test or going for the BDS

## 2020-02-06 NOTE — ASSESSMENT
[FreeTextEntry1] : 89-year-old female who presents to office for right hand contusion and shoulder pain

## 2020-02-06 NOTE — PLAN
[FreeTextEntry1] : Psych [facility] - Extra counseling was given to the pt about depression and anxiety.  Continue with current medications   Consider therapy.  \par \par Ortho - DJD shoulder - follow up with orthopedic. Consider PT.  trial lidocaine cream \par Hand contusion - Advised supportive care.  Advised warm soaks and hold ASA for two to 3 days\par \par Hearing loss \par Following audiology for hearing evaluation.\par \par Peripheral neuropathy -Continue with Neurontin ADRIANA \par \par Rheumatology\par Advised patient the need for bone density scan to evaluate for postmenopausal osteoporosis\par \par GI hx of positive stool guaiac   Advised Fit test. Patient has kit at home.  \par \par We discussed the importance of healthy lifestyles which include exercise, weight control and good diet.\par Patient's questions were answered in full detail. Advise patient if any other concerns arise to please call office to have a discussion. \par \par counseling given to the pt

## 2020-02-06 NOTE — COUNSELING
[Engage in a relaxing activity] : Engage in a relaxing activity [AUDIT-C Screening administered and reviewed] : AUDIT-C Screening administered and reviewed [Healthy eating counseling provided] : healthy eating [Needs reinforcement, provided] : Patient needs reinforcement on understanding of disease, goals and obesity follow-up plan; reinforcement was provided [Behavioral health counseling provided] : behavioral health  [Low Fat Diet] : Low fat diet [Low Salt Diet] : Low salt diet [Decrease Portions] : Decrease food portions [Transportation Issues] : Transportation issues [Walking] : Walking [Patient motivation] : Patient motivation [de-identified] : consider counseling \par Follow up with Derm and audiologist.  \par Advised to get BDS

## 2020-02-06 NOTE — PHYSICAL EXAM
[No Acute Distress] : no acute distress [Well Nourished] : well nourished [Well Developed] : well developed [Well-Appearing] : well-appearing [Normal Sclera/Conjunctiva] : normal sclera/conjunctiva [PERRL] : pupils equal round and reactive to light [EOMI] : extraocular movements intact [Normal Oropharynx] : the oropharynx was normal [Normal TMs] : both tympanic membranes were normal [No JVD] : no jugular venous distention [Supple] : supple [No Lymphadenopathy] : no lymphadenopathy [Thyroid Normal, No Nodules] : the thyroid was normal and there were no nodules present [No Respiratory Distress] : no respiratory distress  [Clear to Auscultation] : lungs were clear to auscultation bilaterally [No Accessory Muscle Use] : no accessory muscle use [Normal Rate] : normal rate  [Regular Rhythm] : with a regular rhythm [Normal S1, S2] : normal S1 and S2 [No Edema] : there was no peripheral edema [No Abdominal Bruit] : a ~M bruit was not heard ~T in the abdomen [Pedal Pulses Present] : the pedal pulses are present [No Palpable Aorta] : no palpable aorta [No Extremity Clubbing/Cyanosis] : no extremity clubbing/cyanosis [Soft] : abdomen soft [Non Tender] : non-tender [Non-distended] : non-distended [No Masses] : no abdominal mass palpated [Normal Posterior Cervical Nodes] : no posterior cervical lymphadenopathy [No HSM] : no HSM [Normal Bowel Sounds] : normal bowel sounds [No CVA Tenderness] : no CVA  tenderness [Normal Anterior Cervical Nodes] : no anterior cervical lymphadenopathy [Kyphosis] : kyphosis [No Spinal Tenderness] : no spinal tenderness [No Rash] : no rash [No Joint Swelling] : no joint swelling [No Skin Lesions] : no skin lesions [Normal Gait] : normal gait [Coordination Grossly Intact] : coordination grossly intact [No Focal Deficits] : no focal deficits [Speech Grossly Normal] : speech grossly normal [Deep Tendon Reflexes (DTR)] : deep tendon reflexes were 2+ and symmetric [Normal Affect] : the affect was normal [Memory Grossly Normal] : memory grossly normal [Normal Mood] : the mood was normal [Alert and Oriented x3] : oriented to person, place, and time [Normal Insight/Judgement] : insight and judgment were intact [Agitated] : agitated [Depressed] : depressed [de-identified] :  carotid bruit left ICA  [de-identified] : with murmur  [de-identified] : bruise right hand  [de-identified] : limited mobility in wheelchair,   Limited ROM of right upper ext.  Tender to palp at the AC joint.    Decrease abduction  [de-identified] : Pt upset secondary to her loss

## 2020-02-06 NOTE — REVIEW OF SYSTEMS
[Anxiety] : anxiety [Depression] : depression [Negative] : Heme/Lymph [Heartburn] : no heartburn [Hearing Loss] : no hearing loss [Nocturia] : no nocturia [Frequency] : no frequency [Dizziness] : no dizziness [FreeTextEntry9] : shoulder pain  [de-identified] : bruise on right hand

## 2020-02-06 NOTE — HEALTH RISK ASSESSMENT
[No] : In the past 12 months have you used drugs other than those required for medical reasons? No [No falls in past year] : Patient reported no falls in the past year [0] : 1) Little interest or pleasure doing things: Not at all (0) [2] : 2) Feeling down, depressed, or hopeless for more than half of the days (2) [] : No [Audit-CScore] : 0 [de-identified] : no [de-identified] : regular  [[id number]] : 2

## 2020-02-12 ENCOUNTER — LABORATORY RESULT (OUTPATIENT)
Age: 85
End: 2020-02-12

## 2020-02-14 ENCOUNTER — RX RENEWAL (OUTPATIENT)
Age: 85
End: 2020-02-14

## 2020-02-17 ENCOUNTER — RX RENEWAL (OUTPATIENT)
Age: 85
End: 2020-02-17

## 2020-03-09 ENCOUNTER — RX RENEWAL (OUTPATIENT)
Age: 85
End: 2020-03-09

## 2020-03-11 NOTE — DIETITIAN INITIAL EVALUATION ADULT. - NS AS NUTRI INTERV MEALS SNACK
Recommend Clear Liquid diet when feasible and advance as tolerated to Low Sodium diet with fiber restriction pending further GI workup however may likely be appropriate Bilobed Transposition Flap Text: The defect edges were debeveled with a #15 scalpel blade.  Given the location of the defect and the proximity to free margins a bilobed transposition flap was deemed most appropriate.  Using a sterile surgical marker, an appropriate bilobe flap drawn around the defect.    The area thus outlined was incised deep to adipose tissue with a #15 scalpel blade.  The skin margins were undermined to an appropriate distance in all directions utilizing iris scissors.

## 2020-04-13 ENCOUNTER — RX RENEWAL (OUTPATIENT)
Age: 85
End: 2020-04-13

## 2020-04-27 ENCOUNTER — RX RENEWAL (OUTPATIENT)
Age: 85
End: 2020-04-27

## 2020-05-11 ENCOUNTER — RX RENEWAL (OUTPATIENT)
Age: 85
End: 2020-05-11

## 2020-05-26 ENCOUNTER — RX RENEWAL (OUTPATIENT)
Age: 85
End: 2020-05-26

## 2020-05-26 RX ORDER — LIDOCAINE 5 G/100G
5 OINTMENT TOPICAL
Qty: 35.44 | Refills: 0 | Status: ACTIVE | COMMUNITY
Start: 2020-02-06 | End: 1900-01-01

## 2020-06-02 ENCOUNTER — APPOINTMENT (OUTPATIENT)
Dept: INTERNAL MEDICINE | Facility: CLINIC | Age: 85
End: 2020-06-02
Payer: MEDICARE

## 2020-06-02 DIAGNOSIS — L21.9 SEBORRHEIC DERMATITIS, UNSPECIFIED: ICD-10-CM

## 2020-06-02 PROCEDURE — 99213 OFFICE O/P EST LOW 20 MIN: CPT | Mod: 95

## 2020-06-02 NOTE — REVIEW OF SYSTEMS
[Anxiety] : anxiety [Depression] : depression [Negative] : Heme/Lymph [Hearing Loss] : no hearing loss [Frequency] : no frequency [Heartburn] : no heartburn [Nocturia] : no nocturia [FreeTextEntry9] : shoulder pain  [Dizziness] : no dizziness [de-identified] : dry skin

## 2020-06-02 NOTE — PLAN
[FreeTextEntry1] : Psych HALI - Extra counseling was given to the pt about depression and anxiety.  Continue with current medications     Renewed xanax.   reviewed  \par \par Cardio - Hypertension -  Patient was educated about hypertension and the importance of controlling the pressure through lifestyle modification which include low sodium  diet and  aerobic  exercise.  Also discussed the use of prescription medication which included their benefits and their side effects. We discussed the use of ASA 81 mg daily.   Having a BMI less than or equal to 25. \par \par We discussed the importance of healthy lifestyles which include exercise, weight control and good diet.\par Patient's questions were answered in full detail. Advise patient if any other concerns arise to please call office to have a discussion. \par \par counseling given to the pt \par \par Patient  education  - COVID-19   Counseling and education provided to the patient.  Advised sign and symptoms of the virus.  Advised contact precautions.  Educated patient on proper hand washing and to participate in social distancing. \par  \par We discussed how COVID 19  is tested.  Which is via nasopharyngeal swab.  Patient agree to testing at this time.\par   \par Advised  Ms. WASHINGTON  if they feel SOB, VELÁZQUEZ, change in mental status, orthopnea, or dizziness upon standing to seek immediate medical attention. \par \par Patient is in full awareness of the plan and agrees to it.  All pt question was answered.  \par \par Ms. WASHINGTON  advised to self quarantine for 14 days. Discussed in full detail what that means. Note was provided to the pt.\par \par  GIACOMO  was advised to contact anybody that has been in close contact with since she started feeling sick.  They should be told that you are highly suspicious for being COVID 19 positive and they should monitor for signs and symptoms of COVID.  If the start to have symptoms to call there medical provider for evaluation.  \par \par Hand out was given to the patient to refer to for more information about COVID 19.

## 2020-06-02 NOTE — PAST MEDICAL HISTORY
[Postmenopausal] : history of menopause having occurred [Total Preg ___] : G: [unfilled] [Full Term ___] : Full Term: [unfilled]  [Live Births___] : P: [unfilled]

## 2020-06-02 NOTE — ASSESSMENT
[FreeTextEntry1] : 89-year-old female who presents for telehealth anointment for renewal of medication

## 2020-06-02 NOTE — COUNSELING
[Engage in a relaxing activity] : Engage in a relaxing activity [AUDIT-C Screening administered and reviewed] : AUDIT-C Screening administered and reviewed [Healthy eating counseling provided] : healthy eating [Behavioral health counseling provided] : behavioral health  [Needs reinforcement, provided] : Patient needs reinforcement on understanding of disease, goals and obesity follow-up plan; reinforcement was provided [Low Fat Diet] : Low fat diet [Low Salt Diet] : Low salt diet [Decrease Portions] : Decrease food portions [Walking] : Walking [Transportation Issues] : Transportation issues [Patient motivation] : Patient motivation [de-identified] : consider counseling \par Follow up with Derm and audiologist.  \par Advised to get BDS

## 2020-06-02 NOTE — HISTORY OF PRESENT ILLNESS
[Medical Office: ([facility])___] : at the medical office located in  [Home] : at home, [unfilled] , at the time of the visit. [Verbal consent obtained from patient] : the patient, [unfilled] [Formal Caregiver] : formal caregiver [de-identified] : Ms. WASHINGTON is a 89 year  female, who present to the office for renewal of xanax.  States recently she has been more anxious secondary to the pandemic.    States she takes xanax for anxiety which helps her control the attacks.  Denies side effects to the medication.  Denies feeling sick over the last 2 months.  States she has not been out of her house.  Denies chest pain, SOB, change in BM \par \par Denies abnormal bleeding  [FreeTextEntry1] : Rx renewal

## 2020-06-02 NOTE — HEALTH RISK ASSESSMENT
[No] : No [2] : 2) Feeling down, depressed, or hopeless for more than half of the days (2) [No falls in past year] : Patient reported no falls in the past year [0] : 1) Little interest or pleasure doing things: Not at all (0) [] : No [Audit-CScore] : 0 [de-identified] : no [de-identified] : regular  [[id number]] : 2

## 2020-06-08 ENCOUNTER — RX RENEWAL (OUTPATIENT)
Age: 85
End: 2020-06-08

## 2020-07-15 ENCOUNTER — APPOINTMENT (OUTPATIENT)
Dept: INTERNAL MEDICINE | Facility: CLINIC | Age: 85
End: 2020-07-15
Payer: MEDICARE

## 2020-07-15 VITALS
WEIGHT: 118 LBS | HEIGHT: 58 IN | SYSTOLIC BLOOD PRESSURE: 109 MMHG | RESPIRATION RATE: 16 BRPM | HEART RATE: 51 BPM | TEMPERATURE: 98.2 F | BODY MASS INDEX: 24.77 KG/M2 | OXYGEN SATURATION: 95 % | DIASTOLIC BLOOD PRESSURE: 77 MMHG

## 2020-07-15 DIAGNOSIS — S60.221A CONTUSION OF RIGHT HAND, INITIAL ENCOUNTER: ICD-10-CM

## 2020-07-15 PROCEDURE — 99215 OFFICE O/P EST HI 40 MIN: CPT

## 2020-07-15 RX ORDER — ASPIRIN ENTERIC COATED TABLETS 81 MG 81 MG/1
81 TABLET, DELAYED RELEASE ORAL
Refills: 0 | Status: DISCONTINUED | COMMUNITY
End: 2020-07-15

## 2020-07-15 RX ORDER — GABAPENTIN 300 MG/1
300 CAPSULE ORAL
Refills: 0 | Status: DISCONTINUED | COMMUNITY
Start: 2019-06-24 | End: 2020-07-15

## 2020-07-15 NOTE — HISTORY OF PRESENT ILLNESS
[Other: _____] : [unfilled] [FreeTextEntry1] : Renewal of medication and discussion on Anxiety  [de-identified] : Ms. WASHINGTON is a 89 year  female, who present to the office for renewal of medication.  Also daughter worried about her mother anxiety.  Ester daughter states her mother is very anxious and agitated  all the time which is cause her to have problems with health aids.  states she was doing better with 10 mg of Lexapro but made her really sleepy.  \par Also wanted to see if she can get occupational and physical therapy at home to increase her mother mobility.\par PT daughter also state that her Mom wakes up multiple times throughout the night.  Patient denies this.  States she usually gets up once a night to go to the bathroom.

## 2020-07-15 NOTE — PAST MEDICAL HISTORY
[Postmenopausal] : history of menopause having occurred [Full Term ___] : Full Term: [unfilled]  [Live Births___] : P: [unfilled] [Total Preg ___] : G: [unfilled]

## 2020-07-15 NOTE — HEALTH RISK ASSESSMENT
[No] : No [2] : 2) Feeling down, depressed, or hopeless for more than half of the days (2) [0] : 1) Little interest or pleasure doing things: Not at all (0) [No falls in past year] : Patient reported no falls in the past year [] : No [Audit-CScore] : 0 [de-identified] : no [de-identified] : regular  [[id number]] : 2

## 2020-07-15 NOTE — COUNSELING
[AUDIT-C Screening administered and reviewed] : AUDIT-C Screening administered and reviewed [Engage in a relaxing activity] : Engage in a relaxing activity [Healthy eating counseling provided] : healthy eating [Behavioral health counseling provided] : behavioral health  [Needs reinforcement, provided] : Patient needs reinforcement on understanding of disease, goals and obesity follow-up plan; reinforcement was provided [Decrease Portions] : Decrease food portions [Low Fat Diet] : Low fat diet [Low Salt Diet] : Low salt diet [Walking] : Walking [Transportation Issues] : Transportation issues [Patient motivation] : Patient motivation [de-identified] : consider counseling \par Follow up with Derm and audiologist.  \par Advised to get BDS

## 2020-07-15 NOTE — PHYSICAL EXAM
[No Acute Distress] : no acute distress [Well Nourished] : well nourished [Well Developed] : well developed [Well-Appearing] : well-appearing [Normal Sclera/Conjunctiva] : normal sclera/conjunctiva [PERRL] : pupils equal round and reactive to light [EOMI] : extraocular movements intact [Normal Oropharynx] : the oropharynx was normal [Normal TMs] : both tympanic membranes were normal [No Lymphadenopathy] : no lymphadenopathy [No JVD] : no jugular venous distention [Supple] : supple [Thyroid Normal, No Nodules] : the thyroid was normal and there were no nodules present [No Respiratory Distress] : no respiratory distress  [Normal Rate] : normal rate  [No Accessory Muscle Use] : no accessory muscle use [Clear to Auscultation] : lungs were clear to auscultation bilaterally [Regular Rhythm] : with a regular rhythm [Normal S1, S2] : normal S1 and S2 [No Abdominal Bruit] : a ~M bruit was not heard ~T in the abdomen [Pedal Pulses Present] : the pedal pulses are present [No Edema] : there was no peripheral edema [No Extremity Clubbing/Cyanosis] : no extremity clubbing/cyanosis [Soft] : abdomen soft [Non Tender] : non-tender [No Palpable Aorta] : no palpable aorta [Non-distended] : non-distended [No Masses] : no abdominal mass palpated [Normal Posterior Cervical Nodes] : no posterior cervical lymphadenopathy [Normal Bowel Sounds] : normal bowel sounds [No HSM] : no HSM [No Spinal Tenderness] : no spinal tenderness [Normal Anterior Cervical Nodes] : no anterior cervical lymphadenopathy [No CVA Tenderness] : no CVA  tenderness [Kyphosis] : kyphosis [No Rash] : no rash [No Joint Swelling] : no joint swelling [No Skin Lesions] : no skin lesions [Coordination Grossly Intact] : coordination grossly intact [Normal Gait] : normal gait [Deep Tendon Reflexes (DTR)] : deep tendon reflexes were 2+ and symmetric [No Focal Deficits] : no focal deficits [Speech Grossly Normal] : speech grossly normal [Memory Grossly Normal] : memory grossly normal [Normal Affect] : the affect was normal [Alert and Oriented x3] : oriented to person, place, and time [Normal Insight/Judgement] : insight and judgment were intact [Normal Mood] : the mood was normal [Agitated] : agitated [Depressed] : depressed [de-identified] :  carotid bruit left ICA,  [de-identified] : with murmur  [de-identified] : limited mobility in wheelchair,   Limited ROM of right upper ext.  Tender to palp at the AC joint.    Decrease abduction  [de-identified] : bruise right hand  [de-identified] : Pt at times got upset with her daughter during the exam

## 2020-07-15 NOTE — PLAN
[FreeTextEntry1] : Psych HALI - Extra counseling was given to the pt about depression and anxiety. long discussion with the daughter and mother and techniques to control anger outburst.   Again discussed therapy PT declined.  Advised to increase lexapro to 7.5 mg daily.   Continue with xanax prn.  check  see scan.  Advised side effects.  Monitor sodium levels\par \par Hearing loss  wearing hearing aids \par \par Peripheral neuropathy -Continue with Neurontin ADRIANA \par \par Ortho - unsteady gait  - Rx for PT/OT at he home \par \par We discussed the importance of healthy lifestyles which include exercise, weight control and good diet.\par Patient's questions were answered in full detail. Advise patient if any other concerns arise to please call office to have a discussion. \par \par counseling given to the pt \par \par Neuro  - Memory loss will monitor. IF getting worse will start Exelon.  Advised to read or do  crossword puzzles  \par Patient  education  - COVID-19   Counseling and education provided to the patient.  Advised sign and symptoms of the virus.  Advised contact precautions.  Educated patient on proper hand washing and to participate in social distancing. \par \par Patient is in full awareness of the plan and agrees to it.  All pt question was answered.  \par \par  I spent 40 Minutes with the patient, half of which we discussed finding on physical exam  and coordinated care.  As well as reviewed my plans and follow ups.

## 2020-07-15 NOTE — REVIEW OF SYSTEMS
[Depression] : depression [Anxiety] : anxiety [Negative] : Heme/Lymph [Fever] : no fever [Chills] : no chills [Fatigue] : no fatigue [Hearing Loss] : no hearing loss [Chest Pain] : no chest pain [Shortness Of Breath] : no shortness of breath [Palpitations] : no palpitations [Abdominal Pain] : no abdominal pain [Heartburn] : no heartburn [Nocturia] : no nocturia [Frequency] : no frequency [Memory Loss] : memory loss [Unsteady Walking] : ataxia [Insomnia] : insomnia [FreeTextEntry9] : shoulder pain  [de-identified] : bruise on right hand  [de-identified] : short term memory loss

## 2020-07-23 ENCOUNTER — RX RENEWAL (OUTPATIENT)
Age: 85
End: 2020-07-23

## 2020-08-05 ENCOUNTER — RX RENEWAL (OUTPATIENT)
Age: 85
End: 2020-08-05

## 2020-08-31 ENCOUNTER — RX RENEWAL (OUTPATIENT)
Age: 85
End: 2020-08-31

## 2020-09-01 ENCOUNTER — RX RENEWAL (OUTPATIENT)
Age: 85
End: 2020-09-01

## 2020-09-07 ENCOUNTER — RX RENEWAL (OUTPATIENT)
Age: 85
End: 2020-09-07

## 2020-10-20 ENCOUNTER — APPOINTMENT (OUTPATIENT)
Dept: INTERNAL MEDICINE | Facility: CLINIC | Age: 85
End: 2020-10-20
Payer: MEDICARE

## 2020-10-20 PROCEDURE — 99213 OFFICE O/P EST LOW 20 MIN: CPT | Mod: 95

## 2020-10-20 NOTE — COUNSELING
[Fall prevention counseling provided] : Fall prevention counseling provided [FreeTextEntry1] : use walker  Patient's Cr is now 9.4 from unclear baseline. Will need collateral from outpatient providers to ascertain whether CKD component is present. Possible 2/2 to cardiorenal syndrome as cardiac output is poor.   - Renal on board, appreciate recs  - Per renal, no urgent need for dialysis at this time  - f/u urine lytes, UA  - f/u renal US    #Hyponatremia: Likely hypervolemic hyponatremia given volume overload on clinical exam.  - f/u urine lytes, serum osm  - Wooten in place

## 2020-10-20 NOTE — REVIEW OF SYSTEMS
[Incontinence] : incontinence [Frequency] : frequency [Nocturia] : nocturia [Anxiety] : anxiety [Negative] : Heme/Lymph

## 2020-10-20 NOTE — PHYSICAL EXAM
[No Acute Distress] : no acute distress [Well Nourished] : well nourished [Well Developed] : well developed [Well-Appearing] : well-appearing [Normal Sclera/Conjunctiva] : normal sclera/conjunctiva [Normal Rate] : normal rate  [Speech Grossly Normal] : speech grossly normal [Alert and Oriented x3] : oriented to person, place, and time [Normal Mood] : the mood was normal

## 2020-10-20 NOTE — PHYSICAL THERAPY INITIAL EVALUATION ADULT - SITTING BALANCE: DYNAMIC
Oriented to self, can be disoriented to all else. Retaining urine, straight cathed for 300mL this shift. LBM 10/19. Denies pain. Transfers with Geoff lift. G tube feeds run 9pm-5am. Blood glucose checks. Poor appetite this evening, ate very little. Pt said he ate a lot during the day. Mepilex replaced on L buttocks. Some skin discoloration around urethral opening. Bed alarm on for safety, call light within reach, Ok to continue with care plan.     fair plus

## 2020-10-20 NOTE — PLAN
[FreeTextEntry1] :  - increase frequency of urination -  Advised pt I will drop of a specimen cup and if she can have her aid drop back off at the office for evaluation\par \par Psych -  reviewed   Refilled xanax.  Advised side effects.  COunseling given to the pt.  \par \par Immunization will set up a house call to administer the vax.\par \par Patient  education  - COVID-19   Counseling and education provided to the patient.  Advised sign and symptoms of the virus.  Advised contact precautions.  Educated patient on proper hand washing and to participate in social distancing.  \par \par Patient is in full awareness of the plan and agrees to it.  All pt question was answered.  \par \par  I spent 18 Minutes with the patient, half of which we discussed finding on physical exam  and coordinated care.  As well as reviewed my plans and follow ups.

## 2020-10-20 NOTE — ASSESSMENT
[FreeTextEntry1] : Ms. WASHINGTON is a 89 year  female, who present for tele video for medications lefty

## 2020-10-20 NOTE — HISTORY OF PRESENT ILLNESS
[Home] : at home, [unfilled] , at the time of the visit. [Medical Office: ([facility])___] : at the medical office located in  [Verbal consent obtained from patient] : the patient, [unfilled] [Friend] : friend [FreeTextEntry1] : Rx renewal  [de-identified] : Ms. WASHINGTON is a 89 year  female, who present to the office for renewal of medication.  Also been waking up at night to urinate.  States few weeks ago went to a urologist advised exam was normal.  ALso states they never did a urine sample.  States it is hard for her to get out of the house to go to the doctors.  \par Denies getting a flu shot.  \par Walking with a walker \par Denies chest pain, SOB, VELÁZQUEZ

## 2020-10-22 ENCOUNTER — APPOINTMENT (OUTPATIENT)
Dept: INTERNAL MEDICINE | Facility: CLINIC | Age: 85
End: 2020-10-22
Payer: MEDICARE

## 2020-10-22 ENCOUNTER — MED ADMIN CHARGE (OUTPATIENT)
Age: 85
End: 2020-10-22

## 2020-10-22 VITALS — RESPIRATION RATE: 18 BRPM | TEMPERATURE: 98.6 F | HEART RATE: 64 BPM

## 2020-10-22 DIAGNOSIS — R35.1 NOCTURIA: ICD-10-CM

## 2020-10-22 LAB
BILIRUB UR QL STRIP: NEGATIVE
CLARITY UR: ABNORMAL
COLLECTION METHOD: NORMAL
GLUCOSE UR-MCNC: NEGATIVE
HCG UR QL: 0.2 EU/DL
HGB UR QL STRIP.AUTO: NEGATIVE
KETONES UR-MCNC: NEGATIVE
LEUKOCYTE ESTERASE UR QL STRIP: ABNORMAL
NITRITE UR QL STRIP: NEGATIVE
PH UR STRIP: 7
PROT UR STRIP-MCNC: NEGATIVE
SP GR UR STRIP: 1.02

## 2020-10-22 PROCEDURE — 81003 URINALYSIS AUTO W/O SCOPE: CPT | Mod: QW

## 2020-10-22 PROCEDURE — 99212 OFFICE O/P EST SF 10 MIN: CPT | Mod: 25

## 2020-10-22 PROCEDURE — 99072 ADDL SUPL MATRL&STAF TM PHE: CPT

## 2020-10-22 PROCEDURE — 90662 IIV NO PRSV INCREASED AG IM: CPT

## 2020-10-22 PROCEDURE — G0008: CPT

## 2020-10-22 NOTE — COUNSELING
[Fall prevention counseling provided] : Fall prevention counseling provided [FreeTextEntry1] : use walker

## 2020-10-22 NOTE — PLAN
[FreeTextEntry1] :  - increase frequency of urination -  Reviewed U/A   will check urine culture \par \par Psych -  reviewed  Continue  xanax.  Advised side effects.  Counseling given to the pt.  \par \par Immunization - Flu  HD shot given - consent formed signed.  CDC sheet given for pt educations \par \par Patient  education  - COVID-19   Counseling and education provided to the patient.  Advised sign and symptoms of the virus.  Advised contact precautions.  Educated patient on proper hand washing and to participate in social distancing.  \par \par Patient is in full awareness of the plan and agrees to it.  All pt question was answered.  \par \par  I spent 18 Minutes with the patient, half of which we discussed finding on physical exam  and coordinated care.  As well as reviewed my plans and follow ups.

## 2020-10-22 NOTE — REVIEW OF SYSTEMS
[Incontinence] : incontinence [Nocturia] : nocturia [Frequency] : frequency [Anxiety] : anxiety [Negative] : Heme/Lymph [Fever] : no fever [Chills] : no chills [Itching] : no itching [Chest Pain] : no chest pain [Lower Ext Edema] : no lower extremity edema

## 2020-10-22 NOTE — HISTORY OF PRESENT ILLNESS
[FreeTextEntry1] : Immunization update and U/A [de-identified] : Ms. WASHINGTON is a 89 year  female, who present to the office for flu shot.  states she recently saw  and exam was normal.  States she wakes up nightly a few times to urinate.  \par Denies fever or chills.  Denies nausea or vomiting.  \par States she is eating well

## 2020-10-26 ENCOUNTER — NON-APPOINTMENT (OUTPATIENT)
Age: 85
End: 2020-10-26

## 2020-10-26 LAB — BACTERIA UR CULT: NORMAL

## 2020-10-30 ENCOUNTER — RX CHANGE (OUTPATIENT)
Age: 85
End: 2020-10-30

## 2020-11-09 ENCOUNTER — RX RENEWAL (OUTPATIENT)
Age: 85
End: 2020-11-09

## 2020-12-14 ENCOUNTER — RX RENEWAL (OUTPATIENT)
Age: 85
End: 2020-12-14

## 2020-12-18 ENCOUNTER — RX RENEWAL (OUTPATIENT)
Age: 85
End: 2020-12-18

## 2020-12-21 ENCOUNTER — RX RENEWAL (OUTPATIENT)
Age: 85
End: 2020-12-21

## 2021-01-12 ENCOUNTER — APPOINTMENT (OUTPATIENT)
Dept: INTERNAL MEDICINE | Facility: CLINIC | Age: 86
End: 2021-01-12
Payer: MEDICARE

## 2021-01-12 VITALS
HEIGHT: 58 IN | OXYGEN SATURATION: 97 % | TEMPERATURE: 98 F | DIASTOLIC BLOOD PRESSURE: 66 MMHG | HEART RATE: 105 BPM | RESPIRATION RATE: 16 BRPM | SYSTOLIC BLOOD PRESSURE: 108 MMHG

## 2021-01-12 PROCEDURE — 69210 REMOVE IMPACTED EAR WAX UNI: CPT

## 2021-01-12 PROCEDURE — 99214 OFFICE O/P EST MOD 30 MIN: CPT | Mod: 25

## 2021-01-12 PROCEDURE — 99072 ADDL SUPL MATRL&STAF TM PHE: CPT

## 2021-01-12 NOTE — REVIEW OF SYSTEMS
[Anxiety] : anxiety [Depression] : depression [Negative] : Heme/Lymph [Hearing Loss] : no hearing loss [Heartburn] : no heartburn [Nocturia] : no nocturia [Frequency] : no frequency [Dizziness] : no dizziness [FreeTextEntry9] : shoulder pain  [de-identified] : bruise on right hand

## 2021-01-12 NOTE — HISTORY OF PRESENT ILLNESS
[FreeTextEntry1] : Ear congestion and renewal of medication  [de-identified] : Ms. WASHINGTON is a 89 year  female, who present to the office for ear wax removal out of the right ear.  States recently saw her audiologist for hearing aid adjustments and advised to have her ears cleaned out  \par Also need a renewal of xanax.  \par Denies chest pain, SOB, VELÁZQUEZ

## 2021-01-12 NOTE — COUNSELING
[Engage in a relaxing activity] : Engage in a relaxing activity [AUDIT-C Screening administered and reviewed] : AUDIT-C Screening administered and reviewed [Healthy eating counseling provided] : healthy eating [Behavioral health counseling provided] : behavioral health  [Needs reinforcement, provided] : Patient needs reinforcement on understanding of disease, goals and obesity follow-up plan; reinforcement was provided [Low Fat Diet] : Low fat diet [Low Salt Diet] : Low salt diet [Decrease Portions] : Decrease food portions [Walking] : Walking [Transportation Issues] : Transportation issues [Patient motivation] : Patient motivation [Fall prevention counseling provided] : Fall prevention counseling provided [Adequate lighting] : Adequate lighting [No throw rugs] : No throw rugs [Use recommended devices] : Use recommended devices [de-identified] : consider counseling \par Follow up with Derm and audiologist.  \par Advised to get BDS

## 2021-01-12 NOTE — PHYSICAL EXAM
[No Acute Distress] : no acute distress [Well Nourished] : well nourished [Well Developed] : well developed [Well-Appearing] : well-appearing [Normal Sclera/Conjunctiva] : normal sclera/conjunctiva [PERRL] : pupils equal round and reactive to light [EOMI] : extraocular movements intact [Normal Oropharynx] : the oropharynx was normal [Normal TMs] : both tympanic membranes were normal [No JVD] : no jugular venous distention [Supple] : supple [No Lymphadenopathy] : no lymphadenopathy [Thyroid Normal, No Nodules] : the thyroid was normal and there were no nodules present [No Respiratory Distress] : no respiratory distress  [No Accessory Muscle Use] : no accessory muscle use [Clear to Auscultation] : lungs were clear to auscultation bilaterally [Normal Rate] : normal rate  [Regular Rhythm] : with a regular rhythm [Normal S1, S2] : normal S1 and S2 [No Abdominal Bruit] : a ~M bruit was not heard ~T in the abdomen [Pedal Pulses Present] : the pedal pulses are present [No Edema] : there was no peripheral edema [No Extremity Clubbing/Cyanosis] : no extremity clubbing/cyanosis [No Palpable Aorta] : no palpable aorta [Soft] : abdomen soft [Non Tender] : non-tender [Non-distended] : non-distended [No Masses] : no abdominal mass palpated [No HSM] : no HSM [Normal Bowel Sounds] : normal bowel sounds [Normal Posterior Cervical Nodes] : no posterior cervical lymphadenopathy [Normal Anterior Cervical Nodes] : no anterior cervical lymphadenopathy [No CVA Tenderness] : no CVA  tenderness [No Spinal Tenderness] : no spinal tenderness [Kyphosis] : kyphosis [No Joint Swelling] : no joint swelling [No Rash] : no rash [No Skin Lesions] : no skin lesions [Normal Gait] : normal gait [Coordination Grossly Intact] : coordination grossly intact [No Focal Deficits] : no focal deficits [Deep Tendon Reflexes (DTR)] : deep tendon reflexes were 2+ and symmetric [Speech Grossly Normal] : speech grossly normal [Memory Grossly Normal] : memory grossly normal [Normal Affect] : the affect was normal [Alert and Oriented x3] : oriented to person, place, and time [Normal Mood] : the mood was normal [Normal Insight/Judgement] : insight and judgment were intact [Agitated] : agitated [Depressed] : depressed [de-identified] : ear wax present right ear canal  slighly obstruction  the TM  [de-identified] : with murmur  [de-identified] :  carotid bruit left ICA  [de-identified] : limited mobility in wheelchair,   Limited ROM of right upper ext.  Tender to palp at the AC joint.    Decrease abduction  [de-identified] : Pt upset secondary to her loss  [de-identified] : bruise right hand

## 2021-01-12 NOTE — HEALTH RISK ASSESSMENT
[No] : In the past 12 months have you used drugs other than those required for medical reasons? No [No falls in past year] : Patient reported no falls in the past year [0] : 1) Little interest or pleasure doing things: Not at all (0) [2] : 2) Feeling down, depressed, or hopeless for more than half of the days (2) [] : No [Audit-CScore] : 0 [de-identified] : no [de-identified] : regular  [[id number]] : 2

## 2021-01-12 NOTE — ASSESSMENT
[FreeTextEntry1] : 89-year-old female who presents to office for decrease hearing and cerumen impaction

## 2021-01-12 NOTE — PLAN
[FreeTextEntry1] : Psych HALI - Extra counseling was given to the pt about depression and anxiety.  Continue with current medications   Consider therapy.   Refilled xanax -  advised risk of sedation \par \par Ortho - DJD shoulder - follow up with orthopedic. Consider PT.  trial lidocaine cream \par Hand contusion - Advised supportive care.  Advised warm soaks and hold ASA for two to 3 days\par \par Cardio - Hypertension -  Patient was educated about hypertension and the importance of controlling the pressure through lifestyle modification which include low sodium  diet and  aerobic  exercise.  Also discussed the use of prescription medication which included their benefits and their side effects. We discussed the use of ASA 81 mg daily.   Having a BMI less than or equal to 25. \par Hyperlipidemia -   Advised low fat cholesterol diet.  Advised importance of having low cholesterol / LDL/ triglycerides. Advised life style modifications.  Continue with current medications.  \par ENT - cerumen impaction -   removal of wax -  \par Hearing loss \par Following audiology for hearing evaluation.\par \par Peripheral neuropathy -Continue with Neurontin ADRIANA \par \par Rheumatology\par Advised patient the need for bone density scan to evaluate for postmenopausal osteoporosis\par pt refused \par \par We discussed the importance of healthy lifestyles which include exercise, weight control and good diet.\par Patient's questions were answered in full detail. Advise patient if any other concerns arise to please call office to have a discussion. \par counseling given to the pt \par Advised to call the health department for the covid vax\par \par I spent 25 Minutes with the patient, half of which we discussed finding on physical exam  and coordinated care.  As well as reviewed my plans and follow ups.

## 2021-01-14 ENCOUNTER — RX RENEWAL (OUTPATIENT)
Age: 86
End: 2021-01-14

## 2021-01-18 ENCOUNTER — RX RENEWAL (OUTPATIENT)
Age: 86
End: 2021-01-18

## 2021-02-28 ENCOUNTER — RX RENEWAL (OUTPATIENT)
Age: 86
End: 2021-02-28

## 2021-03-11 ENCOUNTER — RX RENEWAL (OUTPATIENT)
Age: 86
End: 2021-03-11

## 2021-03-12 ENCOUNTER — APPOINTMENT (OUTPATIENT)
Dept: INTERNAL MEDICINE | Facility: CLINIC | Age: 86
End: 2021-03-12
Payer: MEDICARE

## 2021-03-12 VITALS
HEART RATE: 78 BPM | SYSTOLIC BLOOD PRESSURE: 118 MMHG | HEIGHT: 58 IN | RESPIRATION RATE: 14 BRPM | OXYGEN SATURATION: 92 % | TEMPERATURE: 97.7 F | DIASTOLIC BLOOD PRESSURE: 68 MMHG

## 2021-03-12 PROCEDURE — 36415 COLL VENOUS BLD VENIPUNCTURE: CPT

## 2021-03-12 PROCEDURE — 99214 OFFICE O/P EST MOD 30 MIN: CPT | Mod: 25

## 2021-03-12 PROCEDURE — G0442 ANNUAL ALCOHOL SCREEN 15 MIN: CPT

## 2021-03-12 PROCEDURE — 99072 ADDL SUPL MATRL&STAF TM PHE: CPT

## 2021-03-12 RX ORDER — RANITIDINE 150 MG/1
150 TABLET ORAL
Qty: 90 | Refills: 0 | Status: COMPLETED | COMMUNITY
Start: 2019-03-14 | End: 2021-03-12

## 2021-03-15 LAB
ALBUMIN SERPL ELPH-MCNC: 3.8 G/DL
ALP BLD-CCNC: 85 U/L
ALT SERPL-CCNC: 18 U/L
ANION GAP SERPL CALC-SCNC: 9 MMOL/L
AST SERPL-CCNC: 24 U/L
BASOPHILS # BLD AUTO: 0.06 K/UL
BASOPHILS NFR BLD AUTO: 0.7 %
BILIRUB SERPL-MCNC: 0.2 MG/DL
BUN SERPL-MCNC: 27 MG/DL
CALCIUM SERPL-MCNC: 9.2 MG/DL
CHLORIDE SERPL-SCNC: 103 MMOL/L
CO2 SERPL-SCNC: 30 MMOL/L
CREAT SERPL-MCNC: 0.84 MG/DL
DEPRECATED D DIMER PPP IA-ACNC: 471 NG/ML DDU
EOSINOPHIL # BLD AUTO: 0.28 K/UL
EOSINOPHIL NFR BLD AUTO: 3.2 %
FERRITIN SERPL-MCNC: 46 NG/ML
GLUCOSE SERPL-MCNC: 101 MG/DL
HCT VFR BLD CALC: 33 %
HGB BLD-MCNC: 10.6 G/DL
IMM GRANULOCYTES NFR BLD AUTO: 0.2 %
IRON SATN MFR SERPL: 24 %
IRON SERPL-MCNC: 63 UG/DL
LYMPHOCYTES # BLD AUTO: 1.73 K/UL
LYMPHOCYTES NFR BLD AUTO: 19.7 %
MAN DIFF?: NORMAL
MCHC RBC-ENTMCNC: 32.1 GM/DL
MCHC RBC-ENTMCNC: 32.4 PG
MCV RBC AUTO: 100.9 FL
MONOCYTES # BLD AUTO: 0.8 K/UL
MONOCYTES NFR BLD AUTO: 9.1 %
NEUTROPHILS # BLD AUTO: 5.87 K/UL
NEUTROPHILS NFR BLD AUTO: 67.1 %
NT-PROBNP SERPL-MCNC: 236 PG/ML
PLATELET # BLD AUTO: 236 K/UL
POTASSIUM SERPL-SCNC: 4.3 MMOL/L
PROT SERPL-MCNC: 6.7 G/DL
RBC # BLD: 3.27 M/UL
RBC # FLD: 15.2 %
SODIUM SERPL-SCNC: 142 MMOL/L
TIBC SERPL-MCNC: 267 UG/DL
TSH SERPL-ACNC: 1.64 UIU/ML
UIBC SERPL-MCNC: 204 UG/DL
WBC # FLD AUTO: 8.76 K/UL

## 2021-03-15 NOTE — PLAN
[FreeTextEntry1] : Vasc -  b/l leg swelling -  discussed low na diet.  Check labs -  Start Lasix QOD x 3 days-  discussed elevating legs and wearing compression stockings.  IF no improvement noted will send for LE doppler \par \par Psych HALI - Extra counseling was given to the pt about depression and anxiety.  Continue with current medications   Consider therapy.  Does not need a renewal on xanax.  \par \par Cardio - Hypertension -  Patient was educated about hypertension and the importance of controlling the pressure through lifestyle modification which include low sodium  diet and  aerobic  exercise.  Also discussed the use of prescription medication which included their benefits and their side effects. We discussed the use of ASA 81 mg daily.   Having a BMI less than or equal to 25. \par Hyperlipidemia -   Advised low fat cholesterol diet.  Advised importance of having low cholesterol / LDL/ triglycerides. Advised life style modifications.  Continue with current medications.  \par \par ENT - hearing  loss -   no cerumen impaction noted follow up with audiologist \par \par Peripheral neuropathy -Continue with Neurontin ADRIANA \par \par Rheumatology\par Advised patient the need for bone density scan to evaluate for postmenopausal osteoporosis\par pt refused \par \par We discussed the importance of healthy lifestyles which include exercise, weight control and good diet.\par Patient's questions were answered in full detail. Advise patient if any other concerns arise to please call office to have a discussion. \par counseling given to the pt \par Advised to call the health department for the covid vax\par \par

## 2021-03-15 NOTE — COUNSELING
[Fall prevention counseling provided] : Fall prevention counseling provided [Adequate lighting] : Adequate lighting [No throw rugs] : No throw rugs [Use recommended devices] : Use recommended devices [Engage in a relaxing activity] : Engage in a relaxing activity [AUDIT-C Screening administered and reviewed] : AUDIT-C Screening administered and reviewed [Healthy eating counseling provided] : healthy eating [Behavioral health counseling provided] : behavioral health  [Needs reinforcement, provided] : Patient needs reinforcement on understanding of disease, goals and obesity follow-up plan; reinforcement was provided [Low Fat Diet] : Low fat diet [Low Salt Diet] : Low salt diet [Decrease Portions] : Decrease food portions [Walking] : Walking [Transportation Issues] : Transportation issues [Patient motivation] : Patient motivation [de-identified] : consider counseling \par Follow up with Derm and audiologist.  \par Advised to get BDS

## 2021-03-15 NOTE — PHYSICAL EXAM
[No Acute Distress] : no acute distress [Well Nourished] : well nourished [Well Developed] : well developed [Well-Appearing] : well-appearing [Normal Sclera/Conjunctiva] : normal sclera/conjunctiva [PERRL] : pupils equal round and reactive to light [EOMI] : extraocular movements intact [Normal Oropharynx] : the oropharynx was normal [Normal TMs] : both tympanic membranes were normal [No JVD] : no jugular venous distention [Supple] : supple [No Lymphadenopathy] : no lymphadenopathy [Thyroid Normal, No Nodules] : the thyroid was normal and there were no nodules present [No Respiratory Distress] : no respiratory distress  [Clear to Auscultation] : lungs were clear to auscultation bilaterally [No Accessory Muscle Use] : no accessory muscle use [Normal Rate] : normal rate  [Regular Rhythm] : with a regular rhythm [Normal S1, S2] : normal S1 and S2 [No Abdominal Bruit] : a ~M bruit was not heard ~T in the abdomen [Pedal Pulses Present] : the pedal pulses are present [No Extremity Clubbing/Cyanosis] : no extremity clubbing/cyanosis [No Palpable Aorta] : no palpable aorta [Soft] : abdomen soft [Non Tender] : non-tender [Non-distended] : non-distended [No Masses] : no abdominal mass palpated [No HSM] : no HSM [Normal Bowel Sounds] : normal bowel sounds [Normal Posterior Cervical Nodes] : no posterior cervical lymphadenopathy [Normal Anterior Cervical Nodes] : no anterior cervical lymphadenopathy [No CVA Tenderness] : no CVA  tenderness [No Spinal Tenderness] : no spinal tenderness [Kyphosis] : kyphosis [No Joint Swelling] : no joint swelling [No Rash] : no rash [No Skin Lesions] : no skin lesions [Coordination Grossly Intact] : coordination grossly intact [No Focal Deficits] : no focal deficits [Deep Tendon Reflexes (DTR)] : deep tendon reflexes were 2+ and symmetric [Speech Grossly Normal] : speech grossly normal [Memory Grossly Normal] : memory grossly normal [Normal Affect] : the affect was normal [Alert and Oriented x3] : oriented to person, place, and time [Normal Mood] : the mood was normal [Normal Insight/Judgement] : insight and judgment were intact [Agitated] : agitated [Depressed] : depressed [de-identified] : with murmur  [de-identified] :  carotid bruit left ICA    2 plus edema lower ext negative homans sign  [de-identified] : limited mobility in wheelchair,   Limited ROM of right upper ext.  Tender to palp at the AC joint.    Decrease abduction  [de-identified] : bruise left  hand  [de-identified] : in wheel chair

## 2021-03-15 NOTE — HEALTH RISK ASSESSMENT
[1 or 2 (0 pts)] : 1 or 2 (0 points) [Never (0 pts)] : Never (0 points) [No] : In the past 12 months have you used drugs other than those required for medical reasons? No [No falls in past year] : Patient reported no falls in the past year [1] : 2) Feeling down, depressed, or hopeless for several days (1) [] : No [Audit-CScore] : 0 [de-identified] : no [de-identified] : regular  [HRL2Onnml] : 2 [DSC4Uxalm] : 5

## 2021-03-15 NOTE — HISTORY OF PRESENT ILLNESS
[Family Member] : family member [FreeTextEntry8] : Ms. WASHINGTON is a 89 year  female, who present to the office for Leg swelling.  Denies pain in the legs.  states in the am the legs are less swelling.  Denies chest pain, SOB, [place] or fever.  \par Daughter states she eats a lot o salt and salts her food.  \par Still with issues with her hearing aid.  states she has to go back to the audiologist.  \par Denies any skin rashes \par

## 2021-03-15 NOTE — REVIEW OF SYSTEMS
[Anxiety] : anxiety [Depression] : depression [Negative] : Heme/Lymph [Hearing Loss] : hearing loss [Lower Ext Edema] : lower extremity edema [Heartburn] : no heartburn [Nocturia] : no nocturia [Frequency] : no frequency [Dizziness] : no dizziness

## 2021-03-24 ENCOUNTER — RX CHANGE (OUTPATIENT)
Age: 86
End: 2021-03-24

## 2021-04-19 ENCOUNTER — RX RENEWAL (OUTPATIENT)
Age: 86
End: 2021-04-19

## 2021-04-25 ENCOUNTER — RX RENEWAL (OUTPATIENT)
Age: 86
End: 2021-04-25

## 2021-04-27 ENCOUNTER — APPOINTMENT (OUTPATIENT)
Dept: INTERNAL MEDICINE | Facility: CLINIC | Age: 86
End: 2021-04-27
Payer: MEDICARE

## 2021-04-27 VITALS
RESPIRATION RATE: 15 BRPM | OXYGEN SATURATION: 95 % | HEART RATE: 85 BPM | SYSTOLIC BLOOD PRESSURE: 118 MMHG | HEIGHT: 58 IN | DIASTOLIC BLOOD PRESSURE: 64 MMHG | TEMPERATURE: 206.96 F

## 2021-04-27 PROCEDURE — 99072 ADDL SUPL MATRL&STAF TM PHE: CPT

## 2021-04-27 PROCEDURE — 99213 OFFICE O/P EST LOW 20 MIN: CPT | Mod: 25

## 2021-04-27 RX ORDER — ESCITALOPRAM OXALATE 5 MG/1
5 TABLET ORAL
Qty: 45 | Refills: 3 | Status: DISCONTINUED | COMMUNITY
Start: 2019-01-21 | End: 2021-04-27

## 2021-04-27 NOTE — HISTORY OF PRESENT ILLNESS
[FreeTextEntry8] : Ms. WASHINGTON is a 89 year  female, who present to the office for Leg swelling.  Denies pain in the legs.  states in the am the legs are less swelling.  Denies chest pain, SOB, [place] or fever.  \par Daughter states she eats a lot o salt and salts her food.  \par Still with issues with her hearing aid.  states she has to go back to the audiologist.  \par Denies any skin rashes \par  [Other: _____] : [unfilled] [FreeTextEntry1] : Medication renewal for HALI  [de-identified] : Ms. WASHINGTON is a 89 year  female, who present to the office for medication renewal for HALI.  Patient states she feels well.  denies having CP, SOB, [place].  States she only takes the medication on a prn basis.  Aid in the room states she very anxious in the am but she refuse to  take the medication.  Ester states she feels fine on on the way she has been taking the medication and don’t believe she needs to take it daily.  \par Denies memory loss -  Just sad that her  passed away \par did get the covid vax- States she tolerated the vaccination well \par states her leg swelling is much better

## 2021-04-27 NOTE — REVIEW OF SYSTEMS
[Hearing Loss] : hearing loss [Anxiety] : anxiety [Depression] : depression [Negative] : Heme/Lymph [Fever] : no fever [Discharge] : no discharge [Redness] : no redness [Earache] : no earache [Nasal Discharge] : no nasal discharge [Sore Throat] : no sore throat [Postnasal Drip] : no postnasal drip [Chest Pain] : no chest pain [Palpitations] : no palpitations [Lower Ext Edema] : no lower extremity edema [Heartburn] : no heartburn [Nocturia] : no nocturia [Frequency] : no frequency [Dizziness] : no dizziness

## 2021-04-27 NOTE — HEALTH RISK ASSESSMENT
[1 or 2 (0 pts)] : 1 or 2 (0 points) [Never (0 pts)] : Never (0 points) [No] : In the past 12 months have you used drugs other than those required for medical reasons? No [No falls in past year] : Patient reported no falls in the past year [1] : 2) Feeling down, depressed, or hopeless for several days (1) [] : No [Audit-CScore] : 0 [de-identified] : no [de-identified] : regular  [[id number]] : 2 [AQI0Crzxd] : 5

## 2021-04-27 NOTE — PHYSICAL EXAM
[No Acute Distress] : no acute distress [Well Nourished] : well nourished [Well Developed] : well developed [Well-Appearing] : well-appearing [Normal Sclera/Conjunctiva] : normal sclera/conjunctiva [PERRL] : pupils equal round and reactive to light [EOMI] : extraocular movements intact [Normal Oropharynx] : the oropharynx was normal [Normal TMs] : both tympanic membranes were normal [No JVD] : no jugular venous distention [Supple] : supple [No Lymphadenopathy] : no lymphadenopathy [Thyroid Normal, No Nodules] : the thyroid was normal and there were no nodules present [No Respiratory Distress] : no respiratory distress  [Clear to Auscultation] : lungs were clear to auscultation bilaterally [No Accessory Muscle Use] : no accessory muscle use [Normal Rate] : normal rate  [Regular Rhythm] : with a regular rhythm [Normal S1, S2] : normal S1 and S2 [No Abdominal Bruit] : a ~M bruit was not heard ~T in the abdomen [Pedal Pulses Present] : the pedal pulses are present [No Extremity Clubbing/Cyanosis] : no extremity clubbing/cyanosis [No Palpable Aorta] : no palpable aorta [Soft] : abdomen soft [Non Tender] : non-tender [Non-distended] : non-distended [No Masses] : no abdominal mass palpated [No HSM] : no HSM [Normal Bowel Sounds] : normal bowel sounds [Normal Posterior Cervical Nodes] : no posterior cervical lymphadenopathy [Normal Anterior Cervical Nodes] : no anterior cervical lymphadenopathy [No CVA Tenderness] : no CVA  tenderness [No Spinal Tenderness] : no spinal tenderness [Kyphosis] : kyphosis [No Joint Swelling] : no joint swelling [No Rash] : no rash [No Skin Lesions] : no skin lesions [Coordination Grossly Intact] : coordination grossly intact [No Focal Deficits] : no focal deficits [Deep Tendon Reflexes (DTR)] : deep tendon reflexes were 2+ and symmetric [Speech Grossly Normal] : speech grossly normal [Memory Grossly Normal] : memory grossly normal [Normal Affect] : the affect was normal [Alert and Oriented x3] : oriented to person, place, and time [Normal Mood] : the mood was normal [Normal Insight/Judgement] : insight and judgment were intact [Agitated] : agitated [Depressed] : depressed [de-identified] : with murmur  [de-identified] :  carotid bruit left ICA    trace edema  no pitting noted  [de-identified] : limited mobility in wheelchair,   Limited ROM of right upper ext [de-identified] : bruise left  hand  [de-identified] : in wheel chair

## 2021-04-27 NOTE — COUNSELING
[Fall prevention counseling provided] : Fall prevention counseling provided [Adequate lighting] : Adequate lighting [No throw rugs] : No throw rugs [Use recommended devices] : Use recommended devices [Engage in a relaxing activity] : Engage in a relaxing activity [AUDIT-C Screening administered and reviewed] : AUDIT-C Screening administered and reviewed [Healthy eating counseling provided] : healthy eating [Behavioral health counseling provided] : behavioral health  [Needs reinforcement, provided] : Patient needs reinforcement on understanding of disease, goals and obesity follow-up plan; reinforcement was provided [Low Fat Diet] : Low fat diet [Low Salt Diet] : Low salt diet [Decrease Portions] : Decrease food portions [Walking] : Walking [Transportation Issues] : Transportation issues [Patient motivation] : Patient motivation [de-identified] : consider counseling \par Follow up with Derm and audiologist.  \par Advised to get BDS

## 2021-04-27 NOTE — PLAN
[FreeTextEntry1] : Vasc -  b/l leg swelling -  discussed low na diet.   improved \par \par Psych HALI - Extra counseling was given to the pt about depression and anxiety.  Continue with current medications   Consider therapy.  Does not need a renewal on xanax.  Advised the pt if she feels anxious or uptight to take the xanax. \par \par Cardio - Hypertension -  Patient was educated about hypertension and the importance of controlling the pressure through lifestyle modification which include low sodium  diet and  aerobic  exercise.  Also discussed the use of prescription medication which included their benefits and their side effects. We discussed the use of ASA 81 mg daily.   Having a BMI less than or equal to 25. \par Hyperlipidemia -   Advised low fat cholesterol diet.  Advised importance of having low cholesterol / LDL/ triglycerides. Advised life style modifications.  Continue with current medications.  \par \par Peripheral neuropathy -Continue with Neurontin ADRIANA \par \par Rheumatology\par Advised patient the need for bone density scan to evaluate for postmenopausal osteoporosis\par pt refused \par \par We discussed the importance of healthy lifestyles which include exercise, weight control and good diet.\par Patient's questions were answered in full detail. Advise patient if any other concerns arise to please call office to have a discussion. \par \par Patient  education  - COVID-19   Counseling and education provided to the patient.  Advised sign and symptoms of the virus.  Advised contact precautions.  Educated patient on proper hand washing and to participate in social distancing. Had the covid vax \par \par Patient is in full awareness of the plan and agrees to it.  All pt question was answered.  \par \par  \par \par

## 2021-04-29 NOTE — PHYSICAL THERAPY INITIAL EVALUATION ADULT - PATIENT/FAMILY AGREES WITH PLAN
Health Maintenance Due   Topic Date Due   • COVID-19 Vaccine (1) Never done       Patient is up to date, no discussion needed.  Is aware of covid vaccine         no/patient declined home PT

## 2021-05-12 DIAGNOSIS — R79.89 OTHER SPECIFIED ABNORMAL FINDINGS OF BLOOD CHEMISTRY: ICD-10-CM

## 2021-05-17 PROBLEM — R79.89 D-DIMER, ELEVATED: Status: ACTIVE | Noted: 2021-05-17

## 2021-05-18 ENCOUNTER — INPATIENT (INPATIENT)
Facility: HOSPITAL | Age: 86
LOS: 2 days | Discharge: HOME | DRG: 176 | End: 2021-05-21
Attending: FAMILY MEDICINE | Admitting: FAMILY MEDICINE
Payer: MEDICARE

## 2021-05-18 VITALS
RESPIRATION RATE: 19 BRPM | WEIGHT: 113.98 LBS | DIASTOLIC BLOOD PRESSURE: 79 MMHG | HEIGHT: 58 IN | TEMPERATURE: 98 F | OXYGEN SATURATION: 94 % | SYSTOLIC BLOOD PRESSURE: 122 MMHG | HEART RATE: 80 BPM

## 2021-05-18 DIAGNOSIS — I10 ESSENTIAL (PRIMARY) HYPERTENSION: ICD-10-CM

## 2021-05-18 DIAGNOSIS — R60.0 LOCALIZED EDEMA: ICD-10-CM

## 2021-05-18 DIAGNOSIS — I26.99 OTHER PULMONARY EMBOLISM WITHOUT ACUTE COR PULMONALE: ICD-10-CM

## 2021-05-18 DIAGNOSIS — I26.94 MULTIPLE SUBSEGMENTAL PULMONARY EMBOLI WITHOUT ACUTE COR PULMONALE: ICD-10-CM

## 2021-05-18 DIAGNOSIS — Z90.710 ACQUIRED ABSENCE OF BOTH CERVIX AND UTERUS: Chronic | ICD-10-CM

## 2021-05-18 DIAGNOSIS — Z96.60 PRESENCE OF UNSPECIFIED ORTHOPEDIC JOINT IMPLANT: Chronic | ICD-10-CM

## 2021-05-18 LAB
ANION GAP SERPL CALC-SCNC: 7 MMOL/L — SIGNIFICANT CHANGE UP (ref 5–17)
APTT BLD: 25.5 SEC — LOW (ref 27.5–35.5)
BUN SERPL-MCNC: 19 MG/DL — SIGNIFICANT CHANGE UP (ref 7–23)
CALCIUM SERPL-MCNC: 8.9 MG/DL — SIGNIFICANT CHANGE UP (ref 8.5–10.1)
CHLORIDE SERPL-SCNC: 102 MMOL/L — SIGNIFICANT CHANGE UP (ref 96–108)
CO2 SERPL-SCNC: 30 MMOL/L — SIGNIFICANT CHANGE UP (ref 22–31)
CREAT SERPL-MCNC: 0.88 MG/DL — SIGNIFICANT CHANGE UP (ref 0.5–1.3)
GLUCOSE SERPL-MCNC: 114 MG/DL — HIGH (ref 70–99)
HCT VFR BLD CALC: 35.1 % — SIGNIFICANT CHANGE UP (ref 34.5–45)
HGB BLD-MCNC: 11.6 G/DL — SIGNIFICANT CHANGE UP (ref 11.5–15.5)
INR BLD: 1 RATIO — SIGNIFICANT CHANGE UP (ref 0.88–1.16)
MCHC RBC-ENTMCNC: 31.8 PG — SIGNIFICANT CHANGE UP (ref 27–34)
MCHC RBC-ENTMCNC: 33 GM/DL — SIGNIFICANT CHANGE UP (ref 32–36)
MCV RBC AUTO: 96.2 FL — SIGNIFICANT CHANGE UP (ref 80–100)
NRBC # BLD: 0 /100 WBCS — SIGNIFICANT CHANGE UP (ref 0–0)
NT-PROBNP SERPL-SCNC: 198 PG/ML — SIGNIFICANT CHANGE UP (ref 0–450)
PLATELET # BLD AUTO: 268 K/UL — SIGNIFICANT CHANGE UP (ref 150–400)
POTASSIUM SERPL-MCNC: 4.3 MMOL/L — SIGNIFICANT CHANGE UP (ref 3.5–5.3)
POTASSIUM SERPL-SCNC: 4.3 MMOL/L — SIGNIFICANT CHANGE UP (ref 3.5–5.3)
PROTHROM AB SERPL-ACNC: 11.7 SEC — SIGNIFICANT CHANGE UP (ref 10.6–13.6)
RBC # BLD: 3.65 M/UL — LOW (ref 3.8–5.2)
RBC # FLD: 14.6 % — HIGH (ref 10.3–14.5)
SARS-COV-2 RNA SPEC QL NAA+PROBE: SIGNIFICANT CHANGE UP
SODIUM SERPL-SCNC: 139 MMOL/L — SIGNIFICANT CHANGE UP (ref 135–145)
TROPONIN I SERPL-MCNC: <.015 NG/ML — SIGNIFICANT CHANGE UP (ref 0.01–0.04)
WBC # BLD: 9.19 K/UL — SIGNIFICANT CHANGE UP (ref 3.8–10.5)
WBC # FLD AUTO: 9.19 K/UL — SIGNIFICANT CHANGE UP (ref 3.8–10.5)

## 2021-05-18 PROCEDURE — 99285 EMERGENCY DEPT VISIT HI MDM: CPT

## 2021-05-18 PROCEDURE — 71045 X-RAY EXAM CHEST 1 VIEW: CPT | Mod: 26

## 2021-05-18 PROCEDURE — 93010 ELECTROCARDIOGRAM REPORT: CPT

## 2021-05-18 RX ORDER — GABAPENTIN 400 MG/1
100 CAPSULE ORAL
Refills: 0 | Status: DISCONTINUED | OUTPATIENT
Start: 2021-05-18 | End: 2021-05-21

## 2021-05-18 RX ORDER — ESCITALOPRAM OXALATE 10 MG/1
7.25 TABLET, FILM COATED ORAL
Qty: 0 | Refills: 0 | DISCHARGE

## 2021-05-18 RX ORDER — ATORVASTATIN CALCIUM 80 MG/1
40 TABLET, FILM COATED ORAL AT BEDTIME
Refills: 0 | Status: DISCONTINUED | OUTPATIENT
Start: 2021-05-18 | End: 2021-05-21

## 2021-05-18 RX ORDER — SUCRALFATE 1 G
0 TABLET ORAL
Qty: 0 | Refills: 0 | DISCHARGE

## 2021-05-18 RX ORDER — ESCITALOPRAM OXALATE 10 MG/1
1 TABLET, FILM COATED ORAL
Qty: 0 | Refills: 0 | DISCHARGE

## 2021-05-18 RX ORDER — APIXABAN 2.5 MG/1
10 TABLET, FILM COATED ORAL ONCE
Refills: 0 | Status: COMPLETED | OUTPATIENT
Start: 2021-05-18 | End: 2021-05-18

## 2021-05-18 RX ORDER — ACETAMINOPHEN 500 MG
650 TABLET ORAL ONCE
Refills: 0 | Status: COMPLETED | OUTPATIENT
Start: 2021-05-18 | End: 2021-05-18

## 2021-05-18 RX ORDER — PANTOPRAZOLE SODIUM 20 MG/1
40 TABLET, DELAYED RELEASE ORAL
Refills: 0 | Status: DISCONTINUED | OUTPATIENT
Start: 2021-05-18 | End: 2021-05-21

## 2021-05-18 RX ORDER — APIXABAN 2.5 MG/1
10 TABLET, FILM COATED ORAL EVERY 12 HOURS
Refills: 0 | Status: DISCONTINUED | OUTPATIENT
Start: 2021-05-19 | End: 2021-05-21

## 2021-05-18 RX ORDER — GABAPENTIN 400 MG/1
0 CAPSULE ORAL
Qty: 0 | Refills: 0 | DISCHARGE

## 2021-05-18 RX ORDER — AMLODIPINE BESYLATE 2.5 MG/1
2.5 TABLET ORAL
Refills: 0 | Status: DISCONTINUED | OUTPATIENT
Start: 2021-05-18 | End: 2021-05-19

## 2021-05-18 RX ORDER — ESCITALOPRAM OXALATE 10 MG/1
7.5 TABLET, FILM COATED ORAL DAILY
Refills: 0 | Status: DISCONTINUED | OUTPATIENT
Start: 2021-05-18 | End: 2021-05-21

## 2021-05-18 RX ORDER — SUCRALFATE 1 G
1 TABLET ORAL
Refills: 0 | Status: DISCONTINUED | OUTPATIENT
Start: 2021-05-18 | End: 2021-05-21

## 2021-05-18 RX ORDER — AMLODIPINE BESYLATE 2.5 MG/1
2.5 TABLET ORAL AT BEDTIME
Refills: 0 | Status: DISCONTINUED | OUTPATIENT
Start: 2021-05-18 | End: 2021-05-18

## 2021-05-18 RX ORDER — ESCITALOPRAM OXALATE 10 MG/1
7.25 TABLET, FILM COATED ORAL DAILY
Refills: 0 | Status: DISCONTINUED | OUTPATIENT
Start: 2021-05-18 | End: 2021-05-18

## 2021-05-18 RX ORDER — AMLODIPINE BESYLATE 2.5 MG/1
0 TABLET ORAL
Qty: 0 | Refills: 0 | DISCHARGE

## 2021-05-18 RX ORDER — FUROSEMIDE 40 MG
40 TABLET ORAL ONCE
Refills: 0 | Status: COMPLETED | OUTPATIENT
Start: 2021-05-18 | End: 2021-05-18

## 2021-05-18 RX ADMIN — APIXABAN 10 MILLIGRAM(S): 2.5 TABLET, FILM COATED ORAL at 16:40

## 2021-05-18 RX ADMIN — ATORVASTATIN CALCIUM 40 MILLIGRAM(S): 80 TABLET, FILM COATED ORAL at 21:52

## 2021-05-18 RX ADMIN — Medication 40 MILLIGRAM(S): at 14:57

## 2021-05-18 RX ADMIN — Medication 650 MILLIGRAM(S): at 21:51

## 2021-05-18 RX ADMIN — Medication 650 MILLIGRAM(S): at 22:21

## 2021-05-18 RX ADMIN — GABAPENTIN 100 MILLIGRAM(S): 400 CAPSULE ORAL at 19:17

## 2021-05-18 RX ADMIN — Medication 1 GRAM(S): at 19:17

## 2021-05-18 RX ADMIN — AMLODIPINE BESYLATE 2.5 MILLIGRAM(S): 2.5 TABLET ORAL at 19:17

## 2021-05-18 NOTE — CONSULT NOTE ADULT - ASSESSMENT
pt with hx of hiatal hernia, cva, htn, lymphedema, gi bleed, anemia, pvd, now with new PE - on outpatient study  [facility] rx regimen and BP control  proBNP and CE normal - no evidence of RV strain  planned for TTE  hx of Hiatal hernia - PUD - and GI bleed - on PPI - monitor Hgb - on Eliquis now for PE  PE as per outpatient w/u - sent in by PMD for eval and management - on eliquis now BID loading dose  monitor VS and HD and Sat  assist with needs and ADL   PT consult ordered  Nutritional optimization  pt lives with aide at home

## 2021-05-18 NOTE — ED ADULT NURSE NOTE - NS TRANSFER PATIENT BELONGINGS
Patient is here today for colon screening class. Prep was sent to patient pharmacy and instructions was given to patient. Patient verbalized understanding of prep.     Patient is aware there are to be accompanied by an adult 18 years or older the day of procedure. Patient is schedule 01/15/19 with Dr. Baptiste   Clothing

## 2021-05-18 NOTE — ED PROVIDER NOTE - CARE PLAN
Normal results.  Pt has been notified via [address] Principal Discharge DX:	Multiple subsegmental pulmonary emboli without acute cor pulmonale

## 2021-05-18 NOTE — ED PROVIDER NOTE - OBJECTIVE STATEMENT
89 yo white female with H/O Anxiety    CVA (cerebral vascular accident)    Fall    GI bleed    Hip fracture    Hyperlipidemia    Hypertension and   PUD (peptic ulcer disease) who was evaluated by her PCP=PA recently for worsening lower extremity swelling with mild increase in SOB. Denies any fever or chills. No cough/chest pain/abdominal pains/fever or chills. PA sent patient for doppler to lower extremities which was negative for DVT but had ++ PE on CTA of chest.

## 2021-05-18 NOTE — ED ADULT NURSE NOTE - OBJECTIVE STATEMENT
received pt in bed #11a Pt A&O w/ no complaints noticed swelling in both legs on/off x weeks Had ultrasound & Ct scan today which showed PE. Pt denies SOB denies CP States she received second Moderna vaccine 3/12. CM placed w/ SR EKG done o2 sat on RA 94%

## 2021-05-18 NOTE — H&P ADULT - PROBLEM SELECTOR PLAN 2
cont norvasc (?etiology of pt's edema)   if workup of edema otherwise unrevealing, consider norvasc as possible contributing factor

## 2021-05-18 NOTE — ED PROVIDER NOTE - RESPIRATORY RALES
[FreeTextEntry1] : cough  [de-identified] : persists\par ETOH poor diet and non compliant with meds\par dry cough persists\par no relief with allergy meds\par abnl cxr suggests infiltrate RIGHT LOWER LOBE

## 2021-05-18 NOTE — H&P ADULT - NSHPREVIEWOFSYSTEMS_GEN_ALL_CORE
heent neg  cor no chest pain  resp no dyspnea  gi - hx gi bleed in past. denies any recent hx of brbpr, denies melena  neuro neg  ext edema

## 2021-05-18 NOTE — ED PROVIDER NOTE - CLINICAL SUMMARY MEDICAL DECISION MAKING FREE TEXT BOX
Lower extremity swelling bilateral and ++ PE on CTA requiring evaluation as well as labs, imaging, meds and ECG

## 2021-05-18 NOTE — H&P ADULT - NSICDXPASTMEDICALHX_GEN_ALL_CORE_FT
PAST MEDICAL HISTORY:  Anxiety     CVA (cerebral vascular accident)     Fall     GI bleed     Hip fracture     Hyperlipidemia     Hypertension     PUD (peptic ulcer disease)

## 2021-05-18 NOTE — ED PROVIDER NOTE - CONSTITUTIONAL, MLM
normal... Weak and pale appearing white female, awake, oriented to person, place, time/situation and in no apparent distress.

## 2021-05-18 NOTE — H&P ADULT - PROBLEM SELECTOR PLAN 1
start full dose eliquis  will get 2d echo to eval for right heart strain as possible etiology of LE edema

## 2021-05-18 NOTE — H&P ADULT - NSHPPHYSICALEXAM_GEN_ALL_CORE
heent nl  no jvd  lungs cta  cor rrr s1 s2  abd soft nt  ext 2+ edema bilaterally, no calf tenderness

## 2021-05-18 NOTE — CONSULT NOTE ADULT - SUBJECTIVE AND OBJECTIVE BOX
Date/Time Patient Seen:  		  Referring MD:   Data Reviewed	       Patient is a 89y old  Female who presents with a chief complaint of PE (18 May 2021 17:07)      Subjective/HPI  PE dx - on admission - from outpatient imaging report - follows with Dr Naya LAZO and P reviewed  er provider note reviewed  labs reviewed  imaging reviewed  limited historian  has 3 children  recently   non smoker  retired   lives with an aide    · Chief Complaint: The patient is a 89y Female complaining of edema.  · HPI Objective Statement: 89 yo white female with H/O Anxiety    	CVA (cerebral vascular accident)    	Fall    	GI bleed    	Hip fracture    	Hyperlipidemia    	Hypertension and   PUD (peptic ulcer disease) who was evaluated by her PCP=PA recently for worsening lower extremity swelling with mild increase in SOB. Denies any fever or chills. No cough/chest pain/abdominal pains/fever or chills. PA sent patient for doppler to lower extremities which was negative for DVT but had ++ PE on CTA of chest.  · Presenting Symptoms: Bilateral Lower Extremity Edema/SOB  · Negative Findings: no chest pain, no diaphoresis, no dizziness, no fever, no vomiting  · Radiation: no radiation  · Timing: sudden onset, Worsening  · Duration: week(s)  · Severity: PAIN SCALE 5 OF 10.  · Aggravated Factors: movement, positional change, walking  · Relieving Factors: rest       89 yr old female sent from PCP's office after outpatient CT revealed pulmonary emboli. patient had initially presented to her PCP with worsening lower ext edema  denies chest pain, denies shortness of breath, denies palpitations  outpatient venous dopplers neg  PAST MEDICAL & SURGICAL HISTORY:  Hyperlipidemia    Hypertension    Fall    Hip fracture    Anxiety    CVA (cerebral vascular accident)    PUD (peptic ulcer disease)    GI bleed    S/P hysterectomy    S/P hip replacement  left     Past Surgical History:  S/P hip replacement  left  S/P hysterectomy.     Tobacco Screening:  · Core Measure Site	No    Risk Assessment:    Present on Admission:  Deep Venous Thrombosis	no  Pulmonary Embolus	no     Heart Failure:  Does this patient have a history of or has been diagnosed with heart failure? no.        Medication list         MEDICATIONS  (STANDING):  amLODIPine   Tablet 2.5 milliGRAM(s) Oral <User Schedule>  apixaban 10 milliGRAM(s) Oral every 12 hours  atorvastatin 40 milliGRAM(s) Oral at bedtime  escitalopram 7.5 milliGRAM(s) Oral daily  gabapentin Oral Tab/Cap - Peds 100 milliGRAM(s) Oral two times a day  pantoprazole    Tablet 40 milliGRAM(s) Oral before breakfast  sucralfate 1 Gram(s) Oral two times a day    MEDICATIONS  (PRN):         Vitals log        ICU Vital Signs Last 24 Hrs  T(C): 36.4 (18 May 2021 13:51), Max: 36.4 (18 May 2021 13:51)  T(F): 97.5 (18 May 2021 13:51), Max: 97.5 (18 May 2021 13:51)  HR: 70 (18 May 2021 16:40) (70 - 80)  BP: 172/79 (18 May 2021 16:40) (122/79 - 172/79)  BP(mean): --  ABP: --  ABP(mean): --  RR: 16 (18 May 2021 16:40) (16 - 19)  SpO2: 93% (18 May 2021 16:40) (93% - 94%)           Input and Output:  I&O's Detail      Lab Data                        11.6   9.19  )-----------( 268      ( 18 May 2021 15:16 )             35.1     05-18    139  |  102  |  19  ----------------------------<  114<H>  4.3   |  30  |  0.88    Ca    8.9      18 May 2021 15:16        CARDIAC MARKERS ( 18 May 2021 15:16 )  <.015 ng/mL / x     / x     / x     / x            Review of Systems	  leg pain  weakness      Objective     Physical Examination    extr chr changes  head nc  head at  heart s1s2  lung dec BS  abd soft  verbal  alert      Pertinent Lab findings & Imaging      Soliz:  NO   Adequate UO     I&O's Detail           Discussed with:     Cultures:	        Radiology      EXAM:  XR CHEST PORTABLE IMMED 1V                            PROCEDURE DATE:  05/18/2021          INTERPRETATION:  AP chest on May 18, 2021 at 2:33 PM. Patient is short of breath.    Heart enlargement again noted. Somewhat prominent aorta again seen. Sizable hiatal hernia again seen.    Lungs remain clear.    Shoulder degeneration particularly on the right again noted.    Chest is similar to February 17, 2018.    IMPRESSION: No acute finding. Stable findings as above.            CAROLINE LINCOLN M.D., ATTENDING RADIOLOGIST  This document has been electronically signed. May 18 2021  2:47PM          OBSERVATIONS: This is a technically limited study with suboptimal   endocardial definition, without subcostal views, and with limited apical   views. Within these limitations, the left ventricle is normal in size with   vigorous systolic function, estimated LVEF >75%. Normal RV size and systolic   function. Grade 1 diastolic dysfunction. The aortic valve leaflets are not   well seen, but appear calcified without stenosis. Other valves appear   structurally and functionally normal as visualized. Inadequate TR jet to   estimate PA systolic pressure. No significant pericardial effusion.

## 2021-05-18 NOTE — ED ADULT TRIAGE NOTE - CHIEF COMPLAINT QUOTE
Bilateral leg swelling 1 month ago, diagnosed w/ PE today by PMD, sent in for admission, denies CP or SOB.

## 2021-05-18 NOTE — H&P ADULT - HISTORY OF PRESENT ILLNESS
89 yr old female sent from PCP's office after outpatient CT revealed pulmonary emboli. patient had initially presented to her PCP with worsening lower ext edema  denies chest pain, denies shortness of breath, denies palpitations  outpatient venous dopplers neg

## 2021-05-18 NOTE — PATIENT PROFILE ADULT - TYPE OF COMMUNICATION USED TO ADDRESS HEALTHCARE
pt wears hear aids but they are not with her/Communication Board/Writing/Other pt wears hear aids but they are not with her/Writing/Other

## 2021-05-19 LAB
ANION GAP SERPL CALC-SCNC: 8 MMOL/L — SIGNIFICANT CHANGE UP (ref 5–17)
BUN SERPL-MCNC: 18 MG/DL — SIGNIFICANT CHANGE UP (ref 7–23)
CALCIUM SERPL-MCNC: 8.4 MG/DL — LOW (ref 8.5–10.1)
CHLORIDE SERPL-SCNC: 101 MMOL/L — SIGNIFICANT CHANGE UP (ref 96–108)
CO2 SERPL-SCNC: 32 MMOL/L — HIGH (ref 22–31)
COVID-19 SPIKE DOMAIN AB INTERP: POSITIVE
COVID-19 SPIKE DOMAIN ANTIBODY RESULT: >250 U/ML — HIGH
CREAT SERPL-MCNC: 0.82 MG/DL — SIGNIFICANT CHANGE UP (ref 0.5–1.3)
GLUCOSE SERPL-MCNC: 83 MG/DL — SIGNIFICANT CHANGE UP (ref 70–99)
HCT VFR BLD CALC: 34.3 % — LOW (ref 34.5–45)
HGB BLD-MCNC: 11.3 G/DL — LOW (ref 11.5–15.5)
MAGNESIUM SERPL-MCNC: 2.3 MG/DL — SIGNIFICANT CHANGE UP (ref 1.6–2.6)
MCHC RBC-ENTMCNC: 31.8 PG — SIGNIFICANT CHANGE UP (ref 27–34)
MCHC RBC-ENTMCNC: 32.9 GM/DL — SIGNIFICANT CHANGE UP (ref 32–36)
MCV RBC AUTO: 96.6 FL — SIGNIFICANT CHANGE UP (ref 80–100)
NRBC # BLD: 0 /100 WBCS — SIGNIFICANT CHANGE UP (ref 0–0)
PLATELET # BLD AUTO: 255 K/UL — SIGNIFICANT CHANGE UP (ref 150–400)
POTASSIUM SERPL-MCNC: 3.4 MMOL/L — LOW (ref 3.5–5.3)
POTASSIUM SERPL-SCNC: 3.4 MMOL/L — LOW (ref 3.5–5.3)
RBC # BLD: 3.55 M/UL — LOW (ref 3.8–5.2)
RBC # FLD: 14.6 % — HIGH (ref 10.3–14.5)
SARS-COV-2 IGG+IGM SERPL QL IA: >250 U/ML — HIGH
SARS-COV-2 IGG+IGM SERPL QL IA: POSITIVE
SODIUM SERPL-SCNC: 141 MMOL/L — SIGNIFICANT CHANGE UP (ref 135–145)
TSH SERPL-MCNC: 1.98 UIU/ML — SIGNIFICANT CHANGE UP (ref 0.36–3.74)
WBC # BLD: 7.6 K/UL — SIGNIFICANT CHANGE UP (ref 3.8–10.5)
WBC # FLD AUTO: 7.6 K/UL — SIGNIFICANT CHANGE UP (ref 3.8–10.5)

## 2021-05-19 PROCEDURE — 93306 TTE W/DOPPLER COMPLETE: CPT | Mod: 26

## 2021-05-19 RX ORDER — FUROSEMIDE 40 MG
20 TABLET ORAL DAILY
Refills: 0 | Status: DISCONTINUED | OUTPATIENT
Start: 2021-05-19 | End: 2021-05-21

## 2021-05-19 RX ORDER — POTASSIUM CHLORIDE 20 MEQ
20 PACKET (EA) ORAL
Refills: 0 | Status: COMPLETED | OUTPATIENT
Start: 2021-05-19 | End: 2021-05-19

## 2021-05-19 RX ADMIN — Medication 20 MILLIEQUIVALENT(S): at 11:49

## 2021-05-19 RX ADMIN — Medication 1 GRAM(S): at 05:03

## 2021-05-19 RX ADMIN — APIXABAN 10 MILLIGRAM(S): 2.5 TABLET, FILM COATED ORAL at 05:04

## 2021-05-19 RX ADMIN — GABAPENTIN 100 MILLIGRAM(S): 400 CAPSULE ORAL at 18:20

## 2021-05-19 RX ADMIN — APIXABAN 10 MILLIGRAM(S): 2.5 TABLET, FILM COATED ORAL at 18:20

## 2021-05-19 RX ADMIN — Medication 1 GRAM(S): at 18:20

## 2021-05-19 RX ADMIN — Medication 20 MILLIEQUIVALENT(S): at 13:57

## 2021-05-19 RX ADMIN — PANTOPRAZOLE SODIUM 40 MILLIGRAM(S): 20 TABLET, DELAYED RELEASE ORAL at 05:03

## 2021-05-19 RX ADMIN — ATORVASTATIN CALCIUM 40 MILLIGRAM(S): 80 TABLET, FILM COATED ORAL at 21:32

## 2021-05-19 RX ADMIN — ESCITALOPRAM OXALATE 7.5 MILLIGRAM(S): 10 TABLET, FILM COATED ORAL at 11:48

## 2021-05-19 RX ADMIN — GABAPENTIN 100 MILLIGRAM(S): 400 CAPSULE ORAL at 05:05

## 2021-05-19 RX ADMIN — AMLODIPINE BESYLATE 2.5 MILLIGRAM(S): 2.5 TABLET ORAL at 05:04

## 2021-05-19 NOTE — PHYSICAL THERAPY INITIAL EVALUATION ADULT - FOLLOWS COMMANDS/ANSWERS QUESTIONS, REHAB EVAL
very [place]; needs verbal and visual cues for commands/able to follow single-step instructions

## 2021-05-19 NOTE — OCCUPATIONAL THERAPY INITIAL EVALUATION ADULT - ADDITIONAL COMMENTS
Patient lives in a 2 level condo with 5 steps with handrail to enter and +chairlift to access bedroom. Patient has a 24/7 private HHA at home to assist her with ADL's and IADL's. Patient has a stall shower with several grab bars. Patient owns a rolling walker, w/c and shower chair with back. Patient reports that she often ambulates to the bathroom using a RW or patient uses a w/c for functional mobility. Patient reports that she gets bathed in stall shower 1x/week. Patient performed sit to stand with mod assist and ambulated 5' using RW with min assist.

## 2021-05-19 NOTE — OCCUPATIONAL THERAPY INITIAL EVALUATION ADULT - GENERAL OBSERVATIONS, REHAB EVAL
Patient found seated in bedside chair with +telemonitor. Patient appears [place] and able to follow simple commands with cueing.

## 2021-05-19 NOTE — OCCUPATIONAL THERAPY INITIAL EVALUATION ADULT - RANGE OF MOTION EXAMINATION, UPPER EXTREMITY
Crepitis noted b/l shoulders. Fine motor skills: WFL's/bilateral UE Active ROM was WFL  (within functional limits)

## 2021-05-19 NOTE — OCCUPATIONAL THERAPY INITIAL EVALUATION ADULT - PERTINENT HX OF CURRENT PROBLEM, REHAB EVAL
89 y/o female admitted 5/18/2021 with multiple subsegmental pulmonary emboli without acute pulmonale after outpatient CT revealed +emboli.

## 2021-05-19 NOTE — PHYSICAL THERAPY INITIAL EVALUATION ADULT - ADDITIONAL COMMENTS
Pt is a poor historian. Pt lives alone with 24/7 aides (from agency) in a 2 story house.  Has a chair lift. Reports amb with a RW short distances and w/c when fatigued; requires assist with all.

## 2021-05-19 NOTE — PHYSICAL THERAPY INITIAL EVALUATION ADULT - PERTINENT HX OF CURRENT PROBLEM, REHAB EVAL
89 yr old female sent from PCP's office after outpatient CT revealed pulmonary emboli. patient had initially presented to her PCP with worsening lower ext edema

## 2021-05-20 ENCOUNTER — TRANSCRIPTION ENCOUNTER (OUTPATIENT)
Age: 86
End: 2021-05-20

## 2021-05-20 LAB
ANION GAP SERPL CALC-SCNC: 8 MMOL/L — SIGNIFICANT CHANGE UP (ref 5–17)
BUN SERPL-MCNC: 21 MG/DL — SIGNIFICANT CHANGE UP (ref 7–23)
CALCIUM SERPL-MCNC: 8.5 MG/DL — SIGNIFICANT CHANGE UP (ref 8.5–10.1)
CHLORIDE SERPL-SCNC: 104 MMOL/L — SIGNIFICANT CHANGE UP (ref 96–108)
CO2 SERPL-SCNC: 30 MMOL/L — SIGNIFICANT CHANGE UP (ref 22–31)
CREAT SERPL-MCNC: 0.79 MG/DL — SIGNIFICANT CHANGE UP (ref 0.5–1.3)
GLUCOSE SERPL-MCNC: 82 MG/DL — SIGNIFICANT CHANGE UP (ref 70–99)
POTASSIUM SERPL-MCNC: 4.1 MMOL/L — SIGNIFICANT CHANGE UP (ref 3.5–5.3)
POTASSIUM SERPL-SCNC: 4.1 MMOL/L — SIGNIFICANT CHANGE UP (ref 3.5–5.3)
SODIUM SERPL-SCNC: 142 MMOL/L — SIGNIFICANT CHANGE UP (ref 135–145)

## 2021-05-20 PROCEDURE — 99497 ADVNCD CARE PLAN 30 MIN: CPT

## 2021-05-20 RX ORDER — ASPIRIN/CALCIUM CARB/MAGNESIUM 324 MG
0 TABLET ORAL
Qty: 0 | Refills: 0 | DISCHARGE

## 2021-05-20 RX ORDER — FUROSEMIDE 40 MG
1 TABLET ORAL
Qty: 30 | Refills: 0
Start: 2021-05-20 | End: 2021-06-18

## 2021-05-20 RX ORDER — AMLODIPINE BESYLATE 2.5 MG/1
1 TABLET ORAL
Qty: 0 | Refills: 0 | DISCHARGE

## 2021-05-20 RX ORDER — APIXABAN 2.5 MG/1
1 TABLET, FILM COATED ORAL
Qty: 1 | Refills: 0
Start: 2021-05-20 | End: 2021-06-18

## 2021-05-20 RX ADMIN — Medication 1 GRAM(S): at 05:32

## 2021-05-20 RX ADMIN — ATORVASTATIN CALCIUM 40 MILLIGRAM(S): 80 TABLET, FILM COATED ORAL at 21:44

## 2021-05-20 RX ADMIN — ESCITALOPRAM OXALATE 7.5 MILLIGRAM(S): 10 TABLET, FILM COATED ORAL at 11:51

## 2021-05-20 RX ADMIN — GABAPENTIN 100 MILLIGRAM(S): 400 CAPSULE ORAL at 18:03

## 2021-05-20 RX ADMIN — APIXABAN 10 MILLIGRAM(S): 2.5 TABLET, FILM COATED ORAL at 05:31

## 2021-05-20 RX ADMIN — GABAPENTIN 100 MILLIGRAM(S): 400 CAPSULE ORAL at 05:32

## 2021-05-20 RX ADMIN — Medication 20 MILLIGRAM(S): at 05:32

## 2021-05-20 RX ADMIN — APIXABAN 10 MILLIGRAM(S): 2.5 TABLET, FILM COATED ORAL at 18:03

## 2021-05-20 RX ADMIN — PANTOPRAZOLE SODIUM 40 MILLIGRAM(S): 20 TABLET, DELAYED RELEASE ORAL at 05:32

## 2021-05-20 RX ADMIN — Medication 1 GRAM(S): at 18:03

## 2021-05-20 NOTE — DISCHARGE NOTE PROVIDER - CARE PROVIDER_API CALL
Josue Mendoza (DO)  Medicine  [facility]  [address], [address]  [place] [address]  Phone: [phone]  Fax: [phone]  Follow Up Time:     Philip Lozano)  Critical Care Medicine; Internal Medicine; Pulmonary Disease  [address] [place] [address]  Phone: [phone]  Fax: [phone]  Follow Up Time:     Sharad Fraser)  Cardiovascular Disease; Internal Medicine  [address], [place] [id number]  Phone: [phone]  Fax: [phone]  Follow Up Time:

## 2021-05-20 NOTE — DISCHARGE NOTE PROVIDER - CARE PROVIDERS DIRECT ADDRESSES
,[email or web address],DirectAddress_Unknown,DirectAddress_Unknown

## 2021-05-20 NOTE — DISCHARGE NOTE PROVIDER - HOSPITAL COURSE
admitted for PE with leg swelling  AC with eliquis  leg swelling - likely from norvasc which was hels  PT for ambulation  DC after PULM and PT clearance

## 2021-05-20 NOTE — DISCHARGE NOTE PROVIDER - NSDCMRMEDTOKEN_GEN_ALL_CORE_FT
ALPRAZolam 0.25 mg oral tablet: 1 tab(s) orally 3 times a day, As Needed  Aspirin Enteric Coated 81 mg oral delayed release tablet: orally once a day  AZO Cranberry Gummies oral tablet, chewable: 1 tab(s) orally once a day (in the evening)  Eliquis Starter Pack for Treatment of DVT and PE 5 mg oral tablet: 1 tab(s) orally 2 times a day  dispense 1 packet  take as directed on packet  escitalopram: 7.5 milligram(s) orally once a day  ferrous sulfate 325 mg (65 mg elemental iron) oral delayed release tablet: 1 tab(s) orally once a day  furosemide 20 mg oral tablet: 1 tab(s) orally once a day  multivitamin: 1 tab(s) orally once a day  Neurontin 100 mg oral capsule: 1 cap(s) orally 2 times a day  rosuvastatin 10 mg oral tablet: 1 tab(s) orally once a day  sucralfate 1 g oral tablet: 1 tab(s) orally 2 times a day  Vitamin D3 1000 intl units (25 mcg) oral tablet: 1 tab(s) orally once a day

## 2021-05-20 NOTE — DISCHARGE NOTE PROVIDER - NSDCCPCAREPLAN_GEN_ALL_CORE_FT
PRINCIPAL DISCHARGE DIAGNOSIS  Diagnosis: Multiple subsegmental pulmonary emboli without acute cor pulmonale  Assessment and Plan of Treatment: follow up with lung Dr. rodriguez

## 2021-05-20 NOTE — PROGRESS NOTE ADULT - PROBLEM SELECTOR PLAN 1
start full dose eliquis  will get 2d echo to eval for right heart strain as possible etiology of LE edema
start full dose eliquis   2d echo to eval for right heart strain as possible etiology of LE edema

## 2021-05-20 NOTE — DISCHARGE NOTE PROVIDER - PROVIDER TOKENS
PROVIDER:[TOKEN:[[id number]]],PROVIDER:[TOKEN:[[id number]]],PROVIDER:[TOKEN:[[id number]]]

## 2021-05-20 NOTE — GOALS OF CARE CONVERSATION - ADVANCED CARE PLANNING - CONVERSATION DETAILS
Writer met with patient Reviewed patient's medical and social history as well as events leading to patient's hospitalization. Writer discussed patient's current diagnosis ( Pulmonary Emboli, HTN, edema leg.), patient medical condition and management,  limited prognosis, and life expectancy. Inquired about patient's wishes regarding extent of medical care to be provided including escalation of medical care into the ICU and use of vasopressor support. In addition, the writer inquired about thoughts regarding cardiopulmonary resuscitation, artificial nutrition and hydration including use of feeding tubes and IVF, antibiotics, and further investigative studies such as blood draws and radiology. Patient showed limited insight into her medical condition. All questions answered. Writer recommended discussing further with her hcp what her resuscitation wishes are  Pt wants attempt to have resuscitation, does not want to live on machines and the hcp will decide circumstances to act upon. Psychosocial support provided.  PC RN contact # given.

## 2021-05-21 ENCOUNTER — NON-APPOINTMENT (OUTPATIENT)
Age: 86
End: 2021-05-21

## 2021-05-21 ENCOUNTER — TRANSCRIPTION ENCOUNTER (OUTPATIENT)
Age: 86
End: 2021-05-21

## 2021-05-21 VITALS
SYSTOLIC BLOOD PRESSURE: 132 MMHG | DIASTOLIC BLOOD PRESSURE: 80 MMHG | TEMPERATURE: 98 F | RESPIRATION RATE: 18 BRPM | HEART RATE: 78 BPM | OXYGEN SATURATION: 94 %

## 2021-05-21 LAB
HCT VFR BLD CALC: 36.8 % — SIGNIFICANT CHANGE UP (ref 34.5–45)
HGB BLD-MCNC: 12.2 G/DL — SIGNIFICANT CHANGE UP (ref 11.5–15.5)
MCHC RBC-ENTMCNC: 32.1 PG — SIGNIFICANT CHANGE UP (ref 27–34)
MCHC RBC-ENTMCNC: 33.2 GM/DL — SIGNIFICANT CHANGE UP (ref 32–36)
MCV RBC AUTO: 96.8 FL — SIGNIFICANT CHANGE UP (ref 80–100)
NRBC # BLD: 0 /100 WBCS — SIGNIFICANT CHANGE UP (ref 0–0)
PLATELET # BLD AUTO: 274 K/UL — SIGNIFICANT CHANGE UP (ref 150–400)
RBC # BLD: 3.8 M/UL — SIGNIFICANT CHANGE UP (ref 3.8–5.2)
RBC # FLD: 14.6 % — HIGH (ref 10.3–14.5)
WBC # BLD: 8.95 K/UL — SIGNIFICANT CHANGE UP (ref 3.8–10.5)
WBC # FLD AUTO: 8.95 K/UL — SIGNIFICANT CHANGE UP (ref 3.8–10.5)

## 2021-05-21 PROCEDURE — 97166 OT EVAL MOD COMPLEX 45 MIN: CPT

## 2021-05-21 PROCEDURE — 85027 COMPLETE CBC AUTOMATED: CPT

## 2021-05-21 PROCEDURE — U0003: CPT

## 2021-05-21 PROCEDURE — 84443 ASSAY THYROID STIM HORMONE: CPT

## 2021-05-21 PROCEDURE — 71045 X-RAY EXAM CHEST 1 VIEW: CPT

## 2021-05-21 PROCEDURE — 97116 GAIT TRAINING THERAPY: CPT

## 2021-05-21 PROCEDURE — 97110 THERAPEUTIC EXERCISES: CPT

## 2021-05-21 PROCEDURE — 97163 PT EVAL HIGH COMPLEX 45 MIN: CPT

## 2021-05-21 PROCEDURE — 93005 ELECTROCARDIOGRAM TRACING: CPT

## 2021-05-21 PROCEDURE — 93306 TTE W/DOPPLER COMPLETE: CPT

## 2021-05-21 PROCEDURE — 83735 ASSAY OF MAGNESIUM: CPT

## 2021-05-21 PROCEDURE — U0005: CPT

## 2021-05-21 PROCEDURE — 85730 THROMBOPLASTIN TIME PARTIAL: CPT

## 2021-05-21 PROCEDURE — 96374 THER/PROPH/DIAG INJ IV PUSH: CPT

## 2021-05-21 PROCEDURE — 80048 BASIC METABOLIC PNL TOTAL CA: CPT

## 2021-05-21 PROCEDURE — 85610 PROTHROMBIN TIME: CPT

## 2021-05-21 PROCEDURE — 99285 EMERGENCY DEPT VISIT HI MDM: CPT

## 2021-05-21 PROCEDURE — 84484 ASSAY OF TROPONIN QUANT: CPT

## 2021-05-21 PROCEDURE — 36415 COLL VENOUS BLD VENIPUNCTURE: CPT

## 2021-05-21 PROCEDURE — 86769 SARS-COV-2 COVID-19 ANTIBODY: CPT

## 2021-05-21 PROCEDURE — 83880 ASSAY OF NATRIURETIC PEPTIDE: CPT

## 2021-05-21 PROCEDURE — 97535 SELF CARE MNGMENT TRAINING: CPT

## 2021-05-21 RX ADMIN — APIXABAN 10 MILLIGRAM(S): 2.5 TABLET, FILM COATED ORAL at 06:11

## 2021-05-21 RX ADMIN — Medication 1 GRAM(S): at 06:12

## 2021-05-21 RX ADMIN — PANTOPRAZOLE SODIUM 40 MILLIGRAM(S): 20 TABLET, DELAYED RELEASE ORAL at 06:12

## 2021-05-21 RX ADMIN — ESCITALOPRAM OXALATE 7.5 MILLIGRAM(S): 10 TABLET, FILM COATED ORAL at 12:39

## 2021-05-21 RX ADMIN — Medication 20 MILLIGRAM(S): at 06:12

## 2021-05-21 RX ADMIN — GABAPENTIN 100 MILLIGRAM(S): 400 CAPSULE ORAL at 06:12

## 2021-05-21 NOTE — PROGRESS NOTE ADULT - SUBJECTIVE AND OBJECTIVE BOX
Date/Time Patient Seen:  		  Referring MD:   Data Reviewed	       Patient is a 89y old  Female who presents with a chief complaint of PE (18 May 2021 18:42)      Subjective/HPI     PAST MEDICAL & SURGICAL HISTORY:  Hyperlipidemia    Hypertension    Fall    Hip fracture    Anxiety    CVA (cerebral vascular accident)    PUD (peptic ulcer disease)    GI bleed    S/P hysterectomy    S/P hip replacement  left          Medication list         MEDICATIONS  (STANDING):  amLODIPine   Tablet 2.5 milliGRAM(s) Oral <User Schedule>  apixaban 10 milliGRAM(s) Oral every 12 hours  atorvastatin 40 milliGRAM(s) Oral at bedtime  escitalopram 7.5 milliGRAM(s) Oral daily  gabapentin Oral Tab/Cap - Peds 100 milliGRAM(s) Oral two times a day  pantoprazole    Tablet 40 milliGRAM(s) Oral before breakfast  sucralfate 1 Gram(s) Oral two times a day    MEDICATIONS  (PRN):         Vitals log        ICU Vital Signs Last 24 Hrs  T(C): 36.4 (19 May 2021 04:50), Max: 36.6 (18 May 2021 19:00)  T(F): 97.6 (19 May 2021 04:50), Max: 97.8 (18 May 2021 19:00)  HR: 72 (19 May 2021 04:50) (70 - 84)  BP: 125/78 (19 May 2021 04:50) (122/79 - 172/79)  BP(mean): --  ABP: --  ABP(mean): --  RR: 17 (19 May 2021 04:50) (16 - 19)  SpO2: 93% (19 May 2021 04:50) (91% - 94%)           Input and Output:  I&O's Detail    18 May 2021 07:01  -  19 May 2021 06:17  --------------------------------------------------------  IN:    Oral Fluid: 240 mL  Total IN: 240 mL    OUT:  Total OUT: 0 mL    Total NET: 240 mL          Lab Data                        11.6   9.19  )-----------( 268      ( 18 May 2021 15:16 )             35.1     05-18    139  |  102  |  19  ----------------------------<  114<H>  4.3   |  30  |  0.88    Ca    8.9      18 May 2021 15:16        CARDIAC MARKERS ( 18 May 2021 15:16 )  <.015 ng/mL / x     / x     / x     / x            Review of Systems	      Objective     Physical Examination    heart s1s2  lung dc BS  abd soft  head nc  head at      Pertinent Lab findings & Imaging      Martínez:  NO   Adequate UO     I&O's Detail    18 May 2021 07:01  -  19 May 2021 06:17  --------------------------------------------------------  IN:    Oral Fluid: 240 mL  Total IN: 240 mL    OUT:  Total OUT: 0 mL    Total NET: 240 mL               Discussed with:     Cultures:	        Radiology                            
Date/Time Patient Seen:  		  Referring MD:   Data Reviewed	       Patient is a 89y old  Female who presents with a chief complaint of PE (20 May 2021 17:58)      Subjective/HPI     PAST MEDICAL & SURGICAL HISTORY:  Hyperlipidemia    Hypertension    Fall    Hip fracture    Anxiety    CVA (cerebral vascular accident)    PUD (peptic ulcer disease)    GI bleed    S/P hysterectomy    S/P hip replacement  left          Medication list         MEDICATIONS  (STANDING):  apixaban 10 milliGRAM(s) Oral every 12 hours  atorvastatin 40 milliGRAM(s) Oral at bedtime  escitalopram 7.5 milliGRAM(s) Oral daily  furosemide    Tablet 20 milliGRAM(s) Oral daily  gabapentin Oral Tab/Cap - Peds 100 milliGRAM(s) Oral two times a day  pantoprazole    Tablet 40 milliGRAM(s) Oral before breakfast  sucralfate 1 Gram(s) Oral two times a day    MEDICATIONS  (PRN):         Vitals log        ICU Vital Signs Last 24 Hrs  T(C): 36.6 (21 May 2021 04:33), Max: 36.6 (20 May 2021 13:00)  T(F): 97.8 (21 May 2021 04:33), Max: 97.8 (20 May 2021 13:00)  HR: 76 (21 May 2021 04:33) (71 - 86)  BP: 148/87 (21 May 2021 04:33) (99/64 - 148/87)  BP(mean): --  ABP: --  ABP(mean): --  RR: 17 (21 May 2021 04:33) (17 - 17)  SpO2: 93% (21 May 2021 04:33) (93% - 95%)           Input and Output:  I&O's Detail    19 May 2021 07:01  -  20 May 2021 07:00  --------------------------------------------------------  IN:  Total IN: 0 mL    OUT:    Incontinent per Collection Bag (mL): 900 mL  Total OUT: 900 mL    Total NET: -900 mL      20 May 2021 07:01  -  21 May 2021 06:13  --------------------------------------------------------  IN:    Oral Fluid: 100 mL  Total IN: 100 mL    OUT:    Incontinent per Collection Bag (mL): 600 mL  Total OUT: 600 mL    Total NET: -500 mL          Lab Data                        11.3   7.60  )-----------( 255      ( 19 May 2021 06:46 )             34.3     05-20    142  |  104  |  21  ----------------------------<  82  4.1   |  30  |  0.79    Ca    8.5      20 May 2021 07:02  Mg     2.3     05-19              Review of Systems	      Objective     Physical Examination  heart s1s2  lung dec BS  abd soft  head nc  on RA      Pertinent Lab findings & Imaging      Martínez:  NO   Adequate UO     I&O's Detail    19 May 2021 07:01  -  20 May 2021 07:00  --------------------------------------------------------  IN:  Total IN: 0 mL    OUT:    Incontinent per Collection Bag (mL): 900 mL  Total OUT: 900 mL    Total NET: -900 mL      20 May 2021 07:01  -  21 May 2021 06:13  --------------------------------------------------------  IN:    Oral Fluid: 100 mL  Total IN: 100 mL    OUT:    Incontinent per Collection Bag (mL): 600 mL  Total OUT: 600 mL    Total NET: -500 mL               Discussed with:     Cultures:	        Radiology                            
Patient is a 89y old  Female who presents with a chief complaint of PE (19 May 2021 06:17)      INTERVAL /OVERNIGHT EVENTS: feels depressed    MEDICATIONS  (STANDING):  apixaban 10 milliGRAM(s) Oral every 12 hours  atorvastatin 40 milliGRAM(s) Oral at bedtime  escitalopram 7.5 milliGRAM(s) Oral daily  furosemide    Tablet 20 milliGRAM(s) Oral daily  gabapentin Oral Tab/Cap - Peds 100 milliGRAM(s) Oral two times a day  pantoprazole    Tablet 40 milliGRAM(s) Oral before breakfast  sucralfate 1 Gram(s) Oral two times a day    MEDICATIONS  (PRN):      Allergies    mold, cheese and wine (Unknown)  penicillins (Unknown)    Intolerances        REVIEW OF SYSTEMS:  CONSTITUTIONAL: No fever, weight loss, or fatigue  EYES: No eye pain, visual disturbances, or discharge  ENMT:  No difficulty hearing, tinnitus, vertigo; No sinus or throat pain  RESPIRATORY: No cough, wheezing, chills or hemoptysis; No shortness of breath  CARDIOVASCULAR: No chest pain, palpitations, dizziness, or leg swelling  GASTROINTESTINAL: No abdominal or epigastric pain. No nausea, vomiting, or hematemesis; No diarrhea or constipation. No melena or hematochezia.  GENITOURINARY: No dysuria, frequency, hematuria, or incontinence  NEUROLOGICAL: No headaches, memory loss, loss of strength, numbness, or tremors  SKIN: No itching, burning, rashes, or lesions   LYMPH NODES: No enlarged glands  ENDOCRINE: No heat or cold intolerance; No hair loss; No polydipsia or polyuria  MUSCULOSKELETAL: No joint pain or swelling; No muscle, back, or extremity pain  PSYCHIATRIC: No depression, anxiety, mood swings, or difficulty sleeping  HEME/LYMPH: No easy bruising, or bleeding gums  ALLERGY AND IMMUNOLOGIC: No hives or eczema    Vital Signs Last 24 Hrs  T(C): 36.4 (19 May 2021 13:33), Max: 36.6 (18 May 2021 19:00)  T(F): 97.6 (19 May 2021 13:33), Max: 97.8 (18 May 2021 19:00)  HR: 78 (19 May 2021 10:53) (70 - 84)  BP: 108/71 (19 May 2021 13:33) (108/71 - 172/79)  BP(mean): --  RR: 18 (19 May 2021 13:33) (16 - 18)  SpO2: 94% (19 May 2021 13:33) (91% - 94%)    PHYSICAL EXAM:  GENERAL: NAD, well-groomed, well-developed  HEAD:  Atraumatic, Normocephalic  EYES: EOMI, PERRLA, conjunctiva and sclera clear  ENMT: No tonsillar erythema, exudates, or enlargement; Moist mucous membranes, Good dentition, No lesions  NECK: Supple, No JVD, Normal thyroid  NERVOUS SYSTEM:  Alert & Oriented X3, Good concentration; Motor Strength 5/5 B/L upper and lower extremities; DTRs 2+ intact and symmetric  CHEST/LUNG: Clear to auscultation bilaterally; No rales, rhonchi, wheezing, or rubs  HEART: Regular rate and rhythm; No murmurs, rubs, or gallops  ABDOMEN: Soft, Nontender, Nondistended; Bowel sounds present  EXTREMITIES:  2+ Peripheral Pulses, No clubbing, cyanosis, + edema  LYMPH: No lymphadenopathy noted  SKIN: No rashes or lesions    LABS:                        11.3   7.60  )-----------( 255      ( 19 May 2021 06:46 )             34.3     19 May 2021 06:46    141    |  101    |  18     ----------------------------<  83     3.4     |  32     |  0.82     Ca    8.4        19 May 2021 06:46  Mg     2.3       19 May 2021 06:46      PT/INR - ( 18 May 2021 15:16 )   PT: 11.7 sec;   INR: 1.00 ratio         PTT - ( 18 May 2021 15:16 )  PTT:25.5 sec    CAPILLARY BLOOD GLUCOSE          RADIOLOGY & ADDITIONAL TESTS:    Notes Reviewed:  [x ] YES  [ ] NO    Care Discussed with Consultants/Other Providers [x ] YES  [ ] NO
Date/Time Patient Seen:  		  Referring MD:   Data Reviewed	       Patient is a 89y old  Female who presents with a chief complaint of PE (19 May 2021 11:31)      Subjective/HPI     PAST MEDICAL & SURGICAL HISTORY:  Hyperlipidemia    Hypertension    Fall    Hip fracture    Anxiety    CVA (cerebral vascular accident)    PUD (peptic ulcer disease)    GI bleed    S/P hysterectomy    S/P hip replacement  left          Medication list         MEDICATIONS  (STANDING):  apixaban 10 milliGRAM(s) Oral every 12 hours  atorvastatin 40 milliGRAM(s) Oral at bedtime  escitalopram 7.5 milliGRAM(s) Oral daily  furosemide    Tablet 20 milliGRAM(s) Oral daily  gabapentin Oral Tab/Cap - Peds 100 milliGRAM(s) Oral two times a day  pantoprazole    Tablet 40 milliGRAM(s) Oral before breakfast  sucralfate 1 Gram(s) Oral two times a day    MEDICATIONS  (PRN):         Vitals log        ICU Vital Signs Last 24 Hrs  T(C): 36.4 (20 May 2021 04:25), Max: 36.9 (19 May 2021 19:00)  T(F): 97.6 (20 May 2021 04:25), Max: 98.4 (19 May 2021 19:00)  HR: 93 (20 May 2021 04:25) (78 - 105)  BP: 142/85 (20 May 2021 04:25) (108/71 - 142/85)  BP(mean): --  ABP: --  ABP(mean): --  RR: 18 (20 May 2021 04:25) (18 - 18)  SpO2: 94% (20 May 2021 04:25) (92% - 94%)           Input and Output:  I&O's Detail    18 May 2021 07:01  -  19 May 2021 07:00  --------------------------------------------------------  IN:    Oral Fluid: 240 mL  Total IN: 240 mL    OUT:  Total OUT: 0 mL    Total NET: 240 mL      19 May 2021 07:01  -  20 May 2021 06:35  --------------------------------------------------------  IN:  Total IN: 0 mL    OUT:    Incontinent per Collection Bag (mL): 900 mL  Total OUT: 900 mL    Total NET: -900 mL          Lab Data                        11.3   7.60  )-----------( 255      ( 19 May 2021 06:46 )             34.3     05-19    141  |  101  |  18  ----------------------------<  83  3.4<L>   |  32<H>  |  0.82    Ca    8.4<L>      19 May 2021 06:46  Mg     2.3     05-19        CARDIAC MARKERS ( 18 May 2021 15:16 )  <.015 ng/mL / x     / x     / x     / x            Review of Systems	      Objective     Physical Examination    heart s1s2  lung dec BS  abd soft  head nc  head at  verbal      Pertinent Lab findings & Imaging      Martínez:  NO   Adequate UO     I&O's Detail    18 May 2021 07:01  -  19 May 2021 07:00  --------------------------------------------------------  IN:    Oral Fluid: 240 mL  Total IN: 240 mL    OUT:  Total OUT: 0 mL    Total NET: 240 mL      19 May 2021 07:01  -  20 May 2021 06:35  --------------------------------------------------------  IN:  Total IN: 0 mL    OUT:    Incontinent per Collection Bag (mL): 900 mL  Total OUT: 900 mL    Total NET: -900 mL               Discussed with:     Cultures:	        Radiology                            
Patient is a 89y old  Female who presents with a chief complaint of PE (20 May 2021 11:42)      INTERVAL /OVERNIGHT EVENTS: feeling better    MEDICATIONS  (STANDING):  apixaban 10 milliGRAM(s) Oral every 12 hours  atorvastatin 40 milliGRAM(s) Oral at bedtime  escitalopram 7.5 milliGRAM(s) Oral daily  furosemide    Tablet 20 milliGRAM(s) Oral daily  gabapentin Oral Tab/Cap - Peds 100 milliGRAM(s) Oral two times a day  pantoprazole    Tablet 40 milliGRAM(s) Oral before breakfast  sucralfate 1 Gram(s) Oral two times a day    MEDICATIONS  (PRN):      Allergies    mold, cheese and wine (Unknown)  penicillins (Unknown)    Intolerances        REVIEW OF SYSTEMS:  CONSTITUTIONAL: No fever, weight loss, or fatigue  EYES: No eye pain, visual disturbances, or discharge  ENMT:  No difficulty hearing, tinnitus, vertigo; No sinus or throat pain  NECK: No pain or stiffness  RESPIRATORY: No cough, wheezing, chills or hemoptysis; No shortness of breath  CARDIOVASCULAR: No chest pain, palpitations, dizziness, or leg swelling  GASTROINTESTINAL: No abdominal or epigastric pain. No nausea, vomiting, or hematemesis; No diarrhea or constipation. No melena or hematochezia.  GENITOURINARY: No dysuria, frequency, hematuria, or incontinence  NEUROLOGICAL: No headaches, memory loss, loss of strength, numbness, or tremors  SKIN: No itching, burning, rashes, or lesions   LYMPH NODES: No enlarged glands  ENDOCRINE: No heat or cold intolerance; No hair loss; No polydipsia or polyuria  MUSCULOSKELETAL: No joint pain or swelling; No muscle, back, or extremity pain  PSYCHIATRIC: No depression, anxiety, mood swings, or difficulty sleeping  HEME/LYMPH: No easy bruising, or bleeding gums  ALLERGY AND IMMUNOLOGIC: No hives or eczema    Vital Signs Last 24 Hrs  T(C): 36.6 (20 May 2021 13:00), Max: 36.9 (19 May 2021 19:00)  T(F): 97.8 (20 May 2021 13:00), Max: 98.4 (19 May 2021 19:00)  HR: 86 (20 May 2021 13:00) (71 - 105)  BP: 99/64 (20 May 2021 13:00) (99/64 - 142/85)  BP(mean): --  RR: 17 (20 May 2021 13:00) (17 - 18)  SpO2: 95% (20 May 2021 13:00) (93% - 95%)    PHYSICAL EXAM:  GENERAL: NAD, well-groomed, well-developed  HEAD:  Atraumatic, Normocephalic  EYES: EOMI, PERRLA, conjunctiva and sclera clear  ENMT: No tonsillar erythema, exudates, or enlargement; Moist mucous membranes, Good dentition, No lesions  NECK: Supple, No JVD, Normal thyroid  NERVOUS SYSTEM:  Alert & Oriented X3, Good concentration; Motor Strength 5/5 B/L upper and lower extremities; DTRs 2+ intact and symmetric  CHEST/LUNG: Clear to auscultation bilaterally; No rales, rhonchi, wheezing, or rubs  HEART: Regular rate and rhythm; No murmurs, rubs, or gallops  ABDOMEN: Soft, Nontender, Nondistended; Bowel sounds present  EXTREMITIES:  2+ Peripheral Pulses, No clubbing, cyanosis, or edema  LYMPH: No lymphadenopathy noted  SKIN: No rashes or lesions    LABS:    20 May 2021 07:02    142    |  104    |  21     ----------------------------<  82     4.1     |  30     |  0.79     Ca    8.5        20 May 2021 07:02          CAPILLARY BLOOD GLUCOSE          RADIOLOGY & ADDITIONAL TESTS:    Notes Reviewed:  [x ] YES  [ ] NO    Care Discussed with Consultants/Other Providers [x ] YES  [ ] NO

## 2021-05-21 NOTE — CHART NOTE - NSCHARTNOTEFT_GEN_A_CORE

## 2021-05-21 NOTE — PROGRESS NOTE ADULT - ASSESSMENT
89 yr old female admitted with pulmonary embolism
on RA - vs noted - labs reviewed -     pt with hx of hiatal hernia, cva, htn, lymphedema, gi bleed, anemia, pvd, now with new PE - on outpatient study  cvs rx regimen and BP control  proBNP and CE normal - no evidence of RV strain  planned for TTE  hx of Hiatal hernia - PUD - and GI bleed - on PPI - monitor Hgb - on Eliquis now for PE  PE as per outpatient w/u - sent in by PMD for eval and management - on eliquis now BID loading dose  monitor VS and HD and Sat  assist with needs and ADL   PT consult ordered  Nutritional optimization  pt lives with aide at home
on RA - vs noted - labs reviewed - CM notes reviewed -     pt with hx of hiatal hernia, cva, htn, lymphedema, gi bleed, anemia, pvd, now with new PE - on outpatient study  cvs rx regimen and BP control  proBNP and CE normal - no evidence of RV strain  TTE report reviewed, curr on PO LASIX  hx of Hiatal hernia - PUD - and GI bleed - on PPI - monitor Hgb - on Eliquis now for PE  PE as per outpatient w/u - sent in by PMD for eval and management - on eliquis now BID loading dose  monitor VS and HD and Sat  assist with needs and ADL   PT consult ordered  Nutritional optimization  pt lives with aide at home
on RA - vs noted - labs reviewed - CM notes reviewed -     pt with hx of hiatal hernia, cva, htn, lymphedema, gi bleed, anemia, pvd, now with new PE - on outpatient study  cvs rx regimen and BP control  proBNP and CE normal - no evidence of RV strain  TTE report pending, curr on PO LASIX  hx of Hiatal hernia - PUD - and GI bleed - on PPI - monitor Hgb - on Eliquis now for PE  PE as per outpatient w/u - sent in by PMD for eval and management - on eliquis now BID loading dose  monitor VS and HD and Sat  assist with needs and ADL   PT consult ordered  Nutritional optimization  pt lives with aide at home
89 yr old female admitted with pulmonary embolism

## 2021-05-21 NOTE — DISCHARGE NOTE NURSING/CASE MANAGEMENT/SOCIAL WORK - PATIENT PORTAL LINK FT
You can access the FollowMyHealth Patient Portal offered by [facility] by registering at the following website: [email or web address]. By joining [facility]’s FollowMyHealth portal, you will also be able to view your health information using other applications (apps) compatible with our system.

## 2021-05-23 ENCOUNTER — RX RENEWAL (OUTPATIENT)
Age: 86
End: 2021-05-23

## 2021-05-25 ENCOUNTER — APPOINTMENT (OUTPATIENT)
Dept: INTERNAL MEDICINE | Facility: CLINIC | Age: 86
End: 2021-05-25
Payer: MEDICARE

## 2021-05-25 VITALS
OXYGEN SATURATION: 95 % | HEART RATE: 90 BPM | RESPIRATION RATE: 16 BRPM | TEMPERATURE: 97.9 F | SYSTOLIC BLOOD PRESSURE: 128 MMHG | DIASTOLIC BLOOD PRESSURE: 76 MMHG | HEIGHT: 58 IN

## 2021-05-25 DIAGNOSIS — I65.23 OCCLUSION AND STENOSIS OF BILATERAL CAROTID ARTERIES: ICD-10-CM

## 2021-05-25 PROCEDURE — 99072 ADDL SUPL MATRL&STAF TM PHE: CPT

## 2021-05-25 PROCEDURE — 99496 TRANSJ CARE MGMT HIGH F2F 7D: CPT

## 2021-05-28 PROBLEM — I65.23 BILATERAL CAROTID ARTERY STENOSIS: Status: ACTIVE | Noted: 2019-03-01

## 2021-05-28 NOTE — PHYSICAL EXAM
[No Acute Distress] : no acute distress [Well Nourished] : well nourished [Well Developed] : well developed [Well-Appearing] : well-appearing [Normal Sclera/Conjunctiva] : normal sclera/conjunctiva [PERRL] : pupils equal round and reactive to light [EOMI] : extraocular movements intact [Normal Oropharynx] : the oropharynx was normal [Normal TMs] : both tympanic membranes were normal [No JVD] : no jugular venous distention [Supple] : supple [No Lymphadenopathy] : no lymphadenopathy [Thyroid Normal, No Nodules] : the thyroid was normal and there were no nodules present [No Respiratory Distress] : no respiratory distress  [Clear to Auscultation] : lungs were clear to auscultation bilaterally [No Accessory Muscle Use] : no accessory muscle use [Normal Rate] : normal rate  [Regular Rhythm] : with a regular rhythm [Normal S1, S2] : normal S1 and S2 [No Abdominal Bruit] : a ~M bruit was not heard ~T in the abdomen [Pedal Pulses Present] : the pedal pulses are present [No Extremity Clubbing/Cyanosis] : no extremity clubbing/cyanosis [No Palpable Aorta] : no palpable aorta [Soft] : abdomen soft [Non Tender] : non-tender [Non-distended] : non-distended [No Masses] : no abdominal mass palpated [No HSM] : no HSM [Normal Bowel Sounds] : normal bowel sounds [Normal Posterior Cervical Nodes] : no posterior cervical lymphadenopathy [Normal Anterior Cervical Nodes] : no anterior cervical lymphadenopathy [No CVA Tenderness] : no CVA  tenderness [No Spinal Tenderness] : no spinal tenderness [Kyphosis] : kyphosis [No Joint Swelling] : no joint swelling [No Rash] : no rash [No Skin Lesions] : no skin lesions [Coordination Grossly Intact] : coordination grossly intact [No Focal Deficits] : no focal deficits [Deep Tendon Reflexes (DTR)] : deep tendon reflexes were 2+ and symmetric [Speech Grossly Normal] : speech grossly normal [Memory Grossly Normal] : memory grossly normal [Normal Affect] : the affect was normal [Alert and Oriented x3] : oriented to person, place, and time [Normal Mood] : the mood was normal [Normal Insight/Judgement] : insight and judgment were intact [Agitated] : agitated [Depressed] : depressed [98242 - High Complexity requires an extensive number of possible diagnoses and/or the management options, extensive complexity of the medical data (tests, etc.) to be reviewed, and a high risk of significant complications, morbidity, and/or mortality as w] : High Complexity  [de-identified] : with murmur  [de-identified] :  carotid bruit left ICA    trace edema  no pitting noted  [de-identified] : limited mobility in wheelchair,   Limited ROM of right upper ext [de-identified] : bruise left  hand  [de-identified] : in wheel chair

## 2021-05-28 NOTE — HISTORY OF PRESENT ILLNESS
[Post-hospitalization from ___ Hospital] : Post-hospitalization from [unfilled] Hospital [Admitted on: ___] : The patient was admitted on [unfilled] [Patient Contacted By: ____] : and contacted by [unfilled] [Discharged on ___] : discharged on [unfilled] [FreeTextEntry2] : Pt was admitted for multiple PE.  Hospital stay was uneventful.  States she was started on a blood thinner and sent home.  Denies having Chest pain, SON+B, VELÁZQUEZ.

## 2021-05-28 NOTE — HEALTH RISK ASSESSMENT
[1 or 2 (0 pts)] : 1 or 2 (0 points) [Never (0 pts)] : Never (0 points) [No] : In the past 12 months have you used drugs other than those required for medical reasons? No [No falls in past year] : Patient reported no falls in the past year [1] : 2) Feeling down, depressed, or hopeless for several days (1) [Designated Healthcare Proxy] : Designated healthcare proxy [Name: ___] : Health Care Proxy's Name: [unfilled]  [] : No [Audit-CScore] : 0 [de-identified] : no [de-identified] : regular  [NSW6Yizzp] : 2 [TMJ2Mgxcs] : 5 [HIV test declined] : HIV test declined [Hepatitis C test declined] : Hepatitis C test declined [Change in mental status noted] : No change in mental status noted [Transportation] : transportation [Alone] : lives alone [# of Members in Household ___] :  household currently consist of [unfilled] member(s) [Retired] : retired [] :  [Sexually Active] : not sexually active [Feels Safe at Home] : Feels safe at home [Independent] : managing finances [Some assistance needed] : shopping [Full assistance needed] : using transportation [Reports changes in vision] : Reports no changes in vision [Smoke Detector] : smoke detector [Carbon Monoxide Detector] : carbon monoxide detector [Seat Belt] :  uses seat belt [de-identified] : with an aid  [AdvancecareDate] : 05/21

## 2021-05-28 NOTE — ASSESSMENT
[FreeTextEntry1] : Ms. WASHINGTON is a 89 year  female, who present to the office for follow up s/p discharge from [facility]

## 2021-05-28 NOTE — PLAN
[FreeTextEntry1] : Pulmonary - For Follow up on PE  - Referral given\par \par Cardiology for Evaluation and a nuclear stress test, echo, carotid sonogram. Referral given \par \par RTO in two week for a follow up\par \par Advised to continue all medication as directed. \par Will have [facility] do a home blood draw in two week \par Request medical records fro  the ED \par will review all test pending fax \par \par Called daughter and answered all question and concerns \par

## 2021-05-28 NOTE — COUNSELING
[Behavioral health counseling provided] : Behavioral health counseling provided [Engage in a relaxing activity] : Engage in a relaxing activity [Encouraged to increase physical activity] : Encouraged to increase physical activity [Transportation Issues] : Transportation issues [Good understanding] : Patient has a good understanding of lifestyle changes and steps needed to achieve self management goal

## 2021-06-04 ENCOUNTER — APPOINTMENT (OUTPATIENT)
Dept: PULMONOLOGY | Facility: CLINIC | Age: 86
End: 2021-06-04
Payer: MEDICARE

## 2021-06-04 VITALS — BODY MASS INDEX: 26.75 KG/M2 | WEIGHT: 128 LBS

## 2021-06-04 VITALS — OXYGEN SATURATION: 94 % | SYSTOLIC BLOOD PRESSURE: 132 MMHG | DIASTOLIC BLOOD PRESSURE: 70 MMHG | HEART RATE: 115 BPM

## 2021-06-04 PROCEDURE — 99205 OFFICE O/P NEW HI 60 MIN: CPT | Mod: 25

## 2021-06-04 PROCEDURE — 94010 BREATHING CAPACITY TEST: CPT

## 2021-06-04 PROCEDURE — 99072 ADDL SUPL MATRL&STAF TM PHE: CPT

## 2021-06-04 PROCEDURE — ZZZZZ: CPT

## 2021-06-04 NOTE — HISTORY OF PRESENT ILLNESS
[Former] : former [TextBox_4] : 89F no prior pulm hx, no prior clot hx, recently hospitalized after found to have mulitple PE.\par \par now on full ac\par reports she had a doppler that was negative for DVT (don’t have these results)\par echo without heart strain\par \par She has been sedentary since 2018\par no recent travel\par currently denies sob/cough/cp/palpitations\par fully vaccinated for covid [TextBox_13] : 20 [YearQuit] : 1970

## 2021-06-04 NOTE — ASSESSMENT
[FreeTextEntry1] : From long period of being sedentary?\par if this is the case may need life long ac\par \par suggest routine cancer screening\par will order CT abdomen now\par should fu with PCP regarding mammo/colonoscopy etc.

## 2021-06-04 NOTE — CONSULT LETTER
[___] : [unfilled] [FreeTextEntry1] : Dear ,\par \par I had the pleasure of evaluating your patient, GIACOMO WASHINGTON today in pulmonary consultation.  Please refer to my attached note for my findings and recommendations.\par \par \par Thank you for allowing me to participate in the care of your patient, please feel free to call with any questions or concerns.\par \par \par Sincerely,\par \par Lilian Carroll MD\par [facility] \par [facility]\par \par

## 2021-06-04 NOTE — PROCEDURE
[FreeTextEntry1] : CTA reviewed--david see chart\par \par \par  EXAM: ECHO TTE WO CON COMP W DOPP\par \par \par PROCEDURE DATE: 05/19/2021\par \par \par INTERPRETATION: INDICATION: Pulmonary hypertension\par Sonographer PH\par \par Blood Pressure 125/78 Height 147.3 cm Weight 57.7 kg BSA 1.43 sq m\par \par Dimensions:\par LA 2.5 Normal Values: 2.0 - 4.0 cm\par Ao 2.6 Normal Values: 2.0 - 3.8 cm\par SEPTUM 1.1 Normal Values: 0.6 - 1.2 cm\par PWT 1.0 Normal Values: 0.6 - 1.1 cm\par LVIDd 3.0 Normal Values: 3.0 - 5.6 cm\par LVIDs 2.1 Normal Values: 1.8 - 4.0 cm\par \par \par OBSERVATIONS:\par Technically difficult and limited study, no subcostal views were obtained\par Mitral Valve: normal, trace physiologic MR.\par Aortic Valve/Aorta: Aortic valve is not well-visualized, appears calcified with grossly normal opening\par Tricuspid Valve: normal with trace TR.\par Pulmonic Valve: Not well-visualized\par Left Atrium: normal\par Right Atrium: Not well-visualized\par Left Ventricle: normal LV size and systolic function, estimated LVEF of 65%.\par Right Ventricle: Grossly normal size and systolic function.\par Pericardium: no significant pericardial effusion.\par \par \par \par IMPRESSION:\par Technically difficult and limited study\par Normal left ventricular internal dimensions and systolic function, estimated LVEF of 65%.\par Grossly normal RV size and systolic function.\par Aortic valve is not well-visualized, appears calcified with grossly normal opening\par Trace physiologic MR and TR.\par No significant pericardial effusion.\par \par

## 2021-06-07 ENCOUNTER — RX RENEWAL (OUTPATIENT)
Age: 86
End: 2021-06-07

## 2021-06-09 ENCOUNTER — NON-APPOINTMENT (OUTPATIENT)
Age: 86
End: 2021-06-09

## 2021-06-09 ENCOUNTER — APPOINTMENT (OUTPATIENT)
Dept: CARDIOLOGY | Facility: CLINIC | Age: 86
End: 2021-06-09
Payer: MEDICARE

## 2021-06-09 VITALS
HEIGHT: 58 IN | WEIGHT: 135 LBS | SYSTOLIC BLOOD PRESSURE: 114 MMHG | BODY MASS INDEX: 28.34 KG/M2 | HEART RATE: 105 BPM | DIASTOLIC BLOOD PRESSURE: 68 MMHG | OXYGEN SATURATION: 92 %

## 2021-06-09 PROCEDURE — 93000 ELECTROCARDIOGRAM COMPLETE: CPT

## 2021-06-09 PROCEDURE — 99072 ADDL SUPL MATRL&STAF TM PHE: CPT

## 2021-06-09 PROCEDURE — 99205 OFFICE O/P NEW HI 60 MIN: CPT

## 2021-06-09 RX ORDER — ASPIRIN ENTERIC COATED TABLETS 81 MG 81 MG/1
81 TABLET, DELAYED RELEASE ORAL
Qty: 90 | Refills: 0 | Status: DISCONTINUED | COMMUNITY
Start: 2019-09-03 | End: 2021-06-09

## 2021-06-23 ENCOUNTER — APPOINTMENT (OUTPATIENT)
Dept: INTERNAL MEDICINE | Facility: CLINIC | Age: 86
End: 2021-06-23
Payer: MEDICARE

## 2021-06-23 DIAGNOSIS — R93.5 ABNORMAL FINDINGS ON DIAGNOSTIC IMAGING OF OTHER ABDOMINAL REGIONS, INCLUDING RETROPERITONEUM: ICD-10-CM

## 2021-06-23 PROCEDURE — 99442: CPT

## 2021-06-23 NOTE — HEALTH RISK ASSESSMENT
[] : No [1 or 2 (0 pts)] : 1 or 2 (0 points) [Never (0 pts)] : Never (0 points) [No] : In the past 12 months have you used drugs other than those required for medical reasons? No [No falls in past year] : Patient reported no falls in the past year [1] : 2) Feeling down, depressed, or hopeless for several days (1) [Audit-CScore] : 0 [de-identified] : no [de-identified] : regular  [KXU2Didka] : 2 [ZOO1Fecib] : 5

## 2021-06-23 NOTE — PLAN
[FreeTextEntry1] : Psych HALI - Extra counseling was given to the pt about depression and anxiety.  Continue with current medications   Consider therapy.   Refilled xanax -  advised risk of sedation. reviewed  \par \par Radiolgy - Ct scan reviewed with pt and daughter - will need to get an MRI of the abdomen and pelvis w and w/o iv contrast to r/o a pancreatic mass -\par \par Cardio - Hypertension -  Patient was educated about hypertension and the importance of controlling the pressure through lifestyle modification which include low sodium  diet and  aerobic  exercise.  Also discussed the use of prescription medication which included their benefits and their side effects. We discussed the use of ASA 81 mg daily.   Having a BMI less than or equal to 25. \par Hyperlipidemia -   Advised low fat cholesterol diet.  Advised importance of having low cholesterol / LDL/ triglycerides. Advised life style modifications.  Continue with current medications.  \par \par  - Discussed Kalpana patel- advised to call [phone] for more information and coverage \par \par spent  total of 15 minutes on the phone with the pt

## 2021-06-23 NOTE — ASSESSMENT
[FreeTextEntry1] : Mrs. WASHINGTON is a 89 year  female, who present to the office for renewal of medication and review of recent CT scan of the abdomen

## 2021-06-23 NOTE — HISTORY OF PRESENT ILLNESS
[Home] : at home, [unfilled] , at the time of the visit. [Medical Office: ([facility])___] : at the medical office located in  [Verbal consent obtained from patient] : the patient, [unfilled] [FreeTextEntry1] : Telephone visit  (no video)/ medication renewal  [de-identified] : Mrs. WASHINGTON is a 89 year  female, who present for a telephone visit for a renewal of xanax and to review recent CT scan of the abdomen\par States she been feeling very anxious since having the blood lots.  \par Also not sleeping well secondary to the urinary incontinence

## 2021-06-25 ENCOUNTER — RX RENEWAL (OUTPATIENT)
Age: 86
End: 2021-06-25

## 2021-07-02 ENCOUNTER — INPATIENT (INPATIENT)
Facility: HOSPITAL | Age: 86
LOS: 3 days | Discharge: HOME | DRG: 922 | End: 2021-07-06
Attending: FAMILY MEDICINE | Admitting: STUDENT IN AN ORGANIZED HEALTH CARE EDUCATION/TRAINING PROGRAM
Payer: MEDICARE

## 2021-07-02 VITALS
RESPIRATION RATE: 18 BRPM | HEART RATE: 70 BPM | TEMPERATURE: 98 F | OXYGEN SATURATION: 96 % | WEIGHT: 134.92 LBS | SYSTOLIC BLOOD PRESSURE: 157 MMHG | HEIGHT: 58 IN | DIASTOLIC BLOOD PRESSURE: 97 MMHG

## 2021-07-02 DIAGNOSIS — Z90.710 ACQUIRED ABSENCE OF BOTH CERVIX AND UTERUS: Chronic | ICD-10-CM

## 2021-07-02 DIAGNOSIS — Z96.60 PRESENCE OF UNSPECIFIED ORTHOPEDIC JOINT IMPLANT: Chronic | ICD-10-CM

## 2021-07-02 DIAGNOSIS — R41.82 ALTERED MENTAL STATUS, UNSPECIFIED: ICD-10-CM

## 2021-07-02 DIAGNOSIS — I26.99 OTHER PULMONARY EMBOLISM WITHOUT ACUTE COR PULMONALE: ICD-10-CM

## 2021-07-02 DIAGNOSIS — R09.89 OTHER SPECIFIED SYMPTOMS AND SIGNS INVOLVING THE CIRCULATORY AND RESPIRATORY SYSTEMS: ICD-10-CM

## 2021-07-02 LAB
ALBUMIN SERPL ELPH-MCNC: 3 G/DL — LOW (ref 3.3–5)
ALP SERPL-CCNC: 87 U/L — SIGNIFICANT CHANGE UP (ref 40–120)
ALT FLD-CCNC: 23 U/L — SIGNIFICANT CHANGE UP (ref 12–78)
ANION GAP SERPL CALC-SCNC: 5 MMOL/L — SIGNIFICANT CHANGE UP (ref 5–17)
APPEARANCE UR: CLEAR — SIGNIFICANT CHANGE UP
APTT BLD: 31 SEC — SIGNIFICANT CHANGE UP (ref 27.5–35.5)
AST SERPL-CCNC: 34 U/L — SIGNIFICANT CHANGE UP (ref 15–37)
BILIRUB SERPL-MCNC: 0.2 MG/DL — SIGNIFICANT CHANGE UP (ref 0.2–1.2)
BILIRUB UR-MCNC: NEGATIVE — SIGNIFICANT CHANGE UP
BUN SERPL-MCNC: 19 MG/DL — SIGNIFICANT CHANGE UP (ref 7–23)
CALCIUM SERPL-MCNC: 8.6 MG/DL — SIGNIFICANT CHANGE UP (ref 8.5–10.1)
CHLORIDE SERPL-SCNC: 102 MMOL/L — SIGNIFICANT CHANGE UP (ref 96–108)
CO2 SERPL-SCNC: 32 MMOL/L — HIGH (ref 22–31)
COLOR SPEC: YELLOW — SIGNIFICANT CHANGE UP
CREAT SERPL-MCNC: 0.75 MG/DL — SIGNIFICANT CHANGE UP (ref 0.5–1.3)
DIFF PNL FLD: NEGATIVE — SIGNIFICANT CHANGE UP
GLUCOSE SERPL-MCNC: 84 MG/DL — SIGNIFICANT CHANGE UP (ref 70–99)
GLUCOSE UR QL: NEGATIVE — SIGNIFICANT CHANGE UP
HCT VFR BLD CALC: 32.7 % — LOW (ref 34.5–45)
HGB BLD-MCNC: 10.7 G/DL — LOW (ref 11.5–15.5)
INR BLD: 1.7 RATIO — HIGH (ref 0.88–1.16)
KETONES UR-MCNC: NEGATIVE — SIGNIFICANT CHANGE UP
LEUKOCYTE ESTERASE UR-ACNC: ABNORMAL
LIDOCAIN IGE QN: 67 U/L — LOW (ref 73–393)
MCHC RBC-ENTMCNC: 31.8 PG — SIGNIFICANT CHANGE UP (ref 27–34)
MCHC RBC-ENTMCNC: 32.7 GM/DL — SIGNIFICANT CHANGE UP (ref 32–36)
MCV RBC AUTO: 97 FL — SIGNIFICANT CHANGE UP (ref 80–100)
NITRITE UR-MCNC: NEGATIVE — SIGNIFICANT CHANGE UP
NRBC # BLD: 0 /100 WBCS — SIGNIFICANT CHANGE UP (ref 0–0)
NT-PROBNP SERPL-SCNC: 379 PG/ML — SIGNIFICANT CHANGE UP (ref 0–450)
OB PNL STL: NEGATIVE — SIGNIFICANT CHANGE UP
PH UR: 7 — SIGNIFICANT CHANGE UP (ref 5–8)
PLATELET # BLD AUTO: 252 K/UL — SIGNIFICANT CHANGE UP (ref 150–400)
POTASSIUM SERPL-MCNC: 4.5 MMOL/L — SIGNIFICANT CHANGE UP (ref 3.5–5.3)
POTASSIUM SERPL-SCNC: 4.5 MMOL/L — SIGNIFICANT CHANGE UP (ref 3.5–5.3)
PROT SERPL-MCNC: 7.4 G/DL — SIGNIFICANT CHANGE UP (ref 6–8.3)
PROT UR-MCNC: NEGATIVE — SIGNIFICANT CHANGE UP
PROTHROM AB SERPL-ACNC: 19.4 SEC — HIGH (ref 10.6–13.6)
RBC # BLD: 3.37 M/UL — LOW (ref 3.8–5.2)
RBC # FLD: 15.3 % — HIGH (ref 10.3–14.5)
SARS-COV-2 RNA SPEC QL NAA+PROBE: SIGNIFICANT CHANGE UP
SODIUM SERPL-SCNC: 139 MMOL/L — SIGNIFICANT CHANGE UP (ref 135–145)
SP GR SPEC: 1 — LOW (ref 1.01–1.02)
TROPONIN I SERPL-MCNC: <.015 NG/ML — SIGNIFICANT CHANGE UP (ref 0.01–0.04)
UROBILINOGEN FLD QL: NEGATIVE — SIGNIFICANT CHANGE UP
WBC # BLD: 8.88 K/UL — SIGNIFICANT CHANGE UP (ref 3.8–10.5)
WBC # FLD AUTO: 8.88 K/UL — SIGNIFICANT CHANGE UP (ref 3.8–10.5)

## 2021-07-02 PROCEDURE — 70450 CT HEAD/BRAIN W/O DYE: CPT | Mod: 26,MA

## 2021-07-02 PROCEDURE — 99285 EMERGENCY DEPT VISIT HI MDM: CPT

## 2021-07-02 PROCEDURE — 99222 1ST HOSP IP/OBS MODERATE 55: CPT | Mod: GC

## 2021-07-02 PROCEDURE — 93010 ELECTROCARDIOGRAM REPORT: CPT

## 2021-07-02 PROCEDURE — 71250 CT THORAX DX C-: CPT | Mod: 26,MA

## 2021-07-02 PROCEDURE — 71045 X-RAY EXAM CHEST 1 VIEW: CPT | Mod: 26

## 2021-07-02 RX ORDER — ASPIRIN/CALCIUM CARB/MAGNESIUM 324 MG
0 TABLET ORAL
Qty: 0 | Refills: 0 | DISCHARGE

## 2021-07-02 RX ORDER — SODIUM CHLORIDE 9 MG/ML
500 INJECTION INTRAMUSCULAR; INTRAVENOUS; SUBCUTANEOUS ONCE
Refills: 0 | Status: COMPLETED | OUTPATIENT
Start: 2021-07-02 | End: 2021-07-02

## 2021-07-02 RX ORDER — CEFTRIAXONE 500 MG/1
1000 INJECTION, POWDER, FOR SOLUTION INTRAMUSCULAR; INTRAVENOUS ONCE
Refills: 0 | Status: COMPLETED | OUTPATIENT
Start: 2021-07-02 | End: 2021-07-02

## 2021-07-02 RX ADMIN — SODIUM CHLORIDE 500 MILLILITER(S): 9 INJECTION INTRAMUSCULAR; INTRAVENOUS; SUBCUTANEOUS at 18:39

## 2021-07-02 RX ADMIN — CEFTRIAXONE 100 MILLIGRAM(S): 500 INJECTION, POWDER, FOR SOLUTION INTRAMUSCULAR; INTRAVENOUS at 21:17

## 2021-07-02 RX ADMIN — SODIUM CHLORIDE 500 MILLILITER(S): 9 INJECTION INTRAMUSCULAR; INTRAVENOUS; SUBCUTANEOUS at 17:39

## 2021-07-02 RX ADMIN — CEFTRIAXONE 1000 MILLIGRAM(S): 500 INJECTION, POWDER, FOR SOLUTION INTRAMUSCULAR; INTRAVENOUS at 21:45

## 2021-07-02 NOTE — ED PROVIDER NOTE - TOBACCO USE
Former smoker 1. FOLLOW UP WITH YOUR PRIMARY DOCTOR IN 24-48 HOURS.   2. FOLLOW UP WITH ALL SPECIALIST DISCUSSED DURING YOUR VISIT.   3. TAKE ALL MEDICATIONS PRESCRIBED IN THE ER IF ANY ARE PRESCRIBED. CONTINUE YOUR HOME MEDICATIONS UNLESS OTHERWISE ADVISED DIFFERENTLY.   4. RETURN FOR WORSENING SYMPTOMS OR CONCERNS INCLUDING BUT NOT LIMITED TO FEVER, CHEST PAIN, OR TROUBLE BREATHING OR ANY OTHER CONCERNS   5. take synthroid 150mcg every other day for 2 week and then every day

## 2021-07-02 NOTE — H&P ADULT - PROBLEM SELECTOR PLAN 1
- Patient with 3 day Hx of confusion and disorientation in the setting of decreased oral intake with improvement in her mental status after 500cc of NS bolus.  - encephalopathy likely secondary to dehydration vs early dementia   - CT head negative for any acute intracranial pathology  - patient AAOx3 at baseline as per daughter  - will encourage increased oral intake  - Neurology Dr. Amaya Consulted, will appreciate recs - Patient with 3 day Hx of confusion and disorientation in the setting of decreased oral intake with improvement in her mental status after 500cc of NS bolus.  - encephalopathy likely secondary to dehydration vs early dementia   - medications reviewed   - no signs of acute infection , trend cbc, monitor for fevers   - electrolytes wnl  - CT head negative for any acute intracranial pathology  - patient AAOx3 at baseline as per daughter  - will encourage increased oral intake  - Neurology Dr. Amaya Consulted, will appreciate recs

## 2021-07-02 NOTE — H&P ADULT - HISTORY OF PRESENT ILLNESS
89 yo female pmh Anxiety, CVA, Fall, GI bleed, Hip fracture, Hyperlipidemia, Hypertension, PUD (peptic ulcer disease) presenting with fatigue and increased confusion per daughter. Pt reports that she has been feeling fatigued for the last 3 days with increased urinary frequency.     Of note was recently admitted to the hospital from 5/18-5/20 with PE and bilateral lower ext swelling. her Norvasc was held, lower extremity edema has since improved. For the PE pt was started on Eliquis.     In the ED CBC, coags, fobt, cmp, lipase, troponin, probnp, UA, CT head, CXR were obtained. Significant for H&H 10.7/32.7 (12.2/36.8- May 2021), INR 1.7, albumin 3.0, UA with small leuk esterase, 6-10 wbc, occasional bacteria , covid pcr negative. CXR no acute pathology. CT head no acute pathology.   Pt was given rocephin 1g x 1, 500cc NS bolus.  89 yo female PMH of PE on Eliquis (05/21), anxiety, depression, CVA, Fall, GI bleed, Hip fracture, Hyperlipidemia, Hypertension, PUD (peptic ulcer disease), hiatal hernia presented to the ED with the c/o  fatigue and increased confusion per daughter. History obtained from patients daughter Johana [phone], who states that for the past 3 days patient has been having episodes of confusion and mild disorientation along with difficulty with speech. Then according to the daughter today she has been sleeping a lot which is very unusual for her. Patient according to the daughter is AA) x3 at baseline.  Patient lives with 24 hour nursing aid and daughter Johana visits her daily or if not then talk to her over the phone. Daughter also informs that for the past few days patient has decreased oral intake for both liquids and solids.  Pt reports that she has been feeling fatigued for the last 3 days with increased urinary frequency but denies any burning or urgency. Denies any chest pain, SOB, or palpitations. Denies any nausea or vomiting, no diarrhea.    Of note was recently admitted to the hospital from 5/18-5/20 with PE and bilateral lower ext swelling. Her Norvasc was held, lower extremity edema has since improved. For the PE pt was started on Eliquis.     In the ED CBC, coags, fobt, cmp, lipase, troponin, probnp, UA, CT head, CXR were obtained. Significant for H&H 10.7/32.7 (12.2/36.8- May 2021), INR 1.7, albumin 3.0, UA with small leuk esterase, 6-10 wbc, occasional bacteria , covid pcr negative. CXR no acute pathology mild cardiomegaly, CT head no acute pathology. CT chest: dependent atelectasis at lung bases, hiatal hernia noted.   Pt was given Rocephin 1g x 1, 500cc NS bolus.  89 yo female PMH of PE on Eliquis (05/21), anxiety, depression, CVA, Fall, GI bleed, Hip fracture, Hyperlipidemia, Hypertension, PUD (peptic ulcer disease), hiatal hernia presented to the ED with the c/o  fatigue and increased confusion per daughter. History obtained from patients daughter Johana [phone], who states that for the past 3 days patient has been having episodes of confusion and mild disorientation along with difficulty with speech. Then according to the daughter today she has been sleeping a lot which is very unusual for her. Patient according to the daughter is AA) x3 at baseline.  Patient lives with 24 hour nursing aid and daughter Johana visits her daily or if not then talk to her over the phone. Daughter also informs that for the past few days patient has decreased oral intake for both liquids and solids.  Pt reports that she has been feeling fatigued for the last 3 days with increased urinary frequency but denies any burning or urgency. Denies any chest pain, SOB, or palpitations. Denies any nausea or vomiting, no diarrhea.    Of note was recently admitted to the hospital from 5/18-5/20 with PE and bilateral lower ext swelling. Her Norvasc was held, lower extremity edema has since improved. For the PE pt was started on Eliquis.     In the ED CBC, coags, fobt, cmp, lipase, troponin, probnp, UA, CT head, CXR were obtained.   Significant for H&H 10.7/32.7 (12.2/36.8- May 2021), INR 1.7, albumin 3.0, UA with small leuk esterase, 6-10 wbc, occasional bacteria , covid pcr negative. CXR no acute pathology mild cardiomegaly,   CT head no acute pathology. CT chest: dependent atelectasis at lung bases, hiatal hernia noted.   Pt was given Rocephin 1g x 1, 500cc NS bolus.  89 yo female PMH of PE on Eliquis (05/21), anxiety, depression, CVA, Fall, GI bleed, Hip fracture, Hyperlipidemia, Hypertension, PUD (peptic ulcer disease), hiatal hernia presented to the ED with the c/o  fatigue and increased confusion per daughter. History obtained from patients daughter Johana [phone], who states that for the past 3 days patient has been having episodes of confusion and mild disorientation along with difficulty with speech. Then according to the daughter today she has been sleeping a lot which is very unusual for her. Patient according to the daughter is AA) x3 at baseline.  Patient lives with 24 hour nursing aid and daughter Johana visits her daily or if not then talk to her over the phone. Daughter also informs that for the past few days patient has decreased oral intake for both liquids and solids.  Pt reports that she has been feeling fatigued for the last 3 days with increased urinary frequency but denies any burning or urgency. Denies any chest pain, SOB, or palpitations. Denies any nausea or vomiting, no diarrhea.    Of note was recently admitted to the hospital from 5/18-5/20 with PE and bilateral lower ext swelling. Her Norvasc was held, lower extremity edema has since improved. For the PE pt was started on Eliquis.     In the ED CBC, coags, fobt, cmp, lipase, troponin, probnp, UA, CT head, CXR were obtained.   Significant for H&H 10.7/32.7 (12.2/36.8- May 2021), INR 1.7, albumin 3.0, UA with small leuk esterase, 6-10 wbc, occasional bacteria , covid pcr negative. CXR no acute pathology mild cardiomegaly,   CT head no acute pathology. CT chest: dependent atelectasis at lung bases, hiatal hernia noted.   EKG: NSR with HR of 66, 1st Degree AV block  Pt was given Rocephin 1g x 1, 500cc NS bolus.

## 2021-07-02 NOTE — H&P ADULT - ASSESSMENT
89 yo female pmh Anxiety, CVA, Fall, GI bleed, Hip fracture, Hyperlipidemia, Hypertension, PUD (peptic ulcer disease) presenting with fatigue and increased confusion per daughter 89 yo female PMH of PE on Eliquis (05/21), anxiety, depression, CVA, Fall, GI bleed, Hip fracture, Hyperlipidemia, Hypertension, PUD (peptic ulcer disease), hiatal hernia presented to the ED with the c/o  fatigue and increased confusion per daughter is being admitted for confusion 89 yo female PMH of PE on Eliquis (05/21), anxiety, depression, CVA, Fall, GI bleed, Hip fracture, Hyperlipidemia, Hypertension, PUD (peptic ulcer disease), hiatal hernia presented to the ED with the c/o  fatigue and increased confusion per daughter is being admitted for generalized weakness, encephalopathy

## 2021-07-02 NOTE — H&P ADULT - PROBLEM SELECTOR PLAN 4
- Chronic stable  - On Rosuvastatin 10mg, will continue with therapeutic interchange atorvastatin 40 qd  - continue to monitor

## 2021-07-02 NOTE — H&P ADULT - PROBLEM SELECTOR PLAN 5
- Admission labs show hemoglobin of 10.7, baseline at 12.2 from chart review  - likely iron deficiency anemia as patient is on iron supplements   - will continue ferrous sulfate   - f/u iron studies  - follow hemoglobin with daily cbc  - continue to monitor.

## 2021-07-02 NOTE — ED PROVIDER NOTE - NEUROLOGICAL, MLM
Alert and oriented to person and place with confusion to year and present events, no focal deficits, no motor or sensory deficits.

## 2021-07-02 NOTE — ED PROVIDER NOTE - PHYSICAL EXAMINATION
chronic mild left facial droop from [place] Palsy   bilateral peripheral edema +2 left greater then right   chronic bilateral lower ext weakness

## 2021-07-02 NOTE — H&P ADULT - PROBLEM SELECTOR PLAN 3
- chronic stable  - Patient was previously on Amlodipine which was discontinued as patient developed lower extremity edema  - Pt remained off antihypertensive medication   - Blood pressure elevated at the time of presentation to ED with SBP in 170's-180's  - will start patient of Losartan 25mg qd  - will continue to monitor routine hemodynamics - bp elevated on presentation   - Patient was previously on Amlodipine which was discontinued as patient developed lower extremity edema  - Pt remained off antihypertensive medication   - Blood pressure elevated at the time of presentation to ED with SBP in 170's-180's  - will start patient of Losartan 25mg qd  - will continue to monitor routine hemodynamics

## 2021-07-02 NOTE — H&P ADULT - NSHPSOCIALHISTORY_GEN_ALL_CORE
Lives home alone with 24 hour aide   denies tobacco, etoh, recreational drugs  ambulates/adls-   Covid vaccinated Moderna Lives home alone with 24 hour aide   denies tobacco, etoh, recreational drugs  Covid vaccinated Moderna

## 2021-07-02 NOTE — H&P ADULT - PROBLEM SELECTOR PLAN 6
IMPROVE VTE Individual Risk Assessment          RISK                                                          Points  [  ] Previous VTE                                                3  [  ] Thrombophilia                                             2  [  ] Lower limb paralysis                                   2        (unable to hold up >15 seconds)    [  ] Current Cancer                                             2         (within 6 months)  [  ] Immobilization > 24 hrs                              1  [  ] ICU/CCU stay > 24 hours                             1  [  ] Age > 60                                                         1    IMPROVE VTE Score:     Eliquis 5mg bid

## 2021-07-02 NOTE — H&P ADULT - NSHPREVIEWOFSYSTEMS_GEN_ALL_CORE
Constitutional: denies fever, chills, admits fatigue/malaise   HEENT: denies dry mouth, sore throat, runny nose, visual changes  Respiratory: denies SOB, VEL[place]ZQUEZ, cough, sputum production, wheezing, hemoptysis  Cardiovascular: denies CP, palpitations, edema  Gastrointestinal: denies nausea, vomiting, diarrhea, constipation, abdominal pain  Genitourinary: admits frequency, denies dysuria, hematuria   Skin/Breast: denies rash  Musculoskeletal: denies myalgias, arthralgias  Neurologic: denies syncope, LOC, headache, weakness, dizziness, paresthesias, numbness, seizures. Admits confusion    ROS negative except as noted above

## 2021-07-02 NOTE — ED PROVIDER NOTE - CLINICAL SUMMARY MEDICAL DECISION MAKING FREE TEXT BOX
Pt is a 89 yo female who presents to the ED with a cc of increased fatigue and increased confusion. PMHx of Anxiety, CVA (cerebral vascular accident)    Fall, GI bleed, Hip fracture, Hyperlipidemia, Hypertension, PUD (peptic ulcer disease). Pt was recently admitted to the hospital from 5/18-5/20 with PE and bilateral lower ext swelling. It was believed that the lower ext swelling was due to her Norvasc and so this was held. For the PE pt was started on Eliquis. Pt reports that she has been feeling fatigued for the last 3 days with increased urinary frequency. Denies fever, chills, N/V/D/C, CP, SOB, abd pain, ext numbness. Pt daughter reports that she lives alone with 24 hour aides. She has noted that over the last several days pt has appeared fatigued. She has been sleeping through the night which is not usual and has also been sleeping during the day which is also not usual. The daughter also reports that the pt appears to be more confused then baseline. When questioned about her leg swelling daughter reports that the swelling is improved when compared to several weeks ago.  Pt has been vaccinated for COVID 19 with Moderna Pt is a 89 yo female who presents to the ED with a cc of increased fatigue and increased confusion. PMHx of Anxiety, CVA (cerebral vascular accident)    Fall, GI bleed, Hip fracture, Hyperlipidemia, Hypertension, PUD (peptic ulcer disease). Pt was recently admitted to the hospital from 5/18-5/20 with PE and bilateral lower ext swelling. It was believed that the lower ext swelling was due to her Norvasc and so this was held. For the PE pt was started on Eliquis. Pt reports that she has been feeling fatigued for the last 3 days with increased urinary frequency. Denies fever, chills, N/V/D/C, CP, SOB, abd pain, ext numbness. Pt daughter reports that she lives alone with 24 hour aides. She has noted that over the last several days pt has appeared fatigued. She has been sleeping through the night which is not usual and has also been sleeping during the day which is also not usual. The daughter also reports that the pt appears to be more confused then baseline. When questioned about her leg swelling daughter reports that the swelling is improved when compared to several weeks ago.  Pt has been vaccinated for COVID 19 with Moderna. Pt presenting with worsening confusion and increased fatigue. Concern for possible infectious component vs neurological issue. Will obtain screening labs, check UA, urine culture, obtain CT head, EKG, and monitor

## 2021-07-02 NOTE — H&P ADULT - NSHPPHYSICALEXAM_GEN_ALL_CORE
Vital Signs Last 24 Hrs  T(C): 36.4 (02 Jul 2021 21:13), Max: 36.7 (02 Jul 2021 16:38)  T(F): 97.5 (02 Jul 2021 21:13), Max: 98 (02 Jul 2021 16:38)  HR: 64 (02 Jul 2021 21:13) (64 - 70)  BP: 181/91 (02 Jul 2021 21:13) (157/97 - 181/91)  BP(mean): --  RR: 18 (02 Jul 2021 21:13) (18 - 18)  SpO2: 94% (02 Jul 2021 21:13) (94% - 96%)    General: Well developed, well nourished, NAD  HEENT: NCAT, PERRLA, EOMI bl, moist mucous membranes   Neck: Supple, nontender, no mass  Neurology: A&Ox3, nonfocal, CN II-XII grossly intact, sensation intact, no gait abnormalities   Respiratory: CTA B/L, No W/R/R  CV: RRR, +S1/S2, no murmurs, rubs or gallops  Abdominal: Soft, NT, ND +BSx4  Extremities: No C/C/E, + peripheral pulses  MSK: Normal ROM, no joint erythema or warmth, no joint swelling   Skin: warm, dry, normal color, no rash or abnormal lesions Vital Signs Last 24 Hrs  T(C): 36.4 (02 Jul 2021 21:13), Max: 36.7 (02 Jul 2021 16:38)  T(F): 97.5 (02 Jul 2021 21:13), Max: 98 (02 Jul 2021 16:38)  HR: 64 (02 Jul 2021 21:13) (64 - 70)  BP: 181/91 (02 Jul 2021 21:13) (157/97 - 181/91)  BP(mean): --  RR: 18 (02 Jul 2021 21:13) (18 - 18)  SpO2: 94% (02 Jul 2021 21:13) (94% - 96%)    General: Well developed, well nourished, NAD  HEENT: NCAT, PERRLA, EOMI bl, moist mucous membranes   Neck: Supple, nontender, no mass  Neurology: A&Ox3, nonfocal, CN II-XII grossly intact, sensation intact, no gait abnormalities   Respiratory: CTA B/L, No W/R/R  CV: RRR, +S1/S2, no murmurs, rubs or gallops  Abdominal: Soft, NT, ND +BSx4  Extremities: No C/C, + peripheral pulses, 1+ B/L LE edema  MSK: Normal ROM, no joint erythema or warmth, no joint swelling   Skin: warm, dry, normal color, Vital Signs Last 24 Hrs  T(C): 36.4 (02 Jul 2021 21:13), Max: 36.7 (02 Jul 2021 16:38)  T(F): 97.5 (02 Jul 2021 21:13), Max: 98 (02 Jul 2021 16:38)  HR: 64 (02 Jul 2021 21:13) (64 - 70)  BP: 181/91 (02 Jul 2021 21:13) (157/97 - 181/91)  BP(mean): --  RR: 18 (02 Jul 2021 21:13) (18 - 18)  SpO2: 94% (02 Jul 2021 21:13) (94% - 96%)    General: Well developed, well nourished, NAD  HEENT: NCAT, PERRLA, EOMI bl, moist mucous membranes   Neck: Supple, nontender, no mass  Neurology: A&Ox3, nonfocal, CN II-XII grossly intact, sensation intact, no gait abnormalities   Respiratory: CTA B/L, No W/R/R  CV: RRR, +S1/S2, no murmurs, rubs or gallops  Abdominal: Soft, NT, ND +BSx4  Extremities: No C/C, + peripheral pulses, 1+ B/L LE edema  MSK: Normal ROM, no joint erythema or warmth, no joint swelling   Skin: warm, dry, normal color

## 2021-07-02 NOTE — ED PROVIDER NOTE - OBJECTIVE STATEMENT
Pt is a 89 yo female who presents to the ED with a cc of increased fatigue and increased confusion. PMHx of Anxiety, CVA (cerebral vascular accident)    Fall, GI bleed, Hip fracture, Hyperlipidemia, Hypertension, PUD (peptic ulcer disease). Pt was recently admitted to the hospital from 5/18-5/20 with PE and bilateral lower ext swelling. It was believed that the lower ext swelling was due to her Norvasc and so this was held. For the PE pt was started on Eliquis. Pt reports that she has been feeling fatigued for the last 3 days with increased urinary frequency. Denies fever, chills, N/V/D/C, CP, SOB, abd pain, ext numbness. Pt daughter reports that she lives alone with 24 hour aides. She has noted that over the last several days pt has appeared fatigued. She has been sleeping through the night which is not usual and has also been sleeping during the day which is also not usual. The daughter also reports that the pt appears to be more confused then baseline. When questioned about her leg swelling daughter reports that the swelling is improved when compared to several weeks ago.  Pt has been vaccinated for COVID 19 with Moderna

## 2021-07-02 NOTE — H&P ADULT - PROBLEM SELECTOR PLAN 2
Patient with recent diagnosis of PE was admitted to [facility] 05/18/21  - Pt was found to have Pulmonary emboli and was discharged on Eliquis   - Denies any SOB or chest pain  - will continue with Eliquis 5mg bid  - continue to monitor

## 2021-07-02 NOTE — ED ADULT NURSE NOTE - OBJECTIVE STATEMENT
Pt received sitting on stretcher in NAD. Pt AOx3 daughter brought her here because she wasn't acting like her self and was concerned Neuro WNL. PERRLA. Lungs CTA, RR even unlabored. Ab soft non tender, + bowel sounds x 4quads. Denies Nausea, Vomiting, Diarrhea. Skin warm, dry, color appropriate for age and race.

## 2021-07-02 NOTE — ED ADULT NURSE NOTE - NSIMPLEMENTINTERV_GEN_ALL_ED
Implemented All Fall with Harm Risk Interventions:  [place] to call system. Call bell, personal items and telephone within reach. Instruct patient to call for assistance. Room bathroom lighting operational. Non-slip footwear when patient is off stretcher. Physically safe environment: no spills, clutter or unnecessary equipment. Stretcher in lowest position, wheels locked, appropriate side rails in place. Provide visual cue, wrist band, yellow gown, etc. Monitor gait and stability. Monitor for mental status changes and reorient to person, place, and time. Review medications for side effects contributing to fall risk. Reinforce activity limits and safety measures with patient and family. Provide visual clues: red socks.

## 2021-07-03 ENCOUNTER — TRANSCRIPTION ENCOUNTER (OUTPATIENT)
Age: 86
End: 2021-07-03

## 2021-07-03 DIAGNOSIS — R41.0 DISORIENTATION, UNSPECIFIED: ICD-10-CM

## 2021-07-03 DIAGNOSIS — G93.40 ENCEPHALOPATHY, UNSPECIFIED: ICD-10-CM

## 2021-07-03 DIAGNOSIS — E78.5 HYPERLIPIDEMIA, UNSPECIFIED: ICD-10-CM

## 2021-07-03 DIAGNOSIS — D64.9 ANEMIA, UNSPECIFIED: ICD-10-CM

## 2021-07-03 DIAGNOSIS — I10 ESSENTIAL (PRIMARY) HYPERTENSION: ICD-10-CM

## 2021-07-03 DIAGNOSIS — Z86.711 PERSONAL HISTORY OF PULMONARY EMBOLISM: ICD-10-CM

## 2021-07-03 DIAGNOSIS — Z29.9 ENCOUNTER FOR PROPHYLACTIC MEASURES, UNSPECIFIED: ICD-10-CM

## 2021-07-03 DIAGNOSIS — N39.0 URINARY TRACT INFECTION, SITE NOT SPECIFIED: ICD-10-CM

## 2021-07-03 LAB
ALBUMIN SERPL ELPH-MCNC: 3.2 G/DL — LOW (ref 3.3–5)
ALP SERPL-CCNC: 97 U/L — SIGNIFICANT CHANGE UP (ref 40–120)
ALT FLD-CCNC: 23 U/L — SIGNIFICANT CHANGE UP (ref 12–78)
ANION GAP SERPL CALC-SCNC: 5 MMOL/L — SIGNIFICANT CHANGE UP (ref 5–17)
AST SERPL-CCNC: 26 U/L — SIGNIFICANT CHANGE UP (ref 15–37)
BASOPHILS # BLD AUTO: 0.06 K/UL — SIGNIFICANT CHANGE UP (ref 0–0.2)
BASOPHILS NFR BLD AUTO: 0.6 % — SIGNIFICANT CHANGE UP (ref 0–2)
BILIRUB SERPL-MCNC: 0.4 MG/DL — SIGNIFICANT CHANGE UP (ref 0.2–1.2)
BUN SERPL-MCNC: 13 MG/DL — SIGNIFICANT CHANGE UP (ref 7–23)
CALCIUM SERPL-MCNC: 8.9 MG/DL — SIGNIFICANT CHANGE UP (ref 8.5–10.1)
CHLORIDE SERPL-SCNC: 104 MMOL/L — SIGNIFICANT CHANGE UP (ref 96–108)
CO2 SERPL-SCNC: 32 MMOL/L — HIGH (ref 22–31)
CREAT SERPL-MCNC: 0.59 MG/DL — SIGNIFICANT CHANGE UP (ref 0.5–1.3)
EOSINOPHIL # BLD AUTO: 0.25 K/UL — SIGNIFICANT CHANGE UP (ref 0–0.5)
EOSINOPHIL NFR BLD AUTO: 2.3 % — SIGNIFICANT CHANGE UP (ref 0–6)
FERRITIN SERPL-MCNC: 47 NG/ML — SIGNIFICANT CHANGE UP (ref 15–150)
GLUCOSE SERPL-MCNC: 88 MG/DL — SIGNIFICANT CHANGE UP (ref 70–99)
HCT VFR BLD CALC: 36.1 % — SIGNIFICANT CHANGE UP (ref 34.5–45)
HGB BLD-MCNC: 11.9 G/DL — SIGNIFICANT CHANGE UP (ref 11.5–15.5)
IMM GRANULOCYTES NFR BLD AUTO: 0.3 % — SIGNIFICANT CHANGE UP (ref 0–1.5)
IRON SATN MFR SERPL: 15 % — SIGNIFICANT CHANGE UP (ref 14–50)
IRON SATN MFR SERPL: 43 UG/DL — SIGNIFICANT CHANGE UP (ref 30–160)
LACTATE SERPL-SCNC: 1.2 MMOL/L — SIGNIFICANT CHANGE UP (ref 0.7–2)
LYMPHOCYTES # BLD AUTO: 1.09 K/UL — SIGNIFICANT CHANGE UP (ref 1–3.3)
LYMPHOCYTES # BLD AUTO: 10.1 % — LOW (ref 13–44)
MCHC RBC-ENTMCNC: 31.6 PG — SIGNIFICANT CHANGE UP (ref 27–34)
MCHC RBC-ENTMCNC: 33 GM/DL — SIGNIFICANT CHANGE UP (ref 32–36)
MCV RBC AUTO: 96 FL — SIGNIFICANT CHANGE UP (ref 80–100)
MONOCYTES # BLD AUTO: 0.82 K/UL — SIGNIFICANT CHANGE UP (ref 0–0.9)
MONOCYTES NFR BLD AUTO: 7.6 % — SIGNIFICANT CHANGE UP (ref 2–14)
NEUTROPHILS # BLD AUTO: 8.57 K/UL — HIGH (ref 1.8–7.4)
NEUTROPHILS NFR BLD AUTO: 79.1 % — HIGH (ref 43–77)
NRBC # BLD: 0 /100 WBCS — SIGNIFICANT CHANGE UP (ref 0–0)
PLATELET # BLD AUTO: 275 K/UL — SIGNIFICANT CHANGE UP (ref 150–400)
POTASSIUM SERPL-MCNC: 3.7 MMOL/L — SIGNIFICANT CHANGE UP (ref 3.5–5.3)
POTASSIUM SERPL-SCNC: 3.7 MMOL/L — SIGNIFICANT CHANGE UP (ref 3.5–5.3)
PROT SERPL-MCNC: 7.7 G/DL — SIGNIFICANT CHANGE UP (ref 6–8.3)
RBC # BLD: 3.76 M/UL — LOW (ref 3.8–5.2)
RBC # FLD: 15 % — HIGH (ref 10.3–14.5)
SODIUM SERPL-SCNC: 141 MMOL/L — SIGNIFICANT CHANGE UP (ref 135–145)
TIBC SERPL-MCNC: 287 UG/DL — SIGNIFICANT CHANGE UP (ref 220–430)
UIBC SERPL-MCNC: 244 UG/DL — SIGNIFICANT CHANGE UP (ref 110–370)
WBC # BLD: 10.82 K/UL — HIGH (ref 3.8–10.5)
WBC # FLD AUTO: 10.82 K/UL — HIGH (ref 3.8–10.5)

## 2021-07-03 PROCEDURE — 99232 SBSQ HOSP IP/OBS MODERATE 35: CPT | Mod: GC

## 2021-07-03 RX ORDER — APIXABAN 2.5 MG/1
5 TABLET, FILM COATED ORAL EVERY 12 HOURS
Refills: 0 | Status: DISCONTINUED | OUTPATIENT
Start: 2021-07-03 | End: 2021-07-06

## 2021-07-03 RX ORDER — SUCRALFATE 1 G
1 TABLET ORAL
Refills: 0 | Status: DISCONTINUED | OUTPATIENT
Start: 2021-07-03 | End: 2021-07-06

## 2021-07-03 RX ORDER — GABAPENTIN 400 MG/1
100 CAPSULE ORAL
Refills: 0 | Status: DISCONTINUED | OUTPATIENT
Start: 2021-07-03 | End: 2021-07-06

## 2021-07-03 RX ORDER — ESCITALOPRAM OXALATE 10 MG/1
5 TABLET, FILM COATED ORAL DAILY
Refills: 0 | Status: DISCONTINUED | OUTPATIENT
Start: 2021-07-03 | End: 2021-07-04

## 2021-07-03 RX ORDER — LOSARTAN POTASSIUM 100 MG/1
25 TABLET, FILM COATED ORAL DAILY
Refills: 0 | Status: DISCONTINUED | OUTPATIENT
Start: 2021-07-03 | End: 2021-07-06

## 2021-07-03 RX ORDER — HYDRALAZINE HCL 50 MG
10 TABLET ORAL ONCE
Refills: 0 | Status: COMPLETED | OUTPATIENT
Start: 2021-07-03 | End: 2021-07-03

## 2021-07-03 RX ORDER — CHOLECALCIFEROL (VITAMIN D3) 125 MCG
1000 CAPSULE ORAL DAILY
Refills: 0 | Status: DISCONTINUED | OUTPATIENT
Start: 2021-07-03 | End: 2021-07-06

## 2021-07-03 RX ORDER — ALPRAZOLAM 0.25 MG
0.25 TABLET ORAL THREE TIMES A DAY
Refills: 0 | Status: DISCONTINUED | OUTPATIENT
Start: 2021-07-03 | End: 2021-07-04

## 2021-07-03 RX ORDER — FERROUS SULFATE 325(65) MG
325 TABLET ORAL DAILY
Refills: 0 | Status: DISCONTINUED | OUTPATIENT
Start: 2021-07-03 | End: 2021-07-06

## 2021-07-03 RX ORDER — ATORVASTATIN CALCIUM 80 MG/1
40 TABLET, FILM COATED ORAL AT BEDTIME
Refills: 0 | Status: DISCONTINUED | OUTPATIENT
Start: 2021-07-03 | End: 2021-07-06

## 2021-07-03 RX ORDER — NYSTATIN CREAM 100000 [USP'U]/G
1 CREAM TOPICAL
Refills: 0 | Status: DISCONTINUED | OUTPATIENT
Start: 2021-07-03 | End: 2021-07-06

## 2021-07-03 RX ADMIN — Medication 1000 UNIT(S): at 11:44

## 2021-07-03 RX ADMIN — APIXABAN 5 MILLIGRAM(S): 2.5 TABLET, FILM COATED ORAL at 17:25

## 2021-07-03 RX ADMIN — ATORVASTATIN CALCIUM 40 MILLIGRAM(S): 80 TABLET, FILM COATED ORAL at 21:55

## 2021-07-03 RX ADMIN — Medication 1 TABLET(S): at 11:44

## 2021-07-03 RX ADMIN — GABAPENTIN 100 MILLIGRAM(S): 400 CAPSULE ORAL at 17:25

## 2021-07-03 RX ADMIN — Medication 325 MILLIGRAM(S): at 11:44

## 2021-07-03 RX ADMIN — LOSARTAN POTASSIUM 25 MILLIGRAM(S): 100 TABLET, FILM COATED ORAL at 00:20

## 2021-07-03 RX ADMIN — Medication 10 MILLIGRAM(S): at 03:54

## 2021-07-03 RX ADMIN — APIXABAN 5 MILLIGRAM(S): 2.5 TABLET, FILM COATED ORAL at 05:33

## 2021-07-03 RX ADMIN — NYSTATIN CREAM 1 APPLICATION(S): 100000 CREAM TOPICAL at 17:26

## 2021-07-03 RX ADMIN — GABAPENTIN 100 MILLIGRAM(S): 400 CAPSULE ORAL at 05:33

## 2021-07-03 RX ADMIN — ESCITALOPRAM OXALATE 5 MILLIGRAM(S): 10 TABLET, FILM COATED ORAL at 11:44

## 2021-07-03 RX ADMIN — Medication 1 GRAM(S): at 17:25

## 2021-07-03 RX ADMIN — Medication 1 GRAM(S): at 05:33

## 2021-07-03 NOTE — DISCHARGE NOTE PROVIDER - NSDCQMERRANDS_GEN_ALL_CORE
Verified patient with 2 identifiers   Advised patient that the [facility] was denied for a tier exception as it is already an the lowest possible tier  Patient verified understanding. Yes

## 2021-07-03 NOTE — CONSULT NOTE ADULT - SUBJECTIVE AND OBJECTIVE BOX
ams possible poor oral intake depression  possible underlying dementia   neurologic wise cleared for discharge   spoke to dtr

## 2021-07-03 NOTE — DISCHARGE NOTE PROVIDER - CARE PROVIDER_API CALL
Jillian Chu  NEUROLOGY  [address] [facility], [place] [address]  Phone: [phone]  Fax: [phone]  Follow Up Time:    Jillian Chu  NEUROLOGY  [address] [facility], [address]  [place] [address]  Phone: [phone]  Fax: [phone]  Follow Up Time:     Josue Mendoza (DO)  Medicine  [facility]  [address] [facility] [address], [address]  [place] [address]  Phone: [phone]  Fax: [phone]  Established Patient  Follow Up Time:    Jillian Chu  NEUROLOGY  [address] [facility], [address]  [place] [address]  Phone: [phone]  Fax: [phone]  Follow Up Time: 1 week    Josue Mendoza (DO)  Medicine  [facility]  [address] [facility], [address]  [place] [address]  Phone: [phone]  Fax: [phone]  Established Patient  Follow Up Time: 1-3 days

## 2021-07-03 NOTE — PROVIDER CONTACT NOTE (OTHER) - ASSESSMENT
No signs of distress
No signs of distress
Patient denies any pain/discomfort - currently being assisted to commode

## 2021-07-03 NOTE — PROGRESS NOTE ADULT - PROBLEM SELECTOR PLAN 3
- bp elevated on presentation   - Patient was previously on Amlodipine which was discontinued as patient developed lower extremity edema  - Pt remained off antihypertensive medication   - Blood pressure elevated at the time of presentation to ED with SBP in 170's-180's  - Continue Losartan 25mg qd  - will continue to monitor routine hemodynamics - bp elevated on presentation, now stable.   - Patient was previously on Amlodipine which was discontinued as patient developed lower extremity edema  - Pt remained off antihypertensive medication   - Blood pressure elevated at the time of presentation to ED with SBP in 170's-180's  - Continue Losartan 25mg qd with hold parameters.  - will continue to monitor routine hemodynamics

## 2021-07-03 NOTE — DISCHARGE NOTE PROVIDER - PROVIDER TOKENS
PROVIDER:[TOKEN:[[id number]]] PROVIDER:[TOKEN:[[id number]]],PROVIDER:[TOKEN:[[id number]],ESTABLISHEDPATIENT:[T]] PROVIDER:[TOKEN:[[id number]],FOLLOWUP:[1 week]],PROVIDER:[TOKEN:[[id number]],FOLLOWUP:[1-3 days],ESTABLISHEDPATIENT:[T]]

## 2021-07-03 NOTE — DISCHARGE NOTE PROVIDER - NSDCQMSTAIRS_GEN_ALL_CORE
Bedside and Verbal shift change report given to [facility] RN (oncoming nurse) by Deandre Malik RN (offgoing nurse). Report included the following information SBAR, Intake/Output, MAR, Recent Results and Cardiac Rhythm ST w/ PACs. 0000 Pt c/o cramping abdominal pain, order for Bentyl 10 mg tid obtained, administered with some relief of sx. Bedside shift change report given to Yessenia [address] (oncoming nurse) by Rajan Mcgraw (offgoing nurse). Report included the following information SBAR, Intake/Output, MAR, Recent Results and Cardiac Rhythm SR with BBB, PVCs. Yes

## 2021-07-03 NOTE — DISCHARGE NOTE PROVIDER - HOSPITAL COURSE
ADMISSION DATE:  07-02-21    ---  FROM ADMISSION H+P:   HPI:  89 yo female PMH of PE on Eliquis (05/21), anxiety, depression, CVA, Fall, GI bleed, Hip fracture, Hyperlipidemia, Hypertension, PUD (peptic ulcer disease), hiatal hernia presented to the ED with the c/o  fatigue and increased confusion per daughter. History obtained from patients daughter Johana [phone], who states that for the past 3 days patient has been having episodes of confusion and mild disorientation along with difficulty with speech. Then according to the daughter today she has been sleeping a lot which is very unusual for her. Patient according to the daughter is AA) x3 at baseline.  Patient lives with 24 hour nursing aid and daughter Johana visits her daily or if not then talk to her over the phone. Daughter also informs that for the past few days patient has decreased oral intake for both liquids and solids.  Pt reports that she has been feeling fatigued for the last 3 days with increased urinary frequency but denies any burning or urgency. Denies any chest pain, SOB, or palpitations. Denies any nausea or vomiting, no diarrhea.    Of note was recently admitted to the hospital from 5/18-5/20 with PE and bilateral lower ext swelling. Her Norvasc was held, lower extremity edema has since improved. For the PE pt was started on Eliquis.     In the ED CBC, coags, fobt, cmp, lipase, troponin, probnp, UA, CT head, CXR were obtained.   Significant for H&H 10.7/32.7 (12.2/36.8- May 2021), INR 1.7, albumin 3.0, UA with small leuk esterase, 6-10 wbc, occasional bacteria , covid pcr negative. CXR no acute pathology mild cardiomegaly,   CT head no acute pathology. CT chest: dependent atelectasis at lung bases, hiatal hernia noted.   EKG: NSR with HR of 66, 1st Degree AV block  Pt was given Rocephin 1g x 1, 500cc NS bolus.  (02 Jul 2021 23:07)      ---  HOSPITAL COURSE/PERTINENT LABS/PROCEDURES PERFORMED/PENDING TESTS: Patient was admitted for management of encephalopathy. Neurology (Dr. Amaya) was consulted and attributed encephalopathy to underlying dementia vs depression and optimized patient for discharge. Blood cultures _____. Urine culture ______. Procalcitonin was negative (<.05).     **updated through 7/3    ---  PATIENT CONDITION:  - stable    ---  PHYSICAL EXAM ON DAY OF DISCHARGE:    ---  CONSULTANTS:   - Neurology (Dr. Hester)    ---  ADVANCED CARE PLANNING:  - Code status:      - MOLST completed:      [  ] NO     [  ] YES    ---  TIME SPENT:  I, the attending physician, was physically present for the key portions of the evaluation and management (E/M) service provided. The total amount of time spent reviewing the hospital notes, laboratory values, imaging findings, assessing/counseling the patient, discussing with consultant physicians, social work, nursing staff was -- minutes ADMISSION DATE:  07-02-21    ---  FROM ADMISSION H+P:   HPI:  89 yo female PMH of PE on Eliquis (05/21), anxiety, depression, CVA, Fall, GI bleed, Hip fracture, Hyperlipidemia, Hypertension, PUD (peptic ulcer disease), hiatal hernia presented to the ED with the c/o  fatigue and increased confusion per daughter. History obtained from patients daughter Johana [phone], who states that for the past 3 days patient has been having episodes of confusion and mild disorientation along with difficulty with speech. Then according to the daughter today she has been sleeping a lot which is very unusual for her. Patient according to the daughter is AA) x3 at baseline.  Patient lives with 24 hour nursing aid and daughter Johana visits her daily or if not then talk to her over the phone. Daughter also informs that for the past few days patient has decreased oral intake for both liquids and solids.  Pt reports that she has been feeling fatigued for the last 3 days with increased urinary frequency but denies any burning or urgency. Denies any chest pain, SOB, or palpitations. Denies any nausea or vomiting, no diarrhea.    Of note was recently admitted to the hospital from 5/18-5/20 with PE and bilateral lower ext swelling. Her Norvasc was held, lower extremity edema has since improved. For the PE pt was started on Eliquis.     In the ED CBC, coags, fobt, cmp, lipase, troponin, probnp, UA, CT head, CXR were obtained.   Significant for H&H 10.7/32.7 (12.2/36.8- May 2021), INR 1.7, albumin 3.0, UA with small leuk esterase, 6-10 wbc, occasional bacteria , covid pcr negative. CXR no acute pathology mild cardiomegaly,   CT head no acute pathology. CT chest: dependent atelectasis at lung bases, hiatal hernia noted.   EKG: NSR with HR of 66, 1st Degree AV block  Pt was given Rocephin 1g x 1, 500cc NS bolus.  (02 Jul 2021 23:07)      ---  HOSPITAL COURSE/PERTINENT LABS/PROCEDURES PERFORMED/PENDING TESTS: Patient was admitted for management of encephalopathy. Neurology (Dr. Amaya) was consulted and attributed encephalopathy to underlying dementia vs depression and optimized patient for discharge. Blood cultures _____. Urine culture ______. Procalcitonin was negative (<.05).     ***Updated through 7/3    ---  PATIENT CONDITION:  - stable    ---  PHYSICAL EXAM ON DAY OF DISCHARGE:    ---  CONSULTANTS:   - Neurology (Dr. Hester)    ---  ADVANCED CARE PLANNING:  - Code status:      - MOLST completed:      [  ] NO     [  ] YES    ---  TIME SPENT:  I, the attending physician, was physically present for the key portions of the evaluation and management (E/M) service provided. The total amount of time spent reviewing the hospital notes, laboratory values, imaging findings, assessing/counseling the patient, discussing with consultant physicians, social work, nursing staff was -- minutes ADMISSION DATE:  07-02-21    ---  FROM ADMISSION H+P:   HPI:  89 yo female PMH of PE on Eliquis (05/21), anxiety, depression, CVA, Fall, GI bleed, Hip fracture, Hyperlipidemia, Hypertension, PUD (peptic ulcer disease), hiatal hernia presented to the ED with the c/o  fatigue and increased confusion per daughter. History obtained from patients daughter Johana [phone], who states that for the past 3 days patient has been having episodes of confusion and mild disorientation along with difficulty with speech. Then according to the daughter today she has been sleeping a lot which is very unusual for her. Patient according to the daughter is AA) x3 at baseline.  Patient lives with 24 hour nursing aid and daughter Johana visits her daily or if not then talk to her over the phone. Daughter also informs that for the past few days patient has decreased oral intake for both liquids and solids.  Pt reports that she has been feeling fatigued for the last 3 days with increased urinary frequency but denies any burning or urgency. Denies any chest pain, SOB, or palpitations. Denies any nausea or vomiting, no diarrhea.    Of note was recently admitted to the hospital from 5/18-5/20 with PE and bilateral lower ext swelling. Her Norvasc was held, lower extremity edema has since improved. For the PE pt was started on Eliquis.     In the ED CBC, coags, fobt, cmp, lipase, troponin, probnp, UA, CT head, CXR were obtained.   Significant for H&H 10.7/32.7 (12.2/36.8- May 2021), INR 1.7, albumin 3.0, UA with small leuk esterase, 6-10 wbc, occasional bacteria , covid pcr negative. CXR no acute pathology mild cardiomegaly,   CT head no acute pathology. CT chest: dependent atelectasis at lung bases, hiatal hernia noted.   EKG: NSR with HR of 66, 1st Degree AV block  Pt was given Rocephin 1g x 1, 500cc NS bolus.  (02 Jul 2021 23:07)      ---  HOSPITAL COURSE/PERTINENT LABS/PROCEDURES PERFORMED/PENDING TESTS: Patient was admitted for management of encephalopathy. Neurology (Dr. Amaya) was consulted and attributed encephalopathy to underlying dementia vs depression and optimized patient for discharge. Blood cultures _____. Urine culture ______. Procalcitonin was negative (<.05). Patient was medically optimized and improved clinically during hospital course. Patient was seen and examined on day of discharge.     ***Updated through 7/4***    ---  VITALS:      PHYSICAL EXAM ON DAY OF DISCHARGE:    ---  CONSULTANTS:   - Neurology (Dr. Amaya)    ---  TIME SPENT:  I, the attending physician, was physically present for the key portions of the evaluation and management (E/M) service provided. The total amount of time spent reviewing the hospital notes, laboratory values, imaging findings, assessing/counseling the patient, discussing with consultant physicians, social work, nursing staff was -- minutes ADMISSION DATE:  07-02-21    ---  FROM ADMISSION H+P:   HPI:  89 yo female PMH of PE on Eliquis (05/21), anxiety, depression, CVA, Fall, GI bleed, Hip fracture, Hyperlipidemia, Hypertension, PUD (peptic ulcer disease), hiatal hernia presented to the ED with the c/o  fatigue and increased confusion per daughter. History obtained from patients daughter Johana [phone], who states that for the past 3 days patient has been having episodes of confusion and mild disorientation along with difficulty with speech. Then according to the daughter today she has been sleeping a lot which is very unusual for her. Patient according to the daughter is AA) x3 at baseline.  Patient lives with 24 hour nursing aid and daughter Johana visits her daily or if not then talk to her over the phone. Daughter also informs that for the past few days patient has decreased oral intake for both liquids and solids.  Pt reports that she has been feeling fatigued for the last 3 days with increased urinary frequency but denies any burning or urgency. Denies any chest pain, SOB, or palpitations. Denies any nausea or vomiting, no diarrhea.    Of note was recently admitted to the hospital from 5/18-5/20 with PE and bilateral lower ext swelling. Her Norvasc was held, lower extremity edema has since improved. For the PE pt was started on Eliquis.     In the ED CBC, coags, fobt, cmp, lipase, troponin, probnp, UA, CT head, CXR were obtained.   Significant for H&H 10.7/32.7 (12.2/36.8- May 2021), INR 1.7, albumin 3.0, UA with small leuk esterase, 6-10 wbc, occasional bacteria , covid pcr negative. CXR no acute pathology mild cardiomegaly,   CT head no acute pathology. CT chest: dependent atelectasis at lung bases, hiatal hernia noted.   EKG: NSR with HR of 66, 1st Degree AV block  Pt was given Rocephin 1g x 1, 500cc NS bolus.  (02 Jul 2021 23:07)      ---  HOSPITAL COURSE/PERTINENT LABS/PROCEDURES PERFORMED/PENDING TESTS: Patient was admitted for management of encephalopathy. Neurology (Dr. Amaya) was consulted and attributed encephalopathy to underlying dementia vs depression and optimized patient for discharge. Blood and urine cultures have shown no growth to date. Procalcitonin was negative (<.05). Started on Metoprolol and losartan for hypertension. Patient was medically optimized and improved clinically during hospital course. Patient was seen and examined on day of discharge.     ***Updated through 7/4***    ---  VITALS:      PHYSICAL EXAM ON DAY OF DISCHARGE:    ---  CONSULTANTS:   - Neurology (Dr. Amaya)    ---  TIME SPENT:  I, the attending physician, was physically present for the key portions of the evaluation and management (E/M) service provided. The total amount of time spent reviewing the hospital notes, laboratory values, imaging findings, assessing/counseling the patient, discussing with consultant physicians, social work, nursing staff was -- minutes ADMISSION DATE:  07-02-21    ---  FROM ADMISSION H+P:   HPI:  89 yo female PMH of PE on Eliquis (05/21), anxiety, depression, CVA, Fall, GI bleed, Hip fracture, Hyperlipidemia, Hypertension, PUD (peptic ulcer disease), hiatal hernia presented to the ED with the c/o  fatigue and increased confusion per daughter. History obtained from patients daughter Johana [phone], who states that for the past 3 days patient has been having episodes of confusion and mild disorientation along with difficulty with speech. Then according to the daughter today she has been sleeping a lot which is very unusual for her. Patient according to the daughter is AA) x3 at baseline.  Patient lives with 24 hour nursing aid and daughter Johana visits her daily or if not then talk to her over the phone. Daughter also informs that for the past few days patient has decreased oral intake for both liquids and solids.  Pt reports that she has been feeling fatigued for the last 3 days with increased urinary frequency but denies any burning or urgency. Denies any chest pain, SOB, or palpitations. Denies any nausea or vomiting, no diarrhea.    Of note was recently admitted to the hospital from 5/18-5/20 with PE and bilateral lower ext swelling. Her Norvasc was held, lower extremity edema has since improved. For the PE pt was started on Eliquis.     In the ED CBC, coags, fobt, cmp, lipase, troponin, probnp, UA, CT head, CXR were obtained.   Significant for H&H 10.7/32.7 (12.2/36.8- May 2021), INR 1.7, albumin 3.0, UA with small leuk esterase, 6-10 wbc, occasional bacteria , covid pcr negative. CXR no acute pathology mild cardiomegaly,   CT head no acute pathology. CT chest: dependent atelectasis at lung bases, hiatal hernia noted.   EKG: NSR with HR of 66, 1st Degree AV block  Pt was given Rocephin 1g x 1, 500cc NS bolus.  (02 Jul 2021 23:07)      ---  HOSPITAL COURSE/PERTINENT LABS/PROCEDURES PERFORMED/PENDING TESTS: Patient was admitted for management of encephalopathy. Neurology (Dr. Amaya) was consulted and attributed encephalopathy to underlying dementia vs depression and optimized patient for discharge. Blood and urine cultures have shown no growth to date. Procalcitonin was negative (<.05). Pt started on Metoprolol Succinate and Losartan for hypertension. Patient was medically optimized and improved clinically during hospital course. Patient was seen and examined on day of discharge.     ***Updated through 7/4***    ---  VITALS:      PHYSICAL EXAM ON DAY OF DISCHARGE:    ---  CONSULTANTS:   - Neurology (Dr. Amaya)    ---  TIME SPENT:  I, the attending physician, was physically present for the key portions of the evaluation and management (E/M) service provided. The total amount of time spent reviewing the hospital notes, laboratory values, imaging findings, assessing/counseling the patient, discussing with consultant physicians, social work, nursing staff was -- minutes ADMISSION DATE:  07-02-21    ---  FROM ADMISSION H+P:   HPI:  89 yo female PMH of PE on Eliquis (05/21), anxiety, depression, CVA, Fall, GI bleed, Hip fracture, Hyperlipidemia, Hypertension, PUD (peptic ulcer disease), hiatal hernia presented to the ED with the c/o  fatigue and increased confusion per daughter. History obtained from patients daughter Johana [phone], who states that for the past 3 days patient has been having episodes of confusion and mild disorientation along with difficulty with speech. Then according to the daughter today she has been sleeping a lot which is very unusual for her. Patient according to the daughter is AA) x3 at baseline.  Patient lives with 24 hour nursing aid and daughter Johana visits her daily or if not then talk to her over the phone. Daughter also informs that for the past few days patient has decreased oral intake for both liquids and solids.  Pt reports that she has been feeling fatigued for the last 3 days with increased urinary frequency but denies any burning or urgency. Denies any chest pain, SOB, or palpitations. Denies any nausea or vomiting, no diarrhea.    Of note was recently admitted to the hospital from 5/18-5/20 with PE and bilateral lower ext swelling. Her Norvasc was held, lower extremity edema has since improved. For the PE pt was started on Eliquis.     In the ED CBC, coags, fobt, cmp, lipase, troponin, probnp, UA, CT head, CXR were obtained.   Significant for H&H 10.7/32.7 (12.2/36.8- May 2021), INR 1.7, albumin 3.0, UA with small leuk esterase, 6-10 wbc, occasional bacteria , covid pcr negative. CXR no acute pathology mild cardiomegaly,   CT head no acute pathology. CT chest: dependent atelectasis at lung bases, hiatal hernia noted.   EKG: NSR with HR of 66, 1st Degree AV block  Pt was given Rocephin 1g x 1, 500cc NS bolus.  (02 Jul 2021 23:07)      ---  HOSPITAL COURSE/PERTINENT LABS/PROCEDURES PERFORMED/PENDING TESTS: Patient was admitted for management of encephalopathy. Neurology (Dr. Amaya) was consulted and attributed encephalopathy to underlying dementia vs depression and optimized patient for discharge. Blood and urine cultures have shown no growth to date. Procalcitonin was negative (<.05). Pt started on Metoprolol Succinate and Losartan for hypertension. Etiology likely secondary to heat exahaustion component as pt was keep AC at 80 as per daughter, reinforced on discharge with daughter howard at bedside AC should be kept no higher than 73 degrees faranheight as heat advisory in effect and maintain hydration, pt and daughter verbalized understanding. Pt was seen and evaluated by neurology and psychiatry whom helped collaborated in her care plan. Patient was medically optimized and improved clinically during hospital course. Patient was seen and examined on day of discharge.       ---  VITALS:      PHYSICAL EXAM ON DAY OF DISCHARGE:    ---  CONSULTANTS:   - Neurology (Dr. Amaya)    ---  TIME SPENT:  I, the attending physician, was physically present for the key portions of the evaluation and management (E/M) service provided. The total amount of time spent reviewing the hospital notes, laboratory values, imaging findings, assessing/counseling the patient, discussing with consultant physicians, social work, nursing staff was -- minutes ADMISSION DATE:  07-02-21    ---  FROM ADMISSION H+P:   HPI:  89 yo female PMH of PE on Eliquis (05/21), anxiety, depression, CVA, Fall, GI bleed, Hip fracture, Hyperlipidemia, Hypertension, PUD (peptic ulcer disease), hiatal hernia presented to the ED with the c/o  fatigue and increased confusion per daughter. History obtained from patients daughter Johana [phone], who states that for the past 3 days patient has been having episodes of confusion and mild disorientation along with difficulty with speech. Then according to the daughter today she has been sleeping a lot which is very unusual for her. Patient according to the daughter is AA) x3 at baseline.  Patient lives with 24 hour nursing aid and daughter Johana visits her daily or if not then talk to her over the phone. Daughter also informs that for the past few days patient has decreased oral intake for both liquids and solids.  Pt reports that she has been feeling fatigued for the last 3 days with increased urinary frequency but denies any burning or urgency. Denies any chest pain, SOB, or palpitations. Denies any nausea or vomiting, no diarrhea.    Of note was recently admitted to the hospital from 5/18-5/20 with PE and bilateral lower ext swelling. Her Norvasc was held, lower extremity edema has since improved. For the PE pt was started on Eliquis.     In the ED CBC, coags, fobt, cmp, lipase, troponin, probnp, UA, CT head, CXR were obtained.   Significant for H&H 10.7/32.7 (12.2/36.8- May 2021), INR 1.7, albumin 3.0, UA with small leuk esterase, 6-10 wbc, occasional bacteria , covid pcr negative. CXR no acute pathology mild cardiomegaly,   CT head no acute pathology. CT chest: dependent atelectasis at lung bases, hiatal hernia noted.   EKG: NSR with HR of 66, 1st Degree AV block  Pt was given Rocephin 1g x 1, 500cc NS bolus.  (02 Jul 2021 23:07)      ---  HOSPITAL COURSE/PERTINENT LABS/PROCEDURES PERFORMED/PENDING TESTS: Patient was admitted for management of encephalopathy. Neurology (Dr. Amaya) was consulted and attributed encephalopathy to underlying dementia vs depression and optimized patient for discharge. Blood and urine cultures have shown no growth to date. Procalcitonin was negative (<.05). Pt started on Metoprolol Succinate and Losartan for hypertension. Etiology likely secondary to heat exahaustion component as pt was keep AC at 80 as per daughter, reinforced on discharge with daughter howard at bedside AC should be kept no higher than 73 degrees faranheight as heat advisory in effect and maintain hydration, pt and daughter verbalized understanding. Pt was seen and evaluated by neurology and psychiatry whom helped collaborated in her care plan. Patient was medically optimized and improved clinically during hospital course. Patient was seen and examined on day of discharge.       ---  VITALS: t 98.6, hr 71, bp 117/64, 90% o2 on ra (after OOBTC with PT), rr 18        PHYSICAL EXAM ON DAY OF DISCHARGE:  GENERAL: NAD, sitting in chair  HEENT:  anicteric, moist mucous membranes  CHEST/LUNG:  CTA b/l, no rales, wheezes, or rhonchi  HEART:  RRR, soft S1, S2, no Murmurs appreciated  ABDOMEN:  BS+, soft, nontender, nondistended  EXTREMITIES: no edema, cyanosis, or calf tenderness  NERVOUS SYSTEM: answers questions and follows commands appropriately      ---  CONSULTANTS:   - Neurology (Dr. Amaya)  -Psychiatry (Dr. Silver)    ---  TIME SPENT:  I, the attending physician, was physically present for the key portions of the evaluation and management (E/M) service provided. The total amount of time spent reviewing the hospital notes, laboratory values, imaging findings, assessing/counseling the patient, discussing with consultant physicians, social work, nursing staff was -- minutes ADMISSION DATE:  07-02-21    ---  FROM ADMISSION H+P:   HPI:  89 yo female PMH of PE on Eliquis (05/21), anxiety, depression, CVA, Fall, GI bleed, Hip fracture, Hyperlipidemia, Hypertension, PUD (peptic ulcer disease), hiatal hernia presented to the ED with the c/o  fatigue and increased confusion per daughter. History obtained from patients daughter Johana [phone], who states that for the past 3 days patient has been having episodes of confusion and mild disorientation along with difficulty with speech. Then according to the daughter today she has been sleeping a lot which is very unusual for her. Patient according to the daughter is AA) x3 at baseline.  Patient lives with 24 hour nursing aid and daughter Johana visits her daily or if not then talk to her over the phone. Daughter also informs that for the past few days patient has decreased oral intake for both liquids and solids.  Pt reports that she has been feeling fatigued for the last 3 days with increased urinary frequency but denies any burning or urgency. Denies any chest pain, SOB, or palpitations. Denies any nausea or vomiting, no diarrhea.    Of note was recently admitted to the hospital from 5/18-5/20 with PE and bilateral lower ext swelling. Her Norvasc was held, lower extremity edema has since improved. For the PE pt was started on Eliquis.     In the ED CBC, coags, fobt, cmp, lipase, troponin, probnp, UA, CT head, CXR were obtained.   Significant for H&H 10.7/32.7 (12.2/36.8- May 2021), INR 1.7, albumin 3.0, UA with small leuk esterase, 6-10 wbc, occasional bacteria , covid pcr negative. CXR no acute pathology mild cardiomegaly,   CT head no acute pathology. CT chest: dependent atelectasis at lung bases, hiatal hernia noted.   EKG: NSR with HR of 66, 1st Degree AV block  Pt was given Rocephin 1g x 1, 500cc NS bolus.  (02 Jul 2021 23:07)      ---  HOSPITAL COURSE/PERTINENT LABS/PROCEDURES PERFORMED/PENDING TESTS: Patient was admitted for management of encephalopathy. Neurology (Dr. Amaya) was consulted and attributed encephalopathy to underlying dementia vs depression and optimized patient for discharge. Blood and urine cultures have shown no growth to date. Procalcitonin was negative (<.05). Pt started on Metoprolol Succinate and Losartan for hypertension. Etiology likely secondary to dehydration from heat exahaustion component while on diuresis as pt was keep AC at 80 as per daughter, reinforced on discharge with daughter howard at bedside AC should be kept no higher than 73 degrees faranheight as heat advisory in effect and maintain hydration, pt and daughter verbalized understanding. Pt was seen and evaluated by neurology and psychiatry whom helped collaborated in her care plan. Patient was medically optimized and improved clinically during hospital course. Patient was seen and examined on day of discharge.       ---  VITALS: t 98.6, hr 71, bp 117/64, 90% o2 on ra (after OOBTC with PT), rr 18        PHYSICAL EXAM ON DAY OF DISCHARGE:  GENERAL: NAD, sitting in chair  HEENT:  anicteric, moist mucous membranes  CHEST/LUNG:  CTA b/l, no rales, wheezes, or rhonchi  HEART:  RRR, soft S1, S2, no Murmurs appreciated  ABDOMEN:  BS+, soft, nontender, nondistended  EXTREMITIES: no edema, cyanosis, or calf tenderness  NERVOUS SYSTEM: answers questions and follows commands appropriately      ---  CONSULTANTS:   - Neurology (Dr. Amaya)  -Psychiatry (Dr. Silver)    ---  TIME SPENT:  I, the attending physician, was physically present for the key portions of the evaluation and management (E/M) service provided. The total amount of time spent reviewing the hospital notes, laboratory values, imaging findings, assessing/counseling the patient, discussing with consultant physicians, social work, nursing staff was 70 minutes

## 2021-07-03 NOTE — DISCHARGE NOTE PROVIDER - NSDCMRMEDTOKEN_GEN_ALL_CORE_FT
ALPRAZolam 0.25 mg oral tablet: 1 tab(s) orally 3 times a day, As Needed  Eliquis Starter Pack for Treatment of DVT and PE 5 mg oral tablet: 1 tab(s) orally 2 times a day  dispense 1 packet  take as directed on packet  escitalopram: 7.5 milligram(s) orally once a day  ferrous sulfate 325 mg (65 mg elemental iron) oral delayed release tablet: 1 tab(s) orally once a day  furosemide 20 mg oral tablet: 1 tab(s) orally once a day  multivitamin: 1 tab(s) orally once a day  Neurontin 100 mg oral capsule: 1 cap(s) orally 2 times a day  rosuvastatin 10 mg oral tablet: 1 tab(s) orally once a day  sucralfate 1 g oral tablet: 1 tab(s) orally 2 times a day  Vitamin D3 1000 intl units (25 mcg) oral tablet: 1 tab(s) orally once a day

## 2021-07-03 NOTE — PROGRESS NOTE ADULT - PROBLEM SELECTOR PLAN 4
- Chronic stable  - On Rosuvastatin 10mg, will continue with therapeutic interchange atorvastatin 40 qd  - continue to monitor - Chronic  - On Rosuvastatin 10mg, will continue with therapeutic interchange atorvastatin 40 qd  - continue to monitor

## 2021-07-03 NOTE — PROGRESS NOTE ADULT - SUBJECTIVE AND OBJECTIVE BOX
CHIEF COMPLAINT/INTERVAL HISTORY: Patient seen and examined at bedside this am. Pt is upset regarding the warming blanket and asks for help to move into a more upright position. Explained to Pt the warming blanket is necessary as her temperature was too low. Pt has no other complaints at this time. Denies CP/SOB/N/V/D/C.     REVIEW OF SYSTEMS:   General: no fever, chills  HEENT: no nasal congestion, cough, rhinorrhea, sore throat, headache, changes in vision  Cardio: no palpitations, chest pain or discomfort  Pulm: no shortness of breath  GI: no vomiting, diarrhea, abdominal pain, constipation   /Renal: no dysuria  MSK: no back or extremity pain, no edema, joint pain or swelling, gait changes  Endo: no temperature intolerance  Heme: no bruising or abnormal bleeding  Skin: no rash      Vital Signs Last 24 Hrs  T(C): 36.9 (2021 10:15), Max: 36.9 (2021 10:15)  T(F): 98.4 (2021 10:15), Max: 98.4 (2021 10:15)  HR: 73 (2021 05:13) (60 - 73)  BP: 156/89 (2021 07:49) (156/89 - 181/91)  BP(mean): --  RR: 19 (2021 05:13) (18 - 19)  SpO2: 91% (2021 05:13) (91% - 96%)    PHYSICAL EXAM:  GENERAL: elderly appearing female in NAD   HEENT: EOMI, hearing normal, conjunctiva and sclera clear  Chest: CTA bilaterally, no wheezing  CV: S1S2, RRR,   GI: soft, +BS, NT/ND  Musculoskeletal: no edema  Psychiatric: affect nL, mood nL  Skin: warm and dry    LABS:                        11.9   10.82 )-----------( 275      ( 2021 08:52 )             36.1     07-03    141  |  104  |  13  ----------------------------<  88  3.7   |  32<H>  |  0.59    Ca    8.9      2021 08:52    TPro  7.7  /  Alb  3.2<L>  /  TBili  0.4  /  DBili  x   /  AST  26  /  ALT  23  /  AlkPhos  97  07-03    PT/INR - ( 2021 17:28 )   PT: 19.4 sec;   INR: 1.70 ratio         PTT - ( 2021 17:28 )  PTT:31.0 sec  Urinalysis Basic - ( 2021 18:49 )    Color: Yellow / Appearance: Clear / S.005 / pH: x  Gluc: x / Ketone: Negative  / Bili: Negative / Urobili: Negative   Blood: x / Protein: Negative / Nitrite: Negative   Leuk Esterase: Small / RBC: x / WBC 6-10   Sq Epi: x / Non Sq Epi: Occasional / Bacteria: Occasional        Assessment and Plan:   CHIEF COMPLAINT/INTERVAL HISTORY: Patient seen and examined at bedside this am. Pt is upset regarding the warming blanket and asks for help to move into a more upright position. Explained to Pt the warming blanket is necessary as her temperature was too low. Pt has no other complaints at this time. Denies CP/SOB/N/V/D/C.     REVIEW OF SYSTEMS:   General: no fever, chills  HEENT: no nasal congestion, cough, rhinorrhea, sore throat, headache, changes in vision  Cardio: no palpitations, chest pain or discomfort  Pulm: no shortness of breath  GI: no vomiting, diarrhea, abdominal pain, constipation   /Renal: no dysuria  MSK: no back or extremity pain, no edema, joint pain or swelling, gait changes  Endo: no temperature intolerance  Heme: no bruising or abnormal bleeding  Skin: no rash      Vital Signs Last 24 Hrs  T(C): 36.9 (2021 10:15), Max: 36.9 (2021 10:15)  T(F): 98.4 (2021 10:15), Max: 98.4 (2021 10:15)  HR: 73 (2021 05:13) (60 - 73)  BP: 156/89 (2021 07:49) (156/89 - 181/91)  BP(mean): --  RR: 19 (2021 05:13) (18 - 19)  SpO2: 91% (2021 05:13) (91% - 96%)    PHYSICAL EXAM:  GENERAL: elderly appearing female in NAD   HEENT: EOMI, hearing normal, conjunctiva and sclera clear  Chest: CTA bilaterally, no wheezing  CV: S1S2, RRR,   GI: soft, +BS, NT/ND  Musculoskeletal: no edema  Psychiatric: affect nL, mood nL  Skin: warm and dry    LABS:                        11.9   10.82 )-----------( 275      ( 2021 08:52 )             36.1     07-03    141  |  104  |  13  ----------------------------<  88  3.7   |  32<H>  |  0.59    Ca    8.9      2021 08:52    TPro  7.7  /  Alb  3.2<L>  /  TBili  0.4  /  DBili  x   /  AST  26  /  ALT  23  /  AlkPhos  97  07-03    PT/INR - ( 2021 17:28 )   PT: 19.4 sec;   INR: 1.70 ratio         PTT - ( 2021 17:28 )  PTT:31.0 sec  Urinalysis Basic - ( 2021 18:49 )    Color: Yellow / Appearance: Clear / S.005 / pH: x  Gluc: x / Ketone: Negative  / Bili: Negative / Urobili: Negative   Blood: x / Protein: Negative / Nitrite: Negative   Leuk Esterase: Small / RBC: x / WBC 6-10   Sq Epi: x / Non Sq Epi: Occasional / Bacteria: Occasional

## 2021-07-03 NOTE — DISCHARGE NOTE PROVIDER - NSDCCPCAREPLAN_GEN_ALL_CORE_FT
PRINCIPAL DISCHARGE DIAGNOSIS  Diagnosis: Altered mental status  Assessment and Plan of Treatment:       SECONDARY DISCHARGE DIAGNOSES  Diagnosis: History of pulmonary embolism  Assessment and Plan of Treatment:     Diagnosis: HLD (hyperlipidemia)  Assessment and Plan of Treatment:     Diagnosis: HTN (hypertension)  Assessment and Plan of Treatment:     Diagnosis: Anemia  Assessment and Plan of Treatment:      PRINCIPAL DISCHARGE DIAGNOSIS  Diagnosis: Altered mental status  Assessment and Plan of Treatment: - You presented with a change in your mental status, which improved after IV fluids  - CT of your head did not show any concerning findings.  - Blood cultures ____. Urine culture _____.  - Neurology followed you during your stay  - Follow up with your PCP (Dr. Mendoza) within 1 week of discharge      SECONDARY DISCHARGE DIAGNOSES  Diagnosis: History of pulmonary embolism  Assessment and Plan of Treatment: - You have a history of a pulmonary embolism in 5/2021  - Continue home medication Eliquis as prescribed    Diagnosis: HTN (hypertension)  Assessment and Plan of Treatment: - You have a history of hypertension  - Continue home medications Losartan    Diagnosis: HLD (hyperlipidemia)  Assessment and Plan of Treatment: - You have a history of hyperlipidemia  - Continue home medication Rosuvastatin    Diagnosis: Anemia  Assessment and Plan of Treatment: - You have a history of anemia     PRINCIPAL DISCHARGE DIAGNOSIS  Diagnosis: Altered mental status  Assessment and Plan of Treatment: - You presented with a change in your mental status, which improved after IV fluids  - CT of your head did not show any concerning findings.  - Blood cultures ____. Urine culture _____.  - Neurology followed you during your stay  - Follow up with your PCP (Dr. Mendoza) within 1 week of discharge      SECONDARY DISCHARGE DIAGNOSES  Diagnosis: HTN (hypertension)  Assessment and Plan of Treatment: - You have a history of hypertension.   - Your heart rate was slightly elevated during your stay.   - START Losartan 25 mg daily, Metoprolol Tartrate 12.5 mg twice daily  - ***STOP Lasix   - Follow up with your PCP within 1 week of discharge for recheck on BP and further management    Diagnosis: History of pulmonary embolism  Assessment and Plan of Treatment: - You have a history of a pulmonary embolism in 5/2021  - Continue home medication Eliquis as prescribed    Diagnosis: HLD (hyperlipidemia)  Assessment and Plan of Treatment: - You have a history of hyperlipidemia  - Continue home medication Rosuvastatin    Diagnosis: Anemia  Assessment and Plan of Treatment: - You have a history of anemia. Your hemoglobin was stable during your stay  - Continue home medication Ferrous Sulfate    Diagnosis: Anxiety and depression  Assessment and Plan of Treatment: - You have a history of anxiety and your home dose of Lexapro was increased from 5 mg to 10 mg for better control of your anxiety  - START Lexapro 10 mg daily   - STOP Lexapro 7.5 mg daily  - Continue home medication Xanax     PRINCIPAL DISCHARGE DIAGNOSIS  Diagnosis: Altered mental status  Assessment and Plan of Treatment: - You presented with a change in your mental status, which improved after IV fluids  - CT of your head did not show any concerning findings.  - Blood cultures have not grown any bacteria to date. Urine culture has also grown no bacteria to date.  - Neurology followed you during your stay  - Follow up with your PCP (Dr. Mendoza) within 1 week of discharge      SECONDARY DISCHARGE DIAGNOSES  Diagnosis: History of pulmonary embolism  Assessment and Plan of Treatment: - You have a history of a pulmonary embolism in 5/2021  - Continue home medication Eliquis as prescribed    Diagnosis: HTN (hypertension)  Assessment and Plan of Treatment: - You have a history of hypertension.   - Your heart rate was slightly elevated during your stay.   - START Losartan 25 mg daily, Metoprolol Tartrate 12.5 mg twice daily  - ***STOP Lasix   - Follow up with your PCP within 1 week of discharge for recheck on BP and further management    Diagnosis: HLD (hyperlipidemia)  Assessment and Plan of Treatment: - You have a history of hyperlipidemia  - Continue home medication Rosuvastatin    Diagnosis: Anemia  Assessment and Plan of Treatment: - You have a history of anemia. Your hemoglobin was stable during your stay  - Continue home medication Ferrous Sulfate    Diagnosis: Anxiety and depression  Assessment and Plan of Treatment: - You have a history of anxiety and your home dose of Lexapro was increased from 5 mg to 10 mg for better control of your anxiety  - START Lexapro 10 mg daily   - STOP Lexapro 7.5 mg daily  - Continue home medication Xanax     PRINCIPAL DISCHARGE DIAGNOSIS  Diagnosis: Altered mental status  Assessment and Plan of Treatment: - You presented with a change in your mental status, which improved after IV fluids  - CT of your head did not show any concerning findings.  - Blood cultures have not grown any bacteria to date. Urine culture has also grown no bacteria to date.  - Neurology followed you during your stay  - Follow up with your PCP (Dr. Mendoza) within 1 week of discharge      SECONDARY DISCHARGE DIAGNOSES  Diagnosis: History of pulmonary embolism  Assessment and Plan of Treatment: - You have a history of a pulmonary embolism in 5/2021  - Continue home medication Eliquis as prescribed    Diagnosis: HTN (hypertension)  Assessment and Plan of Treatment: - You have a history of hypertension.   - Your heart rate was slightly elevated during your stay.   - START Losartan 25 mg daily, Metoprolol Tartrate 12.5 mg twice daily  - Continue Lasix 20mg by mouth daily.   - Follow up with your PCP within 1 week of discharge for recheck on BP and further management    Diagnosis: HLD (hyperlipidemia)  Assessment and Plan of Treatment: - You have a history of hyperlipidemia  - Continue home medication Rosuvastatin    Diagnosis: Anemia  Assessment and Plan of Treatment: - You have a history of anemia. Your hemoglobin was stable during your stay  - Continue home medication Ferrous Sulfate    Diagnosis: Anxiety and depression  Assessment and Plan of Treatment: - You have a history of anxiety.  - Continue Lexapro 7.5 mg daily  - Continue home medication Xanax     PRINCIPAL DISCHARGE DIAGNOSIS  Diagnosis: Altered mental status  Assessment and Plan of Treatment: - You presented with a change in your mental status, which improved after IV fluids likely due to dehydration from extreme heat condition, assure to keep you air conditioner no higher than 73 degrees faranheit, maintain hydration   - CT of your head did not show any concerning findings.  - Blood cultures have not grown any bacteria to date. Urine culture has also grown no bacteria to date.  - Neurology followed you during your stay  - Follow up with your PCP (Dr. Mendoza) within 1 week of discharge      SECONDARY DISCHARGE DIAGNOSES  Diagnosis: History of pulmonary embolism  Assessment and Plan of Treatment: - You have a history of a pulmonary embolism in 5/2021  - Continue home medication Eliquis as prescribed    Diagnosis: HTN (hypertension)  Assessment and Plan of Treatment: - You have a history of hypertension.   - Your heart rate was slightly elevated during your stay, however improved with medication and hydration  - Continue Lasix 20mg by mouth daily.   - Follow up with your PCP within 1 week of discharge for recheck on BP and further management    Diagnosis: HLD (hyperlipidemia)  Assessment and Plan of Treatment: - You have a history of hyperlipidemia  - Continue home medication Rosuvastatin    Diagnosis: Anemia  Assessment and Plan of Treatment: - You have a history of anemia. Your hemoglobin was stable during your stay  - Continue home medication Ferrous Sulfate    Diagnosis: Anxiety and depression  Assessment and Plan of Treatment: - You have a history of anxiety. you were seen by psychiatry and neurology whom deemed your clinical status as stable and ok to continue current regimen, a outpatient neurologist was provided please follow up within 1-2 weeks of discharge  - Continue Lexapro 7.5 mg daily  - Continue home medication Xanax

## 2021-07-03 NOTE — DISCHARGE NOTE PROVIDER - CARE PROVIDERS DIRECT ADDRESSES
,DirectAddress_Unknown ,DirectAddress_Unknown,[email or web address]

## 2021-07-03 NOTE — PROGRESS NOTE ADULT - PROBLEM SELECTOR PLAN 1
- Patient with 3 day Hx of confusion and disorientation in the setting of decreased oral intake with improvement in her mental status after 500cc of NS bolus.  - encephalopathy likely secondary to dehydration vs early dementia   - currently AAOx4.  - medications reviewed   - no signs of acute infection , trend cbc, monitor for fevers   - lactate 1.2 (7/3)  - f/u procal, blood and urine cultures  - electrolytes wnl  - CT head negative for any acute intracranial pathology  - patient AAOx3 at baseline as per daughter  - will encourage increased oral intake  - Neurology Dr. Amaya Consulted, pt is neurologically cleared for d/c. - Patient with 3 day Hx of confusion and disorientation in the setting of decreased oral intake with improvement in her mental status after 500cc of NS bolus on admission.  - encephalopathy likely secondary to dehydration vs early dementia   - CT head negative for any acute intracranial pathology  - currently AAOx4.  - medications reviewed   - no signs of acute infection, trend cbc, monitor for fevers   - lactate 1.2 (7/3)  - electrolytes wnl  - will encourage increased oral intake  - f/u procal, blood and urine cultures  - Neurology Dr. Amaya Consulted, pt is neurologically cleared for d/c.

## 2021-07-04 DIAGNOSIS — F41.9 ANXIETY DISORDER, UNSPECIFIED: ICD-10-CM

## 2021-07-04 LAB
ANION GAP SERPL CALC-SCNC: 7 MMOL/L — SIGNIFICANT CHANGE UP (ref 5–17)
BASOPHILS # BLD AUTO: 0.04 K/UL — SIGNIFICANT CHANGE UP (ref 0–0.2)
BASOPHILS NFR BLD AUTO: 0.4 % — SIGNIFICANT CHANGE UP (ref 0–2)
BUN SERPL-MCNC: 25 MG/DL — HIGH (ref 7–23)
CALCIUM SERPL-MCNC: 8.3 MG/DL — LOW (ref 8.5–10.1)
CHLORIDE SERPL-SCNC: 104 MMOL/L — SIGNIFICANT CHANGE UP (ref 96–108)
CO2 SERPL-SCNC: 30 MMOL/L — SIGNIFICANT CHANGE UP (ref 22–31)
COVID-19 SPIKE DOMAIN AB INTERP: POSITIVE
COVID-19 SPIKE DOMAIN ANTIBODY RESULT: >250 U/ML — HIGH
CREAT SERPL-MCNC: 0.91 MG/DL — SIGNIFICANT CHANGE UP (ref 0.5–1.3)
EOSINOPHIL # BLD AUTO: 0.28 K/UL — SIGNIFICANT CHANGE UP (ref 0–0.5)
EOSINOPHIL NFR BLD AUTO: 2.9 % — SIGNIFICANT CHANGE UP (ref 0–6)
FOLATE SERPL-MCNC: >20 NG/ML — SIGNIFICANT CHANGE UP
GLUCOSE SERPL-MCNC: 89 MG/DL — SIGNIFICANT CHANGE UP (ref 70–99)
HCT VFR BLD CALC: 34.5 % — SIGNIFICANT CHANGE UP (ref 34.5–45)
HGB BLD-MCNC: 11.4 G/DL — LOW (ref 11.5–15.5)
IMM GRANULOCYTES NFR BLD AUTO: 0.3 % — SIGNIFICANT CHANGE UP (ref 0–1.5)
LYMPHOCYTES # BLD AUTO: 1.77 K/UL — SIGNIFICANT CHANGE UP (ref 1–3.3)
LYMPHOCYTES # BLD AUTO: 18.1 % — SIGNIFICANT CHANGE UP (ref 13–44)
MAGNESIUM SERPL-MCNC: 2.2 MG/DL — SIGNIFICANT CHANGE UP (ref 1.6–2.6)
MCHC RBC-ENTMCNC: 31.8 PG — SIGNIFICANT CHANGE UP (ref 27–34)
MCHC RBC-ENTMCNC: 33 GM/DL — SIGNIFICANT CHANGE UP (ref 32–36)
MCV RBC AUTO: 96.1 FL — SIGNIFICANT CHANGE UP (ref 80–100)
MONOCYTES # BLD AUTO: 0.86 K/UL — SIGNIFICANT CHANGE UP (ref 0–0.9)
MONOCYTES NFR BLD AUTO: 8.8 % — SIGNIFICANT CHANGE UP (ref 2–14)
NEUTROPHILS # BLD AUTO: 6.82 K/UL — SIGNIFICANT CHANGE UP (ref 1.8–7.4)
NEUTROPHILS NFR BLD AUTO: 69.5 % — SIGNIFICANT CHANGE UP (ref 43–77)
NRBC # BLD: 0 /100 WBCS — SIGNIFICANT CHANGE UP (ref 0–0)
PLATELET # BLD AUTO: 257 K/UL — SIGNIFICANT CHANGE UP (ref 150–400)
POTASSIUM SERPL-MCNC: 3.7 MMOL/L — SIGNIFICANT CHANGE UP (ref 3.5–5.3)
POTASSIUM SERPL-SCNC: 3.7 MMOL/L — SIGNIFICANT CHANGE UP (ref 3.5–5.3)
RBC # BLD: 3.59 M/UL — LOW (ref 3.8–5.2)
RBC # FLD: 15.1 % — HIGH (ref 10.3–14.5)
SARS-COV-2 IGG+IGM SERPL QL IA: >250 U/ML — HIGH
SARS-COV-2 IGG+IGM SERPL QL IA: POSITIVE
SODIUM SERPL-SCNC: 141 MMOL/L — SIGNIFICANT CHANGE UP (ref 135–145)
TSH SERPL-MCNC: 2.63 UIU/ML — SIGNIFICANT CHANGE UP (ref 0.36–3.74)
VIT B12 SERPL-MCNC: 1242 PG/ML — SIGNIFICANT CHANGE UP (ref 232–1245)
WBC # BLD: 9.8 K/UL — SIGNIFICANT CHANGE UP (ref 3.8–10.5)
WBC # FLD AUTO: 9.8 K/UL — SIGNIFICANT CHANGE UP (ref 3.8–10.5)

## 2021-07-04 PROCEDURE — 99232 SBSQ HOSP IP/OBS MODERATE 35: CPT | Mod: GC

## 2021-07-04 RX ORDER — ESCITALOPRAM OXALATE 10 MG/1
10 TABLET, FILM COATED ORAL ONCE
Refills: 0 | Status: DISCONTINUED | OUTPATIENT
Start: 2021-07-04 | End: 2021-07-04

## 2021-07-04 RX ORDER — LANOLIN ALCOHOL/MO/W.PET/CERES
5 CREAM (GRAM) TOPICAL AT BEDTIME
Refills: 0 | Status: DISCONTINUED | OUTPATIENT
Start: 2021-07-04 | End: 2021-07-06

## 2021-07-04 RX ORDER — METOPROLOL TARTRATE 50 MG
12.5 TABLET ORAL
Refills: 0 | Status: DISCONTINUED | OUTPATIENT
Start: 2021-07-04 | End: 2021-07-06

## 2021-07-04 RX ORDER — ESCITALOPRAM OXALATE 10 MG/1
7.5 TABLET, FILM COATED ORAL DAILY
Refills: 0 | Status: DISCONTINUED | OUTPATIENT
Start: 2021-07-04 | End: 2021-07-06

## 2021-07-04 RX ORDER — ALPRAZOLAM 0.25 MG
0.25 TABLET ORAL
Refills: 0 | Status: DISCONTINUED | OUTPATIENT
Start: 2021-07-04 | End: 2021-07-06

## 2021-07-04 RX ADMIN — Medication 12.5 MILLIGRAM(S): at 10:32

## 2021-07-04 RX ADMIN — LOSARTAN POTASSIUM 25 MILLIGRAM(S): 100 TABLET, FILM COATED ORAL at 05:28

## 2021-07-04 RX ADMIN — ESCITALOPRAM OXALATE 7.5 MILLIGRAM(S): 10 TABLET, FILM COATED ORAL at 21:50

## 2021-07-04 RX ADMIN — GABAPENTIN 100 MILLIGRAM(S): 400 CAPSULE ORAL at 18:58

## 2021-07-04 RX ADMIN — Medication 1000 UNIT(S): at 18:58

## 2021-07-04 RX ADMIN — Medication 325 MILLIGRAM(S): at 18:58

## 2021-07-04 RX ADMIN — NYSTATIN CREAM 1 APPLICATION(S): 100000 CREAM TOPICAL at 18:59

## 2021-07-04 RX ADMIN — Medication 0.25 MILLIGRAM(S): at 21:49

## 2021-07-04 RX ADMIN — Medication 1 GRAM(S): at 18:58

## 2021-07-04 RX ADMIN — APIXABAN 5 MILLIGRAM(S): 2.5 TABLET, FILM COATED ORAL at 05:28

## 2021-07-04 RX ADMIN — Medication 1 TABLET(S): at 18:58

## 2021-07-04 RX ADMIN — GABAPENTIN 100 MILLIGRAM(S): 400 CAPSULE ORAL at 05:28

## 2021-07-04 RX ADMIN — Medication 0.25 MILLIGRAM(S): at 10:32

## 2021-07-04 RX ADMIN — APIXABAN 5 MILLIGRAM(S): 2.5 TABLET, FILM COATED ORAL at 18:58

## 2021-07-04 RX ADMIN — Medication 0.25 MILLIGRAM(S): at 01:40

## 2021-07-04 RX ADMIN — Medication 12.5 MILLIGRAM(S): at 21:51

## 2021-07-04 RX ADMIN — NYSTATIN CREAM 1 APPLICATION(S): 100000 CREAM TOPICAL at 05:28

## 2021-07-04 RX ADMIN — Medication 1 GRAM(S): at 05:28

## 2021-07-04 RX ADMIN — ATORVASTATIN CALCIUM 40 MILLIGRAM(S): 80 TABLET, FILM COATED ORAL at 21:49

## 2021-07-04 RX ADMIN — Medication 5 MILLIGRAM(S): at 21:50

## 2021-07-04 NOTE — PROGRESS NOTE ADULT - PROBLEM SELECTOR PLAN 3
- bp continues to be elevated, 159/99 this am  - start Metoprolol 12.5mg BID with hold parameters.  - Patient was previously on Amlodipine which was discontinued as patient developed lower extremity edema  - Blood pressure elevated at the time of presentation to ED with SBP in 170's-180's  - Continue Losartan 25mg qd with hold parameters.  - Monitor hemodynamic response to BP medication regimen and titrate to SBP below 150.

## 2021-07-04 NOTE — PROGRESS NOTE ADULT - PROBLEM SELECTOR PLAN 1
- Patient with 3 day Hx of confusion and disorientation in the setting of decreased oral intake with improvement in her mental status after 500cc of NS bolus on admission.  - encephalopathy likely secondary to dehydration vs pseudodementia   - CT head negative for any acute intracranial pathology  - procalcitonin wnl.  - currently AAOx4.  - medications reviewed   - no signs of acute infection, trend cbc, monitor for fevers   - lactate 1.2 (7/3)  - electrolytes wnl  - will encourage increased oral intake  - f/u blood and urine cultures  - Psych consulted, f/u recs  - Neurology Dr. Amaya Consulted, pt is neurologically cleared for d/c. - Patient with 3 day Hx of confusion and disorientation in the setting of decreased oral intake with improvement in her mental status after 500cc of NS bolus on admission.  - encephalopathy likely secondary to dehydration vs pseudodementia   - CT head negative for any acute intracranial pathology  - procalcitonin wnl.  - Blood and Urine cultures NGTD.  - currently AAOx4.  - medications reviewed   - no signs of acute infection, trend cbc, monitor for fevers   - lactate 1.2 (7/3)  - electrolytes wnl  - will encourage increased oral intake  - Psych consulted, f/u recs  - Neurology Dr. Amaya Consulted, pt is neurologically cleared for d/c. - Patient with 3 day Hx of confusion and disorientation in the setting of decreased oral intake with improvement in her mental status after 500cc of NS bolus on admission.  - encephalopathy likely secondary to dehydration vs pseudodementia   - CT head negative for any acute intracranial pathology  - procalcitonin wnl.  - Blood and Urine cultures NGTD.  - currently AAOx4.  - medications reviewed   - no signs of acute infection, trend cbc, monitor for fevers   - lactate 1.2 (7/3)  - electrolytes wnl  - will encourage increased oral intake  - Psych consulted, f/u recs  - Neurology (Dr. Amaya) Consulted, pt is neurologically cleared for d/c.

## 2021-07-04 NOTE — PROGRESS NOTE ADULT - SUBJECTIVE AND OBJECTIVE BOX
Neurology follow up note    GIACOMO [id number]      Interval History:    Patient feels ok no new complaints.    MEDICATIONS    ALPRAZolam 0.25 milliGRAM(s) Oral three times a day PRN  apixaban 5 milliGRAM(s) Oral every 12 hours  atorvastatin 40 milliGRAM(s) Oral at bedtime  cholecalciferol 1000 Unit(s) Oral daily  escitalopram 5 milliGRAM(s) Oral daily  ferrous    sulfate 325 milliGRAM(s) Oral daily  gabapentin 100 milliGRAM(s) Oral two times a day  losartan 25 milliGRAM(s) Oral daily  multivitamin 1 Tablet(s) Oral daily  nystatin Powder 1 Application(s) Topical two times a day  sucralfate 1 Gram(s) Oral two times a day      Allergies    mold, cheese and wine (Unknown)  penicillins (Unknown)    Intolerances            Vital Signs Last 24 Hrs  T(C): 36.6 (2021 05:36), Max: 37.4 (2021 11:42)  T(F): 97.9 (2021 05:36), Max: 99.4 (2021 11:42)  HR: 89 (2021 05:36) (89 - 106)  BP: 159/99 (2021 05:36) (108/54 - 159/99)  BP(mean): --  RR: 19 (2021 05:36) (18 - 19)  SpO2: 91% (2021 05:36) (91% - 92%)    REVIEW OF SYSTEMS:  Constitutional:  The patient denies fever, chills, or night sweats.  Head:  No headaches.  Eyes:  No double vision or blurry vision.  Ears:  No ringing in the ears.  Neck:  No neck pain.  Respiratory:  No shortness of breath.  Cardiovascular:  No chest pain.  Abdomen:  No nausea, vomiting, or abdominal pain.  Extremities/Neurological:  No numbness or tingling.  Musculoskeletal:  No joint pain.  General:  Positive history of sleeping a lot.  Positive episode of feeling fatigued, feeling worn out.    PHYSICAL EXAMINATION:    HEENT:  Head:  Normocephalic, atraumatic.  Eyes:  No scleral icterus.  Ears:  Hearing bilaterally intact.  NECK:  Supple.  RESPIRATORY:  Good air entry bilaterally.  CARDIOVASCULAR:  S1 and S2 heard.  ABDOMEN:  Soft and nontender.  Extremities:  No clubbing or cyanosis was noted.      NEUROLOGIC:  The patient is awake and alert.  Location was hospital, year was ,able to follow complex commands, took right hand touch left ear.  Extraocular movements were intact.  Pupils were equal, round, and reactive bilaterally 3 mm to 2 mm.  Speech was fluent.  Smile was symmetric.  Motor:  Right upper was 5/5, left upper; the patient has decreased range of motion of the left shoulder, but does have a history of trauma and fracture, left upper extremity was 4+.  Bilateral extremities were 4-/5.  Sensory:  Bilateral upper and lower intact to light touch.  No drift.              LABS:  CBC Full  -  ( 2021 07:02 )  WBC Count : 9.80 K/uL  RBC Count : 3.59 M/uL  Hemoglobin : 11.4 g/dL  Hematocrit : 34.5 %  Platelet Count - Automated : 257 K/uL  Mean Cell Volume : 96.1 fl  Mean Cell Hemoglobin : 31.8 pg  Mean Cell Hemoglobin Concentration : 33.0 gm/dL  Auto Neutrophil # : 6.82 K/uL  Auto Lymphocyte # : 1.77 K/uL  Auto Monocyte # : 0.86 K/uL  Auto Eosinophil # : 0.28 K/uL  Auto Basophil # : 0.04 K/uL  Auto Neutrophil % : 69.5 %  Auto Lymphocyte % : 18.1 %  Auto Monocyte % : 8.8 %  Auto Eosinophil % : 2.9 %  Auto Basophil % : 0.4 %    Urinalysis Basic - ( 2021 18:49 )    Color: Yellow / Appearance: Clear / S.005 / pH: x  Gluc: x / Ketone: Negative  / Bili: Negative / Urobili: Negative   Blood: x / Protein: Negative / Nitrite: Negative   Leuk Esterase: Small / RBC: x / WBC 6-10   Sq Epi: x / Non Sq Epi: Occasional / Bacteria: Occasional      -    141  |  104  |  25<H>  ----------------------------<  89  3.7   |  30  |  0.91    Ca    8.3<L>      2021 07:02  Mg     2.2     07-04    TPro  7.7  /  Alb  3.2<L>  /  TBili  0.4  /  DBili  x   /  AST  26  /  ALT  23  /  AlkPhos  97  07-03    Hemoglobin A1C:     LIVER FUNCTIONS - ( 2021 08:52 )  Alb: 3.2 g/dL / Pro: 7.7 g/dL / ALK PHOS: 97 U/L / ALT: 23 U/L / AST: 26 U/L / GGT: x           Vitamin B12   PT/INR - ( 2021 17:28 )   PT: 19.4 sec;   INR: 1.70 ratio         PTT - ( 2021 17:28 )  PTT:31.0 sec      RADIOLOGY      ANALYSIS AND PLAN:  An 89-year-old with an episode of altered mental status.  For episode of altered mental status with the patient becoming more fatigued, sleeping more, slower speech, suspect most likely this is systemic in nature, rather than a primary CNS event.  The patient at present did answer all questions accordingly and does follow commands.  I would recommend to check basic electrolytes.  Infection workup as needed.  Questionable the patient could have any type of pseudodementia secondary to depression type of component with the patient sleeping constantly and speaking slower.  For anxiety and depression, home medications.  For history of PE, continue the patient on Eliquis.  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  as per family appears at her baseline     Spoke with the daughter, Johana, at [phone] She understands the reasoning and thought process.    From a neurology standpoint only, the patient appears stable for discharge planning.

## 2021-07-04 NOTE — PROGRESS NOTE ADULT - PROBLEM SELECTOR PLAN 6
Pt is taking Escitalopram 5mg oral qd and Alprazolam .25 mg TID at home.  - increase dosage of Escitalopram to 10mg qd.  - decrease Alprazolam to .25mg to BID.  - psych consulted, f/u recs. Pt is taking Escitalopram 5mg oral qd and Alprazolam .25 mg TID at home.  - increase dosage of Escitalopram to 7.5mg qd.  - decrease Alprazolam to .25mg to BID.  - psych consulted, f/u recs.

## 2021-07-04 NOTE — PROGRESS NOTE ADULT - PROBLEM SELECTOR PLAN 4
- Chronic  - On Rosuvastatin 10mg, will continue with therapeutic interchange atorvastatin 40 qd  - continue to monitor

## 2021-07-04 NOTE — PROGRESS NOTE ADULT - SUBJECTIVE AND OBJECTIVE BOX
Patient is a 89y old  Female who presents with a chief complaint of altered mental status (2021 06:25)      INTERVAL HPI/OVERNIGHT EVENTS: Pt seen and examined at bedside. Patient had difficulty sleeping overnight and was given .25 mg xanax at 1:00am. This AM, patient says she is not feeling well, answers questions slowly and appears sad. Pt is AAOx4. Possible that there is an element of pseudodemntia secondary to depression that is causing her apparent altered mental state. Denies CP/SOB/N/V/D/C.    MEDICATIONS  (STANDING):  apixaban 5 milliGRAM(s) Oral every 12 hours  atorvastatin 40 milliGRAM(s) Oral at bedtime  cholecalciferol 1000 Unit(s) Oral daily  escitalopram 5 milliGRAM(s) Oral daily  ferrous    sulfate 325 milliGRAM(s) Oral daily  gabapentin 100 milliGRAM(s) Oral two times a day  losartan 25 milliGRAM(s) Oral daily  melatonin 5 milliGRAM(s) Oral at bedtime  metoprolol tartrate 12.5 milliGRAM(s) Oral two times a day  multivitamin 1 Tablet(s) Oral daily  nystatin Powder 1 Application(s) Topical two times a day  sucralfate 1 Gram(s) Oral two times a day    MEDICATIONS  (PRN):  ALPRAZolam 0.25 milliGRAM(s) Oral two times a day PRN Anxiety      Allergies    mold, cheese and wine (Unknown)  penicillins (Unknown)    Intolerances        REVIEW OF SYSTEMS:  CONSTITUTIONAL: No fever or chills  HEENT:  No headache, no sore throat  RESPIRATORY: No cough, wheezing, or shortness of breath  CARDIOVASCULAR: No chest pain, palpitations  GASTROINTESTINAL: No abd pain, nausea, vomiting, or diarrhea  GENITOURINARY: No dysuria, frequency, or hematuria  NEUROLOGICAL: no focal weakness or dizziness  MUSCULOSKELETAL: no myalgias     Vital Signs Last 24 Hrs  T(C): 36.6 (2021 05:36), Max: 37.4 (2021 11:42)  T(F): 97.9 (2021 05:36), Max: 99.4 (2021 11:42)  HR: 89 (2021 05:36) (89 - 106)  BP: 159/99 (2021 05:36) (108/54 - 159/99)  BP(mean): --  RR: 19 (2021 05:36) (18 - 19)  SpO2: 91% (2021 05:36) (91% - 92%)    PHYSICAL EXAM:  GENERAL: Sad appearing female in NAD laying in bed  HEENT:  anicteric, moist mucous membranes  CHEST/LUNG:  CTA b/l, no rales, wheezes, or rhonchi  HEART:  RRR, S1, S2  ABDOMEN:  BS+, soft, nontender, nondistended  EXTREMITIES: no edema, cyanosis, or calf tenderness  NERVOUS SYSTEM: answers questions and follows commands appropriately    LABS:                        11.4   9.80  )-----------( 257      ( 2021 07:02 )             34.5     CBC Full  -  ( 2021 07:02 )  WBC Count : 9.80 K/uL  Hemoglobin : 11.4 g/dL  Hematocrit : 34.5 %  Platelet Count - Automated : 257 K/uL  Mean Cell Volume : 96.1 fl  Mean Cell Hemoglobin : 31.8 pg  Mean Cell Hemoglobin Concentration : 33.0 gm/dL  Auto Neutrophil # : 6.82 K/uL  Auto Lymphocyte # : 1.77 K/uL  Auto Monocyte # : 0.86 K/uL  Auto Eosinophil # : 0.28 K/uL  Auto Basophil # : 0.04 K/uL  Auto Neutrophil % : 69.5 %  Auto Lymphocyte % : 18.1 %  Auto Monocyte % : 8.8 %  Auto Eosinophil % : 2.9 %  Auto Basophil % : 0.4 %    2021 07:02    141    |  104    |  25     ----------------------------<  89     3.7     |  30     |  0.91     Ca    8.3        2021 07:02  Mg     2.2       2021 07:02      PT/INR - ( 2021 17:28 )   PT: 19.4 sec;   INR: 1.70 ratio         PTT - ( 2021 17:28 )  PTT:31.0 sec  Urinalysis Basic - ( 2021 18:49 )    Color: Yellow / Appearance: Clear / S.005 / pH: x  Gluc: x / Ketone: Negative  / Bili: Negative / Urobili: Negative   Blood: x / Protein: Negative / Nitrite: Negative   Leuk Esterase: Small / RBC: x / WBC 6-10   Sq Epi: x / Non Sq Epi: Occasional / Bacteria: Occasional      CAPILLARY BLOOD GLUCOSE              RADIOLOGY & ADDITIONAL TESTS:    Personally reviewed.     Consultant(s) Notes Reviewed:  [x] YES  [ ] NO

## 2021-07-04 NOTE — PROGRESS NOTE ADULT - ATTENDING COMMENTS
Pt. seen and evaluated for acute metabolic encephalopathy.  Possibly 2/2 dehydration with improvement in mental status s/p NS bolus.  Pt. today is AAO x 3.  Noted to have mild hypothermia this morning which has since improved with warming blanket.  Physical examination and plan as above.  Procalcitonin negative and lactate 1.2.  Will hold off on starting antibiotics.  Check urine and blood cx.
89yr old F admitted with c/o weakness, polyuria, "not being herself" as per daughters. Spoke at length with Johana Chiang- patient's caregiver&daughter who is concerned mother though improved, is emotionally not at her baseline. She explained patient was considerably drowsy and disengaging when on Lexapro 10mg at home and so dose was lowered to 7.5mg daily. Hx of Norvasc for BP, that was discontinued in May(last hospitalization) due to LE edema and recommended Lasix 20mg- polyuria, weakness - hence HELD on this admission.   Patient seen and examined, awake, NAD, unwilling to converse, dull affect, received .25mg Xanax at 1am&12.30pm for behavioral agitation  -BP&HR elev on 25mg Losartan  - start 12.5mg bid metoprolol, monitor & titrate up to optimize BP  - decrease Lexapro to 7.5mg daily  - reduce Xanax to 0.25mg BID prn  - Psych eval for depressed flat affect

## 2021-07-05 LAB
ANION GAP SERPL CALC-SCNC: 5 MMOL/L — SIGNIFICANT CHANGE UP (ref 5–17)
BASOPHILS # BLD AUTO: 0.05 K/UL — SIGNIFICANT CHANGE UP (ref 0–0.2)
BASOPHILS NFR BLD AUTO: 0.5 % — SIGNIFICANT CHANGE UP (ref 0–2)
BUN SERPL-MCNC: 28 MG/DL — HIGH (ref 7–23)
CALCIUM SERPL-MCNC: 8.4 MG/DL — LOW (ref 8.5–10.1)
CHLORIDE SERPL-SCNC: 106 MMOL/L — SIGNIFICANT CHANGE UP (ref 96–108)
CO2 SERPL-SCNC: 31 MMOL/L — SIGNIFICANT CHANGE UP (ref 22–31)
CREAT SERPL-MCNC: 0.73 MG/DL — SIGNIFICANT CHANGE UP (ref 0.5–1.3)
CULTURE RESULTS: SIGNIFICANT CHANGE UP
EOSINOPHIL # BLD AUTO: 0.33 K/UL — SIGNIFICANT CHANGE UP (ref 0–0.5)
EOSINOPHIL NFR BLD AUTO: 3.6 % — SIGNIFICANT CHANGE UP (ref 0–6)
GLUCOSE SERPL-MCNC: 85 MG/DL — SIGNIFICANT CHANGE UP (ref 70–99)
HCT VFR BLD CALC: 33.3 % — LOW (ref 34.5–45)
HGB BLD-MCNC: 10.7 G/DL — LOW (ref 11.5–15.5)
IMM GRANULOCYTES NFR BLD AUTO: 0.4 % — SIGNIFICANT CHANGE UP (ref 0–1.5)
LYMPHOCYTES # BLD AUTO: 1.69 K/UL — SIGNIFICANT CHANGE UP (ref 1–3.3)
LYMPHOCYTES # BLD AUTO: 18.3 % — SIGNIFICANT CHANGE UP (ref 13–44)
MCHC RBC-ENTMCNC: 31.4 PG — SIGNIFICANT CHANGE UP (ref 27–34)
MCHC RBC-ENTMCNC: 32.1 GM/DL — SIGNIFICANT CHANGE UP (ref 32–36)
MCV RBC AUTO: 97.7 FL — SIGNIFICANT CHANGE UP (ref 80–100)
MONOCYTES # BLD AUTO: 0.87 K/UL — SIGNIFICANT CHANGE UP (ref 0–0.9)
MONOCYTES NFR BLD AUTO: 9.4 % — SIGNIFICANT CHANGE UP (ref 2–14)
NEUTROPHILS # BLD AUTO: 6.27 K/UL — SIGNIFICANT CHANGE UP (ref 1.8–7.4)
NEUTROPHILS NFR BLD AUTO: 67.8 % — SIGNIFICANT CHANGE UP (ref 43–77)
NRBC # BLD: 0 /100 WBCS — SIGNIFICANT CHANGE UP (ref 0–0)
PLATELET # BLD AUTO: 257 K/UL — SIGNIFICANT CHANGE UP (ref 150–400)
POTASSIUM SERPL-MCNC: 4.3 MMOL/L — SIGNIFICANT CHANGE UP (ref 3.5–5.3)
POTASSIUM SERPL-SCNC: 4.3 MMOL/L — SIGNIFICANT CHANGE UP (ref 3.5–5.3)
RBC # BLD: 3.41 M/UL — LOW (ref 3.8–5.2)
RBC # FLD: 15.3 % — HIGH (ref 10.3–14.5)
SODIUM SERPL-SCNC: 142 MMOL/L — SIGNIFICANT CHANGE UP (ref 135–145)
SPECIMEN SOURCE: SIGNIFICANT CHANGE UP
WBC # BLD: 9.25 K/UL — SIGNIFICANT CHANGE UP (ref 3.8–10.5)
WBC # FLD AUTO: 9.25 K/UL — SIGNIFICANT CHANGE UP (ref 3.8–10.5)

## 2021-07-05 PROCEDURE — 99233 SBSQ HOSP IP/OBS HIGH 50: CPT

## 2021-07-05 RX ADMIN — Medication 1 GRAM(S): at 18:12

## 2021-07-05 RX ADMIN — Medication 1000 UNIT(S): at 11:31

## 2021-07-05 RX ADMIN — APIXABAN 5 MILLIGRAM(S): 2.5 TABLET, FILM COATED ORAL at 18:12

## 2021-07-05 RX ADMIN — Medication 1 GRAM(S): at 05:57

## 2021-07-05 RX ADMIN — ATORVASTATIN CALCIUM 40 MILLIGRAM(S): 80 TABLET, FILM COATED ORAL at 21:32

## 2021-07-05 RX ADMIN — Medication 12.5 MILLIGRAM(S): at 18:12

## 2021-07-05 RX ADMIN — ESCITALOPRAM OXALATE 7.5 MILLIGRAM(S): 10 TABLET, FILM COATED ORAL at 11:30

## 2021-07-05 RX ADMIN — NYSTATIN CREAM 1 APPLICATION(S): 100000 CREAM TOPICAL at 18:12

## 2021-07-05 RX ADMIN — GABAPENTIN 100 MILLIGRAM(S): 400 CAPSULE ORAL at 05:56

## 2021-07-05 RX ADMIN — Medication 1 TABLET(S): at 11:31

## 2021-07-05 RX ADMIN — GABAPENTIN 100 MILLIGRAM(S): 400 CAPSULE ORAL at 18:12

## 2021-07-05 RX ADMIN — APIXABAN 5 MILLIGRAM(S): 2.5 TABLET, FILM COATED ORAL at 05:56

## 2021-07-05 RX ADMIN — Medication 325 MILLIGRAM(S): at 11:31

## 2021-07-05 RX ADMIN — Medication 5 MILLIGRAM(S): at 21:32

## 2021-07-05 RX ADMIN — NYSTATIN CREAM 1 APPLICATION(S): 100000 CREAM TOPICAL at 05:57

## 2021-07-05 NOTE — PROGRESS NOTE ADULT - PROBLEM SELECTOR PLAN 6
Pt is taking Escitalopram 5mg oral qd and Alprazolam .25 mg TID at home.  - increase dosage of Escitalopram to 7.5mg qd.  - decrease Alprazolam to .25mg to BID.  - psych consulted, f/u recs.

## 2021-07-05 NOTE — PROGRESS NOTE ADULT - SUBJECTIVE AND OBJECTIVE BOX
Patient is a 89y old  Female who presents with a chief complaint of altered mental status (05 Jul 2021 07:52)      INTERVAL HPI:  OVERNIGHT EVENTS:  T(F): 97.6 (07-05-21 @ 04:13), Max: 98.3 (07-04-21 @ 20:30)  HR: 83 (07-05-21 @ 04:13) (70 - 83)  BP: 110/60 (07-05-21 @ 08:56) (91/61 - 157/85)  RR: 18 (07-05-21 @ 04:13) (18 - 18)  SpO2: 92% (07-05-21 @ 04:13) (91% - 92%)  I&O's Summary      REVIEW OF SYSTEMS:  CONSTITUTIONAL: No fever, weight loss, or fatigue  EYES: No eye pain, visual disturbances, or discharge  ENMT:  No difficulty hearing, tinnitus, vertigo; No sinus or throat pain  NECK: No pain or stiffness  BREASTS: No pain, masses, or nipple discharge  RESPIRATORY: No cough, wheezing, chills or hemoptysis; No shortness of breath  CARDIOVASCULAR: No chest pain, palpitations, dizziness, or leg swelling  GASTROINTESTINAL: No abdominal or epigastric pain. No nausea, vomiting, or hematemesis; No diarrhea or constipation. No melena or hematochezia.  GENITOURINARY: No dysuria, frequency, hematuria, or incontinence  NEUROLOGICAL: No headaches, memory loss, loss of strength, numbness, or tremors  SKIN: No itching, burning, rashes, or lesions   LYMPH NODES: No enlarged glands  ENDOCRINE: No heat or cold intolerance; No hair loss  MUSCULOSKELETAL: No joint pain or swelling; No muscle, back, or extremity pain  PSYCHIATRIC: No depression, anxiety, mood swings, or difficulty sleeping  HEME/LYMPH: No easy bruising, or bleeding gums  ALLERY AND IMMUNOLOGIC: No hives or eczema    PHYSICAL EXAM:  GENERAL: NAD, well-groomed, well-developed  HEAD:  Atraumatic, Normocephalic  EYES: EOMI, PERRLA, conjunctiva and sclera clear  ENMT: No tonsillar erythema, exudates, or enlargement; Moist mucous membranes, Good dentition, No lesions  NECK: Supple, No JVD, Normal thyroid  NERVOUS SYSTEM:  Alert & Oriented X3, Good concentration; Motor Strength 5/5 B/L upper and lower extremities; DTRs 2+ intact and symmetric  CHEST/LUNG: Clear to percussion bilaterally; No rales, rhonchi, wheezing, or rubs  HEART: Regular rate and rhythm; No murmurs, rubs, or gallops  ABDOMEN: Soft, Nontender, Nondistended; Bowel sounds present  EXTREMITIES:  2+ Peripheral Pulses, No clubbing, cyanosis, or edema  LYMPH: No lymphadenopathy noted  SKIN: No rashes or lesions    LABS:                        10.7   9.25  )-----------( 257      ( 05 Jul 2021 08:23 )             33.3     07-05    142  |  106  |  28<H>  ----------------------------<  85  4.3   |  31  |  0.73    Ca    8.4<L>      05 Jul 2021 08:23  Mg     2.2     07-04          CAPILLARY BLOOD GLUCOSE          07-03 @ 12:18   No growth to date.  --  --  07-03 @ 01:23   <10,000 CFU/mL Normal Urogenital Kate  --  --          MEDICATIONS  (STANDING):  apixaban 5 milliGRAM(s) Oral every 12 hours  atorvastatin 40 milliGRAM(s) Oral at bedtime  cholecalciferol 1000 Unit(s) Oral daily  escitalopram 7.5 milliGRAM(s) Oral daily  ferrous    sulfate 325 milliGRAM(s) Oral daily  gabapentin 100 milliGRAM(s) Oral two times a day  losartan 25 milliGRAM(s) Oral daily  melatonin 5 milliGRAM(s) Oral at bedtime  metoprolol tartrate 12.5 milliGRAM(s) Oral two times a day  multivitamin 1 Tablet(s) Oral daily  nystatin Powder 1 Application(s) Topical two times a day  sucralfate 1 Gram(s) Oral two times a day    MEDICATIONS  (PRN):  ALPRAZolam 0.25 milliGRAM(s) Oral two times a day PRN Anxiety       Patient is a 89y old  Female who presents with a chief complaint of altered mental status (05 Jul 2021 07:52)      INTERVAL HPI: Pt seen and examined with daughter Johana at bedside whom shares concern for apathy and weakness, agrees with psych and PT eval prior to dc. Pt denies any acute complaints at this time.     OVERNIGHT EVENTS: none noted  T(F): 97.6 (07-05-21 @ 04:13), Max: 98.3 (07-04-21 @ 20:30)  HR: 83 (07-05-21 @ 04:13) (70 - 83)  BP: 110/60 (07-05-21 @ 08:56) (91/61 - 157/85)  RR: 18 (07-05-21 @ 04:13) (18 - 18)  SpO2: 92% (07-05-21 @ 04:13) (91% - 92%)  I&O's Summary      REVIEW OF SYSTEMS:  CONSTITUTIONAL: No fever or chills  HEENT:  No headache, no sore throat  RESPIRATORY: No cough, wheezing, or shortness of breath  CARDIOVASCULAR: No chest pain, palpitations  GASTROINTESTINAL: No abd pain, nausea, vomiting, or diarrhea  GENITOURINARY: No dysuria, frequency, or hematuria  NEUROLOGICAL: no focal weakness or dizziness  MUSCULOSKELETAL: no myalgias     PHYSICAL EXAM:  GENERAL:  NAD, elder, somewhat flat affect female in bed, appears to labile in mood  HEENT:  anicteric, moist mucous membranes  CHEST/LUNG:  CTA b/l, no rales, wheezes, or rhonchi  HEART:  RRR, S1, S2  ABDOMEN:  BS+, soft, nontender, nondistended  EXTREMITIES: no edema, cyanosis, or calf tenderness  NERVOUS SYSTEM: AAOx2, some weakness   LABS:                        10.7   9.25  )-----------( 257      ( 05 Jul 2021 08:23 )             33.3     07-05    142  |  106  |  28<H>  ----------------------------<  85  4.3   |  31  |  0.73    Ca    8.4<L>      05 Jul 2021 08:23  Mg     2.2     07-04          CAPILLARY BLOOD GLUCOSE          07-03 @ 12:18   No growth to date.  --  --  07-03 @ 01:23   <10,000 CFU/mL Normal Urogenital Kate  --  --          MEDICATIONS  (STANDING):  apixaban 5 milliGRAM(s) Oral every 12 hours  atorvastatin 40 milliGRAM(s) Oral at bedtime  cholecalciferol 1000 Unit(s) Oral daily  escitalopram 7.5 milliGRAM(s) Oral daily  ferrous    sulfate 325 milliGRAM(s) Oral daily  gabapentin 100 milliGRAM(s) Oral two times a day  losartan 25 milliGRAM(s) Oral daily  melatonin 5 milliGRAM(s) Oral at bedtime  metoprolol tartrate 12.5 milliGRAM(s) Oral two times a day  multivitamin 1 Tablet(s) Oral daily  nystatin Powder 1 Application(s) Topical two times a day  sucralfate 1 Gram(s) Oral two times a day    MEDICATIONS  (PRN):  ALPRAZolam 0.25 milliGRAM(s) Oral two times a day PRN Anxiety

## 2021-07-05 NOTE — PROGRESS NOTE ADULT - SUBJECTIVE AND OBJECTIVE BOX
Neurology follow up note    GIACOMO [id number]      Interval History:    Patient feels ok no new complaints.    MEDICATIONS    ALPRAZolam 0.25 milliGRAM(s) Oral two times a day PRN  apixaban 5 milliGRAM(s) Oral every 12 hours  atorvastatin 40 milliGRAM(s) Oral at bedtime  cholecalciferol 1000 Unit(s) Oral daily  escitalopram 7.5 milliGRAM(s) Oral daily  ferrous    sulfate 325 milliGRAM(s) Oral daily  gabapentin 100 milliGRAM(s) Oral two times a day  losartan 25 milliGRAM(s) Oral daily  melatonin 5 milliGRAM(s) Oral at bedtime  metoprolol tartrate 12.5 milliGRAM(s) Oral two times a day  multivitamin 1 Tablet(s) Oral daily  nystatin Powder 1 Application(s) Topical two times a day  sucralfate 1 Gram(s) Oral two times a day      Allergies    mold, cheese and wine (Unknown)  penicillins (Unknown)    Intolerances            Vital Signs Last 24 Hrs  T(C): 36.4 (05 Jul 2021 04:13), Max: 36.8 (04 Jul 2021 20:30)  T(F): 97.6 (05 Jul 2021 04:13), Max: 98.3 (04 Jul 2021 20:30)  HR: 83 (05 Jul 2021 04:13) (62 - 83)  BP: 91/61 (05 Jul 2021 04:13) (91/61 - 157/85)  BP(mean): --  RR: 18 (05 Jul 2021 04:13) (17 - 18)  SpO2: 92% (05 Jul 2021 04:13) (90% - 92%)        RADIOLOGY    REVIEW OF SYSTEMS:  Constitutional:  The patient denies fever, chills, or night sweats.  Head:  No headaches.  Eyes:  No double vision or blurry vision.  Ears:  No ringing in the ears.  Neck:  No neck pain.  Respiratory:  No shortness of breath.  Cardiovascular:  No chest pain.  Abdomen:  No nausea, vomiting, or abdominal pain.  Extremities/Neurological:  No numbness or tingling.  Musculoskeletal:  No joint pain.  General:  Positive history of sleeping a lot.  Positive episode of feeling fatigued, feeling worn out.    PHYSICAL EXAMINATION:    HEENT:  Head:  Normocephalic, atraumatic.  Eyes:  No scleral icterus.  Ears:  Hearing bilaterally intact.  NECK:  Supple.  RESPIRATORY:  Good air entry bilaterally.  CARDIOVASCULAR:  S1 and S2 heard.  ABDOMEN:  Soft and nontender.  Extremities:  No clubbing or cyanosis was noted.      NEUROLOGIC:  The patient is awake and alert.    Extraocular movements were intact.  Pupils were equal, round, and reactive bilaterally 3 mm to 2 mm.  Speech was fluent.  Smile was symmetric.  Motor:  Right upper was 5/5, left upper; the patient has decreased range of motion of the left shoulder, but does have a history of trauma and fracture, left upper extremity was 4+.  Bilateral extremities were 4-/5.  Sensory:  Bilateral upper and lower intact to light touch.  No drift.            LABS:  CBC Full  -  ( 05 Jul 2021 08:23 )  WBC Count : 9.25 K/uL  RBC Count : 3.41 M/uL  Hemoglobin : 10.7 g/dL  Hematocrit : 33.3 %  Platelet Count - Automated : 257 K/uL  Mean Cell Volume : 97.7 fl  Mean Cell Hemoglobin : 31.4 pg  Mean Cell Hemoglobin Concentration : 32.1 gm/dL  Auto Neutrophil # : 6.27 K/uL  Auto Lymphocyte # : 1.69 K/uL  Auto Monocyte # : 0.87 K/uL  Auto Eosinophil # : 0.33 K/uL  Auto Basophil # : 0.05 K/uL  Auto Neutrophil % : 67.8 %  Auto Lymphocyte % : 18.3 %  Auto Monocyte % : 9.4 %  Auto Eosinophil % : 3.6 %  Auto Basophil % : 0.5 %      07-05    142  |  106  |  28<H>  ----------------------------<  85  4.3   |  31  |  0.73    Ca    8.4<L>      05 Jul 2021 08:23  Mg     2.2     07-04      Hemoglobin A1C:       Vitamin B12 Vitamin B12, Serum: 1242 pg/mL (07-04 @ 10:35)      ANALYSIS AND PLAN:  An 89-year-old with an episode of altered mental status.  For episode of altered mental status with the patient becoming more fatigued, sleeping more, slower speech, suspect most likely this is systemic in nature, rather than a primary CNS event.  The patient at present did answer all questions accordingly and does follow commands.  Infection workup as needed.  Questionable the patient could have any type of pseudodementia secondary to depression type of component with the patient sleeping constantly and speaking slower.  For anxiety and depression, home medications.  For history of PE, continue the patient on Eliquis.  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  as per family appears at her baseline     Spoke with the daughter, Johana, at [phone] 7/5/21 She understands the reasoning and thought process.    From a neurology standpoint only, the patient appears stable for discharge planning.

## 2021-07-05 NOTE — PROGRESS NOTE ADULT - PROBLEM SELECTOR PLAN 1
- ruled out, likely weakness sec to dehydration from heat and pt keeping AC at 80  - Patient with 3 day Hx of confusion and disorientation in the setting of decreased oral intake with improvement in her mental status after 500cc of NS bolus on admission.  - encephalopathy likely secondary to dehydration vs pseudodementia   - CT head negative for any acute intracranial pathology  - procalcitonin wnl.  - Blood and Urine cultures NGTD.  - currently AAOx4.  - medications reviewed   - no signs of acute infection, trend cbc, monitor for fevers   - lactate 1.2 (7/3)  - electrolytes wnl, thyroid wnl  - will encourage increased oral intake  - Psych consulted Dr evans, f/u recs tomorrow, dc planning underway  - Neurology (Dr. Amaya) Consulted, pt is neurologically cleared for d/c, apprec psych and PT eval prior to dc-daughter wants to take pt home - metaboli due to dehydation as well weakness sec to dehydration from heat and pt keeping AC at 80  - Patient with 3 day Hx of confusion and disorientation in the setting of decreased oral intake with improvement in her mental status after 500cc of NS bolus on admission.  - encephalopathy likely secondary to dehydration vs pseudodementia   - CT head negative for any acute intracranial pathology  - procalcitonin wnl.  - Blood and Urine cultures NGTD.  - currently AAOx4.  - medications reviewed   - no signs of acute infection, trend cbc, monitor for fevers   - lactate 1.2 (7/3)  - electrolytes wnl, thyroid wnl  - will encourage increased oral intake  - Psych consulted Dr evans, f/u recs tomorrow, dc planning underway  - Neurology (Dr. Amaya) Consulted, pt is neurologically cleared for d/c, apprec psych and PT eval prior to dc-daughter wants to take pt home

## 2021-07-06 ENCOUNTER — TRANSCRIPTION ENCOUNTER (OUTPATIENT)
Age: 86
End: 2021-07-06

## 2021-07-06 VITALS
TEMPERATURE: 99 F | DIASTOLIC BLOOD PRESSURE: 71 MMHG | SYSTOLIC BLOOD PRESSURE: 131 MMHG | OXYGEN SATURATION: 91 % | RESPIRATION RATE: 18 BRPM | HEART RATE: 61 BPM

## 2021-07-06 LAB
ANION GAP SERPL CALC-SCNC: 5 MMOL/L — SIGNIFICANT CHANGE UP (ref 5–17)
BUN SERPL-MCNC: 35 MG/DL — HIGH (ref 7–23)
CALCIUM SERPL-MCNC: 8.4 MG/DL — LOW (ref 8.5–10.1)
CHLORIDE SERPL-SCNC: 106 MMOL/L — SIGNIFICANT CHANGE UP (ref 96–108)
CO2 SERPL-SCNC: 30 MMOL/L — SIGNIFICANT CHANGE UP (ref 22–31)
CREAT SERPL-MCNC: 0.92 MG/DL — SIGNIFICANT CHANGE UP (ref 0.5–1.3)
GLUCOSE SERPL-MCNC: 100 MG/DL — HIGH (ref 70–99)
HCT VFR BLD CALC: 31.4 % — LOW (ref 34.5–45)
HGB BLD-MCNC: 10.3 G/DL — LOW (ref 11.5–15.5)
MCHC RBC-ENTMCNC: 32.3 PG — SIGNIFICANT CHANGE UP (ref 27–34)
MCHC RBC-ENTMCNC: 32.8 GM/DL — SIGNIFICANT CHANGE UP (ref 32–36)
MCV RBC AUTO: 98.4 FL — SIGNIFICANT CHANGE UP (ref 80–100)
NRBC # BLD: 0 /100 WBCS — SIGNIFICANT CHANGE UP (ref 0–0)
PLATELET # BLD AUTO: 244 K/UL — SIGNIFICANT CHANGE UP (ref 150–400)
POTASSIUM SERPL-MCNC: 4.3 MMOL/L — SIGNIFICANT CHANGE UP (ref 3.5–5.3)
POTASSIUM SERPL-SCNC: 4.3 MMOL/L — SIGNIFICANT CHANGE UP (ref 3.5–5.3)
RBC # BLD: 3.19 M/UL — LOW (ref 3.8–5.2)
RBC # FLD: 15.1 % — HIGH (ref 10.3–14.5)
SODIUM SERPL-SCNC: 141 MMOL/L — SIGNIFICANT CHANGE UP (ref 135–145)
WBC # BLD: 9.01 K/UL — SIGNIFICANT CHANGE UP (ref 3.8–10.5)
WBC # FLD AUTO: 9.01 K/UL — SIGNIFICANT CHANGE UP (ref 3.8–10.5)

## 2021-07-06 PROCEDURE — 99231 SBSQ HOSP IP/OBS SF/LOW 25: CPT

## 2021-07-06 PROCEDURE — 82746 ASSAY OF FOLIC ACID SERUM: CPT

## 2021-07-06 PROCEDURE — 84443 ASSAY THYROID STIM HORMONE: CPT

## 2021-07-06 PROCEDURE — 83880 ASSAY OF NATRIURETIC PEPTIDE: CPT

## 2021-07-06 PROCEDURE — 99239 HOSP IP/OBS DSCHRG MGMT >30: CPT | Mod: GC

## 2021-07-06 PROCEDURE — 96365 THER/PROPH/DIAG IV INF INIT: CPT

## 2021-07-06 PROCEDURE — 82607 VITAMIN B-12: CPT

## 2021-07-06 PROCEDURE — 83540 ASSAY OF IRON: CPT

## 2021-07-06 PROCEDURE — 82272 OCCULT BLD FECES 1-3 TESTS: CPT

## 2021-07-06 PROCEDURE — 70450 CT HEAD/BRAIN W/O DYE: CPT | Mod: MA

## 2021-07-06 PROCEDURE — 93005 ELECTROCARDIOGRAM TRACING: CPT

## 2021-07-06 PROCEDURE — 82962 GLUCOSE BLOOD TEST: CPT

## 2021-07-06 PROCEDURE — 85730 THROMBOPLASTIN TIME PARTIAL: CPT

## 2021-07-06 PROCEDURE — 99285 EMERGENCY DEPT VISIT HI MDM: CPT | Mod: 25

## 2021-07-06 PROCEDURE — 71045 X-RAY EXAM CHEST 1 VIEW: CPT

## 2021-07-06 PROCEDURE — 83550 IRON BINDING TEST: CPT

## 2021-07-06 PROCEDURE — 85027 COMPLETE CBC AUTOMATED: CPT

## 2021-07-06 PROCEDURE — U0005: CPT

## 2021-07-06 PROCEDURE — 84145 PROCALCITONIN (PCT): CPT

## 2021-07-06 PROCEDURE — 84484 ASSAY OF TROPONIN QUANT: CPT

## 2021-07-06 PROCEDURE — 81001 URINALYSIS AUTO W/SCOPE: CPT

## 2021-07-06 PROCEDURE — 83605 ASSAY OF LACTIC ACID: CPT

## 2021-07-06 PROCEDURE — 82728 ASSAY OF FERRITIN: CPT

## 2021-07-06 PROCEDURE — 80048 BASIC METABOLIC PNL TOTAL CA: CPT

## 2021-07-06 PROCEDURE — 71250 CT THORAX DX C-: CPT | Mod: MA

## 2021-07-06 PROCEDURE — 96361 HYDRATE IV INFUSION ADD-ON: CPT

## 2021-07-06 PROCEDURE — 87086 URINE CULTURE/COLONY COUNT: CPT

## 2021-07-06 PROCEDURE — 86769 SARS-COV-2 COVID-19 ANTIBODY: CPT

## 2021-07-06 PROCEDURE — 36415 COLL VENOUS BLD VENIPUNCTURE: CPT

## 2021-07-06 PROCEDURE — U0003: CPT

## 2021-07-06 PROCEDURE — 80053 COMPREHEN METABOLIC PANEL: CPT

## 2021-07-06 PROCEDURE — 83690 ASSAY OF LIPASE: CPT

## 2021-07-06 PROCEDURE — 97162 PT EVAL MOD COMPLEX 30 MIN: CPT

## 2021-07-06 PROCEDURE — 83735 ASSAY OF MAGNESIUM: CPT

## 2021-07-06 PROCEDURE — 87040 BLOOD CULTURE FOR BACTERIA: CPT

## 2021-07-06 PROCEDURE — 85610 PROTHROMBIN TIME: CPT

## 2021-07-06 PROCEDURE — 85025 COMPLETE CBC W/AUTO DIFF WBC: CPT

## 2021-07-06 RX ADMIN — GABAPENTIN 100 MILLIGRAM(S): 400 CAPSULE ORAL at 06:26

## 2021-07-06 RX ADMIN — APIXABAN 5 MILLIGRAM(S): 2.5 TABLET, FILM COATED ORAL at 06:26

## 2021-07-06 RX ADMIN — Medication 12.5 MILLIGRAM(S): at 06:26

## 2021-07-06 RX ADMIN — Medication 325 MILLIGRAM(S): at 11:52

## 2021-07-06 RX ADMIN — NYSTATIN CREAM 1 APPLICATION(S): 100000 CREAM TOPICAL at 06:26

## 2021-07-06 RX ADMIN — ESCITALOPRAM OXALATE 7.5 MILLIGRAM(S): 10 TABLET, FILM COATED ORAL at 11:52

## 2021-07-06 RX ADMIN — Medication 1 TABLET(S): at 11:52

## 2021-07-06 RX ADMIN — LOSARTAN POTASSIUM 25 MILLIGRAM(S): 100 TABLET, FILM COATED ORAL at 06:26

## 2021-07-06 RX ADMIN — Medication 1000 UNIT(S): at 11:52

## 2021-07-06 RX ADMIN — Medication 1 GRAM(S): at 06:26

## 2021-07-06 NOTE — PROGRESS NOTE ADULT - PROBLEM SELECTOR PLAN 1
- metaboli due to dehydation as well weakness sec to dehydration from heat and pt keeping AC at 80  - Patient with 3 day Hx of confusion and disorientation in the setting of decreased oral intake with improvement in her mental status after 500cc of NS bolus on admission.  - encephalopathy likely secondary to dehydration vs pseudodementia   - CT head negative for any acute intracranial pathology  - procalcitonin wnl.  - Blood and Urine cultures NGTD.  - currently AAOx3.  - medications reviewed   - no signs of acute infection, trend cbc, monitor for fevers   - lactate 1.2 (7/3)  - electrolytes wnl, thyroid wnl  - will encourage increased oral intake  - Psych (Dr. Silver) saw patient this AM, says clear for d/c, d/c planning underway. Appreciate recs.  - Neurology (Dr. Amaya) Consulted, pt is neurologically cleared for d/c, apprec psych and PT eval prior to dc-daughter wants to take pt home

## 2021-07-06 NOTE — PHYSICAL THERAPY INITIAL EVALUATION ADULT - LEVEL OF INDEPENDENCE: SIT/STAND, REHAB EVAL
minimum assist (75% patients effort) moderate assist (50% patients effort)/maximum assist (25% patients effort)

## 2021-07-06 NOTE — PROGRESS NOTE ADULT - REASON FOR ADMISSION
altered mental status
Encephalopathy

## 2021-07-06 NOTE — PROGRESS NOTE ADULT - PROBLEM SELECTOR PROBLEM 2
History of pulmonary embolism

## 2021-07-06 NOTE — DISCHARGE NOTE NURSING/CASE MANAGEMENT/SOCIAL WORK - PATIENT PORTAL LINK FT
You can access the FollowMyHealth Patient Portal offered by [facility] by registering at the following website: [email or web address]. By joining [facility]’s FollowMyHealth portal, you will also be able to view your health information using other applications (apps) compatible with our system.

## 2021-07-06 NOTE — PHYSICAL THERAPY INITIAL EVALUATION ADULT - LEVEL OF INDEPENDENCE: SUPINE/SIT, REHAB EVAL
minimum assist (75% patients effort) tactile, manual and verbal cues/moderate assist (50% patients effort)

## 2021-07-06 NOTE — PHYSICAL THERAPY INITIAL EVALUATION ADULT - PHYSICAL ASSIST/NONPHYSICAL ASSIST: GAIT, REHAB EVAL
verbal cues/nonverbal cues (demo/gestures)/1 person assist set-up required/verbal cues/nonverbal cues (demo/gestures)/1 person assist

## 2021-07-06 NOTE — PHYSICAL THERAPY INITIAL EVALUATION ADULT - DIAGNOSIS, PT EVAL
Decrease in functional mobility Decrease in functional mobility, weakness, deconditioned and functional decline

## 2021-07-06 NOTE — BH CONSULTATION LIAISON ASSESSMENT NOTE - HPI (INCLUDE ILLNESS QUALITY, SEVERITY, DURATION, TIMING, CONTEXT, MODIFYING FACTORS, ASSOCIATED SIGNS AND SYMPTOMS)
Patient seen, evaluated and chart reviewed. Patient is a 89 yo female PMH of PE on Eliquis (05/21), anxiety, depression, CVA, Fall, GI bleed, Hip fracture, Hyperlipidemia, Hypertension, PUD (peptic ulcer disease), hiatal hernia presented to the ED with the c/o  fatigue and increased confusion per daughter. History obtained from patients daughter Johana [phone], who states that for the past 3 days patient has been having episodes of confusion and mild disorientation along with difficulty with speech. Then according to the daughter today she has been sleeping a lot which is very unusual for her. Patient according to the daughter is AA) x3 at baseline.  Patient lives with 24 hour nursing aid and daughter Johana visits her daily or if not then talk to her over the phone. Daughter also informs that for the past few days patient has decreased oral intake for both liquids and solids.  Pt reports that she has been feeling fatigued for the last 3 days with increased urinary frequency but denies any burning or urgency. Denies any chest pain, SOB, or palpitations. Denies any nausea or vomiting, no diarrhea.  Of note was recently admitted to the hospital from 5/18-5/20 with PE and bilateral lower ext swelling. Her Norvasc was held, lower extremity edema has since improved. For the PE pt was started on Eliquis. The issue of patient's mood was raised as she initially presented with depressed mood. At this time patient is lucid and cooperative, denies symptoms of acute psychosis, kem or depression.

## 2021-07-06 NOTE — PROGRESS NOTE ADULT - PROBLEM SELECTOR PLAN 5
- Admission labs show hemoglobin of 10.7, baseline at 12.2 from chart review  - likely iron deficiency anemia as patient is on iron supplements   - will continue ferrous sulfate   - follow hemoglobin with daily cbc  - continue to monitor.

## 2021-07-06 NOTE — PHYSICAL THERAPY INITIAL EVALUATION ADULT - FOLLOWS COMMANDS/ANSWERS QUESTIONS, REHAB EVAL
very [place]; needs verbal and visual cues for commands/able to follow single-step instructions very [place]; needs verbal cues for commands/able to follow single-step instructions

## 2021-07-06 NOTE — PHYSICAL THERAPY INITIAL EVALUATION ADULT - TRANSFER SAFETY CONCERNS NOTED: SIT/STAND, REHAB EVAL
decreased balance during turns/losing balance/decreased proprioception/decreased sequencing ability/decreased step length/decreased weight-shifting ability

## 2021-07-06 NOTE — PHYSICAL THERAPY INITIAL EVALUATION ADULT - IMPAIRMENTS FOUND, PT EVAL
gait, locomotion, and balance aerobic capacity/endurance/arousal, attention, and cognition/cognitive impairment/gait, locomotion, and balance/gross motor/joint integrity and mobility/muscle strength/poor safety awareness/posture/ROM

## 2021-07-06 NOTE — PHYSICAL THERAPY INITIAL EVALUATION ADULT - PERTINENT HX OF CURRENT PROBLEM, REHAB EVAL
89 yr old female sent from PCP's office after outpatient CT revealed pulmonary emboli. patient had initially presented to her PCP with worsening lower ext edema As per H&P:"89 yo female PMH of PE on Eliquis (05/21), anxiety, depression, CVA, Fall, GI bleed, Hip fracture, Hyperlipidemia, Hypertension, PUD (peptic ulcer disease), hiatal hernia presented to the ED with the c/o  fatigue and increased confusion per daughter. History obtained from patients daughter Johana [phone], who states that for the past 3 days patient has been having episodes of confusion and mild disorientation along with difficulty with speech. Pt has been sleeping a lot."

## 2021-07-06 NOTE — PHYSICAL THERAPY INITIAL EVALUATION ADULT - TRANSFER TRAINING, PT EVAL
2 to 3 Sessions Sit to Stand CGA Pt will perform sit<->stand /c RW and Cg/Min Ax1 or better in 3 weeks

## 2021-07-06 NOTE — PHYSICAL THERAPY INITIAL EVALUATION ADULT - IMPAIRED TRANSFERS: SIT/STAND, REHAB EVAL
impaired balance/cognition/impaired coordination/decreased flexibility/impaired motor control/impaired postural control/decreased strength

## 2021-07-06 NOTE — PROGRESS NOTE ADULT - SUBJECTIVE AND OBJECTIVE BOX
Neurology follow up note    GIACOMO [id number]      Interval History:    Patient feels ok no new complaints wants to leave     MEDICATIONS    ALPRAZolam 0.25 milliGRAM(s) Oral two times a day PRN  apixaban 5 milliGRAM(s) Oral every 12 hours  atorvastatin 40 milliGRAM(s) Oral at bedtime  cholecalciferol 1000 Unit(s) Oral daily  escitalopram 7.5 milliGRAM(s) Oral daily  ferrous    sulfate 325 milliGRAM(s) Oral daily  gabapentin 100 milliGRAM(s) Oral two times a day  losartan 25 milliGRAM(s) Oral daily  melatonin 5 milliGRAM(s) Oral at bedtime  metoprolol tartrate 12.5 milliGRAM(s) Oral two times a day  multivitamin 1 Tablet(s) Oral daily  nystatin Powder 1 Application(s) Topical two times a day  sucralfate 1 Gram(s) Oral two times a day      Allergies    mold, cheese and wine (Unknown)  penicillins (Unknown)    Intolerances            Vital Signs Last 24 Hrs  T(C): 37 (06 Jul 2021 06:09), Max: 37 (06 Jul 2021 06:09)  T(F): 98.6 (06 Jul 2021 06:09), Max: 98.6 (06 Jul 2021 06:09)  HR: 66 (06 Jul 2021 06:09) (66 - 85)  BP: 117/64 (06 Jul 2021 06:09) (110/60 - 127/68)  BP(mean): --  RR: 18 (06 Jul 2021 06:09) (16 - 18)  SpO2: 91% (06 Jul 2021 06:09) (91% - 96%)    REVIEW OF SYSTEMS:  Constitutional:  The patient denies fever, chills, or night sweats.  Head:  No headaches.  Eyes:  No double vision or blurry vision.  Ears:  No ringing in the ears.  Neck:  No neck pain.  Respiratory:  No shortness of breath.  Cardiovascular:  No chest pain.  Abdomen:  No nausea, vomiting, or abdominal pain.  Extremities/Neurological:  No numbness or tingling.  Musculoskeletal:  No joint pain.  General:  Positive history of sleeping a lot.  Positive episode of feeling fatigued, feeling worn out.    PHYSICAL EXAMINATION:    HEENT:  Head:  Normocephalic, atraumatic.  Eyes:  No scleral icterus.  Ears:  Hearing bilaterally intact.  NECK:  Supple.  RESPIRATORY:  Good air entry bilaterally.  CARDIOVASCULAR:  S1 and S2 heard.  ABDOMEN:  Soft and nontender.  Extremities:  No clubbing or cyanosis was noted.      NEUROLOGIC:  The patient is awake and alert.    Extraocular movements were intact.  Pupils were equal, round, and reactive bilaterally 3 mm to 2 mm.  Speech was fluent.  Smile was symmetric.  Motor:  Right upper was 5/5, left upper; the patient has decreased range of motion of the left shoulder, but does have a history of trauma and fracture, left upper extremity was 4+.  Bilateral extremities were 4-/5.  Sensory:  Bilateral upper and lower intact to light touch.  No drift.                 LABS:  CBC Full  -  ( 05 Jul 2021 08:23 )  WBC Count : 9.25 K/uL  RBC Count : 3.41 M/uL  Hemoglobin : 10.7 g/dL  Hematocrit : 33.3 %  Platelet Count - Automated : 257 K/uL  Mean Cell Volume : 97.7 fl  Mean Cell Hemoglobin : 31.4 pg  Mean Cell Hemoglobin Concentration : 32.1 gm/dL  Auto Neutrophil # : 6.27 K/uL  Auto Lymphocyte # : 1.69 K/uL  Auto Monocyte # : 0.87 K/uL  Auto Eosinophil # : 0.33 K/uL  Auto Basophil # : 0.05 K/uL  Auto Neutrophil % : 67.8 %  Auto Lymphocyte % : 18.3 %  Auto Monocyte % : 9.4 %  Auto Eosinophil % : 3.6 %  Auto Basophil % : 0.5 %      07-05    142  |  106  |  28<H>  ----------------------------<  85  4.3   |  31  |  0.73    Ca    8.4<L>      05 Jul 2021 08:23      Hemoglobin A1C:       Vitamin B12         RADIOLOGY      ANALYSIS AND PLAN:  An 89-year-old with an episode of altered mental status.  For episode of altered mental status with the patient becoming more fatigued, sleeping more, slower speech, suspect most likely this is systemic in nature, rather than a primary CNS event.  The patient at present did answer all questions accordingly and does follow commands.  Infection workup as needed.  Questionable the patient could have any type of pseudodementia secondary to depression type of component with the patient sleeping constantly and speaking slower.  For anxiety and depression, home medications.  For history of PE, continue the patient on Eliquis.  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  as per family appears at her baseline   no new events     Spoke with the daughter, Johana, at [phone] 7/5/21 She understands the reasoning and thought process.    From a neurology standpoint only, the patient appears stable for discharge planning.     Neurology follow up note    GIACOMO [id number]      Interval History:    Patient feels ok no new complaints wants to leave     MEDICATIONS    ALPRAZolam 0.25 milliGRAM(s) Oral two times a day PRN  apixaban 5 milliGRAM(s) Oral every 12 hours  atorvastatin 40 milliGRAM(s) Oral at bedtime  cholecalciferol 1000 Unit(s) Oral daily  escitalopram 7.5 milliGRAM(s) Oral daily  ferrous    sulfate 325 milliGRAM(s) Oral daily  gabapentin 100 milliGRAM(s) Oral two times a day  losartan 25 milliGRAM(s) Oral daily  melatonin 5 milliGRAM(s) Oral at bedtime  metoprolol tartrate 12.5 milliGRAM(s) Oral two times a day  multivitamin 1 Tablet(s) Oral daily  nystatin Powder 1 Application(s) Topical two times a day  sucralfate 1 Gram(s) Oral two times a day      Allergies    mold, cheese and wine (Unknown)  penicillins (Unknown)    Intolerances            Vital Signs Last 24 Hrs  T(C): 37 (06 Jul 2021 06:09), Max: 37 (06 Jul 2021 06:09)  T(F): 98.6 (06 Jul 2021 06:09), Max: 98.6 (06 Jul 2021 06:09)  HR: 66 (06 Jul 2021 06:09) (66 - 85)  BP: 117/64 (06 Jul 2021 06:09) (110/60 - 127/68)  BP(mean): --  RR: 18 (06 Jul 2021 06:09) (16 - 18)  SpO2: 91% (06 Jul 2021 06:09) (91% - 96%)    REVIEW OF SYSTEMS:  Constitutional:  The patient denies fever, chills, or night sweats.  Head:  No headaches.  Eyes:  No double vision or blurry vision.  Ears:  No ringing in the ears.  Neck:  No neck pain.  Respiratory:  No shortness of breath.  Cardiovascular:  No chest pain.  Abdomen:  No nausea, vomiting, or abdominal pain.  Extremities/Neurological:  No numbness or tingling.  Musculoskeletal:  No joint pain.  General:  Positive history of sleeping a lot.  Positive episode of feeling fatigued, feeling worn out.    PHYSICAL EXAMINATION:    HEENT:  Head:  Normocephalic, atraumatic.  Eyes:  No scleral icterus.  Ears:  Hearing bilaterally intact.  NECK:  Supple.  RESPIRATORY:  Good air entry bilaterally.  CARDIOVASCULAR:  S1 and S2 heard.  ABDOMEN:  Soft and nontender.  Extremities:  No clubbing or cyanosis was noted.      NEUROLOGIC:  The patient is awake and alert.    Extraocular movements were intact.  Pupils were equal, round, and reactive bilaterally 3 mm to 2 mm.  Speech was fluent.  Smile was symmetric.  Motor:  Right upper was 5/5, left upper; the patient has decreased range of motion of the left shoulder, but does have a history of trauma and fracture, left upper extremity was 4+.  Bilateral extremities were 4-/5.  Sensory:  Bilateral upper and lower intact to light touch.  No drift.                 LABS:  CBC Full  -  ( 05 Jul 2021 08:23 )  WBC Count : 9.25 K/uL  RBC Count : 3.41 M/uL  Hemoglobin : 10.7 g/dL  Hematocrit : 33.3 %  Platelet Count - Automated : 257 K/uL  Mean Cell Volume : 97.7 fl  Mean Cell Hemoglobin : 31.4 pg  Mean Cell Hemoglobin Concentration : 32.1 gm/dL  Auto Neutrophil # : 6.27 K/uL  Auto Lymphocyte # : 1.69 K/uL  Auto Monocyte # : 0.87 K/uL  Auto Eosinophil # : 0.33 K/uL  Auto Basophil # : 0.05 K/uL  Auto Neutrophil % : 67.8 %  Auto Lymphocyte % : 18.3 %  Auto Monocyte % : 9.4 %  Auto Eosinophil % : 3.6 %  Auto Basophil % : 0.5 %      07-05    142  |  106  |  28<H>  ----------------------------<  85  4.3   |  31  |  0.73    Ca    8.4<L>      05 Jul 2021 08:23      Hemoglobin A1C:       Vitamin B12         RADIOLOGY      ANALYSIS AND PLAN:  An 89-year-old with an episode of altered mental status.  For episode of altered mental status with the patient becoming more fatigued, sleeping more, slower speech, suspect most likely this is systemic in nature, rather than a primary CNS event.  The patient at present did answer all questions accordingly and does follow commands.  Infection workup as needed.  Questionable the patient could have any type of pseudodementia secondary to depression type of component with the patient sleeping constantly and speaking slower.  For anxiety and depression, home medications.  For history of PE, continue the patient on Eliquis.  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  as per family appears at her baseline  spoke to them yesterday  no new events     Spoke with the daughter, Johana, at [phone] 7/5/21 She understands the reasoning and thought process.    From a neurology standpoint only, the patient appears stable for discharge planning.

## 2021-07-06 NOTE — BH CONSULTATION LIAISON ASSESSMENT NOTE - RISK ASSESSMENT
Patient denies mood symptoms, is future-oriented, and is able to contract for safety, no significant prior psychiatric history or suicidal ideation

## 2021-07-06 NOTE — BH CONSULTATION LIAISON ASSESSMENT NOTE - NSBHCHARTREVIEWLAB_PSY_A_CORE FT
10.3   9.01  )-----------( 244      ( 06 Jul 2021 10:41 )             31.4   07-06    141  |  106  |  35<H>  ----------------------------<  100<H>  4.3   |  30  |  0.92    Ca    8.4<L>      06 Jul 2021 10:41

## 2021-07-06 NOTE — PROGRESS NOTE ADULT - PROBLEM SELECTOR PLAN 2
Patient with recent diagnosis of PE was admitted to [facility] 05/18/21  - Pt was found to have Pulmonary emboli and was discharged on Eliquis   - Denies any SOB or chest pain  - will continue with Eliquis 5mg bid  - continue to monitor
Patient with recent diagnosis of PE was admitted to [facility] 05/18/21  - Pt was found to have Pulmonary emboli and was discharged on Eliquis   - Denies any SOB or chest pain  - will continue with Eliquis 5mg bid  - continue to monitor
Patient with recent diagnosis of PE was admitted to [facility] 05/18/21  - Pt was found to have Pulmonary emboli and was discharged on Eliquis   - Denies any SOB or chest pain  - will continue with Eliquis 5mg bid  - continue to monitor
Patient with recent diagnosis of PE was admitted to [facility] 05/18/21  - Pt was found to have Pulmonary emboli and was discharged on Eliquis   - Denies any SOB or chest pain  - will continue with Eliquis 5mg bid  - continue to monitor

## 2021-07-06 NOTE — PHYSICAL THERAPY INITIAL EVALUATION ADULT - RANGE OF MOTION EXAMINATION, REHAB EVAL
chronic pain with B shoulder rom due to arthritis/no ROM deficits were identified kyphosis, arthritic changes/bilateral upper extremity ROM was WFL (within functional limits)/bilateral lower extremity ROM was WFL (within functional limits)/deficits as listed below

## 2021-07-06 NOTE — PHYSICAL THERAPY INITIAL EVALUATION ADULT - STRENGTHENING, PT EVAL
2 to 3 Sessions Increase Strength 1/2 Grade Pt will improve strength t/o BUE/BLE by 1 grade or more in 3 weeks

## 2021-07-06 NOTE — BH CONSULTATION LIAISON ASSESSMENT NOTE - CURRENT MEDICATION
MEDICATIONS  (STANDING):  apixaban 5 milliGRAM(s) Oral every 12 hours  atorvastatin 40 milliGRAM(s) Oral at bedtime  cholecalciferol 1000 Unit(s) Oral daily  escitalopram 7.5 milliGRAM(s) Oral daily  ferrous    sulfate 325 milliGRAM(s) Oral daily  gabapentin 100 milliGRAM(s) Oral two times a day  losartan 25 milliGRAM(s) Oral daily  melatonin 5 milliGRAM(s) Oral at bedtime  metoprolol tartrate 12.5 milliGRAM(s) Oral two times a day  multivitamin 1 Tablet(s) Oral daily  nystatin Powder 1 Application(s) Topical two times a day  sucralfate 1 Gram(s) Oral two times a day    MEDICATIONS  (PRN):  ALPRAZolam 0.25 milliGRAM(s) Oral two times a day PRN Anxiety

## 2021-07-06 NOTE — PHYSICAL THERAPY INITIAL EVALUATION ADULT - GAIT DEVIATIONS NOTED, PT EVAL
decreased brittany/increased time in double stance/decreased step length/decreased stride length tactile, manual and verbal cues/decreased brittany/increased time in double stance/decreased velocity of limb motion/decreased step length/decreased stride length/decreased weight-shifting ability

## 2021-07-06 NOTE — PHYSICAL THERAPY INITIAL EVALUATION ADULT - LEVEL OF INDEPENDENCE: STAND/SIT, REHAB EVAL
minimum assist (75% patients effort) tactile, manual and verbal cues/moderate assist (50% patients effort)/maximum assist (25% patients effort)

## 2021-07-06 NOTE — BH CONSULTATION LIAISON ASSESSMENT NOTE - NSBHCHARTREVIEWVS_PSY_A_CORE FT
Vital Signs Last 24 Hrs  T(C): 37.2 (06 Jul 2021 13:12), Max: 37.2 (06 Jul 2021 13:12)  T(F): 98.9 (06 Jul 2021 13:12), Max: 98.9 (06 Jul 2021 13:12)  HR: 61 (06 Jul 2021 13:12) (61 - 85)  BP: 131/71 (06 Jul 2021 13:12) (110/60 - 131/71)  BP(mean): --  RR: 18 (06 Jul 2021 13:12) (18 - 18)  SpO2: 91% (06 Jul 2021 13:12) (90% - 96%)

## 2021-07-06 NOTE — PHYSICAL THERAPY INITIAL EVALUATION ADULT - GENERAL OBSERVATIONS, REHAB EVAL
Patient received semifowler in bed, in NAD. Patient received semifowler in 1 east bed, in NAD. Pt c/o breakfast eggs not having flavor. Pt was agreeable to sit up for breakfast and participate /c PT eval.

## 2021-07-06 NOTE — PHYSICAL THERAPY INITIAL EVALUATION ADULT - PHYSICAL ASSIST/NONPHYSICAL ASSIST: SUPINE/SIT, REHAB EVAL
cues for safety/verbal cues/nonverbal cues (demo/gestures)/1 person assist cues for safety/set-up required/verbal cues/nonverbal cues (demo/gestures)/1 person assist

## 2021-07-06 NOTE — PHYSICAL THERAPY INITIAL EVALUATION ADULT - GAIT TRAINING, PT EVAL
2 to 3 Sessions Ambulate 100ft with RW CGA Pt will ambulation /c RW 25'-50' or as tolerated and Cg/Min Ax1 or better in 3 weeks

## 2021-07-06 NOTE — PHYSICAL THERAPY INITIAL EVALUATION ADULT - IMPAIRMENTS CONTRIBUTING TO GAIT DEVIATIONS, PT EVAL
impaired balance/decreased strength impaired balance/cognition/impaired coordination/decreased flexibility/narrow base of support/impaired postural control/decreased strength

## 2021-07-06 NOTE — PROGRESS NOTE ADULT - PROBLEM SELECTOR PLAN 6
Pt is taking Escitalopram 7.5mg oral qd and Alprazolam .25 mg TID at home.  - Continue Escitalopram 7.5mg qd.  - Continue Alprazolam .25mg to BID.  - psych consulted, f/u recs.

## 2021-07-06 NOTE — PROGRESS NOTE ADULT - SUBJECTIVE AND OBJECTIVE BOX
Patient is a 89y old  Female who presents with a chief complaint of altered mental status (06 Jul 2021 06:54)      INTERVAL HPI/OVERNIGHT EVENTS: Pt seen and examined at the bedside. She was OOBTC with assistance from PT. Says she's irritated, tired of being in the hospital and wants to go home. Has no acute complaints this AM. AAOx3. Denies CP/SOB/N/V/D/C.    MEDICATIONS  (STANDING):  apixaban 5 milliGRAM(s) Oral every 12 hours  atorvastatin 40 milliGRAM(s) Oral at bedtime  cholecalciferol 1000 Unit(s) Oral daily  escitalopram 7.5 milliGRAM(s) Oral daily  ferrous    sulfate 325 milliGRAM(s) Oral daily  gabapentin 100 milliGRAM(s) Oral two times a day  losartan 25 milliGRAM(s) Oral daily  melatonin 5 milliGRAM(s) Oral at bedtime  metoprolol tartrate 12.5 milliGRAM(s) Oral two times a day  multivitamin 1 Tablet(s) Oral daily  nystatin Powder 1 Application(s) Topical two times a day  sucralfate 1 Gram(s) Oral two times a day    MEDICATIONS  (PRN):  ALPRAZolam 0.25 milliGRAM(s) Oral two times a day PRN Anxiety      Allergies    mold, cheese and wine (Unknown)  penicillins (Unknown)    Intolerances        REVIEW OF SYSTEMS:  CONSTITUTIONAL: No fever or chills  HEENT:  No headache, no sore throat  RESPIRATORY: No cough, wheezing, or shortness of breath  CARDIOVASCULAR: No chest pain, palpitations  GASTROINTESTINAL: No abd pain, nausea, vomiting, or diarrhea  GENITOURINARY: No dysuria, frequency, or hematuria  NEUROLOGICAL: no focal weakness or dizziness  MUSCULOSKELETAL: no myalgias     Vital Signs Last 24 Hrs  T(C): 37 (06 Jul 2021 06:09), Max: 37 (06 Jul 2021 06:09)  T(F): 98.6 (06 Jul 2021 06:09), Max: 98.6 (06 Jul 2021 06:09)  HR: 71 (06 Jul 2021 09:03) (66 - 85)  BP: 117/64 (06 Jul 2021 06:09) (110/60 - 127/68)  BP(mean): --  RR: 18 (06 Jul 2021 06:09) (16 - 18)  SpO2: 90% (06 Jul 2021 09:03) (90% - 96%)    PHYSICAL EXAM:  GENERAL: NAD, sitting in chair  HEENT:  anicteric, moist mucous membranes  CHEST/LUNG:  CTA b/l, no rales, wheezes, or rhonchi  HEART:  RRR, soft S1, S2, no Murmurs appreciated  ABDOMEN:  BS+, soft, nontender, nondistended  EXTREMITIES: no edema, cyanosis, or calf tenderness  NERVOUS SYSTEM: answers questions and follows commands appropriately    LABS:                        10.3   9.01  )-----------( 244      ( 06 Jul 2021 10:41 )             31.4     CBC Full  -  ( 06 Jul 2021 10:41 )  WBC Count : 9.01 K/uL  Hemoglobin : 10.3 g/dL  Hematocrit : 31.4 %  Platelet Count - Automated : 244 K/uL  Mean Cell Volume : 98.4 fl  Mean Cell Hemoglobin : 32.3 pg  Mean Cell Hemoglobin Concentration : 32.8 gm/dL  Auto Neutrophil # : x  Auto Lymphocyte # : x  Auto Monocyte # : x  Auto Eosinophil # : x  Auto Basophil # : x  Auto Neutrophil % : x  Auto Lymphocyte % : x  Auto Monocyte % : x  Auto Eosinophil % : x  Auto Basophil % : x    06 Jul 2021 10:41    141    |  106    |  35     ----------------------------<  100    4.3     |  30     |  0.92     Ca    8.4        06 Jul 2021 10:41          CAPILLARY BLOOD GLUCOSE            Culture - Blood (collected 07-03-21 @ 12:18)  Source: .Blood Blood-Peripheral  Preliminary Report (07-04-21 @ 13:01):    No growth to date.    Culture - Blood (collected 07-03-21 @ 12:18)  Source: .Blood Blood-Peripheral  Preliminary Report (07-04-21 @ 13:01):    No growth to date.    Culture - Urine (collected 07-03-21 @ 01:23)  Source: .Urine Clean Catch (Midstream)  Final Report (07-05-21 @ 02:53):    <10,000 CFU/mL Normal Urogenital Kate        RADIOLOGY & ADDITIONAL TESTS:    Personally reviewed.     Consultant(s) Notes Reviewed:  [x] YES  [ ] NO

## 2021-07-06 NOTE — PHYSICAL THERAPY INITIAL EVALUATION ADULT - ADDITIONAL COMMENTS
Pt is a poor historian. Pt lives alone with 24/7 aides (from agency) in a 2 story house.  Has a chair lift. Reports amb with a RW short distances and w/c when fatigued; requires assist with all. Pt is a poor historian. Pt lives alone with 24/7 aides (from agency) in a 2 story house.  Has a chair lift but 5 RENETTA /c rails. Reports amb with a RW short distances and w/c when fatigued; requires assist with all other transfers and ADLs. Pt has a rolling walker, commode, wheelchair, grab bars and chair lift.

## 2021-07-06 NOTE — PROGRESS NOTE ADULT - ASSESSMENT
89 yo female PMH of PE on Eliquis (05/21), anxiety, depression, CVA, Fall, GI bleed, Hip fracture, Hyperlipidemia, Hypertension, PUD (peptic ulcer disease), hiatal hernia presented to the ED with the c/o  fatigue and increased confusion per daughter is being admitted for generalized weakness, encephalopathy
89 yo female PMH of PE on Eliquis (05/21), anxiety, depression, CVA, Fall, GI bleed, Hip fracture, Hyperlipidemia, Hypertension, PUD (peptic ulcer disease), hiatal hernia presented to the ED with the c/o  fatigue and increased confusion per daughter is being admitted for generalized weakness and worsening mood, for D/C with home PT.
89 yo female PMH of PE on Eliquis (05/21), anxiety, depression, CVA, Fall, GI bleed, Hip fracture, Hyperlipidemia, Hypertension, PUD (peptic ulcer disease), hiatal hernia presented to the ED with the c/o  fatigue and increased confusion per daughter is being admitted for generalized weakness and worsening mood
89 yo female PMH of PE on Eliquis (05/21), anxiety, depression, CVA, Fall, GI bleed, Hip fracture, Hyperlipidemia, Hypertension, PUD (peptic ulcer disease), hiatal hernia presented to the ED with the c/o  fatigue and increased confusion per daughter is being admitted for generalized weakness, encephalopathy

## 2021-07-07 ENCOUNTER — NON-APPOINTMENT (OUTPATIENT)
Age: 86
End: 2021-07-07

## 2021-07-08 ENCOUNTER — APPOINTMENT (OUTPATIENT)
Dept: INTERNAL MEDICINE | Facility: CLINIC | Age: 86
End: 2021-07-08

## 2021-07-16 ENCOUNTER — APPOINTMENT (OUTPATIENT)
Dept: INTERNAL MEDICINE | Facility: CLINIC | Age: 86
End: 2021-07-16
Payer: MEDICARE

## 2021-07-16 VITALS — SYSTOLIC BLOOD PRESSURE: 116 MMHG | DIASTOLIC BLOOD PRESSURE: 72 MMHG

## 2021-07-16 VITALS
RESPIRATION RATE: 17 BRPM | HEIGHT: 58 IN | WEIGHT: 135 LBS | HEART RATE: 101 BPM | BODY MASS INDEX: 28.34 KG/M2 | TEMPERATURE: 96.7 F | OXYGEN SATURATION: 95 %

## 2021-07-16 PROCEDURE — 99072 ADDL SUPL MATRL&STAF TM PHE: CPT

## 2021-07-16 PROCEDURE — 99495 TRANSJ CARE MGMT MOD F2F 14D: CPT

## 2021-07-19 ENCOUNTER — RX RENEWAL (OUTPATIENT)
Age: 86
End: 2021-07-19

## 2021-07-19 NOTE — PLAN
[FreeTextEntry1] : Advised to follow up with Pulm and cardio \par \par Psych - Depression and axiety  -  decrease lexapro to 5 mg daily and start wellbutrin 100 mg daily.  Counseling given to the pt. \par \par Ortho   - DJD hands advised hand exercises.  \par \par Reviewed CT scan - CXR, Ct of the chest \par Reviewed CBC and CMP   (had a lower GFR , advised hydration)\par \par Discussed side effects to medication.  Advised to continue all other medication\par Advised to follow up  in the office in 4 weeks \par \par Follow up with Dr. Palma Neuro

## 2021-07-19 NOTE — PHYSICAL EXAM
[No Acute Distress] : no acute distress [Well Nourished] : well nourished [Well Developed] : well developed [Well-Appearing] : well-appearing [Normal Sclera/Conjunctiva] : normal sclera/conjunctiva [PERRL] : pupils equal round and reactive to light [EOMI] : extraocular movements intact [Normal Outer Ear/Nose] : the outer ears and nose were normal in appearance [Normal Oropharynx] : the oropharynx was normal [Normal TMs] : both tympanic membranes were normal [No JVD] : no jugular venous distention [No Lymphadenopathy] : no lymphadenopathy [Supple] : supple [Thyroid Normal, No Nodules] : the thyroid was normal and there were no nodules present [No Respiratory Distress] : no respiratory distress  [No Accessory Muscle Use] : no accessory muscle use [Clear to Auscultation] : lungs were clear to auscultation bilaterally [Normal Rate] : normal rate  [Regular Rhythm] : with a regular rhythm [Normal S1, S2] : normal S1 and S2 [No Carotid Bruits] : no carotid bruits [No Abdominal Bruit] : a ~M bruit was not heard ~T in the abdomen [No Varicosities] : no varicosities [Pedal Pulses Present] : the pedal pulses are present [No Edema] : there was no peripheral edema [No Palpable Aorta] : no palpable aorta [No Extremity Clubbing/Cyanosis] : no extremity clubbing/cyanosis [Soft] : abdomen soft [Non Tender] : non-tender [Non-distended] : non-distended [No Masses] : no abdominal mass palpated [No HSM] : no HSM [Normal Bowel Sounds] : normal bowel sounds [Normal Posterior Cervical Nodes] : no posterior cervical lymphadenopathy [Normal Anterior Cervical Nodes] : no anterior cervical lymphadenopathy [No CVA Tenderness] : no CVA  tenderness [No Spinal Tenderness] : no spinal tenderness [No Joint Swelling] : no joint swelling [Grossly Normal Strength/Tone] : grossly normal strength/tone [No Rash] : no rash [Coordination Grossly Intact] : coordination grossly intact [No Focal Deficits] : no focal deficits [Deep Tendon Reflexes (DTR)] : deep tendon reflexes were 2+ and symmetric [Speech Grossly Normal] : speech grossly normal [Normal Affect] : the affect was normal [Alert and Oriented x3] : oriented to person, place, and time [Normal Insight/Judgement] : insight and judgment were intact [de-identified] : weakness LE  [de-identified] : in wheel chair    [60866 - Moderate Complexity requires multiple possible diagnoses and/or the management options, moderate complexity of the medical data (tests, etc.) to be reviewed, and moderate risk of significant complications, morbidity, and/or mortality as well as co] : Moderate Complexity

## 2021-07-19 NOTE — COUNSELING
[Fall prevention counseling provided] : Fall prevention counseling provided [Adequate lighting] : Adequate lighting [No throw rugs] : No throw rugs [Use proper foot wear] : Use proper foot wear [Behavioral health counseling provided] : Behavioral health counseling provided [Engage in a relaxing activity] : Engage in a relaxing activity [Encouraged to increase physical activity] : Encouraged to increase physical activity [Good understanding] : Patient has a good understanding of disease, goals and obesity follow-up plan

## 2021-07-19 NOTE — ASSESSMENT
[FreeTextEntry1] : Mrs. WASHINGTON is a 89 year  female, who present to the office for hospital follow up with her daughter

## 2021-07-19 NOTE — CURRENT MEDS
[Takes medication as prescribed] : takes [None] : Patient does not have any barriers to medication adherence [Opioids] : opioids [Muscle Relaxants] : muscle relaxants [Sedatives] : sedatives [FreeTextEntry1] : on xanax prn and Eliquis

## 2021-07-19 NOTE — HISTORY OF PRESENT ILLNESS
[Post-hospitalization from ___ Hospital] : Post-hospitalization from [unfilled] Hospital [Admitted on: ___] : The patient was admitted on [unfilled] [Discharged on ___] : discharged on [unfilled] [Discharge Summary] : discharge summary [Med Reconciliation] : medication reconciliation has been completed [Patient Contacted By: ____] : and contacted by [unfilled] [Discharge Med List] : discharge medication list [FreeTextEntry2] : Patient was admitted for fatigue and dehydration - Hospital course was uneventful.  Denies any medication changes and was discharge to home.\par Had labs, CXR - Ct of the head and chest done which was normal \par Daughter states that her Mother is depressed and feels that the Lexapro is making her lethargic.

## 2021-07-19 NOTE — HEALTH RISK ASSESSMENT
[] : No [1 or 2 (0 pts)] : 1 or 2 (0 points) [Never (0 pts)] : Never (0 points) [No] : In the past 12 months have you used drugs other than those required for medical reasons? No [No falls in past year] : Patient reported no falls in the past year [1] : 2) Feeling down, depressed, or hopeless for several days (1) [PHQ-2 Positive] : PHQ-2 Positive [I have developed a follow-up plan documented below in the note.] : I have developed a follow-up plan documented below in the note. [Audit-CScore] : 0 [de-identified] : no [de-identified] : regular  [MCO2Fsvag] : 2

## 2021-08-02 ENCOUNTER — RX RENEWAL (OUTPATIENT)
Age: 86
End: 2021-08-02

## 2021-08-09 ENCOUNTER — RX RENEWAL (OUTPATIENT)
Age: 86
End: 2021-08-09

## 2021-08-30 ENCOUNTER — RX RENEWAL (OUTPATIENT)
Age: 86
End: 2021-08-30

## 2021-08-31 ENCOUNTER — RX RENEWAL (OUTPATIENT)
Age: 86
End: 2021-08-31

## 2021-09-06 ENCOUNTER — RX RENEWAL (OUTPATIENT)
Age: 86
End: 2021-09-06

## 2021-10-01 ENCOUNTER — RX RENEWAL (OUTPATIENT)
Age: 86
End: 2021-10-01

## 2021-10-05 ENCOUNTER — RX RENEWAL (OUTPATIENT)
Age: 86
End: 2021-10-05

## 2021-10-07 ENCOUNTER — RX RENEWAL (OUTPATIENT)
Age: 86
End: 2021-10-07

## 2021-10-11 ENCOUNTER — APPOINTMENT (OUTPATIENT)
Dept: INTERNAL MEDICINE | Facility: CLINIC | Age: 86
End: 2021-10-11
Payer: MEDICARE

## 2021-10-11 ENCOUNTER — MED ADMIN CHARGE (OUTPATIENT)
Age: 86
End: 2021-10-11

## 2021-10-11 VITALS
DIASTOLIC BLOOD PRESSURE: 88 MMHG | SYSTOLIC BLOOD PRESSURE: 124 MMHG | HEIGHT: 58 IN | TEMPERATURE: 97.4 F | OXYGEN SATURATION: 94 % | HEART RATE: 105 BPM | RESPIRATION RATE: 16 BRPM

## 2021-10-11 DIAGNOSIS — R29.898 OTHER SYMPTOMS AND SIGNS INVOLVING THE MUSCULOSKELETAL SYSTEM: ICD-10-CM

## 2021-10-11 PROCEDURE — 90662 IIV NO PRSV INCREASED AG IM: CPT

## 2021-10-11 PROCEDURE — G0008: CPT

## 2021-10-11 PROCEDURE — 99214 OFFICE O/P EST MOD 30 MIN: CPT | Mod: 25

## 2021-10-11 NOTE — ASSESSMENT
[FreeTextEntry1] : Mrs. WASHINGTON is a 89 year  female, who present to the office for medication renewal, and a flu shot

## 2021-10-11 NOTE — REVIEW OF SYSTEMS
[Fatigue] : fatigue [Anxiety] : anxiety [Depression] : depression [Negative] : Heme/Lymph [Hearing Loss] : hearing loss [Muscle Weakness] : muscle weakness [Unsteady Walking] : ataxia

## 2021-10-11 NOTE — PLAN
[FreeTextEntry1] : Vasc - no leg edema noted today - Continue to elevated legs and Low na diet \par \par Psych HALI - Extra counseling was given to the pt about depression and anxiety.  Continue with current medications   Consider therapy.  reviewed -  Refilled medicaation \par \par Cardio - Hypertension -  Patient was educated about hypertension and the importance of controlling the pressure through lifestyle modification which include low sodium  diet and  aerobic  exercise.  Also discussed the use of prescription medication which included their benefits and their side effects. We discussed the use of ASA 81 mg daily.   Having a BMI less than or equal to 25. \par \par Hyperlipidemia -   Advised low fat cholesterol diet.  Advised importance of having low cholesterol / LDL/ triglycerides. Advised life style modifications.  Continue with current medications.  Monitor labs \par \par ENT - hearing  loss -   no cerumen impaction - has was  b/l  on the wall of the ear canal. follow up with ENt/ audiologist \par \par Peripheral neuropathy -Continue with Neurontin ADRIANA - Refilled medication a few weeks ago.  \par \par Rheumatology\par Advised patient the need for bone density scan to evaluate for postmenopausal osteoporosis\par pt refused \par \par We discussed the importance of healthy lifestyles which include exercise, weight control and good diet.\par Patient's questions were answered in full detail. Advise patient if any other concerns arise to please call office to have a discussion.  Advised to get  covid booster\par counseling given to the pt \par Advised to call the health department for the covid vax\par \par Shingrix - discussed, patient to determine if vaccine is covered under medical benefits of current insurance plan and follow up for 1st dose if interested; otherwise, instructed to follow up at the pharmacy for vaccine \par \par Flu HD given - side effect reviewed - consent signed

## 2021-10-11 NOTE — PHYSICAL EXAM
[No Acute Distress] : no acute distress [Well Nourished] : well nourished [Well Developed] : well developed [Well-Appearing] : well-appearing [Normal Sclera/Conjunctiva] : normal sclera/conjunctiva [PERRL] : pupils equal round and reactive to light [EOMI] : extraocular movements intact [Normal Outer Ear/Nose] : the outer ears and nose were normal in appearance [Normal Oropharynx] : the oropharynx was normal [Normal TMs] : both tympanic membranes were normal [No JVD] : no jugular venous distention [No Lymphadenopathy] : no lymphadenopathy [Supple] : supple [Thyroid Normal, No Nodules] : the thyroid was normal and there were no nodules present [No Respiratory Distress] : no respiratory distress  [No Accessory Muscle Use] : no accessory muscle use [Clear to Auscultation] : lungs were clear to auscultation bilaterally [Normal Rate] : normal rate  [Regular Rhythm] : with a regular rhythm [Normal S1, S2] : normal S1 and S2 [No Abdominal Bruit] : a ~M bruit was not heard ~T in the abdomen [Pedal Pulses Present] : the pedal pulses are present [No Edema] : there was no peripheral edema [No Palpable Aorta] : no palpable aorta [No Extremity Clubbing/Cyanosis] : no extremity clubbing/cyanosis [Soft] : abdomen soft [Non Tender] : non-tender [Non-distended] : non-distended [No Masses] : no abdominal mass palpated [No HSM] : no HSM [Normal Bowel Sounds] : normal bowel sounds [Normal Posterior Cervical Nodes] : no posterior cervical lymphadenopathy [Normal Anterior Cervical Nodes] : no anterior cervical lymphadenopathy [No CVA Tenderness] : no CVA  tenderness [No Spinal Tenderness] : no spinal tenderness [No Joint Swelling] : no joint swelling [Grossly Normal Strength/Tone] : grossly normal strength/tone [No Rash] : no rash [Coordination Grossly Intact] : coordination grossly intact [No Focal Deficits] : no focal deficits [Deep Tendon Reflexes (DTR)] : deep tendon reflexes were 2+ and symmetric [Speech Grossly Normal] : speech grossly normal [Normal Affect] : the affect was normal [Normal Insight/Judgement] : insight and judgment were intact [Alert and Oriented x3] : oriented to person, place, and time [Normal Mood] : the mood was normal [de-identified] : wax noted b/l  not impacted  [de-identified] : with murmur  [de-identified] : weakness LE  [de-identified] : in wheel chair

## 2021-10-11 NOTE — HEALTH RISK ASSESSMENT
[] : No [1 or 2 (0 pts)] : 1 or 2 (0 points) [Never (0 pts)] : Never (0 points) [No] : In the past 12 months have you used drugs other than those required for medical reasons? No [No falls in past year] : Patient reported no falls in the past year [1] : 2) Feeling down, depressed, or hopeless for several days (1) [PHQ-2 Positive] : PHQ-2 Positive [I have developed a follow-up plan documented below in the note.] : I have developed a follow-up plan documented below in the note. [Audit-CScore] : 0 [de-identified] : no [de-identified] : regular  [[id number]] : 2

## 2021-10-11 NOTE — HISTORY OF PRESENT ILLNESS
[Family Member] : family member [FreeTextEntry1] : Medication renewal and a flu shot  [de-identified] : Mrs. WASHINGTON is a 89 year  female, who present to the office for medication renewal for anxiety. States she takes xanax on  a daily basis with good results.  Denies any adverse side effects.  States it helps calm her down.   Pt states she has a follow up with her audiologist today.  Having a hard time hearing.\par Would like a flu shot\par Denies chest pain, or SOB.\par States she going to start PT  on Friday   for  gait training.

## 2021-10-12 ENCOUNTER — RX RENEWAL (OUTPATIENT)
Age: 86
End: 2021-10-12

## 2021-10-24 ENCOUNTER — RX RENEWAL (OUTPATIENT)
Age: 86
End: 2021-10-24

## 2021-11-16 ENCOUNTER — RX RENEWAL (OUTPATIENT)
Age: 86
End: 2021-11-16

## 2021-11-26 ENCOUNTER — RX RENEWAL (OUTPATIENT)
Age: 86
End: 2021-11-26

## 2021-11-28 ENCOUNTER — RX RENEWAL (OUTPATIENT)
Age: 86
End: 2021-11-28

## 2021-12-14 ENCOUNTER — APPOINTMENT (OUTPATIENT)
Dept: INTERNAL MEDICINE | Facility: CLINIC | Age: 86
End: 2021-12-14
Payer: MEDICARE

## 2021-12-14 DIAGNOSIS — Z00.00 ENCOUNTER FOR GENERAL ADULT MEDICAL EXAMINATION W/OUT ABNORMAL FINDINGS: ICD-10-CM

## 2021-12-14 PROCEDURE — 99214 OFFICE O/P EST MOD 30 MIN: CPT | Mod: 95

## 2021-12-15 NOTE — HISTORY OF PRESENT ILLNESS
[Verbal consent obtained from patient] : the patient, [unfilled] [Family Member] : family member [Home] : at home, [unfilled] , at the time of the visit. [Medical Office: ([facility])___] : at the medical office located in  [FreeTextEntry4] : [place]  [FreeTextEntry1] : medication renewal  [de-identified] : Ms. WASHINGTON is a 89 year  female, who present to the office for medication renewal for xanax.  \par Daughter was present during the evaluation.\par States she been feeling well.   Denies having any chest pain, SOB, or recent infection.\par Taking xanax 1 tab daily prn -  States helps with her anxiety -  States she doing better since she has new aids.  \par Daughter states she sets up her pill boxes on a weekly basis \par \par

## 2021-12-15 NOTE — ASSESSMENT
[FreeTextEntry1] : Ms. WASHINGTON is a 89 year  female, who present  for medication renewal through tele video appointment

## 2021-12-15 NOTE — REVIEW OF SYSTEMS
[Negative] : Heme/Lymph [Fever] : no fever [Chills] : no chills [Fatigue] : no fatigue [Pain] : no pain [Earache] : no earache [Nosebleed] : no nosebleeds [Sore Throat] : no sore throat [Chest Pain] : no chest pain [Lower Ext Edema] : no lower extremity edema [Paroxysmal Nocturnal Dyspnea] : no paroxysmal nocturnal dyspnea [Shortness Of Breath] : no shortness of breath [Cough] : no cough [Abdominal Pain] : no abdominal pain [Melena] : no melena [Dysuria] : no dysuria [Easy Bruising] : no easy bruising [Swollen Glands] : no swollen glands

## 2021-12-15 NOTE — PHYSICAL EXAM
[No Acute Distress] : no acute distress [Well Nourished] : well nourished [Well Developed] : well developed [Well-Appearing] : well-appearing [Normal Sclera/Conjunctiva] : normal sclera/conjunctiva [Speech Grossly Normal] : speech grossly normal [Alert and Oriented x3] : oriented to person, place, and time [Normal Mood] : the mood was normal

## 2021-12-15 NOTE — PLAN
[FreeTextEntry1] : Psych HALI - Extra counseling was given to the pt about depression and anxiety.  Continue with current medications   Consider therapy.  reviewed -  Refilled medication.\par \par Set up [facility] labs to go to the house to get fasting labs \par \par Cardio - Hypertension -  Patient was educated about hypertension and the importance of controlling the pressure through lifestyle modification which include low sodium  diet and  aerobic  exercise.  Also discussed the use of prescription medication which included their benefits and their side effects. We discussed the use of ASA 81 mg daily.   Having a BMI less than or equal to 25. \par \par Hyperlipidemia -   Advised low fat cholesterol diet.  Advised importance of having low cholesterol / LDL/ triglycerides. Advised life style modifications.  Continue with current medications.  Rxn for labs sent to [facility] \par \par Peripheral neuropathy -Continue with Neurontin ADRIANA - Refilled medication. \par \par We discussed the importance of healthy lifestyles which include exercise, weight control and good diet.\par Patient's questions were answered in full detail. Advise patient if any other concerns arise to please call office to have a discussion.  Recieved covid booster \par counseling given to the pt \par \par Shingrix - discussed, patient to determine if vaccine is covered under medical benefits of current insurance plan and follow up for 1st dose if interested; otherwise, instructed to follow up at the pharmacy for vaccine \par

## 2021-12-23 ENCOUNTER — NON-APPOINTMENT (OUTPATIENT)
Age: 86
End: 2021-12-23

## 2022-02-07 ENCOUNTER — RX RENEWAL (OUTPATIENT)
Age: 87
End: 2022-02-07

## 2022-02-22 ENCOUNTER — RX RENEWAL (OUTPATIENT)
Age: 87
End: 2022-02-22

## 2022-02-24 ENCOUNTER — APPOINTMENT (OUTPATIENT)
Dept: INTERNAL MEDICINE | Facility: CLINIC | Age: 87
End: 2022-02-24
Payer: MEDICARE

## 2022-02-24 VITALS
OXYGEN SATURATION: 97 % | DIASTOLIC BLOOD PRESSURE: 84 MMHG | HEART RATE: 86 BPM | HEIGHT: 58 IN | RESPIRATION RATE: 16 BRPM | SYSTOLIC BLOOD PRESSURE: 126 MMHG | BODY MASS INDEX: 28.34 KG/M2 | WEIGHT: 135 LBS | TEMPERATURE: 98.2 F

## 2022-02-24 DIAGNOSIS — R32 UNSPECIFIED URINARY INCONTINENCE: ICD-10-CM

## 2022-02-24 DIAGNOSIS — H91.90 UNSPECIFIED HEARING LOSS, UNSPECIFIED EAR: ICD-10-CM

## 2022-02-24 DIAGNOSIS — M19.90 UNSPECIFIED OSTEOARTHRITIS, UNSPECIFIED SITE: ICD-10-CM

## 2022-02-24 PROCEDURE — 99214 OFFICE O/P EST MOD 30 MIN: CPT

## 2022-02-25 ENCOUNTER — NON-APPOINTMENT (OUTPATIENT)
Age: 87
End: 2022-02-25

## 2022-02-25 PROBLEM — H91.90 HEARING LOSS: Status: ACTIVE | Noted: 2019-06-25

## 2022-02-25 PROBLEM — R32 URINARY INCONTINENCE: Status: ACTIVE | Noted: 2021-06-23

## 2022-02-25 NOTE — PLAN
[FreeTextEntry1] : Vasc - venous insufficiency - Low na diet - Elevated legs - renewed  lasix.  \par \par Psych HALI - Extra counseling was given to the pt about depression and anxiety.  Continue with current medications   Consider therapy.  Does not need a renewal on xanax.  Advised the pt if she feels anxious or uptight to take the xanax.  Reviewed   Renewed xanax \par \par Cardio - Hypertension -  Patient was educated about hypertension and the importance of controlling the pressure through lifestyle modification which include low sodium  diet and  aerobic  exercise.  Also discussed the use of prescription medication which included their benefits and their side effects. We discussed the use of ASA 81 mg daily.   Having a BMI less than or equal to 25. \par Hyperlipidemia -   Advised low fat cholesterol diet.  Advised importance of having low cholesterol / LDL/ triglycerides. Advised life style modifications.  Continue with current medications.   LAbs done 12/21 \par \par Peripheral neuropathy -Continue with Neurontin ADRIANA  - advised pt \par \par - Frequency urination - nocturia -  consider pureWick \par Derm - B/l  breast candidiasis - Advised Mycolog cream bid for 10 days - Keep area clean and dry \par \par Rheumatology\par Advised patient the need for bone density scan to evaluate for postmenopausal osteoporosis\par pt refused - education given \par \par We discussed the importance of healthy lifestyles which include exercise, weight control and good diet.\par Patient's questions were answered in full detail. Advise patient if any other concerns arise to please call office to have a discussion. \par \par Patient  education  - COVID-19   Counseling and education provided to the patient.  Advised sign and symptoms of the virus.  Advised contact precautions.  Educated patient on proper hand washing and to participate in social distancing. Had the covid vax \par \par Patient is in full awareness of the plan and agrees to it.  All pt question was answered.  \par \par I spent 32 Minutes with the patient, half of which we discussed finding on physical exam  and coordinated care.  As well as reviewed my plans and follow ups. \par  \par \par

## 2022-02-25 NOTE — PHYSICAL EXAM
[No Acute Distress] : no acute distress [Well Nourished] : well nourished [Well Developed] : well developed [Well-Appearing] : well-appearing [Normal Sclera/Conjunctiva] : normal sclera/conjunctiva [PERRL] : pupils equal round and reactive to light [EOMI] : extraocular movements intact [Normal Oropharynx] : the oropharynx was normal [Normal TMs] : both tympanic membranes were normal [No JVD] : no jugular venous distention [Supple] : supple [No Lymphadenopathy] : no lymphadenopathy [Thyroid Normal, No Nodules] : the thyroid was normal and there were no nodules present [No Respiratory Distress] : no respiratory distress  [Clear to Auscultation] : lungs were clear to auscultation bilaterally [No Accessory Muscle Use] : no accessory muscle use [Normal Rate] : normal rate  [Regular Rhythm] : with a regular rhythm [Normal S1, S2] : normal S1 and S2 [No Abdominal Bruit] : a ~M bruit was not heard ~T in the abdomen [Pedal Pulses Present] : the pedal pulses are present [No Extremity Clubbing/Cyanosis] : no extremity clubbing/cyanosis [No Palpable Aorta] : no palpable aorta [Soft] : abdomen soft [Non Tender] : non-tender [Non-distended] : non-distended [No Masses] : no abdominal mass palpated [No HSM] : no HSM [Normal Bowel Sounds] : normal bowel sounds [Normal Posterior Cervical Nodes] : no posterior cervical lymphadenopathy [Normal Anterior Cervical Nodes] : no anterior cervical lymphadenopathy [No CVA Tenderness] : no CVA  tenderness [No Spinal Tenderness] : no spinal tenderness [Kyphosis] : kyphosis [No Joint Swelling] : no joint swelling [No Rash] : no rash [No Skin Lesions] : no skin lesions [Coordination Grossly Intact] : coordination grossly intact [No Focal Deficits] : no focal deficits [Deep Tendon Reflexes (DTR)] : deep tendon reflexes were 2+ and symmetric [Speech Grossly Normal] : speech grossly normal [Memory Grossly Normal] : memory grossly normal [Normal Affect] : the affect was normal [Alert and Oriented x3] : oriented to person, place, and time [Normal Mood] : the mood was normal [Normal Insight/Judgement] : insight and judgment were intact [Agitated] : agitated [Depressed] : depressed [de-identified] : sitting in a wheel chair  [de-identified] : with murmur  [de-identified] :  carotid bruit left ICA    trace edema  no pitting noted  [de-identified] : limited mobility in wheelchair,   Limited ROM of right upper ext [de-identified] : Sharply demarcated  under breast rash  [de-identified] : in wheel chair

## 2022-02-25 NOTE — HEALTH RISK ASSESSMENT
[Never] : Never [1 or 2 (0 pts)] : 1 or 2 (0 points) [Never (0 pts)] : Never (0 points) [No] : In the past 12 months have you used drugs other than those required for medical reasons? No [No falls in past year] : Patient reported no falls in the past year [1] : 2) Feeling down, depressed, or hopeless for several days (1) [Audit-CScore] : 0 [de-identified] : no [de-identified] : regular  [[id number]] : 2

## 2022-02-25 NOTE — REVIEW OF SYSTEMS
[Fever] : no fever [Discharge] : no discharge [Redness] : no redness [Earache] : no earache [Hearing Loss] : hearing loss [Nasal Discharge] : no nasal discharge [Sore Throat] : no sore throat [Postnasal Drip] : no postnasal drip [Chest Pain] : no chest pain [Palpitations] : no palpitations [Lower Ext Edema] : no lower extremity edema [Heartburn] : no heartburn [Nocturia] : no nocturia [Frequency] : no frequency [Skin Rash] : skin rash [Dizziness] : no dizziness [Anxiety] : anxiety [Depression] : depression [Negative] : Heme/Lymph [FreeTextEntry4] : not wearing hearing aids today

## 2022-02-25 NOTE — HISTORY OF PRESENT ILLNESS
[Spouse] : spouse [Formal Caregiver] : formal caregiver [FreeTextEntry1] : Medication renewal - Rash under both breast  [de-identified] : Ms. WASHINGTON is a 89 year  female, who present to the office for medication renewal for xanax.  Denies any side effects to the medication.  States it helps her calm down.  denies any side effects to the medication.  \par States she has a rash under both breast for a few weeks.  Rash is stable.  Unaware of what makes it better or worse.\par Denies fever or chills. \par Waking up at night to urinate - denies pain, fever, chills.  Been doing this for years. On lasix. \par Having weakness in legs need to use a wheel chair-  Was in PT which seem to help.

## 2022-02-28 ENCOUNTER — RX RENEWAL (OUTPATIENT)
Age: 87
End: 2022-02-28

## 2022-05-05 ENCOUNTER — RX RENEWAL (OUTPATIENT)
Age: 87
End: 2022-05-05

## 2022-05-11 ENCOUNTER — APPOINTMENT (OUTPATIENT)
Dept: INTERNAL MEDICINE | Facility: CLINIC | Age: 87
End: 2022-05-11
Payer: MEDICARE

## 2022-05-11 VITALS
DIASTOLIC BLOOD PRESSURE: 78 MMHG | SYSTOLIC BLOOD PRESSURE: 122 MMHG | BODY MASS INDEX: 28.34 KG/M2 | HEART RATE: 80 BPM | OXYGEN SATURATION: 96 % | WEIGHT: 135 LBS | HEIGHT: 58 IN | RESPIRATION RATE: 16 BRPM | TEMPERATURE: 98.7 F

## 2022-05-11 DIAGNOSIS — R26.81 UNSTEADINESS ON FEET: ICD-10-CM

## 2022-05-11 DIAGNOSIS — H61.21 IMPACTED CERUMEN, RIGHT EAR: ICD-10-CM

## 2022-05-11 PROCEDURE — 99214 OFFICE O/P EST MOD 30 MIN: CPT

## 2022-05-13 PROBLEM — H61.21 IMPACTED CERUMEN OF RIGHT EAR: Status: RESOLVED | Noted: 2019-07-01 | Resolved: 2022-05-13

## 2022-05-13 PROBLEM — R26.81 UNSTEADY GAIT: Status: ACTIVE | Noted: 2020-07-15

## 2022-05-13 NOTE — HEALTH RISK ASSESSMENT
[Never] : Never [1 or 2 (0 pts)] : 1 or 2 (0 points) [Never (0 pts)] : Never (0 points) [No] : In the past 12 months have you used drugs other than those required for medical reasons? No [No falls in past year] : Patient reported no falls in the past year [1] : 2) Feeling down, depressed, or hopeless for several days (1) [Audit-CScore] : 0 [de-identified] : no [de-identified] : regular  [[id number]] : 2

## 2022-05-13 NOTE — REVIEW OF SYSTEMS
[Hearing Loss] : hearing loss [Skin Rash] : skin rash [Anxiety] : anxiety [Depression] : depression [Negative] : Heme/Lymph [Fever] : no fever [Discharge] : no discharge [Redness] : no redness [Earache] : no earache [Nasal Discharge] : no nasal discharge [Sore Throat] : no sore throat [Postnasal Drip] : no postnasal drip [Chest Pain] : no chest pain [Palpitations] : no palpitations [Lower Ext Edema] : no lower extremity edema [Heartburn] : no heartburn [Nocturia] : no nocturia [Frequency] : no frequency [Dizziness] : no dizziness [FreeTextEntry4] : not wearing hearing aids today

## 2022-05-13 NOTE — HISTORY OF PRESENT ILLNESS
[Family Member] : family member [Formal Caregiver] : formal caregiver [FreeTextEntry1] : Medication renewal, blood pressure check  [de-identified] : Mrs. WASHINGTON is a 89 year  female, who present to the office for medication renewal for anxiety.  Pt in the room with her daughter and Aid \par Pt states she feels well.  Denies having any chest pressure, SOB, [id number] \par Needs a refill on xanax.  Doing well with current medications.  Daughter states she sets up he medication  for the week \par Gives one tab in the am and leaves one out for the day if she feels anxious.\par Denies following up with the dermatologist as discussed \par PT is coming to the house a weekly.  \par Still with Lower ext weakness -  Daughter states on occasion she falls  or slides out of the chair  - Pt denies it \par

## 2022-05-13 NOTE — PLAN
[FreeTextEntry1] : Derm - Fascial growth - Again advised to follow up with dermatology for  evaluation of the lesions\par \par Vasc - venous insufficiency - Low sodium  diet - Elevated legs - Continue with Lasix.  \par \par Psych HALI - Extra counseling was given to the pt about depression and anxiety.  Continue with current medications   Consider therapy.  Does not need a renewal on xanax.  Advised the pt if she feels anxious or uptight to take the xanax.  Reviewed   Renewed xanax - Daughter will hold the script and give to the pt when need \par \par Cardio - Hypertension -  Patient was educated about hypertension and the importance of controlling the pressure through lifestyle modification which include low sodium  diet and  aerobic  exercise.  Also discussed the use of prescription medication which included their benefits and their side effects. We discussed the use of ASA 81 mg daily.   Having a BMI less than or equal to 25. \par Hyperlipidemia -   Advised low fat cholesterol diet.  Advised importance of having low cholesterol / LDL/ triglycerides. Advised life style modifications.  Continue with current medications.   Monitor LFt/FLP\par \par Peripheral neuropathy -Continue with Neurontin ADRIANA  - advised pt \par \par - Frequency urination - nocturia -  consider pureWick \par \par Rheumatology\par Advised patient the need for bone density scan to evaluate for postmenopausal osteoporosis\par pt refused - education given \par \par We discussed the importance of healthy lifestyles which include exercise, weight control and good diet.\par Patient's questions were answered in full detail. Advise patient if any other concerns arise to please call office to have a discussion. \par \par Patient  education  - COVID-19   Counseling and education provided to the patient.  Advised sign and symptoms of the virus.  Advised contact precautions.  Educated patient on proper hand washing and to participate in social distancing. Had the covid vax \par \par Patient is in full awareness of the plan and agrees to it.  All pt question was answered.  \par \par I spent 30 Minutes with the patient, half of which we discussed finding on physical exam  and coordinated care.  As well as reviewed my plans and follow ups. \par  \par \par Fall prevention advised - Advised to call for help when transferring in and out of her chair,\par Continue with PT

## 2022-05-13 NOTE — PHYSICAL EXAM
[No Acute Distress] : no acute distress [Well Nourished] : well nourished [Well Developed] : well developed [Well-Appearing] : well-appearing [Normal Sclera/Conjunctiva] : normal sclera/conjunctiva [PERRL] : pupils equal round and reactive to light [EOMI] : extraocular movements intact [Normal Oropharynx] : the oropharynx was normal [Normal TMs] : both tympanic membranes were normal [No JVD] : no jugular venous distention [Supple] : supple [No Lymphadenopathy] : no lymphadenopathy [Thyroid Normal, No Nodules] : the thyroid was normal and there were no nodules present [No Respiratory Distress] : no respiratory distress  [Clear to Auscultation] : lungs were clear to auscultation bilaterally [No Accessory Muscle Use] : no accessory muscle use [Normal Rate] : normal rate  [Regular Rhythm] : with a regular rhythm [Normal S1, S2] : normal S1 and S2 [No Abdominal Bruit] : a ~M bruit was not heard ~T in the abdomen [Pedal Pulses Present] : the pedal pulses are present [No Extremity Clubbing/Cyanosis] : no extremity clubbing/cyanosis [No Palpable Aorta] : no palpable aorta [Soft] : abdomen soft [Non Tender] : non-tender [Non-distended] : non-distended [No Masses] : no abdominal mass palpated [No HSM] : no HSM [Normal Bowel Sounds] : normal bowel sounds [Normal Posterior Cervical Nodes] : no posterior cervical lymphadenopathy [Normal Anterior Cervical Nodes] : no anterior cervical lymphadenopathy [No CVA Tenderness] : no CVA  tenderness [No Spinal Tenderness] : no spinal tenderness [Kyphosis] : kyphosis [No Joint Swelling] : no joint swelling [No Rash] : no rash [No Skin Lesions] : no skin lesions [Coordination Grossly Intact] : coordination grossly intact [No Focal Deficits] : no focal deficits [Deep Tendon Reflexes (DTR)] : deep tendon reflexes were 2+ and symmetric [Speech Grossly Normal] : speech grossly normal [Memory Grossly Normal] : memory grossly normal [Normal Affect] : the affect was normal [Alert and Oriented x3] : oriented to person, place, and time [Normal Mood] : the mood was normal [Normal Insight/Judgement] : insight and judgment were intact [Agitated] : agitated [Depressed] : depressed [de-identified] : sitting in a wheel chair  [de-identified] : with murmur  [de-identified] :  carotid bruit left ICA    trace edema  no pitting noted  [de-identified] : limited mobility in wheelchair,   Limited ROM of right upper ext [de-identified] : Sharply demarcated  rash present under breast improving with ointment  [de-identified] : in wheel chair

## 2022-05-13 NOTE — COUNSELING
[Fall prevention counseling provided] : Fall prevention counseling provided [Adequate lighting] : Adequate lighting [No throw rugs] : No throw rugs [Use recommended devices] : Use recommended devices [Engage in a relaxing activity] : Engage in a relaxing activity [AUDIT-C Screening administered and reviewed] : AUDIT-C Screening administered and reviewed [Healthy eating counseling provided] : healthy eating [Behavioral health counseling provided] : behavioral health  [Needs reinforcement, provided] : Patient needs reinforcement on understanding of disease, goals and obesity follow-up plan; reinforcement was provided [Low Fat Diet] : Low fat diet [Low Salt Diet] : Low salt diet [Decrease Portions] : Decrease food portions [Walking] : Walking [Transportation Issues] : Transportation issues [Patient motivation] : Patient motivation [de-identified] : consider counseling \par Follow up with Derm and audiologist.  \par Advised to get BDS

## 2022-05-27 NOTE — PHYSICAL THERAPY INITIAL EVALUATION ADULT - TRANSFER SKILLS, REHAB EVAL
Discharge Instructions       PATIENT ID: Tee Contreras  MRN: [id number]   YOB: 1965    DATE OF ADMISSION: 5/25/2022  3:30 PM    DATE OF DISCHARGE: 5/27/2022    PRIMARY CARE PROVIDER: None     ATTENDING PHYSICIAN: Kasandra Elliott MD  DISCHARGING PROVIDER: Slick Boles MD    To contact this individual call [phone] and ask the  to page. If unavailable ask to be transferred the Adult Hospitalist Department. DISCHARGE DIAGNOSES seizure    CONSULTATIONS: IP CONSULT TO HOSPITALIST  IP CONSULT TO NEUROLOGY    PROCEDURES/SURGERIES: * No surgery found *    PENDING TEST RESULTS:   At the time of discharge the following test results are still pending: None    FOLLOW UP APPOINTMENTS:   Follow-up Information     Follow up With Specialties Details Why Yudith Gamino MD Family Medicine In 1 week  [address]  [phone]             ADDITIONAL CARE RECOMMENDATIONS: Follow-up with neurology    DIET: Cardiac Diet      ACTIVITY: Activity as tolerated    Wound care: Wound Care Order: submitted to [facility] Please view [email or web address]    EQUIPMENT needed: None      DISCHARGE MEDICATIONS:   See Medication Reconciliation Form    · It is important that you take the medication exactly as they are prescribed. · Keep your medication in the bottles provided by the pharmacist and keep a list of the medication names, dosages, and times to be taken in your wallet. · Do not take other medications without consulting your doctor. NOTIFY YOUR PHYSICIAN FOR ANY OF THE FOLLOWING:   Fever over 101 degrees for 24 hours. Chest pain, shortness of breath, fever, chills, nausea, vomiting, diarrhea, change in mentation, falling, weakness, bleeding. Severe pain or pain not relieved by medications. Or, any other signs or symptoms that you may have questions about.       DISPOSITION:    Home With:   OT  PT  [facility]  RN       SNF/Inpatient Rehab/LTAC    Independent/assisted living    Hospice    Other:         PROBLEM LIST Updated:  Yes ***       Signed:   Yecenia Desouza MD  5/27/2022  12:10 PM needs device and assist

## 2022-06-01 NOTE — ED PROVIDER NOTE - SKIN NEGATIVE STATEMENT, MLM
no abrasions, no jaundice, no lesions, no pruritis, and no rashes. 2 = A lot of assistance 3 = A little assistance

## 2022-08-06 ENCOUNTER — RX RENEWAL (OUTPATIENT)
Age: 87
End: 2022-08-06

## 2022-08-08 ENCOUNTER — APPOINTMENT (OUTPATIENT)
Dept: INTERNAL MEDICINE | Facility: CLINIC | Age: 87
End: 2022-08-08

## 2022-08-08 ENCOUNTER — RX RENEWAL (OUTPATIENT)
Age: 87
End: 2022-08-08

## 2022-08-08 PROCEDURE — 99213 OFFICE O/P EST LOW 20 MIN: CPT | Mod: 95

## 2022-08-08 NOTE — HEALTH RISK ASSESSMENT
[Never] : Never [No] : In the past 12 months have you used drugs other than those required for medical reasons? No [No falls in past year] : Patient reported no falls in the past year [0] : 2) Feeling down, depressed, or hopeless: Not at all (0) [PHQ-2 Negative - No further assessment needed] : PHQ-2 Negative - No further assessment needed [VAR4Scbae] : 0

## 2022-08-08 NOTE — HISTORY OF PRESENT ILLNESS
[Family Member] : family member [FreeTextEntry1] : follow up [de-identified] : GIACOMO WASHINGTON is a 89 year old F who presents today for renewal of medication

## 2022-09-13 ENCOUNTER — RX RENEWAL (OUTPATIENT)
Age: 87
End: 2022-09-13

## 2022-10-03 ENCOUNTER — RX RENEWAL (OUTPATIENT)
Age: 87
End: 2022-10-03

## 2022-10-07 ENCOUNTER — APPOINTMENT (OUTPATIENT)
Dept: INTERNAL MEDICINE | Facility: CLINIC | Age: 87
End: 2022-10-07

## 2022-10-07 VITALS
TEMPERATURE: 98.3 F | RESPIRATION RATE: 17 BRPM | WEIGHT: 140 LBS | HEIGHT: 58 IN | DIASTOLIC BLOOD PRESSURE: 76 MMHG | SYSTOLIC BLOOD PRESSURE: 130 MMHG | BODY MASS INDEX: 29.39 KG/M2 | OXYGEN SATURATION: 99 % | HEART RATE: 103 BPM

## 2022-10-07 PROCEDURE — G0008: CPT

## 2022-10-07 PROCEDURE — 90662 IIV NO PRSV INCREASED AG IM: CPT

## 2022-10-07 PROCEDURE — 99214 OFFICE O/P EST MOD 30 MIN: CPT | Mod: 25

## 2022-10-07 NOTE — ASSESSMENT
[FreeTextEntry1] : A 89-year-old female who presents to office for a renewal of medication  and a flu shot

## 2022-10-07 NOTE — HISTORY OF PRESENT ILLNESS
[FreeTextEntry1] : Medication renewal and a flu shot. [de-identified] :   A 89-year-old female, with a past medical history as noted below, presents to the office for an influenza vaccination as well as medication renewal.  Patient denies having chest pain, shortness of breath, heart palpitation probably nausea or vomiting.\par   Daughter states she stopped physical therapy and Occupational Therapy.    Also noted that her ring on her right ring finger is too small to get off.\par   Patient states she takes all her medication as directed without any side effects.\par   Patient denies having any recent infection, fever, chills, weight loss, egg allergies or history of GBS.  Also denies any prior reaction to vaccinations

## 2022-10-07 NOTE — PHYSICAL EXAM
[No Acute Distress] : no acute distress [Well Nourished] : well nourished [Well Developed] : well developed [Well-Appearing] : well-appearing [Normal Sclera/Conjunctiva] : normal sclera/conjunctiva [PERRL] : pupils equal round and reactive to light [EOMI] : extraocular movements intact [Normal Oropharynx] : the oropharynx was normal [Normal TMs] : both tympanic membranes were normal [No JVD] : no jugular venous distention [Supple] : supple [No Lymphadenopathy] : no lymphadenopathy [Thyroid Normal, No Nodules] : the thyroid was normal and there were no nodules present [No Respiratory Distress] : no respiratory distress  [Clear to Auscultation] : lungs were clear to auscultation bilaterally [No Accessory Muscle Use] : no accessory muscle use [Normal Rate] : normal rate  [Regular Rhythm] : with a regular rhythm [Normal S1, S2] : normal S1 and S2 [No Abdominal Bruit] : a ~M bruit was not heard ~T in the abdomen [Pedal Pulses Present] : the pedal pulses are present [No Extremity Clubbing/Cyanosis] : no extremity clubbing/cyanosis [No Palpable Aorta] : no palpable aorta [Soft] : abdomen soft [Non Tender] : non-tender [Non-distended] : non-distended [No Masses] : no abdominal mass palpated [No HSM] : no HSM [Normal Bowel Sounds] : normal bowel sounds [Normal Posterior Cervical Nodes] : no posterior cervical lymphadenopathy [Normal Anterior Cervical Nodes] : no anterior cervical lymphadenopathy [No CVA Tenderness] : no CVA  tenderness [No Spinal Tenderness] : no spinal tenderness [Kyphosis] : kyphosis [No Joint Swelling] : no joint swelling [No Rash] : no rash [No Skin Lesions] : no skin lesions [Coordination Grossly Intact] : coordination grossly intact [No Focal Deficits] : no focal deficits [Deep Tendon Reflexes (DTR)] : deep tendon reflexes were 2+ and symmetric [Speech Grossly Normal] : speech grossly normal [Memory Grossly Normal] : memory grossly normal [Normal Affect] : the affect was normal [Alert and Oriented x3] : oriented to person, place, and time [Normal Mood] : the mood was normal [Normal Insight/Judgement] : insight and judgment were intact [Agitated] : agitated [Depressed] : depressed [Declined Breast Exam] : declined breast exam  [de-identified] : sitting in a wheel chair  [de-identified] :  lungs were clear to auscultate Tatian today did not appreciate any wheezing [de-identified] : with murmur  [de-identified] :  carotid bruit left ICA    trace edema  no pitting noted  [de-identified] : limited mobility in wheelchair,   Limited ROM of right upper ext [de-identified] :  right ring finger ring on unable to pass it through the knuckle.  Took off band  [de-identified] : in wheel chair

## 2022-10-07 NOTE — REVIEW OF SYSTEMS
[Hearing Loss] : hearing loss [Anxiety] : anxiety [Depression] : depression [Fever] : no fever [Discharge] : no discharge [Redness] : no redness [Earache] : no earache [Nasal Discharge] : no nasal discharge [Sore Throat] : no sore throat [Postnasal Drip] : no postnasal drip [Chest Pain] : no chest pain [Palpitations] : no palpitations [Lower Ext Edema] : no lower extremity edema [Wheezing] : wheezing [Heartburn] : no heartburn [Nocturia] : no nocturia [Frequency] : no frequency [Itching] : no itching [Skin Rash] : no skin rash [Dizziness] : no dizziness [Easy Bleeding] : no easy bleeding [Easy Bruising] : easy bruising [Swollen Glands] : no swollen glands [Negative] : Integumentary

## 2022-10-07 NOTE — HEALTH RISK ASSESSMENT
[Never] : Never [1 or 2 (0 pts)] : 1 or 2 (0 points) [Never (0 pts)] : Never (0 points) [No] : In the past 12 months have you used drugs other than those required for medical reasons? No [No falls in past year] : Patient reported no falls in the past year [1] : 2) Feeling down, depressed, or hopeless for several days (1) [Audit-CScore] : 0 [de-identified] : no [de-identified] : regular  [[id number]] : 2

## 2022-10-07 NOTE — COUNSELING
[Fall prevention counseling provided] : Fall prevention counseling provided [Adequate lighting] : Adequate lighting [No throw rugs] : No throw rugs [Use recommended devices] : Use recommended devices [Engage in a relaxing activity] : Engage in a relaxing activity [AUDIT-C Screening administered and reviewed] : AUDIT-C Screening administered and reviewed [Healthy eating counseling provided] : healthy eating [Behavioral health counseling provided] : behavioral health  [Needs reinforcement, provided] : Patient needs reinforcement on understanding of disease, goals and obesity follow-up plan; reinforcement was provided [Low Fat Diet] : Low fat diet [Low Salt Diet] : Low salt diet [Decrease Portions] : Decrease food portions [Walking] : Walking [Transportation Issues] : Transportation issues [Patient motivation] : Patient motivation [de-identified] : consider counseling \par Follow up with Derm and audiologist.  \par Advised to get BDS

## 2022-10-07 NOTE — PLAN
[FreeTextEntry1] :  ring finger/ring too tight to come off.  Advised patient to try to take it off first thing in the morning after applying ice to the finger.  If unsuccessful will need to follow-up with the jeweler to have it cut off.\par \par Vasc - venous insufficiency - Low sodium  diet - Elevated legs - Continue with Lasix.    Check comprehensive metabolic.  Prescription given  for [place] to go to the house to draw her blood.\par Psych HALI - Extra counseling was given to the pt about depression and anxiety.  Continue with current medications   Consider therapy.  Does not need a renewal on xanax.  Advised the pt if she feels anxious or uptight to take the xanax.  Reviewed   Renewed xanax - Daughter will hold the script and give to the pt when need \par \par Cardio - Hypertension -  Patient was educated about hypertension and the importance of controlling the pressure through lifestyle modification which include low sodium  diet and  aerobic  exercise.  Also discussed the use of prescription medication which included their benefits and their side effects.   Having a BMI less than or equal to 25. \par   Continue all medication as directed.  Check comprehensive metabolic.  Prescription sent to [place] lab to have blood drawn at the house\par Hyperlipidemia -   Advised low fat cholesterol diet.  Advised importance of having low cholesterol / LDL/ triglycerides. Advised life style modifications.  Continue with current medications.   Monitor LFt/FLP\par \par Peripheral neuropathy -Continue with Neurontin ADRIANA  - advised pt \par \par Rheumatology\par Advised patient the need for bone density scan to evaluate for postmenopausal osteoporosis\par pt refused - education given.  prescription given to check vitamin D levels.\par \par We discussed the importance of healthy lifestyles which include exercise, weight control and good diet.\par Patient's questions were answered in full detail. Advise patient if any other concerns arise to please call office to have a discussion. \par \par Patient  education  - COVID-19   Counseling and education provided to the patient.  Advised sign and symptoms of the virus.  Advised contact precautions.  Educated patient on proper hand washing and to participate in social distancing. Had the covid vax \par \par Immunization - Flu vaccination discussed. Education provided -See consent form for lot number and expiration date.  Fluzone HD 0.7 mL was administered right deltoid IM no reaction noted.\par Patient is in full awareness of the plan and agrees to it.  All pt question was answered.  \par \par I spent 30 Minutes with the patient, half of which we discussed finding on physical exam  and coordinated care.  As well as reviewed my plans and follow ups. \par  \par

## 2022-10-10 ENCOUNTER — MED ADMIN CHARGE (OUTPATIENT)
Age: 87
End: 2022-10-10

## 2022-10-18 NOTE — HISTORY OF PRESENT ILLNESS
[FreeTextEntry1] : A 89-year-old female who presents to the office for followup and prescription renewal [de-identified] : 89-year-old female who presents to the office today for followup of her recent sonograms as well as a prescription renewal for Xanax. Patient states since her last visit her vertigo/dizziness has resolved. Patient continues to state that she is extremely anxious and stress secondary to her  being on hospice and have been end-stage dementia.  \par Patient denies any chest pain, shortness of breath dyspnea on exertion nausea, vomiting or blood in the stools. Patient denies having any allergies to aspirin.\par \par Patient recently saw dermatology for a fungal rash underneath bilateral breasts. Is using a prescribed cream which seems to be working. [Formal Caregiver] : formal caregiver No

## 2022-10-24 ENCOUNTER — RX RENEWAL (OUTPATIENT)
Age: 87
End: 2022-10-24

## 2022-11-07 ENCOUNTER — RX RENEWAL (OUTPATIENT)
Age: 87
End: 2022-11-07

## 2022-11-07 ENCOUNTER — EMERGENCY (EMERGENCY)
Facility: HOSPITAL | Age: 87
LOS: 1 days | Discharge: ANOTHER HOSPITAL | End: 2022-11-07
Attending: STUDENT IN AN ORGANIZED HEALTH CARE EDUCATION/TRAINING PROGRAM | Admitting: STUDENT IN AN ORGANIZED HEALTH CARE EDUCATION/TRAINING PROGRAM
Payer: MEDICARE

## 2022-11-07 ENCOUNTER — INPATIENT (INPATIENT)
Facility: HOSPITAL | Age: 87
LOS: 2 days | Discharge: HOME | DRG: 304 | End: 2022-11-10
Attending: INTERNAL MEDICINE | Admitting: STUDENT IN AN ORGANIZED HEALTH CARE EDUCATION/TRAINING PROGRAM
Payer: MEDICARE

## 2022-11-07 ENCOUNTER — NON-APPOINTMENT (OUTPATIENT)
Age: 87
End: 2022-11-07

## 2022-11-07 VITALS
SYSTOLIC BLOOD PRESSURE: 154 MMHG | RESPIRATION RATE: 18 BRPM | OXYGEN SATURATION: 97 % | HEIGHT: 64 IN | TEMPERATURE: 98 F | HEART RATE: 72 BPM | DIASTOLIC BLOOD PRESSURE: 88 MMHG | WEIGHT: 134.48 LBS

## 2022-11-07 VITALS
WEIGHT: 134.92 LBS | TEMPERATURE: 98 F | RESPIRATION RATE: 20 BRPM | OXYGEN SATURATION: 96 % | SYSTOLIC BLOOD PRESSURE: 110 MMHG | DIASTOLIC BLOOD PRESSURE: 70 MMHG | HEART RATE: 98 BPM | HEIGHT: 64 IN

## 2022-11-07 DIAGNOSIS — Z90.710 ACQUIRED ABSENCE OF BOTH CERVIX AND UTERUS: Chronic | ICD-10-CM

## 2022-11-07 DIAGNOSIS — I60.9 NONTRAUMATIC SUBARACHNOID HEMORRHAGE, UNSPECIFIED: ICD-10-CM

## 2022-11-07 DIAGNOSIS — Z96.60 PRESENCE OF UNSPECIFIED ORTHOPEDIC JOINT IMPLANT: Chronic | ICD-10-CM

## 2022-11-07 LAB
ALBUMIN SERPL ELPH-MCNC: 3 G/DL — LOW (ref 3.3–5)
ALP SERPL-CCNC: 86 U/L — SIGNIFICANT CHANGE UP (ref 40–120)
ALT FLD-CCNC: 25 U/L — SIGNIFICANT CHANGE UP (ref 12–78)
ANION GAP SERPL CALC-SCNC: 5 MMOL/L — SIGNIFICANT CHANGE UP (ref 5–17)
APPEARANCE UR: CLEAR — SIGNIFICANT CHANGE UP
APTT BLD: 37.8 SEC — HIGH (ref 27.5–35.5)
AST SERPL-CCNC: 27 U/L — SIGNIFICANT CHANGE UP (ref 15–37)
BASOPHILS # BLD AUTO: 0.03 K/UL — SIGNIFICANT CHANGE UP (ref 0–0.2)
BASOPHILS NFR BLD AUTO: 0.4 % — SIGNIFICANT CHANGE UP (ref 0–2)
BILIRUB SERPL-MCNC: 0.3 MG/DL — SIGNIFICANT CHANGE UP (ref 0.2–1.2)
BILIRUB UR-MCNC: NEGATIVE — SIGNIFICANT CHANGE UP
BUN SERPL-MCNC: 25 MG/DL — HIGH (ref 7–23)
CALCIUM SERPL-MCNC: 9 MG/DL — SIGNIFICANT CHANGE UP (ref 8.5–10.1)
CHLORIDE SERPL-SCNC: 103 MMOL/L — SIGNIFICANT CHANGE UP (ref 96–108)
CO2 SERPL-SCNC: 31 MMOL/L — SIGNIFICANT CHANGE UP (ref 22–31)
COLOR SPEC: YELLOW — SIGNIFICANT CHANGE UP
CREAT SERPL-MCNC: 1.2 MG/DL — SIGNIFICANT CHANGE UP (ref 0.5–1.3)
DIFF PNL FLD: NEGATIVE — SIGNIFICANT CHANGE UP
EGFR: 42 ML/MIN/1.73M2 — LOW
EOSINOPHIL # BLD AUTO: 0.18 K/UL — SIGNIFICANT CHANGE UP (ref 0–0.5)
EOSINOPHIL NFR BLD AUTO: 2.3 % — SIGNIFICANT CHANGE UP (ref 0–6)
GLUCOSE SERPL-MCNC: 81 MG/DL — SIGNIFICANT CHANGE UP (ref 70–99)
GLUCOSE UR QL: NEGATIVE MG/DL — SIGNIFICANT CHANGE UP
HCT VFR BLD CALC: 34 % — LOW (ref 34.5–45)
HGB BLD-MCNC: 10.9 G/DL — LOW (ref 11.5–15.5)
IMM GRANULOCYTES NFR BLD AUTO: 0.3 % — SIGNIFICANT CHANGE UP (ref 0–0.9)
INR BLD: 1.83 RATIO — HIGH (ref 0.88–1.16)
KETONES UR-MCNC: NEGATIVE — SIGNIFICANT CHANGE UP
LEUKOCYTE ESTERASE UR-ACNC: NEGATIVE — SIGNIFICANT CHANGE UP
LYMPHOCYTES # BLD AUTO: 1.26 K/UL — SIGNIFICANT CHANGE UP (ref 1–3.3)
LYMPHOCYTES # BLD AUTO: 15.8 % — SIGNIFICANT CHANGE UP (ref 13–44)
MCHC RBC-ENTMCNC: 31.7 PG — SIGNIFICANT CHANGE UP (ref 27–34)
MCHC RBC-ENTMCNC: 32.1 GM/DL — SIGNIFICANT CHANGE UP (ref 32–36)
MCV RBC AUTO: 98.8 FL — SIGNIFICANT CHANGE UP (ref 80–100)
MONOCYTES # BLD AUTO: 0.87 K/UL — SIGNIFICANT CHANGE UP (ref 0–0.9)
MONOCYTES NFR BLD AUTO: 10.9 % — SIGNIFICANT CHANGE UP (ref 2–14)
NEUTROPHILS # BLD AUTO: 5.59 K/UL — SIGNIFICANT CHANGE UP (ref 1.8–7.4)
NEUTROPHILS NFR BLD AUTO: 70.3 % — SIGNIFICANT CHANGE UP (ref 43–77)
NITRITE UR-MCNC: NEGATIVE — SIGNIFICANT CHANGE UP
NRBC # BLD: 0 /100 WBCS — SIGNIFICANT CHANGE UP (ref 0–0)
PH UR: 7 — SIGNIFICANT CHANGE UP (ref 5–8)
PLATELET # BLD AUTO: 239 K/UL — SIGNIFICANT CHANGE UP (ref 150–400)
POTASSIUM SERPL-MCNC: 4.2 MMOL/L — SIGNIFICANT CHANGE UP (ref 3.5–5.3)
POTASSIUM SERPL-SCNC: 4.2 MMOL/L — SIGNIFICANT CHANGE UP (ref 3.5–5.3)
PROT SERPL-MCNC: 7.4 G/DL — SIGNIFICANT CHANGE UP (ref 6–8.3)
PROT UR-MCNC: NEGATIVE — SIGNIFICANT CHANGE UP
PROTHROM AB SERPL-ACNC: 21.5 SEC — HIGH (ref 10.5–13.4)
RBC # BLD: 3.44 M/UL — LOW (ref 3.8–5.2)
RBC # FLD: 13.8 % — SIGNIFICANT CHANGE UP (ref 10.3–14.5)
SARS-COV-2 RNA SPEC QL NAA+PROBE: SIGNIFICANT CHANGE UP
SODIUM SERPL-SCNC: 139 MMOL/L — SIGNIFICANT CHANGE UP (ref 135–145)
SP GR SPEC: 1.01 — SIGNIFICANT CHANGE UP (ref 1.01–1.02)
UROBILINOGEN FLD QL: NEGATIVE MG/DL — SIGNIFICANT CHANGE UP
WBC # BLD: 7.95 K/UL — SIGNIFICANT CHANGE UP (ref 3.8–10.5)
WBC # FLD AUTO: 7.95 K/UL — SIGNIFICANT CHANGE UP (ref 3.8–10.5)

## 2022-11-07 PROCEDURE — 70450 CT HEAD/BRAIN W/O DYE: CPT | Mod: MA

## 2022-11-07 PROCEDURE — 99291 CRITICAL CARE FIRST HOUR: CPT

## 2022-11-07 PROCEDURE — 85730 THROMBOPLASTIN TIME PARTIAL: CPT

## 2022-11-07 PROCEDURE — 80053 COMPREHEN METABOLIC PANEL: CPT

## 2022-11-07 PROCEDURE — 36415 COLL VENOUS BLD VENIPUNCTURE: CPT

## 2022-11-07 PROCEDURE — 71045 X-RAY EXAM CHEST 1 VIEW: CPT

## 2022-11-07 PROCEDURE — 99497 ADVNCD CARE PLAN 30 MIN: CPT | Mod: 25

## 2022-11-07 PROCEDURE — 70450 CT HEAD/BRAIN W/O DYE: CPT | Mod: 26,MA,77

## 2022-11-07 PROCEDURE — U0003: CPT

## 2022-11-07 PROCEDURE — 99223 1ST HOSP IP/OBS HIGH 75: CPT

## 2022-11-07 PROCEDURE — 71045 X-RAY EXAM CHEST 1 VIEW: CPT | Mod: 26

## 2022-11-07 PROCEDURE — 85025 COMPLETE CBC W/AUTO DIFF WBC: CPT

## 2022-11-07 PROCEDURE — 85610 PROTHROMBIN TIME: CPT

## 2022-11-07 PROCEDURE — 70450 CT HEAD/BRAIN W/O DYE: CPT | Mod: 26,MA

## 2022-11-07 PROCEDURE — 99291 CRITICAL CARE FIRST HOUR: CPT | Mod: 25

## 2022-11-07 PROCEDURE — U0005: CPT

## 2022-11-07 RX ORDER — FUROSEMIDE 40 MG
20 TABLET ORAL DAILY
Refills: 0 | Status: DISCONTINUED | OUTPATIENT
Start: 2022-11-07 | End: 2022-11-10

## 2022-11-07 RX ORDER — FERROUS SULFATE 325(65) MG
1 TABLET ORAL
Qty: 0 | Refills: 0 | DISCHARGE

## 2022-11-07 RX ORDER — ALPRAZOLAM 0.25 MG
0.25 TABLET ORAL
Refills: 0 | Status: DISCONTINUED | OUTPATIENT
Start: 2022-11-07 | End: 2022-11-10

## 2022-11-07 RX ORDER — GABAPENTIN 400 MG/1
100 CAPSULE ORAL
Refills: 0 | Status: DISCONTINUED | OUTPATIENT
Start: 2022-11-07 | End: 2022-11-10

## 2022-11-07 RX ORDER — ACETAMINOPHEN 500 MG
650 TABLET ORAL EVERY 6 HOURS
Refills: 0 | Status: DISCONTINUED | OUTPATIENT
Start: 2022-11-07 | End: 2022-11-10

## 2022-11-07 RX ORDER — BUPROPION HYDROCHLORIDE 150 MG/1
150 TABLET, EXTENDED RELEASE ORAL DAILY
Refills: 0 | Status: DISCONTINUED | OUTPATIENT
Start: 2022-11-07 | End: 2022-11-10

## 2022-11-07 RX ORDER — LANOLIN ALCOHOL/MO/W.PET/CERES
3 CREAM (GRAM) TOPICAL AT BEDTIME
Refills: 0 | Status: DISCONTINUED | OUTPATIENT
Start: 2022-11-07 | End: 2022-11-10

## 2022-11-07 RX ORDER — FUROSEMIDE 40 MG
1 TABLET ORAL
Qty: 0 | Refills: 0 | DISCHARGE

## 2022-11-07 RX ORDER — ONDANSETRON 8 MG/1
4 TABLET, FILM COATED ORAL EVERY 8 HOURS
Refills: 0 | Status: DISCONTINUED | OUTPATIENT
Start: 2022-11-07 | End: 2022-11-10

## 2022-11-07 RX ORDER — ALPRAZOLAM 0.25 MG
1 TABLET ORAL
Qty: 0 | Refills: 0 | DISCHARGE

## 2022-11-07 RX ORDER — ESCITALOPRAM OXALATE 10 MG/1
5 TABLET, FILM COATED ORAL DAILY
Refills: 0 | Status: DISCONTINUED | OUTPATIENT
Start: 2022-11-07 | End: 2022-11-10

## 2022-11-07 RX ORDER — ESCITALOPRAM OXALATE 10 MG/1
7.5 TABLET, FILM COATED ORAL
Qty: 0 | Refills: 0 | DISCHARGE

## 2022-11-07 RX ORDER — SODIUM CHLORIDE 9 MG/ML
1000 INJECTION INTRAMUSCULAR; INTRAVENOUS; SUBCUTANEOUS ONCE
Refills: 0 | Status: COMPLETED | OUTPATIENT
Start: 2022-11-07 | End: 2022-11-07

## 2022-11-07 RX ORDER — FERROUS SULFATE 325(65) MG
325 TABLET ORAL DAILY
Refills: 0 | Status: DISCONTINUED | OUTPATIENT
Start: 2022-11-07 | End: 2022-11-10

## 2022-11-07 RX ORDER — ATORVASTATIN CALCIUM 80 MG/1
40 TABLET, FILM COATED ORAL AT BEDTIME
Refills: 0 | Status: DISCONTINUED | OUTPATIENT
Start: 2022-11-07 | End: 2022-11-10

## 2022-11-07 RX ORDER — SUCRALFATE 1 G
1 TABLET ORAL
Refills: 0 | Status: DISCONTINUED | OUTPATIENT
Start: 2022-11-07 | End: 2022-11-10

## 2022-11-07 RX ORDER — CHOLECALCIFEROL (VITAMIN D3) 125 MCG
1000 CAPSULE ORAL DAILY
Refills: 0 | Status: DISCONTINUED | OUTPATIENT
Start: 2022-11-07 | End: 2022-11-10

## 2022-11-07 RX ORDER — HYDRALAZINE HCL 50 MG
10 TABLET ORAL ONCE
Refills: 0 | Status: COMPLETED | OUTPATIENT
Start: 2022-11-07 | End: 2022-11-07

## 2022-11-07 RX ADMIN — Medication 10 MILLIGRAM(S): at 16:15

## 2022-11-07 RX ADMIN — SODIUM CHLORIDE 1000 MILLILITER(S): 9 INJECTION INTRAMUSCULAR; INTRAVENOUS; SUBCUTANEOUS at 12:14

## 2022-11-07 NOTE — ED PROVIDER NOTE - OBJECTIVE STATEMENT
89 yo female PMH of PE on Eliquis (05/21), anxiety, depression, dementia, CVA, Fall, GI bleed, Hip fracture, Hyperlipidemia, Hypertension, PUD (peptic ulcer disease), hiatal hernia, transferred from [facility] for possible bleed on CT head.  Family initially c/o urinary frequency and confusion.  Patient has dementia and poor historian.

## 2022-11-07 NOTE — ED ADULT NURSE NOTE - OBJECTIVE STATEMENT
PT presents to ED as transfer from [facility] for neurosurg.  PT states she slipped and fell on her "bottom" denies hitting head. PT neurologically intact at this time. PT daughter at bedside stating she has been "delusional" since thursday.  Send to ED for concern of UTI. NAD noted.

## 2022-11-07 NOTE — H&P ADULT - NSHPPHYSICALEXAM_GEN_ALL_CORE
Vital Signs Last 24 Hrs  T(C): 36.4 (07 Nov 2022 15:26), Max: 36.6 (07 Nov 2022 11:25)  T(F): 97.6 (07 Nov 2022 15:26), Max: 97.9 (07 Nov 2022 11:25)  HR: 79 (07 Nov 2022 18:56) (68 - 98)  BP: 128/56 (07 Nov 2022 18:56) (110/70 - 195/84)  BP(mean): --  RR: 18 (07 Nov 2022 18:56) (18 - 20)  SpO2: 95% (07 Nov 2022 18:56) (95% - 99%)    Parameters below as of 07 Nov 2022 18:56  Patient On (Oxygen Delivery Method): room air        General: Age-appearing, in no acute distress  Head: Normochephalic, atraumatic  ENMT: EOMI, neck supple  Cardiovascular: +S1, S2; Regular rate and rhythm, no murmurs, rubs, gallops  Respiratory: CTA BL, no wheezes, rales, rhonchi  Gastrointestinal: Abdomen soft, non-tender, +BS in all 4 quadrants  Extremities: No clubbing, cyanosis, or edema  Vascular: 2+ pulses, cap refill < 2 seconds  Neuro: Non-focal, AAOx4, sensation intact BL  Musculoskeletal: Normal tone, no deformities  Skin: Warm, dry; no acute rash seen  Psych: Appropriate, cooperative Vital Signs Last 24 Hrs  T(C): 36.4 (07 Nov 2022 15:26), Max: 36.6 (07 Nov 2022 11:25)  T(F): 97.6 (07 Nov 2022 15:26), Max: 97.9 (07 Nov 2022 11:25)  HR: 79 (07 Nov 2022 18:56) (68 - 98)  BP: 128/56 (07 Nov 2022 18:56) (110/70 - 195/84)  BP(mean): --  RR: 18 (07 Nov 2022 18:56) (18 - 20)  SpO2: 95% (07 Nov 2022 18:56) (95% - 99%)    Parameters below as of 07 Nov 2022 18:56  Patient On (Oxygen Delivery Method): room air    General: Age-appearing, in no acute distress  Head: Normochephalic, atraumatic  ENMT: EOMI, neck supple  Cardiovascular: +S1, S2; Regular rate and rhythm, no murmurs, rubs, gallops  Respiratory: CTA BL, no wheezes, rales, rhonchi  Gastrointestinal: Abdomen soft, non-tender, +BS in all 4 quadrants  Extremities: No clubbing, cyanosis, or edema  Vascular: 2+ pulses, cap refill < 2 seconds  Neuro: Non-focal, AAOx2, sensation intact BL  Musculoskeletal: Normal tone, no deformities  Skin: Warm, dry; no acute rash seen  Psych: Appropriate, cooperative

## 2022-11-07 NOTE — ED PROVIDER NOTE - ATTENDING CONTRIBUTION TO CARE
grossly assessed 3+/5 Pt went to OSH for worsening mental status and frequent urination.  CT done and saw possible bleed and was transferred for further eval.    physical - rrr. ctab. abd - soft, nt. no edema. no rash. neuro - confused at baseline.  WING.     plan - labs and imaging reviewed. neurosurgery evaluated. repeat CT stable. Pt admitted to medicine for further management/workup.

## 2022-11-07 NOTE — PHARMACOTHERAPY INTERVENTION NOTE - COMMENTS
Spoke to daughter at bedside to obtain medication list. Last dose Eliquis 5 mG BID at approximately 9am 11/7. Outpatient Medication Review updated.    HOME MEDICATIONS:  acetaminophen 325 mg oral tablet: 2 tab(s) orally every 6 hours, As Needed (07 Nov 2022 17:47)  ALPRAZolam 0.25 mg oral tablet: 1 tab(s) orally 2 times a day (07 Nov 2022 17:47)  buPROPion 100 mg/12 hours (SR) oral tablet, extended release: 1 tab(s) orally once a day (07 Nov 2022 17:47)  Eliquis 5 mg oral tablet: 1 tab(s) orally 2 times a day (07 Nov 2022 17:47)  escitalopram 5 mg oral tablet: 1 tab(s) orally once a day (07 Nov 2022 17:47)  ferrous sulfate 325 mg (65 mg elemental iron) oral delayed release tablet: 1 tab(s) orally every other day (07 Nov 2022 17:47)  furosemide 20 mg oral tablet: 1 tab(s) orally once a day (07 Nov 2022 17:47)  multivitamin: 1 tab(s) orally once a day (07 Nov 2022 17:47)  Neurontin 100 mg oral capsule: 1 cap(s) orally 2 times a day (07 Nov 2022 17:47)  rosuvastatin 10 mg oral tablet: 1 tab(s) orally once a day (07 Nov 2022 17:47)  sucralfate 1 g oral tablet: 1 tab(s) orally 2 times a day (07 Nov 2022 17:47)  Vitamin D3 1000 intl units (25 mcg) oral tablet: 1 tab(s) orally once a day (07 Nov 2022 17:47)  
[facility] transfer; [facility]

## 2022-11-07 NOTE — CONSULT NOTE ADULT - SUBJECTIVE AND OBJECTIVE BOX
HPI:  This is a 89yf transferred from [facility].  Pt presented to the hospital due to urinary freq and mental status change.  Pt has dementia at baseline. Pt was not recognizing home. Pt lives at home with aide.   Ct of the brain performed and heme noted       PAST MEDICAL & SURGICAL HISTORY:  Hyperlipidemia  Hypertension  Fall  Hip fracture  Anxiety  CVA (cerebral vascular accident)  PUD (peptic ulcer disease)  GI bleed  S/P hysterectomy  S/P hip replacement  left        REVIEW OF SYSTEMS:    CONSTITUTIONAL: No fever, weight loss, or fatigue  EYES: No eye pain, visual disturbances, or discharge  ENMT:  No difficulty hearing, tinnitus, vertigo; No sinus or throat pain  NECK: No pain or stiffness  BREASTS: No pain, masses, or nipple discharge  RESPIRATORY: No cough, wheezing, chills or hemoptysis; No shortness of breath  CARDIOVASCULAR: No chest pain, palpitations, dizziness, or leg swelling  GASTROINTESTINAL: No abdominal or epigastric pain. No nausea, vomiting, or hematemesis; No diarrhea or constipation. No melena or hematochezia.  GENITOURINARY: No dysuria, frequency, hematuria, or incontinence  NEUROLOGICAL: No headaches, memory loss, loss of strength, numbness, or tremors  SKIN: No itching, burning, rashes, or lesions   LYMPH NODES: No enlarged glands  ENDOCRINE: No heat or cold intolerance; No hair loss  MUSCULOSKELETAL: No joint pain or swelling; No muscle, back, or extremity pain  PSYCHIATRIC: No depression, anxiety, mood swings, or difficulty sleeping  HEME/LYMPH: No easy bruising, or bleeding gums  ALLERY AND IMMUNOLOGIC: No hives or eczema    Allergies    mold, cheese and wine (Unknown)  penicillins (Unknown)    Intolerances      MEDICATIONS  (STANDING):  hydrALAZINE Injectable 10 milliGRAM(s) IV Push Once    MEDICATIONS  (PRN):    SOCIAL HISTORY:  FAMILY HISTORY:    Vital Signs Last 24 Hrs  T(C): 36.4 (07 Nov 2022 15:26), Max: 36.6 (07 Nov 2022 11:25)  T(F): 97.6 (07 Nov 2022 15:26), Max: 97.9 (07 Nov 2022 11:25)  HR: 68 (07 Nov 2022 15:44) (68 - 98)  BP: 195/84 (07 Nov 2022 15:44) (110/70 - 195/84)  BP(mean): --  RR: 18 (07 Nov 2022 15:44) (18 - 20)  SpO2: 99% (07 Nov 2022 15:44) (96% - 99%)    Parameters below as of 07 Nov 2022 15:26  Patient On (Oxygen Delivery Method): nasal cannula  O2 Flow (L/min): 4      PHYSICAL EXAM:    GENERAL: NAD, well-groomed, well-developed  HEAD:  Atraumatic, Normocephalic  EYES: EOMI, PERRLA, conjunctiva and sclera clear  ENMT: No tonsillar erythema, exudates, or enlargement; Moist mucous membranes, Good dentition, No lesions  NECK: Supple, No JVD, Normal thyroid  NERVOUS SYSTEM:  Alert & Oriented X3, Good concentration; Motor Strength 5/5 B/L upper and lower extremities; DTRs 2+ intact and symmetric  CHEST/LUNG: Clear to percussion bilaterally; No rales, rhonchi, wheezing, or rubs  HEART: Regular rate and rhythm; No murmurs, rubs, or gallops  ABDOMEN: Soft, Nontender, Nondistended; Bowel sounds present  EXTREMITIES:  2+ Peripheral Pulses, No clubbing, cyanosis, or edema  LYMPH: No lymphadenopathy noted  SKIN: No rashes or lesions    LABS:                        10.9   7.95  )-----------( 239      ( 07 Nov 2022 12:00 )             34.0     11-07    139  |  103  |  25<H>  ----------------------------<  81  4.2   |  31  |  1.20    Ca    9.0      07 Nov 2022 12:00    TPro  7.4  /  Alb  3.0<L>  /  TBili  0.3  /  DBili  x   /  AST  27  /  ALT  25  /  AlkPhos  86  11-07    PT/INR - ( 07 Nov 2022 12:00 )   PT: 21.5 sec;   INR: 1.83 ratio         PTT - ( 07 Nov 2022 12:00 )  PTT:37.8 sec      RADIOLOGY & ADDITIONAL STUDIES:  ~~~~~~~~~~~~~~~~~~~~~~~~~~~~~~   HPI:  This is a 89 y/o female w/ hx of HTN, PE on eliquis, dementia, transferred from [facility] where she presented w/ AMS. Pts daughter states that pt had episode of confusion 11/3 described as not recognizing home, was intact Fri and Sat, then became confused again 11/6/22, and was recommended to present to ED by pts PMD. CT brain in ED read as showing  increased density within the mesial right thalamus likely represents small focus of acute hemorrhage. Pt transferred to [facility] for NS evaluation. While in [facility] ED, pts BP noted to be 195/84, given hydralazine by ED team for treatment. Pt denies HA, N/V, trauma.     Daughter Angie [phone], Kathy [phone]      PAST MEDICAL & SURGICAL HISTORY:  Hyperlipidemia  Hypertension  Fall  Hip fracture  Anxiety  CVA (cerebral vascular accident)  PUD (peptic ulcer disease)  GI bleed  S/P hysterectomy  S/P hip replacement  left        REVIEW OF SYSTEMS:    CONSTITUTIONAL: No fever, weight loss, or fatigue  EYES: No eye pain, visual disturbances, or discharge  ENMT:  No difficulty hearing, tinnitus, vertigo; No sinus or throat pain  NECK: No pain or stiffness  RESPIRATORY: No cough, wheezing, chills or hemoptysis; No shortness of breath  CARDIOVASCULAR: No chest pain, palpitations, dizziness, or leg swelling  GASTROINTESTINAL: No abdominal or epigastric pain. No nausea, vomiting, or hematemesis; No diarrhea or constipation. No melena or hematochezia.  GENITOURINARY: No dysuria, frequency, hematuria, or incontinence  NEUROLOGICAL: No headaches, memory loss, loss of strength, numbness, or tremors  SKIN: No itching, burning, rashes, or lesions   LYMPH NODES: No enlarged glands  ENDOCRINE: No heat or cold intolerance; No hair loss  MUSCULOSKELETAL: No joint pain or swelling; No muscle, back, or extremity pain  PSYCHIATRIC: No depression, anxiety, mood swings, or difficulty sleeping  HEME/LYMPH: No easy bruising, or bleeding gums  ALLERY AND IMMUNOLOGIC: No hives or eczema    Allergies    mold, cheese and wine (Unknown)  penicillins (Unknown)    Intolerances      MEDICATIONS  (STANDING):  hydrALAZINE Injectable 10 milliGRAM(s) IV Push Once    MEDICATIONS  (PRN):    SOCIAL HISTORY:  FAMILY HISTORY:    Vital Signs Last 24 Hrs  T(C): 36.4 (07 Nov 2022 15:26), Max: 36.6 (07 Nov 2022 11:25)  T(F): 97.6 (07 Nov 2022 15:26), Max: 97.9 (07 Nov 2022 11:25)  HR: 68 (07 Nov 2022 15:44) (68 - 98)  BP: 195/84 (07 Nov 2022 15:44) (110/70 - 195/84)  BP(mean): --  RR: 18 (07 Nov 2022 15:44) (18 - 20)  SpO2: 99% (07 Nov 2022 15:44) (96% - 99%)    Parameters below as of 07 Nov 2022 15:26  Patient On (Oxygen Delivery Method): nasal cannula  O2 Flow (L/min): 4      PHYSICAL EXAM:    GENERAL: NAD, well-groomed  HEAD:  Atraumatic, Normocephalic  EYES: EOMI, PERRLA, conjunctiva and sclera clear  ENMT: No tonsillar erythema, exudates, or enlargement; Moist mucous membranes  NECK: Supple, No JVD, Normal thyroid  NERVOUS SYSTEM:  Alert & Oriented X3, Good concentration; Motor Strength 5/5 B/L upper and lower extremities; DTRs 2+ intact and symmetric  CHEST/LUNG: No rales, rhonchi, wheezing, or rubs  HEART: Regular rate and rhythm; No murmurs, rubs, or gallops  ABDOMEN: Soft, Nontender, Nondistended; Bowel sounds present  EXTREMITIES:  No clubbing, cyanosis  LYMPH: No lymphadenopathy noted  SKIN: No rashes or lesions    LABS:                        10.9   7.95  )-----------( 239      ( 07 Nov 2022 12:00 )             34.0     11-07    139  |  103  |  25<H>  ----------------------------<  81  4.2   |  31  |  1.20    Ca    9.0      07 Nov 2022 12:00    TPro  7.4  /  Alb  3.0<L>  /  TBili  0.3  /  DBili  x   /  AST  27  /  ALT  25  /  AlkPhos  86  11-07    PT/INR - ( 07 Nov 2022 12:00 )   PT: 21.5 sec;   INR: 1.83 ratio         PTT - ( 07 Nov 2022 12:00 )  PTT:37.8 sec      RADIOLOGY & ADDITIONAL STUDIES:    ACC: [id number] EXAM:  CT BRAIN                        PROCEDURE DATE:  11/07/2022   IMPRESSION:  4.5 mm focus of increased density within the mesial right thalamus likely   represents small focus of acute hemorrhage.

## 2022-11-07 NOTE — ED PROVIDER NOTE - OBJECTIVE STATEMENT
89 F BIB daughters due to urinary frequency and change in mental status the past few days. Not recognizing her home, stating her daughters left her somewhere. Lives at home with aide.

## 2022-11-07 NOTE — ED ADULT NURSE NOTE - NSIMPLEMENTINTERV_GEN_ALL_ED
Implemented All Fall with Harm Risk Interventions:  [place] to call system. Call bell, personal items and telephone within reach. Instruct patient to call for assistance. Room bathroom lighting operational. Non-slip footwear when patient is off stretcher. Physically safe environment: no spills, clutter or unnecessary equipment. Stretcher in lowest position, wheels locked, appropriate side rails in place. Provide visual cue, wrist band, yellow gown, etc. Monitor gait and stability. Monitor for mental status changes and reorient to person, place, and time. Review medications for side effects contributing to fall risk. Reinforce activity limits and safety measures with patient and family. Provide visual clues: red socks.

## 2022-11-07 NOTE — ED PROVIDER NOTE - CRITICAL CARE ATTENDING CONTRIBUTION TO CARE
Upon my evaluation, this patient had a high probability of imminent or life-threatening deterioration due to __intracranial hemorrhage_______, which required my direct attention, intervention, and personal management.  The patient has a  medical condition that impairs one or more vital organ systems.  Frequent personal assessment and adjustment of medical interventions was performed.      I have personally provided _60__ minutes of critical care time exclusive of time spent on separately billable procedures. Time includes review of laboratory data, radiology results, discussion with consultants, patient and family; monitoring for potential decompensation, as well as time spent retrieving data and reviewing the chart and documenting the visit. Interventions were performed as documented above.

## 2022-11-07 NOTE — ED ADULT TRIAGE NOTE - CHIEF COMPLAINT QUOTE
Patient BIBA from home for AMS, agitation and urinary frequency. Hx of dementia. No apparent distress noted.

## 2022-11-07 NOTE — ED ADULT TRIAGE NOTE - CHIEF COMPLAINT QUOTE
Transfer from [facility] for head CT with results stating increased density and possible small acute hemorrhage.  Pt C/O frequent urination.  Alert to person and place not time.

## 2022-11-07 NOTE — ED ADULT NURSE REASSESSMENT NOTE - NS ED NURSE REASSESS COMMENT FT1
Assumed care of pt at 19:15 as stated in report from JN Pierce. Charting as noted. Patient A&O x2, denies pain/discomfort, denies CP/SOB. Updated on the plan of care. Call bell within reach, bed locked in lowest position. IV site flushed w/ NS. No redness, swelling or pain noted to site. No signs of acute distress noted, safety maintained. Pt remains on CM in NSR.

## 2022-11-07 NOTE — CONSULT NOTE ADULT - ASSESSMENT
89 y/o female w/ hx of HTN, PE on eliquis, dementia, transferred from [facility] where she presented w/ AMS, CT brain in ED read as showing  increased density within the mesial right thalamus likely represents small focus of acute hemorrhage.     -Case discussed w/ Dr. Valdes  -recommend CT and CTA brain   -recommend anti-Xa  -recommend MTI brain w/ and w/o   -systolic BP goal 100to 160  -pain control as needed, avoid over sedation 89 y/o female w/ hx of HTN, PE on eliquis, dementia, transferred from [facility] where she presented w/ AMS, CT brain in ED read as showing  increased density within the mesial right thalamus likely represents small focus of acute hemorrhage.     -Case discussed w/ Dr. Valdes  -recommend CT and CTA brain   -recommend anti-Xa  -recommend MTI brain w/ and w/o   -systolic BP goal 100to 160  -pain control as needed, avoid over sedation  -will continue to follow  89 y/o female w/ hx of HTN, PE on eliquis, dementia, transferred from [facility] where she presented w/ AMS, CT brain in ED read as showing  increased density within the mesial right thalamus likely represents small focus of acute hemorrhage.     -Case discussed w/ Dr. Valdes  -recommend CT and CTA brain   -recommend anti-Xa  -recommend MTI brain w/ and w/o   -systolic BP goal 100to 160  -pain control as needed, avoid over sedation  -recommend Medicine admission, SD, BP management   -will continue to follow  89 y/o female w/ hx of HTN, PE on eliquis, dementia, transferred from [facility] where she presented w/ AMS, CT brain in ED read as showing  increased density within the mesial right thalamus likely represents small focus of acute hemorrhage.     ICH score 2 on admission   NIHSS 1 on admission     -Case discussed w/ Dr. Valdes  -recommend CT and CTA brain   -recommend anti-Xa  -recommend MTI brain w/ and w/o   -systolic BP goal 100to 160  -pain control as needed, avoid over sedation  -recommend Medicine admission, SD, BP management   -will continue to follow

## 2022-11-07 NOTE — ED PROVIDER NOTE - CLINICAL SUMMARY MEDICAL DECISION MAKING FREE TEXT BOX
89 yo female PMH of PE on Eliquis (05/21), anxiety, depression, dementia, CVA, Fall, GI bleed, Hip fracture, Hyperlipidemia, Hypertension, PUD (peptic ulcer disease), hiatal hernia, transferred from [facility] for possible bleed on CT head.  PT hypertensive, gave hydralizine.  Neurosurgery called. 89 yo female PMH of PE on Eliquis (05/21), anxiety, depression, dementia, CVA, Fall, GI bleed, Hip fracture, Hyperlipidemia, Hypertension, PUD (peptic ulcer disease), hiatal hernia, transferred from [facility] for possible bleed on CT head.  PT hypertensive, gave hydralizine.  Neurosurgery called, advised medicine admit for hypertensice emergency and MRI.

## 2022-11-07 NOTE — ED ADULT NURSE NOTE - CHIEF COMPLAINT QUOTE
Transfer from [facility] for head CT with results stating increased density and possible small acute hemorrhage.  Pt C/O frequent urination.  Alert to person and place not time.

## 2022-11-07 NOTE — ED PROVIDER NOTE - PHYSICAL EXAMINATION
General: NAD, elderly appearing  HEENT: Normocephalic, atraumatic  Neck: No apparent stiffness or JVD  Pulm: Chest wall symmetric and nontender, lungs clear to ascultation   Cardiac: Regular rate and regular rhythm  Abdomen: Nontender and nondistended  Skin: Skin is warm, dry and intact without rashes or lesions.  Neuro: No motor or sensory deficits above reported baseline  MSK: No deformity or tenderness above reported baseline

## 2022-11-07 NOTE — H&P ADULT - CONVERSATION DETAILS
Patient has filled out MOLST on prior admissions indicating DNR/DNI. Daughter does not have the original copy, but states there should be a copy in her chart as it was done here. No wishes have changed and she would like to keep the MOLST the same.

## 2022-11-07 NOTE — H&P ADULT - HISTORY OF PRESENT ILLNESS
89 y/o female w/ hx of HTN, PE on eliquis, dementia, transferred from [facility] where she initially presented with AMS. Patient has dementia at baseline normally AAOx2. Daughters states she was having confusion regarding where she was intermittently and PCP advised she be evaluated in the ED. At [place], she was found to have a small focus of acute hemorrhage and was transferred here for neurosurgical evaluation. 89 y/o female w/ hx of HTN, PE on eliquis, dementia, transferred from [facility] where she initially presented with AMS. Patient has dementia at baseline normally AAOx2. Daughters states she was having confusion regarding where she was intermittently and PCP advised she be evaluated in the ED. At [place], she was found to have a small focus of acute hemorrhage and was transferred here for neurosurgical evaluation.  Spoke with daughter Angie for additional collateral.  89 y/o female w/ hx of HTN, PE on eliquis, dementia, transferred from [facility] where she initially presented with AMS. Patient has dementia at baseline normally AAOx2. Daughters states she was having confusion regarding where she was intermittently and PCP advised she be evaluated in the ED. At [place], she was found to have a small focus of acute hemorrhage and was transferred here for neurosurgical evaluation.  Spoke with daughter Angie for additional collateral. She states the patient was adamant that her daughters moved her out of her home and this was not her home (it was the home she lived in for 30 years). She was making comments that they were punishing her that were non-sensical. Angie states this is a significant worsening of mental status compared to prior.    Spoke with daughter regarding code status. States they have filled out a MOLST at [facility] prior and it should be in the chart, but she does not have the hard copy. Patient is DNR/DNI.

## 2022-11-07 NOTE — H&P ADULT - NSHPLABSRESULTS_GEN_ALL_CORE
10.9   7.95  )-----------( 239      ( 2022 12:00 )             34.0     2022 12:00    139    |  103    |  25     ----------------------------<  81     4.2     |  31     |  1.20     Ca    9.0        2022 12:00    TPro  7.4    /  Alb  3.0    /  TBili  0.3    /  DBili  x      /  AST  27     /  ALT  25     /  AlkPhos  86     2022 12:00    PT/INR - ( 2022 12:00 )   PT: 21.5 sec;   INR: 1.83 ratio         PTT - ( 2022 12:00 )  PTT:37.8 sec  CAPILLARY BLOOD GLUCOSE        LIVER FUNCTIONS - ( 2022 12:00 )  Alb: 3.0 g/dL / Pro: 7.4 g/dL / ALK PHOS: 86 U/L / ALT: 25 U/L / AST: 27 U/L / GGT: x           Urinalysis Basic - ( 2022 17:08 )    Color: Yellow / Appearance: Clear / S.010 / pH: x  Gluc: x / Ketone: Negative  / Bili: Negative / Urobili: Negative mg/dL   Blood: x / Protein: Negative / Nitrite: Negative   Leuk Esterase: Negative / RBC: x / WBC x   Sq Epi: x / Non Sq Epi: x / Bacteria: x

## 2022-11-07 NOTE — PATIENT PROFILE ADULT - FALL HARM RISK - HARM RISK INTERVENTIONS
Assistance with ambulation/Assistance OOB with selected safe patient handling equipment/Communicate Risk of Fall with Harm to all staff/Discuss with provider need for PT consult/Monitor for mental status changes/Monitor gait and stability/Move patient closer to nurses' station/Provide patient with walking aids - walker, cane, crutches/Reinforce activity limits and safety measures with patient and family/Reorient to person, place and time as needed/Tailored Fall Risk Interventions/Toileting schedule using arm’s reach rule for commode and bathroom/Use of alarms - bed, chair and/or voice tab/Visual Cue: Yellow wristband and red socks/Bed in lowest position, wheels locked, appropriate side rails in place/Call bell, personal items and telephone in reach/Instruct patient to call for assistance before getting out of bed or chair/Non-slip footwear when patient is out of bed/[place] to call system/Physically safe environment - no spills, clutter or unnecessary equipment/Purposeful Proactive Rounding/Room/bathroom lighting operational, light cord in reach

## 2022-11-07 NOTE — H&P ADULT - ASSESSMENT
89 y/o female w/ hx of HTN, PE on Eliquis, Dementia (AAOx2 at baseline), transferred from [facility] where she initially presented with AMS.    Hypertensive Emergency; Altered Mental Status, Acute Hemorrhage  -Admit to stepdown  -Repeat CTH shows stability of hemorrhage  -Neurochecks Q2   -MRI brain pending  -NSx following   -Given 1 dose of IV Hydralazine 10 mg in ED with improvement of BP, monitor for now    Hx of PE  -Hold Eliquis because of acute hemorrhage    HLD  -Atorvastatin as formulary substitution for Rosuvastatin    Depression  -Continue Bupropion, Escitalopram, and Xanax    DVTppx: Hold chemical DVT ppx due to acute hemorrhage  GOC:  89 y/o female w/ hx of HTN, PE on Eliquis, Dementia (AAOx2 at baseline), transferred from [facility] where she initially presented with AMS.    Hypertensive Emergency; Altered Mental Status, Acute Hemorrhage  -Admit to stepdown  -Repeat CTH shows stability of hemorrhage  -Neurochecks Q2   -MRI brain pending  -NSx following   -Given 1 dose of IV Hydralazine 10 mg in ED with improvement of BP, monitor for now    Hx of PE  -Hold Eliquis due to acute hemorrhage    HLD  -Atorvastatin as formulary substitution for Rosuvastatin    Depression  -Continue Bupropion, Escitalopram, and Xanax    DVTppx: Hold chemical DVT ppx due to acute hemorrhage  GOC: DNR/DNI, confirmed with daughter and refilled out MOLST form as they do not have original copy

## 2022-11-07 NOTE — CONSULT NOTE ADULT - NS ATTEND AMEND GEN_ALL_CORE FT
Neurosurgery Attending Attestation:    Patient seen and examined at bedside. Agree with plan and note as documented above.    89 y/o F with PMHx significant for HTN, PE on eliquis who presents with several days of confusion and found to have small 4mm hyperdensity in R thalamus on CT Head w/o contrast concerning for intracranial hemorrhage. Patient is awake, alert, oriented to self, not hospital or year, PERRL, EOMI, FS, TML, and moving all extremities 5/5. Patient would benefit from repeat CT Head w/o contrast to assess for interval change in bleeding and a delayed nonurgent MRI Brain w/w/o contrast to assess for other etiologies for AMS. Rest of AMS/dementia workup per medicine team.    -Lakia Valdes MD

## 2022-11-07 NOTE — ED PROVIDER NOTE - PROGRESS NOTE DETAILS
darrian mitchell via transfer center to [facility] pt takes eliquis 5mg BID - dw dr mitchell who does not recommend anything for eliquis reversal at this time

## 2022-11-07 NOTE — ED PROVIDER NOTE - CLINICAL SUMMARY MEDICAL DECISION MAKING FREE TEXT BOX
89 F ho cva on eliquis, GI bleed, BIB daughters due to urinary frequency and change in mental status the past few days. Not recognizing her home, stating her daughters left her somewhere. Lives at home with aide. daughter states she has been getting phone calls from her mother for last few days expressing anger that they moved her mothers place of living. pt lives at home with aide, daughter doesn't see her daily, unsure if shes been having fevers, URI sx, nausea or vomitng, pt denies pain at this time. pt oriented to self and place follows commands, nonfocal neuro exam   will chck labs eval for infectious vs central vs metabolic etiology

## 2022-11-08 LAB
ANION GAP SERPL CALC-SCNC: 9 MMOL/L — SIGNIFICANT CHANGE UP (ref 5–17)
BASOPHILS # BLD AUTO: 0.05 K/UL — SIGNIFICANT CHANGE UP (ref 0–0.2)
BASOPHILS NFR BLD AUTO: 0.6 % — SIGNIFICANT CHANGE UP (ref 0–2)
BUN SERPL-MCNC: 21.3 MG/DL — HIGH (ref 8–20)
CALCIUM SERPL-MCNC: 8.6 MG/DL — SIGNIFICANT CHANGE UP (ref 8.4–10.5)
CHLORIDE SERPL-SCNC: 104 MMOL/L — SIGNIFICANT CHANGE UP (ref 96–108)
CO2 SERPL-SCNC: 29 MMOL/L — SIGNIFICANT CHANGE UP (ref 22–29)
CREAT SERPL-MCNC: 1.06 MG/DL — SIGNIFICANT CHANGE UP (ref 0.5–1.3)
CULTURE RESULTS: NO GROWTH — SIGNIFICANT CHANGE UP
EGFR: 49 ML/MIN/1.73M2 — LOW
EOSINOPHIL # BLD AUTO: 0.2 K/UL — SIGNIFICANT CHANGE UP (ref 0–0.5)
EOSINOPHIL NFR BLD AUTO: 2.5 % — SIGNIFICANT CHANGE UP (ref 0–6)
GLUCOSE SERPL-MCNC: 107 MG/DL — HIGH (ref 70–99)
HCT VFR BLD CALC: 32.6 % — LOW (ref 34.5–45)
HGB BLD-MCNC: 10.8 G/DL — LOW (ref 11.5–15.5)
IMM GRANULOCYTES NFR BLD AUTO: 0.4 % — SIGNIFICANT CHANGE UP (ref 0–0.9)
LYMPHOCYTES # BLD AUTO: 1.24 K/UL — SIGNIFICANT CHANGE UP (ref 1–3.3)
LYMPHOCYTES # BLD AUTO: 15.3 % — SIGNIFICANT CHANGE UP (ref 13–44)
MCHC RBC-ENTMCNC: 32.3 PG — SIGNIFICANT CHANGE UP (ref 27–34)
MCHC RBC-ENTMCNC: 33.1 GM/DL — SIGNIFICANT CHANGE UP (ref 32–36)
MCV RBC AUTO: 97.6 FL — SIGNIFICANT CHANGE UP (ref 80–100)
MONOCYTES # BLD AUTO: 0.76 K/UL — SIGNIFICANT CHANGE UP (ref 0–0.9)
MONOCYTES NFR BLD AUTO: 9.4 % — SIGNIFICANT CHANGE UP (ref 2–14)
NEUTROPHILS # BLD AUTO: 5.81 K/UL — SIGNIFICANT CHANGE UP (ref 1.8–7.4)
NEUTROPHILS NFR BLD AUTO: 71.8 % — SIGNIFICANT CHANGE UP (ref 43–77)
PLATELET # BLD AUTO: 244 K/UL — SIGNIFICANT CHANGE UP (ref 150–400)
POTASSIUM SERPL-MCNC: 3.9 MMOL/L — SIGNIFICANT CHANGE UP (ref 3.5–5.3)
POTASSIUM SERPL-SCNC: 3.9 MMOL/L — SIGNIFICANT CHANGE UP (ref 3.5–5.3)
RBC # BLD: 3.34 M/UL — LOW (ref 3.8–5.2)
RBC # FLD: 13.9 % — SIGNIFICANT CHANGE UP (ref 10.3–14.5)
SODIUM SERPL-SCNC: 142 MMOL/L — SIGNIFICANT CHANGE UP (ref 135–145)
SPECIMEN SOURCE: SIGNIFICANT CHANGE UP
WBC # BLD: 8.09 K/UL — SIGNIFICANT CHANGE UP (ref 3.8–10.5)
WBC # FLD AUTO: 8.09 K/UL — SIGNIFICANT CHANGE UP (ref 3.8–10.5)

## 2022-11-08 PROCEDURE — 99232 SBSQ HOSP IP/OBS MODERATE 35: CPT

## 2022-11-08 PROCEDURE — 99223 1ST HOSP IP/OBS HIGH 75: CPT

## 2022-11-08 PROCEDURE — 99233 SBSQ HOSP IP/OBS HIGH 50: CPT

## 2022-11-08 RX ADMIN — GABAPENTIN 100 MILLIGRAM(S): 400 CAPSULE ORAL at 05:49

## 2022-11-08 RX ADMIN — ATORVASTATIN CALCIUM 40 MILLIGRAM(S): 80 TABLET, FILM COATED ORAL at 01:24

## 2022-11-08 RX ADMIN — Medication 0.25 MILLIGRAM(S): at 17:01

## 2022-11-08 RX ADMIN — Medication 1000 UNIT(S): at 11:46

## 2022-11-08 RX ADMIN — BUPROPION HYDROCHLORIDE 150 MILLIGRAM(S): 150 TABLET, EXTENDED RELEASE ORAL at 11:46

## 2022-11-08 RX ADMIN — GABAPENTIN 100 MILLIGRAM(S): 400 CAPSULE ORAL at 17:01

## 2022-11-08 RX ADMIN — ATORVASTATIN CALCIUM 40 MILLIGRAM(S): 80 TABLET, FILM COATED ORAL at 23:16

## 2022-11-08 RX ADMIN — Medication 0.25 MILLIGRAM(S): at 05:48

## 2022-11-08 RX ADMIN — Medication 325 MILLIGRAM(S): at 11:46

## 2022-11-08 RX ADMIN — Medication 1 GRAM(S): at 05:48

## 2022-11-08 RX ADMIN — ESCITALOPRAM OXALATE 5 MILLIGRAM(S): 10 TABLET, FILM COATED ORAL at 11:46

## 2022-11-08 RX ADMIN — Medication 20 MILLIGRAM(S): at 05:49

## 2022-11-08 RX ADMIN — Medication 1 GRAM(S): at 17:01

## 2022-11-08 NOTE — CONSULT NOTE ADULT - ASSESSMENT
The patient is a 89y Female with dementia now presenting with probable hypertensive right thalamic intracranial hemorrhage.    Intracranial hemorrhage  Likely secondary to hypertension.   Seen by neurosurgery.   Her anticoagulation (history of PE) is on hold.   Control blood pressure.   PT/OT evaluations.     Dementia  Supportive care.     Case discussed with Dr Truong.

## 2022-11-08 NOTE — PROGRESS NOTE ADULT - NS ATTEND AMEND GEN_ALL_CORE FT
Neurosurgery Attending Attestation:    Patient seen and examined at bedside. Agree with plan and note as documented above.    89 y/o F with PMHx significant for HTN, PE on eliquis who presents with several days of confusion and found to have small 4mm hyperdensity in R thalamus on CT Head w/o contrast concerning for intracranial hemorrhage. Patient is awake, alert, oriented to self, not hospital or year, PERRL, EOMI, FS, TML, and moving all extremities 5/5. Repeat CT Head stable. Nonurgent (outpatient if needed) MRI Brain w/w/o contrast can be obtained for assessment of other etiologies for AMS. Rest of AMS/dementia workup per medicine team.    -Lakia Valdes MD

## 2022-11-08 NOTE — PROGRESS NOTE ADULT - ASSESSMENT
89F w/ PMHX of HTN, PE on Eliquis, Dementia (baseline AAOx2), trf from [facility] p/w AMS. Family noted worsening confusion/agitation and PCP advised ED visit, found to have a small focus of acute hemorrhage and was trf to [facility] for neurosurgical evaluation, hypertensive in ED.   - Repeat CTH stable      Plan  - Q4 neuro checks  - Pain control PRN; avoid oversedation   - SBP <160  - MRI Brain w/ w/o contrast when medically optimized   - STAT CTH for any deterioration in neuro status  - No acute neurosurgical intervention at this time  - Recommend holding Eliquis for at least 2 weeks, with repeat CTH to assess stability prior to resuming.   - Medical management/supportive care per primary team   - D/w Dr. Valdes

## 2022-11-08 NOTE — PROGRESS NOTE ADULT - ASSESSMENT
89 y/o female w/ hx of HTN, PE on Eliquis, Dementia (AAOx2 at baseline), transferred from [facility] where she initially presented with AMS.    # Hypertensive Emergency and Changes in mentation due to Acute Hemorrhage CVA, present on admission  -downgraded to tele bed with neurocheck q4h   - repeat ct head w/o acute changes,   - holding ac for 2 weeks  - neurosurgery consult noted >> mri ordered  - PT/OT assessment  - goal sbp 100-160       Hx of PE  -Hold Eliquis due to acute hemorrhage    HLD  -Atorvastatin as formulary substitution for Rosuvastatin    Depression  -Continue Bupropion, Escitalopram, and Xanax    DVTppx:  SCD for now, lovenox may be resumed on 11/09/22  GOC: DNR/DNI, daughter updated over phone  Dispo - pending MRI;

## 2022-11-08 NOTE — CHART NOTE - NSCHARTNOTEFT_GEN_A_CORE
Spoke with patient's daughter Johana Chiang for both completion of the MRI safety screening form and Gadolinium MRI Contrast consent. Per patient's daughter the patient does not have any metal in her body or contraindications to MRI imaging.  Per patient's daughter the patient has had b/l hip replacements with a femur fx repair several years ago; per patient's daughter she believes this is titanium but is unsure.  In addition the patient does have hearing aids, however, these are both removed and at home per the patient's daughter.     MRI safety screening form completed based on the above information provided by the patient's daughter.  In addition Gadolinium contrast was obtained from the patient's daughter who understands the all of the risks associated including but limited to: allergic reaction, anaphylaxis, and potential worsening of kidney function.  Advised patient's daughter her mother's creatinine is 1.06 with an eGFR 49; patient's daughter verbalized understanding to all that was explained and wishes to proceed forward with the MRI brain with and without contrast as recommended by the neurosurgery team. The patient's daughter believes her mother has had MRI imaging in the past without issue but is unsure.     MRI safety screening form and Gadolinium contrast consent forms completed and placed in the patient's chart.  RN scanned copies of consent forms down to MRI, discussed with Dr. Truong. Spoke with patient's daughter Johana Chiang for both completion of the MRI safety screening form and Gadolinium MRI Contrast consent. Per patient's daughter the patient does not have any metal in her body or contraindications to MRI imaging.  Per patient's daughter the patient has had b/l hip replacements with a femur fx repair several years ago; per patient's daughter she believes this is titanium but is unsure.  In addition the patient does have hearing aids, however, these are both removed and at home per the patient's daughter.     MRI safety screening form completed based on the above information provided by the patient's daughter.  In addition Gadolinium contrast was obtained from the patient's daughter who understands the all of the risks associated including but limited to: allergic reaction, anaphylaxis, and potential worsening of kidney function.  Advised patient's daughter her mother's creatinine is 1.06 with an eGFR 49; patient's daughter verbalized understanding to all that was explained and wishes to proceed forward with the MRI brain with and without contrast as recommended by the neurosurgery team. The patient's daughter believes her mother has had MRI imaging in the past without issue but is unsure.     MRI safety screening form and Gadolinium contrast consent forms completed and placed in the patient's chart.  RN faxed copies of both consent forms down to MRI, discussed with Dr. Truong.

## 2022-11-08 NOTE — CONSULT NOTE ADULT - SUBJECTIVE AND OBJECTIVE BOX
[facility]                                        Neurology at [place]                                  Naty Blkae, & Levon                                      [address]                                           [place], [place], [address]                                                [phone]        CC: Dementia and intracranial hemorrhage     HISTORY:  The patient is a 89y Female with history of dementia who presented to [facility] with altered mental status. Her blood pressure was noted to be high. She was evaluated with head CT at [place] and was transferred to [facility] (formerly [facility]) based upon result (see below).   She was evaluated by the neurosurgery service her upon arrival.   Neurology was called in am 11/8 to evaluate.     PAST MEDICAL & SURGICAL HISTORY:  Hyperlipidemia  Hypertension  Fall  Hip fracture  Anxiety  CVA (cerebral vascular accident)  PUD (peptic ulcer disease)  GI bleed  S/P hysterectomy  S/P hip replacement  left    MEDICATION PRIOR TO ADMISSION:  · 	ferrous sulfate 325 mg (65 mg elemental iron) oral delayed release tablet  · 	ALPRAZolam 0.25 mg oral tablet: Last Dose Taken:  , 1 tab(s) orally 2 times a day  · 	Eliquis 5 mg oral tablet: Last Dose Taken: 07-Nov-2022 9:00 AM, 1 tab(s) orally 2 times a day  · 	buPROPion 100 mg/12 hours (SR) oral tablet, extended release  · 	escitalopram 5 mg oral tablet: Last Dose Taken:  , 1 tab(s) orally once a day  · 	acetaminophen 325 mg oral tablet: Last Dose Taken:  , 2 tab(s) orally every 6 hours, As Needed  · 	furosemide 20 mg oral tablet: 1 tab(s) orally once a day  · 	rosuvastatin 10 mg oral tablet: Last Dose Taken:  , 1 tab(s) orally once a day  · 	Neurontin 100 mg oral capsule: Last Dose Taken:  , 1 cap(s) orally 2 times a day  · 	sucralfate 1 g oral tablet: Last Dose Taken:  , 1 tab(s) orally 2 times a day  · 	multivitamin: Last Dose Taken:  , 1 tab(s) orally once a day  · 	Vitamin D3 1000 intl units (25 mcg) oral tablet: Last Dose Taken:  , 1 tab(s) orally once a day    MEDICATIONS  (STANDING):  ALPRAZolam 0.25 milliGRAM(s) Oral two times a day  atorvastatin 40 milliGRAM(s) Oral at bedtime  buPROPion XL (24-Hour) . 150 milliGRAM(s) Oral daily  cholecalciferol 1000 Unit(s) Oral daily  escitalopram 5 milliGRAM(s) Oral daily  ferrous    sulfate 325 milliGRAM(s) Oral daily  furosemide    Tablet 20 milliGRAM(s) Oral daily  gabapentin 100 milliGRAM(s) Oral two times a day  sucralfate 1 Gram(s) Oral two times a day    MEDICATIONS  (PRN):  acetaminophen     Tablet .. 650 milliGRAM(s) Oral every 6 hours PRN Temp greater or equal to 38C (100.4F), Mild Pain (1 - 3)  aluminum hydroxide/magnesium hydroxide/simethicone Suspension 30 milliLiter(s) Oral every 4 hours PRN Dyspepsia  melatonin 3 milliGRAM(s) Oral at bedtime PRN Insomnia  ondansetron Injectable 4 milliGRAM(s) IV Push every 8 hours PRN Nausea and/or Vomiting    Allergies  mold, cheese and wine (Unknown)  penicillins (Unknown)    SOCIAL HISTORY:  Non smoker.     FAMILY HISTORY:  No pertinent family history in first degree relatives  Patient unable to provide further family history.    ROS:  Constitutional: The patient denies fevers or weight changes.  Neuro: As per HPI.  Eyes: Denies blurry vision.  Ears/nose/throat: Denies Tinnitus.   Cardiac: Denies chest pain. Denies palpitations.  Respiratory: Denies shortness of breath.  GI: Denies abdominal pain, nausea, or vomiting.  : Denies change in urinary pattern.  Integumentary: Denies rash.  Psych: Denies recent mood changes.  Heme: denies easy bleeding/bruising.    Exam:  Vital Signs Last 24 Hrs  T(C): 36.4 (08 Nov 2022 08:20), Max: 36.7 (08 Nov 2022 04:21)  T(F): 97.6 (08 Nov 2022 08:20), Max: 98 (08 Nov 2022 04:21)  HR: 89 (08 Nov 2022 08:20) (68 - 98)  BP: 128/79 (08 Nov 2022 08:20) (110/70 - 195/84)  RR: 18 (08 Nov 2022 08:20) (18 - 20)  SpO2: 93% (08 Nov 2022 08:20) (93% - 99%)    Parameters below as of 08 Nov 2022 08:20  Patient On (Oxygen Delivery Method): room air    General: NAD.   Carotid bruits absent.     Mental status: The patient is awake, alert, and oriented to self only. There is no aphasia. Memory is poor. Gives age as "63" and month as "August."     Cranial nerves: There is no papilledema. Pupils react symmetrically to light. There is no visual field deficit to confrontation. Extraocular motion is full with no nystagmus.  Facial sensation is intact. Facial musculature is symmetric. Palate elevates symmetrically. Tongue is midline.    Motor: There is normal bulk and tone.  Strength is 5/5 in the right arm and leg.   Strength is 5/5 in the left arm and leg.    Sensation: Intact to light touch and pin. There is no extinction to double simultaneous stimulation.    Reflexes: 1+ throughout and plantar responses are flexor.    Cerebellar: There is no dysmetria on finger to nose testing.    [facility] SS:   Date: 11/8/22  Time:   1a) Level of consciousness (0-3): 0  1b) Questions (0-2): 2  1c) Commands (0-2): 0  2  ) Gaze (0-2): 0  3  ) Visual field (0-3): 0  4  ) Facial palsy (0-3): 0  Motor  5a) Left arm (0-4): 0  5b) Right arm (0-4): 0  6a) Left leg (0-4): 0  6b) Right leg (0-4): 1  7  ) Ataxia (0-2): 0  Sensory  8  ) Sensory (0-2): 0  Speech  9  ) Language (0-3): 0  10) Dysarthria (0-2): 0  Extinction  11) Extinction/inattention (0-2): 0    Total score: 3    Prestroke Modified Nolan: 3-4    (0: No symptoms and no disability.  1: No significant disability despite symptoms; able to carry out all usual duties and activities.  2: Slight disability; unable to carry out all previous activity but able to look after own affairs without assistance.  3: Moderate disability; requiring some help but able to walk without assistance.   4: Moderately severe disability; unable to walk without assistance and unable to attend to own bodily needs without assistance.  5: Severe disability; bedridden, incontinent and requiring constant nursing care and attention.   6: Dead. )     LABS:                         10.8   8.09  )-----------( 244      ( 08 Nov 2022 02:39 )             32.6       11-08    142  |  104  |  21.3<H>  ----------------------------<  107<H>  3.9   |  29.0  |  1.06    Ca    8.6      08 Nov 2022 02:39    TPro  7.4  /  Alb  3.0<L>  /  TBili  0.3  /  DBili  x   /  AST  27  /  ALT  25  /  AlkPhos  86  11-07    PT/INR - ( 07 Nov 2022 12:00 )   PT: 21.5 sec;   INR: 1.83 ratio    PTT - ( 07 Nov 2022 12:00 )  PTT:37.8 sec    RADIOLOGY   CT head images reviewed (and concur with report): There is marked atrophy and ischemic white matter change. There is a small hyperdensity in the right thalamus without mass effect stable from prior scan earlier 11/7.

## 2022-11-08 NOTE — OCCUPATIONAL THERAPY INITIAL EVALUATION ADULT - PERTINENT HX OF CURRENT PROBLEM, REHAB EVAL
As per MD note: The patient is a 89y Female with history of dementia who presented to [facility] with altered mental status. Her blood pressure was noted to be high. She was evaluated with head CT at [place] and was transferred to [facility] (formerly [facility]) based upon result (see below).  She was evaluated by the neurosurgery service her upon arrival.

## 2022-11-09 ENCOUNTER — TRANSCRIPTION ENCOUNTER (OUTPATIENT)
Age: 87
End: 2022-11-09

## 2022-11-09 PROCEDURE — 93970 EXTREMITY STUDY: CPT | Mod: 26

## 2022-11-09 PROCEDURE — 99233 SBSQ HOSP IP/OBS HIGH 50: CPT

## 2022-11-09 PROCEDURE — 99232 SBSQ HOSP IP/OBS MODERATE 35: CPT

## 2022-11-09 RX ORDER — ENOXAPARIN SODIUM 100 MG/ML
40 INJECTION SUBCUTANEOUS EVERY 24 HOURS
Refills: 0 | Status: DISCONTINUED | OUTPATIENT
Start: 2022-11-09 | End: 2022-11-10

## 2022-11-09 RX ADMIN — ESCITALOPRAM OXALATE 5 MILLIGRAM(S): 10 TABLET, FILM COATED ORAL at 17:17

## 2022-11-09 RX ADMIN — Medication 325 MILLIGRAM(S): at 17:17

## 2022-11-09 RX ADMIN — Medication 20 MILLIGRAM(S): at 06:38

## 2022-11-09 RX ADMIN — ENOXAPARIN SODIUM 40 MILLIGRAM(S): 100 INJECTION SUBCUTANEOUS at 17:17

## 2022-11-09 RX ADMIN — Medication 1 GRAM(S): at 17:16

## 2022-11-09 RX ADMIN — BUPROPION HYDROCHLORIDE 150 MILLIGRAM(S): 150 TABLET, EXTENDED RELEASE ORAL at 17:17

## 2022-11-09 RX ADMIN — Medication 0.25 MILLIGRAM(S): at 17:17

## 2022-11-09 RX ADMIN — GABAPENTIN 100 MILLIGRAM(S): 400 CAPSULE ORAL at 17:17

## 2022-11-09 RX ADMIN — GABAPENTIN 100 MILLIGRAM(S): 400 CAPSULE ORAL at 06:38

## 2022-11-09 RX ADMIN — Medication 1000 UNIT(S): at 17:18

## 2022-11-09 RX ADMIN — Medication 1 GRAM(S): at 06:38

## 2022-11-09 RX ADMIN — Medication 0.25 MILLIGRAM(S): at 06:37

## 2022-11-09 RX ADMIN — ATORVASTATIN CALCIUM 40 MILLIGRAM(S): 80 TABLET, FILM COATED ORAL at 21:51

## 2022-11-09 NOTE — PROGRESS NOTE ADULT - ASSESSMENT
89 y/o female w/ hx of HTN, PE on Eliquis, Dementia (AAOx2 at baseline), transferred from [facility] where she initially presented with AMS.    Assessment/Plan:    1.  Hypertensive Emergency and Changes in mentation due to Acute Hemorrhage CVA, present on admission  - Neurochecks Q4 hours  - Repeat CT head was stable  - MRI of the brain pending; PO Ativan for agitation prio   - Hold Eliquis for 2 weeks per neurosurgery  - Seen by PT at baseline (wheelchair bound)     2. History of PE/DVT  -Hold Eliquis due to acute hemorrhage    3. Hyperlipidemia  - Statin    4. Depression  -Continue Bupropion, Escitalopram, and Xanax    VTE_ resume Lovenox     DNR/DNI    Anticipate discharge home with 24 hour assist pending MRI of the brain    89 y/o female w/ hx of HTN, PE on Eliquis, Dementia (AAOx2 at baseline), transferred from [facility] where she initially presented with AMS.    Assessment/Plan:    1.  Hypertensive Emergency and Changes in mentation due to Acute Hemorrhage CVA, present on admission  - Neurochecks Q4 hours  - Repeat CT head was stable  - MRI of the brain pending; PO Ativan for agitation prio   - Hold Eliquis for 2 weeks per neurosurgery  - Seen by PT at baseline (wheelchair bound)     2. History of PE/DVT  -Hold Eliquis due to acute hemorrhage  - PE/DVT May 2021 1st time episode- Check CTA chest and LE duplex if negative no further need for AC     3. Hyperlipidemia  - Statin    4. Depression  -Continue Bupropion, Escitalopram, and Xanax    VTE_ resume Lovenox     DNR/DNI    Anticipate discharge home with 24 hour assist pending MRI of the brain   Plan of care discussed in detail with patient's daughter

## 2022-11-09 NOTE — DISCHARGE NOTE NURSING/CASE MANAGEMENT/SOCIAL WORK - NSDCPEFALRISK_GEN_ALL_CORE
For information on Fall & Injury Prevention, visit: [email or web address] OR  [email or web address] OR  https://www.cdc.gov/steadi/patient.html

## 2022-11-09 NOTE — DISCHARGE NOTE NURSING/CASE MANAGEMENT/SOCIAL WORK - NSDCPEPTSTRK_GEN_ALL_CORE
Call 911 for stroke/Need for follow up after discharge/Prescribed medications/Risk factors for stroke/Stroke education booklet/Stroke support groups for patients, families, and friends/Stroke warning signs and symptoms/Signs and symptoms of stroke
ASA of 4, 4E or 5E

## 2022-11-09 NOTE — PROGRESS NOTE ADULT - ASSESSMENT
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    Intracranial hemorrhage  Likely secondary to hypertension.   Seen by neurosurgery.   Her anticoagulation (history of PE) is on hold.   The PE was in May of 2021.   Control blood pressure.   PT/OT evaluations.     Dementia  Supportive care.     Discussed with daughter at bedside.

## 2022-11-09 NOTE — PROGRESS NOTE ADULT - ASSESSMENT
89F w/ PMHX of HTN, PE on Eliquis, Dementia (baseline AAOx2), trf from [facility] p/w AMS. Family noted worsening confusion/agitation and PCP advised ED visit, found to have a small focus of acute hemorrhage and was trf to [facility] for neurosurgical evaluation, hypertensive in ED.   - Repeat CTH stable      Plan  - Q4 neuro checks  - Pain control PRN; avoid oversedation   - SBP <160  - Recommend MRI Brain w/ w/o contrast when medically optimized to look for other source altered mentation  - STAT CTH for any deterioration in neuro status  - No acute neurosurgical intervention at this time  - Recommend holding Eliquis for at least 2 weeks, with repeat CTH to assess stability prior to resuming.   - Medical management/supportive care per primary team   - D/w Dr. Valdes  - Signing off, please recall as needed with further questions about anticoagulation or if further findings on MRI once obtained

## 2022-11-09 NOTE — PHYSICAL THERAPY INITIAL EVALUATION ADULT - ADDITIONAL COMMENTS
pt reports that she walks short distances with assistance at baseline however is a questionable historian. pt states having several steps to enter the home and has an aide that is always there. Per care coordination notes pt has a stair glide and 24/7 assist.

## 2022-11-10 ENCOUNTER — TRANSCRIPTION ENCOUNTER (OUTPATIENT)
Age: 87
End: 2022-11-10

## 2022-11-10 VITALS
HEART RATE: 100 BPM | OXYGEN SATURATION: 94 % | RESPIRATION RATE: 18 BRPM | TEMPERATURE: 98 F | SYSTOLIC BLOOD PRESSURE: 121 MMHG | DIASTOLIC BLOOD PRESSURE: 91 MMHG

## 2022-11-10 PROCEDURE — 99232 SBSQ HOSP IP/OBS MODERATE 35: CPT

## 2022-11-10 PROCEDURE — 97167 OT EVAL HIGH COMPLEX 60 MIN: CPT

## 2022-11-10 PROCEDURE — 80048 BASIC METABOLIC PNL TOTAL CA: CPT

## 2022-11-10 PROCEDURE — 81003 URINALYSIS AUTO W/O SCOPE: CPT

## 2022-11-10 PROCEDURE — 36415 COLL VENOUS BLD VENIPUNCTURE: CPT

## 2022-11-10 PROCEDURE — 85025 COMPLETE CBC W/AUTO DIFF WBC: CPT

## 2022-11-10 PROCEDURE — 70551 MRI BRAIN STEM W/O DYE: CPT | Mod: 26

## 2022-11-10 PROCEDURE — 71275 CT ANGIOGRAPHY CHEST: CPT

## 2022-11-10 PROCEDURE — 70551 MRI BRAIN STEM W/O DYE: CPT

## 2022-11-10 PROCEDURE — 70450 CT HEAD/BRAIN W/O DYE: CPT | Mod: MA

## 2022-11-10 PROCEDURE — 71275 CT ANGIOGRAPHY CHEST: CPT | Mod: 26

## 2022-11-10 PROCEDURE — 93970 EXTREMITY STUDY: CPT

## 2022-11-10 PROCEDURE — 99239 HOSP IP/OBS DSCHRG MGMT >30: CPT

## 2022-11-10 PROCEDURE — 87086 URINE CULTURE/COLONY COUNT: CPT

## 2022-11-10 RX ORDER — APIXABAN 2.5 MG/1
1 TABLET, FILM COATED ORAL
Qty: 0 | Refills: 0 | DISCHARGE

## 2022-11-10 RX ADMIN — Medication 1000 UNIT(S): at 17:48

## 2022-11-10 RX ADMIN — Medication 0.25 MILLIGRAM(S): at 05:44

## 2022-11-10 RX ADMIN — Medication 1 GRAM(S): at 17:48

## 2022-11-10 RX ADMIN — Medication 0.25 MILLIGRAM(S): at 17:48

## 2022-11-10 RX ADMIN — ESCITALOPRAM OXALATE 5 MILLIGRAM(S): 10 TABLET, FILM COATED ORAL at 17:48

## 2022-11-10 RX ADMIN — BUPROPION HYDROCHLORIDE 150 MILLIGRAM(S): 150 TABLET, EXTENDED RELEASE ORAL at 17:47

## 2022-11-10 RX ADMIN — Medication 1 GRAM(S): at 05:44

## 2022-11-10 RX ADMIN — ENOXAPARIN SODIUM 40 MILLIGRAM(S): 100 INJECTION SUBCUTANEOUS at 17:49

## 2022-11-10 RX ADMIN — GABAPENTIN 100 MILLIGRAM(S): 400 CAPSULE ORAL at 17:47

## 2022-11-10 RX ADMIN — GABAPENTIN 100 MILLIGRAM(S): 400 CAPSULE ORAL at 05:44

## 2022-11-10 RX ADMIN — Medication 1 MILLIGRAM(S): at 13:09

## 2022-11-10 RX ADMIN — Medication 325 MILLIGRAM(S): at 17:48

## 2022-11-10 RX ADMIN — Medication 20 MILLIGRAM(S): at 05:44

## 2022-11-10 NOTE — DISCHARGE NOTE PROVIDER - HOSPITAL COURSE
89 y/o female w/ hx of HTN, PE on eliquis, dementia, transferred from [facility] where she initially presented with AMS. Patient has dementia at baseline normally AAOx2.  At [place], she was found to have a small focus of acute hemorrhage and was transferred to [facility] for neurosurgical evaluation. While in [facility] ED, pts BP noted to be 195/84, given hydralazine by ED team for treatment. Seen in consultation by Neurosurgery. Repeat  CT Head w/o contrast to assess for interval change in bleeding was stable.  Nonurgent MRI Brain w/w/o contrast to assess for other etiologies for AMS performed on 11/10.  Eliquis to be held  for 2 weeks per neurosurgery. PE/DVT May 2021 1st time episode, however patient remained on AC. LE duplex negative.  CTA chest performed:                     The patient is medically stable for dc home with 24/7 care.    89 y/o female w/ hx of HTN, PE on eliquis, dementia, transferred from [facility] where she initially presented with AMS. Patient has dementia at baseline normally AAOx2.  At [place], she was found to have a small focus of acute hemorrhage and was transferred to [facility] for neurosurgical evaluation. While in [facility] ED, pts BP noted to be 195/84, given hydralazine by ED team for treatment. Seen in consultation by Neurosurgery. Repeat  CT Head w/o contrast to assess for interval change in bleeding was stable.  Nonurgent MRI Brain w/w/o contrast to assess for other etiologies for AMS performed on 11/10.   PE/DVT May 2021 1st time episode, however patient remained on AC. LE duplex negative.  CTA chest performed: negative PE. Eliquis to be discontinued.  The patient is medically stable for dc home with 24/7 care.    89 y/o female w/ hx of HTN, PE on eliquis, dementia, transferred from [facility] where she initially presented with AMS. Patient has dementia at baseline normally AAOx2.  At [place], she was found to have a small focus of acute hemorrhage and was transferred to [facility] for neurosurgical evaluation. While in [facility] ED, pts BP noted to be 195/84, given hydralazine by ED team for treatment. Seen in consultation by Neurosurgery. Repeat  CT Head w/o contrast to assess for interval change in bleeding was stable.  Nonurgent MRI Brain w/w/o contrast to assess for other etiologies for AMS performed on 11/10.   PE/DVT May 2021 1st time episode, however patient remained on AC. LE duplex negative.  CTA chest performed: negative PE. Eliquis to be discontinued.   mRI of the brain with stable ICHThe patient is medically stable for dc home with 24/7 care.

## 2022-11-10 NOTE — DISCHARGE NOTE PROVIDER - CARE PROVIDER_API CALL
Gavino Shultz)  Neurology  [address] [place] [address], [facility] [address]  [place] [address]  Phone: [phone]  Fax: [phone]  Follow Up Time:    Gavino Shultz)  Neurology  [address], [facility] [address]  [place] [address]  Phone: [phone]  Fax: [phone]  Follow Up Time:     Lakia Valdes)  Neurosurgery  [address] [place] [address]  Phone: [phone]  Fax: [phone]  Follow Up Time:

## 2022-11-10 NOTE — PROGRESS NOTE ADULT - SUBJECTIVE AND OBJECTIVE BOX
[facility]                                        Neurology at [place]                                 Naty Blake & Levon                                  [address]                                        [place], [place], [address]                                             [phone]        CC: Dementia and intracranial hemorrhage.    HPI:   The patient is a 89y Female with history of dementia who presented to [facility] with altered mental status. Her blood pressure was noted to be high. She was evaluated with head CT at [place] and was transferred to [facility] (formerly [facility]) based upon result (see below).   She was evaluated by the neurosurgery service her upon arrival.   Neurology was called in am 11/8 to evaluate.   (Reportedly wheelchair bound at baseline with 24/7 aides).    Interim history:  Now on 5 [place].     ROS:   Denies headache or dizziness.  Denies chest pain.  Denies shortness of breath.    MEDICATIONS  (STANDING):  ALPRAZolam 0.25 milliGRAM(s) Oral two times a day  atorvastatin 40 milliGRAM(s) Oral at bedtime  buPROPion XL (24-Hour) . 150 milliGRAM(s) Oral daily  cholecalciferol 1000 Unit(s) Oral daily  enoxaparin Injectable 40 milliGRAM(s) SubCutaneous every 24 hours  escitalopram 5 milliGRAM(s) Oral daily  ferrous    sulfate 325 milliGRAM(s) Oral daily  furosemide    Tablet 20 milliGRAM(s) Oral daily  gabapentin 100 milliGRAM(s) Oral two times a day  LORazepam     Tablet 1 milliGRAM(s) Oral once  sucralfate 1 Gram(s) Oral two times a day    Vital Signs Last 24 Hrs  T(C): 36.6 (09 Nov 2022 05:09), Max: 36.8 (08 Nov 2022 16:35)  T(F): 97.9 (09 Nov 2022 05:09), Max: 98.3 (08 Nov 2022 16:35)  HR: 86 (09 Nov 2022 05:09) (80 - 99)  BP: 144/78 (09 Nov 2022 05:09) (130/87 - 147/84)  BP(mean): 100 (08 Nov 2022 16:35) (100 - 100)  RR: 18 (09 Nov 2022 05:09) (18 - 18)  SpO2: 96% (09 Nov 2022 05:09) (93% - 96%)    Parameters below as of 09 Nov 2022 05:09  Patient On (Oxygen Delivery Method): room air    Detailed Neurologic Exam:    Mental status: The patient is awake and alert. There is no aphasia. There is no dysarthria.     Cranial nerves: Pupils equal and react symmetrically to light. There is no visual field deficit to threat. Extraocular motion is full with no nystagmus. Facial sensation is intact. Facial musculature is symmetric. Hearing is poor. Palate elevates symmetrically. Tongue is midline.    Motor: There is normal bulk and tone.  There is no tremor.  Symmetric limb movement to command/stimuli.    Sensation: Grossly intact to light touch and pin.    Reflexes: 1+ throughout and plantar responses are flexor.    Cerebellar: No dysmetria on finger nose testing.    Labs:     11-08    142  |  104  |  21.3<H>  ----------------------------<  107<H>  3.9   |  29.0  |  1.06    Ca    8.6      08 Nov 2022 02:39    TPro  7.4  /  Alb  3.0<L>  /  TBili  0.3  /  DBili  x   /  AST  27  /  ALT  25  /  AlkPhos  86  11-07                            10.8   8.09  )-----------( 244      ( 08 Nov 2022 02:39 )             32.6             
HPI: 89 y/o female w/ hx of HTN, PE on eliquis, dementia, transferred from [facility] where she initially presented with AMS. Patient has dementia at baseline normally AAOx2. Daughters states she was having confusion regarding where she was intermittently and PCP advised she be evaluated in the ED. At [place], she was found to have a small focus of acute hemorrhage and was transferred here for neurosurgical evaluation.  Spoke with daughter Angie for additional collateral. She states the patient was adamant that her daughters moved her out of her home and this was not her home (it was the home she lived in for 30 years). She was making comments that they were punishing her that were non-sensical. Angie states this is a significant worsening of mental status compared to prior.    Spoke with daughter regarding code status. States they have filled out a MOLST at [facility] prior and it should be in the chart, but she does not have the hard copy. Patient is DNR/DNI. (2022 21:00)      INTERVAL HPI/OVERNIGHT EVENTS:  Patient seen/examined by neurosurgery team. Patient in NAD, pleasantly confused.         Vital Signs Last 24 Hrs  T(C): 36.8 (2022 11:51), Max: 36.8 (2022 16:35)  T(F): 98.3 (2022 11:51), Max: 98.3 (2022 16:35)  HR: 75 (2022 11:51) (75 - 99)  BP: 144/78 (2022 05:09) (130/87 - 147/84)  BP(mean): 100 (2022 16:35) (100 - 100)  RR: 18 (2022 11:51) (18 - 18)  SpO2: 95% (2022 11:51) (93% - 96%)    Parameters below as of 2022 11:51  Patient On (Oxygen Delivery Method): room air        PHYSICAL EXAM:  GENERAL: NAD, well-groomed, pleasant, cooperative  HEAD: Atraumatic, normocephalic  AIDEN COMA SCORE: E-4 V-4 M-6 = 14  MENTAL STATUS: Alert & O X 1; Awake; confused. Opens eyes spontaneously; conversant without aphasia. following simple commands  CRANIAL NERVES: EOMI without nystagmus. Face symmetric w/ normal eye closure and smile, tongue midline. Hearing grossly intact. Speech clear.  MOTOR: strength 5/5 b/l upper and lower extremities with encouragement   SENSATION: grossly intact to light touch all extremities  SKIN: Warm, dry        LABS:                        10.8   8.09  )-----------( 244      ( 2022 02:39 )             32.6     11-08    142  |  104  |  21.3<H>  ----------------------------<  107<H>  3.9   |  29.0  |  1.06    Ca    8.6      2022 02:39        Urinalysis Basic - ( 2022 17:08 )    Color: Yellow / Appearance: Clear / S.010 / pH: x  Gluc: x / Ketone: Negative  / Bili: Negative / Urobili: Negative mg/dL   Blood: x / Protein: Negative / Nitrite: Negative   Leuk Esterase: Negative / RBC: x / WBC x   Sq Epi: x / Non Sq Epi: x / Bacteria: x          RADIOLOGY & ADDITIONAL TESTS:    < from: CT Head No Cont (22 @ 20:04) >  FINDINGS:  Ventricles and sulci: Parenchymal volume loss is present which is   commensurate with patient age.  Intra-axial: Periventricular small vessel white matter ischemic changes   are appreciated. Old left thalamic lacunar infarct. Focus of increased   attenuation again appreciated within the right thalamus, may represent   presence of calcification of changes related to prior hemorrhage. No   intracranial mass, acute hemorrhage, or midline shift is present.  Extra-axial: No extra-axial fluid collection is identified. Deposition of   calcified plaques in association with the distal intracranial bilateral   vertebral arteries and bilateral carotid siphons.    Calvarium: Intact.    Bilateral optic globes are intact. Imaged paranasal sinuses, bilateral   mastoid air cells, middle ear cavities are clear.    IMPRESSION: Stable findings. No acute intracranial process. If altered   mental status persists, consider further evaluation via MR imaging to   include DWI and ADC mapping techniques, provided there are no   contraindications.    
INTERVAL HPI/OVERNIGHT EVENTS:  89 year old Female w/ PMHX of HTN, PE on Eliquis, Dementia (baseline AAOx2), trf from [facility] p/w AMS. Family noted worsening confusion/agitation and PCP advised ED visit, found to have a small focus of acute hemorrhage and was trf to [facility] for neurosurgical evaluation, hypertensive in ED.   Patient seen and examined by neurosurgery team this morning. Repeat CTH stable. Patient neuro status stable, intermittently AAOx1-2.     Vital Signs Last 24 Hrs  T(C): 36.8 (2022 16:35), Max: 36.8 (2022 16:35)  T(F): 98.3 (2022 16:35), Max: 98.3 (2022 16:35)  HR: 83 (2022 16:35) (79 - 91)  BP: 137/82 (2022 16:35) (124/71 - 139/83)  BP(mean): 100 (2022 16:35) (100 - 100)  RR: 18 (2022 16:35) (18 - 18)  SpO2: 93% (2022 16:35) (93% - 95%)  Parameters below as of 2022 16:35  Patient On (Oxygen Delivery Method): room air    PHYSICAL EXAM:  GENERAL: NAD, well-groomed, pleasant, cooperative  HEAD: Atraumatic, normocephalic  AIDEN COMA SCORE: E-4 V-4 M-6 = 14       E: 4= opens eyes spontaneously 3= to voice 2= to noxious 1= no opening       V: 5= oriented 4= confused 3= inappropriate words 2= incomprehensible sounds 1= nonverbal 1T= intubated       M: 6= follows commands 5= localizes 4= withdraws 3= flexor posturing 2= extensor posturing 1= no movement  MENTAL STATUS: Alert and oriented to name, library ; Awake; confused. Opens eyes spontaneously; conversant without aphasia. following simple commands  CRANIAL NERVES: EOMI without nystagmus. Facial sensation intact V1-3 distribution b/l. Face symmetric w/ normal eye closure and smile, tongue midline. Hearing grossly intact. Speech clear.  MOTOR: strength 5/5 b/l upper and lower extremities with encouragement   SENSATION: grossly intact to light touch all extremities  SKIN: Warm, dry    LABS:                        10.8   8.09  )-----------( 244      ( 2022 02:39 )             32.6     11-08    142  |  104  |  21.3<H>  ----------------------------<  107<H>  3.9   |  29.0  |  1.06    Ca    8.6      2022 02:39    TPro  7.4  /  Alb  3.0<L>  /  TBili  0.3  /  DBili  x   /  AST  27  /  ALT  25  /  AlkPhos  86  11-07    PT/INR - ( 2022 12:00 )   PT: 21.5 sec;   INR: 1.83 ratio      PTT - ( 2022 12:00 )  PTT:37.8 sec  Urinalysis Basic - ( 2022 17:08 )    Color: Yellow / Appearance: Clear / S.010 / pH: x  Gluc: x / Ketone: Negative  / Bili: Negative / Urobili: Negative mg/dL   Blood: x / Protein: Negative / Nitrite: Negative   Leuk Esterase: Negative / RBC: x / WBC x   Sq Epi: x / Non Sq Epi: x / Bacteria: x        RADIOLOGY & ADDITIONAL TESTS:  CT Head No Cont (22 @ 20:04)   IMPRESSION:   Stable findings. No acute intracranial process. If altered   mental status persists, consider further evaluation via MR imaging to   include DWI and ADC mapping techniques, provided there are no   contraindications.    
[facility] Division of Hospital Medicine    Chief Complaint:  ICH    SUBJECTIVE: reports feeling ok, upset about " poor quality food", denied any HA, worsening of weakness, sob, n/v    OVERNIGHT EVENTS: none reported     Patient denies chest pain, SOB, abd pain, N/V, fever, chills, dysuria or any other complaints. All remainder ROS negative.     MEDICATIONS  (STANDING):  ALPRAZolam 0.25 milliGRAM(s) Oral two times a day  atorvastatin 40 milliGRAM(s) Oral at bedtime  buPROPion XL (24-Hour) . 150 milliGRAM(s) Oral daily  cholecalciferol 1000 Unit(s) Oral daily  escitalopram 5 milliGRAM(s) Oral daily  ferrous    sulfate 325 milliGRAM(s) Oral daily  furosemide    Tablet 20 milliGRAM(s) Oral daily  gabapentin 100 milliGRAM(s) Oral two times a day  sucralfate 1 Gram(s) Oral two times a day    MEDICATIONS  (PRN):  acetaminophen     Tablet .. 650 milliGRAM(s) Oral every 6 hours PRN Temp greater or equal to 38C (100.4F), Mild Pain (1 - 3)  aluminum hydroxide/magnesium hydroxide/simethicone Suspension 30 milliLiter(s) Oral every 4 hours PRN Dyspepsia  melatonin 3 milliGRAM(s) Oral at bedtime PRN Insomnia  ondansetron Injectable 4 milliGRAM(s) IV Push every 8 hours PRN Nausea and/or Vomiting        I&O's Summary      PHYSICAL EXAM:  Vital Signs Last 24 Hrs  T(C): 36.5 (2022 11:58), Max: 36.7 (2022 04:21)  T(F): 97.7 (2022 11:58), Max: 98 (2022 04:21)  HR: 80 (2022 11:58) (68 - 91)  BP: 132/76 (2022 11:58) (124/71 - 195/84)  BP(mean): --  RR: 18 (2022 11:58) (18 - 18)  SpO2: 95% (2022 11:58) (93% - 99%)    Parameters below as of 2022 11:58  Patient On (Oxygen Delivery Method): room air            CONSTITUTIONAL: NAD, frail and pale  ENMT: Moist oral mucosa, no pharyngeal injection or exudates; normal dentition; No JVD  RESPIRATORY: Normal respiratory effort; lungs are clear to auscultation bilaterally  CARDIOVASCULAR: Regular rate and rhythm, normal S1 and S2, no murmur/rub/gallop; No lower extremity edema; Peripheral pulses are 2+ bilaterally  ABDOMEN: Nontender to palpation, normoactive bowel sounds, no rebound/guarding; No hepatosplenomegaly  MUSCLOSKELETAL:  no clubbing or cyanosis of digits; no joint swelling or tenderness to palpation  PSYCH: A+O to person, place, and time; affect appropriate  NEUROLOGY: CN 2-12 are intact and symmetric; no gross sensory deficits; was observed moving all 4 ext against gravity cooperating with exam.   SKIN: atrophic skin all over    LABS:                        10.8   8.09  )-----------( 244      ( 2022 02:39 )             32.6     11-08    142  |  104  |  21.3<H>  ----------------------------<  107<H>  3.9   |  29.0  |  1.06    Ca    8.6      2022 02:39    TPro  7.4  /  Alb  3.0<L>  /  TBili  0.3  /  DBili  x   /  AST  27  /  ALT  25  /  AlkPhos  86  11-07    PT/INR - ( 2022 12:00 )   PT: 21.5 sec;   INR: 1.83 ratio         PTT - ( 2022 12:00 )  PTT:37.8 sec      Urinalysis Basic - ( 2022 17:08 )    Color: Yellow / Appearance: Clear / S.010 / pH: x  Gluc: x / Ketone: Negative  / Bili: Negative / Urobili: Negative mg/dL   Blood: x / Protein: Negative / Nitrite: Negative   Leuk Esterase: Negative / RBC: x / WBC x   Sq Epi: x / Non Sq Epi: x / Bacteria: x        CAPILLARY BLOOD GLUCOSE            RADIOLOGY & ADDITIONAL TESTS:  Results Reviewed:   Imaging Personally Reviewed:  Electrocardiogram Personally Reviewed:
                            [facility]                                        Neurology at [place]                                 Naty Blake & Levon                                  [address]                                        [place], [place], [address]                                             [phone]        CC: Dementia and intracranial hemorrhage.    HPI:   The patient is a 89y Female with history of dementia who presented to [facility] with altered mental status. Her blood pressure was noted to be high. She was evaluated with head CT at [place] and was transferred to [facility] ([facility]) based upon result (see below).   She was evaluated by the neurosurgery service her upon arrival.   Neurology was called in am 11/8 to evaluate.   (Reportedly wheelchair bound at baseline with 24/7 aides).    Interim history:  Remains on 5 [place].     ROS:   Denies headache or dizziness.  Denies chest pain.  Denies shortness of breath.    MEDICATIONS  (STANDING):  ALPRAZolam 0.25 milliGRAM(s) Oral two times a day  atorvastatin 40 milliGRAM(s) Oral at bedtime  buPROPion XL (24-Hour) . 150 milliGRAM(s) Oral daily  cholecalciferol 1000 Unit(s) Oral daily  enoxaparin Injectable 40 milliGRAM(s) SubCutaneous every 24 hours  escitalopram 5 milliGRAM(s) Oral daily  ferrous    sulfate 325 milliGRAM(s) Oral daily  furosemide    Tablet 20 milliGRAM(s) Oral daily  gabapentin 100 milliGRAM(s) Oral two times a day  LORazepam     Tablet 1 milliGRAM(s) Oral once  sucralfate 1 Gram(s) Oral two times a day      Vital Signs Last 24 Hrs  T(C): 36.5 (10 Nov 2022 04:51), Max: 36.8 (09 Nov 2022 11:51)  T(F): 97.7 (10 Nov 2022 04:51), Max: 98.3 (09 Nov 2022 11:51)  HR: 88 (10 Nov 2022 04:51) (74 - 88)  BP: 120/74 (10 Nov 2022 04:51) (120/74 - 156/83)  RR: 18 (10 Nov 2022 04:51) (18 - 18)  SpO2: 94% (10 Nov 2022 04:51) (94% - 95%)    Parameters below as of 10 Nov 2022 04:51  Patient On (Oxygen Delivery Method): room air    Detailed Neurologic Exam:    Mental status: The patient is awake and alert. There is no aphasia. There is no dysarthria.     Cranial nerves: Pupils equal and react symmetrically to light. There is no visual field deficit to threat. Extraocular motion is full with no nystagmus. Facial sensation is intact. Facial musculature is symmetric. Hearing is poor. Palate elevates symmetrically. Tongue is midline.    Motor: There is normal bulk and tone.  There is no tremor.  Symmetric limb movement to command/stimuli.    Sensation: Grossly intact to light touch and pin.    Reflexes: 1+ throughout and plantar responses are flexor.    Cerebellar: No dysmetria on finger nose testing.        
CC: Follow up     INTERVAL HPI/OVERNIGHT EVENTS: Patient seen and examined, agitated this morning states she just wants to go home      Vital Signs Last 24 Hrs  T(C): 36.5 (10 Nov 2022 04:51), Max: 36.8 (09 Nov 2022 11:51)  T(F): 97.7 (10 Nov 2022 04:51), Max: 98.3 (09 Nov 2022 11:51)  HR: 88 (10 Nov 2022 04:51) (74 - 88)  BP: 120/74 (10 Nov 2022 04:51) (120/74 - 156/83)  BP(mean): --  RR: 18 (10 Nov 2022 04:51) (18 - 18)  SpO2: 94% (10 Nov 2022 04:51) (94% - 95%)    Parameters below as of 10 Nov 2022 04:51  Patient On (Oxygen Delivery Method): room air        PHYSICAL EXAM:    GENERAL: NAD,AOX2  HEAD:  Atraumatic, Normocephalic  EYES:conjunctiva and sclera clear  ENMT: Moist mucous membranes  NECK: Supple  NERVOUS SYSTEM:  Alert & Oriented x 2   CHEST/LUNG: Clear to auscultation bilaterally; No rales, rhonchi, wheezing, or rubs  HEART: Regular rate and rhythm; No murmurs, rubs, or gallops  ABDOMEN: Soft, Nontender, Nondistended; Bowel sounds present  EXTREMITIES:  2+ Peripheral Pulses, No clubbing, cyanosis, or edema        MEDICATIONS  (STANDING):  ALPRAZolam 0.25 milliGRAM(s) Oral two times a day  atorvastatin 40 milliGRAM(s) Oral at bedtime  buPROPion XL (24-Hour) . 150 milliGRAM(s) Oral daily  cholecalciferol 1000 Unit(s) Oral daily  enoxaparin Injectable 40 milliGRAM(s) SubCutaneous every 24 hours  escitalopram 5 milliGRAM(s) Oral daily  ferrous    sulfate 325 milliGRAM(s) Oral daily  furosemide    Tablet 20 milliGRAM(s) Oral daily  gabapentin 100 milliGRAM(s) Oral two times a day  LORazepam     Tablet 1 milliGRAM(s) Oral once  sucralfate 1 Gram(s) Oral two times a day    MEDICATIONS  (PRN):  acetaminophen     Tablet .. 650 milliGRAM(s) Oral every 6 hours PRN Temp greater or equal to 38C (100.4F), Mild Pain (1 - 3)  aluminum hydroxide/magnesium hydroxide/simethicone Suspension 30 milliLiter(s) Oral every 4 hours PRN Dyspepsia  melatonin 3 milliGRAM(s) Oral at bedtime PRN Insomnia  ondansetron Injectable 4 milliGRAM(s) IV Push every 8 hours PRN Nausea and/or Vomiting      Allergies    mold, cheese and wine (Unknown)  penicillins (Unknown)    Intolerances          LABS:                  RADIOLOGY & ADDITIONAL TESTS:  
CC: Follow up     INTERVAL HPI/OVERNIGHT EVENTS: Patient seen and examined, sitting up in a chair. Upset states she doesnt know why she is here and wants to go home      Vital Signs Last 24 Hrs  T(C): 36.6 (2022 05:09), Max: 36.8 (2022 16:35)  T(F): 97.9 (2022 05:09), Max: 98.3 (2022 16:35)  HR: 86 (2022 05:09) (80 - 99)  BP: 144/78 (2022 05:09) (130/87 - 147/84)  BP(mean): 100 (2022 16:35) (100 - 100)  RR: 18 (2022 05:09) (18 - 18)  SpO2: 96% (2022 05:09) (93% - 96%)    Parameters below as of 2022 05:09  Patient On (Oxygen Delivery Method): room air        PHYSICAL EXAM:    GENERAL: NAD, AOX2  HEAD:  Atraumatic, Normocephalic  EYES: conjunctiva and sclera clear  ENMT: Moist mucous membranes  NECK: Supple  NERVOUS SYSTEM:  Alert & Oriented X2, Motor Strength 5/5 B/L upper and lower extremities  CHEST/LUNG: Clear to auscultation bilaterally; No rales, rhonchi, wheezing, or rubs  HEART: Regular rate and rhythm; No murmurs, rubs, or gallops  ABDOMEN: Soft, Nontender, Nondistended; Bowel sounds present  EXTREMITIES:  2+ Peripheral Pulses, No clubbing, cyanosis, or edema        MEDICATIONS  (STANDING):  ALPRAZolam 0.25 milliGRAM(s) Oral two times a day  atorvastatin 40 milliGRAM(s) Oral at bedtime  buPROPion XL (24-Hour) . 150 milliGRAM(s) Oral daily  cholecalciferol 1000 Unit(s) Oral daily  enoxaparin Injectable 40 milliGRAM(s) SubCutaneous every 24 hours  escitalopram 5 milliGRAM(s) Oral daily  ferrous    sulfate 325 milliGRAM(s) Oral daily  furosemide    Tablet 20 milliGRAM(s) Oral daily  gabapentin 100 milliGRAM(s) Oral two times a day  LORazepam     Tablet 1 milliGRAM(s) Oral once  sucralfate 1 Gram(s) Oral two times a day    MEDICATIONS  (PRN):  acetaminophen     Tablet .. 650 milliGRAM(s) Oral every 6 hours PRN Temp greater or equal to 38C (100.4F), Mild Pain (1 - 3)  aluminum hydroxide/magnesium hydroxide/simethicone Suspension 30 milliLiter(s) Oral every 4 hours PRN Dyspepsia  melatonin 3 milliGRAM(s) Oral at bedtime PRN Insomnia  ondansetron Injectable 4 milliGRAM(s) IV Push every 8 hours PRN Nausea and/or Vomiting      Allergies    mold, cheese and wine (Unknown)  penicillins (Unknown)    Intolerances          LABS:                          10.8   8.09  )-----------( 244      ( 2022 02:39 )             32.6     11-08    142  |  104  |  21.3<H>  ----------------------------<  107<H>  3.9   |  29.0  |  1.06    Ca    8.6      2022 02:39    TPro  7.4  /  Alb  3.0<L>  /  TBili  0.3  /  DBili  x   /  AST  27  /  ALT  25  /  AlkPhos  86  11-07    PT/INR - ( 2022 12:00 )   PT: 21.5 sec;   INR: 1.83 ratio         PTT - ( 2022 12:00 )  PTT:37.8 sec  Urinalysis Basic - ( 2022 17:08 )    Color: Yellow / Appearance: Clear / S.010 / pH: x  Gluc: x / Ketone: Negative  / Bili: Negative / Urobili: Negative mg/dL   Blood: x / Protein: Negative / Nitrite: Negative   Leuk Esterase: Negative / RBC: x / WBC x   Sq Epi: x / Non Sq Epi: x / Bacteria: x        RADIOLOGY & ADDITIONAL TESTS:

## 2022-11-10 NOTE — DISCHARGE NOTE PROVIDER - PROVIDER TOKENS
PROVIDER:[TOKEN:[[id number]]] PROVIDER:[TOKEN:[[id number]]],PROVIDER:[TOKEN:[[id number]]]

## 2022-11-10 NOTE — DISCHARGE NOTE PROVIDER - NSDCMRMEDTOKEN_GEN_ALL_CORE_FT
acetaminophen 325 mg oral tablet: 2 tab(s) orally every 6 hours, As Needed  ALPRAZolam 0.25 mg oral tablet: 1 tab(s) orally 2 times a day  buPROPion 100 mg/12 hours (SR) oral tablet, extended release: 1 tab(s) orally once a day  escitalopram 5 mg oral tablet: 1 tab(s) orally once a day  ferrous sulfate 325 mg (65 mg elemental iron) oral delayed release tablet: 1 tab(s) orally every other day  furosemide 20 mg oral tablet: 1 tab(s) orally once a day  multivitamin: 1 tab(s) orally once a day  Neurontin 100 mg oral capsule: 1 cap(s) orally 2 times a day  rosuvastatin 10 mg oral tablet: 1 tab(s) orally once a day  sucralfate 1 g oral tablet: 1 tab(s) orally 2 times a day  Vitamin D3 1000 intl units (25 mcg) oral tablet: 1 tab(s) orally once a day

## 2022-11-10 NOTE — PROGRESS NOTE ADULT - REASON FOR ADMISSION
Hypertensive Emergency, Altered mental status

## 2022-11-10 NOTE — DISCHARGE NOTE PROVIDER - CARE PROVIDERS DIRECT ADDRESSES
[email or web address]

## 2022-11-10 NOTE — PROGRESS NOTE ADULT - ASSESSMENT
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    Intracranial hemorrhage  Likely secondary to hypertension.   Seen by neurosurgery.   Her anticoagulation (history of PE) is on hold.   The PE was in May of 2021.   For CT-A chest. If negative then can discontinue anticoagulation.  Control blood pressure.   Awaiting MRI brain (ordered at neurosurgery recommendation). If unable to tolerate, can repeat CT head.    Dementia  Supportive care.     Discharge planning.   Has 24/7 aides at home.

## 2022-11-10 NOTE — PROGRESS NOTE ADULT - ASSESSMENT
89 y/o female w/ hx of HTN, PE on Eliquis, Dementia (AAOx2 at baseline), transferred from [facility] where she initially presented with AMS.    Assessment/Plan:    1.  Hypertensive Emergency and Changes in mentation due to Acute Hemorrhage CVA, present on admission  - Neurochecks Q4 hours  - Repeat CT head was stable  - MRI of the brain pending; PO Ativan for agitation prio   - Hold Eliquis for 2 weeks per neurosurgery  - Seen by PT at baseline (wheelchair bound)     2. History of PE/DVT  -Hold Eliquis due to acute hemorrhage  - PE/DVT May 2021 1st time episode- Check CTA chest   - LE duplex negative  - IF CTA chest negative for PE discontinue AC    3. Hyperlipidemia  - Statin    4. Depression  -Continue Bupropion, Escitalopram, and Xanax    VTE_ resume Lovenox     DNR/DNI    Anticipate discharge home with 24 hour assist pending MRI of the brain and CTA chest   89 y/o female w/ hx of HTN, PE on Eliquis, Dementia (AAOx2 at baseline), transferred from [facility] where she initially presented with AMS.    Assessment/Plan:    1.  Hypertensive Emergency and Changes in mentation due to Acute Hemorrhage CVA, present on admission  - Neurochecks Q4 hours  - Repeat CT head was stable  - MRI of the brain pending; PO Ativan for agitation prio   - Hold Eliquis for 2 weeks per neurosurgery  - Seen by PT at baseline (wheelchair bound)     2. History of PE/DVT  -Hold Eliquis due to acute hemorrhage  - PE/DVT May 2021 1st time episode  - LE duplex negative  - CTA negative for PE  - No further AC required    3. Hyperlipidemia  - Statin    4. Depression  -Continue Bupropion, Escitalopram, and Xanax    VTE_ r Lovenox     DNR/DNI    Anticipate discharge home with 24 hour assist tonight pending MRI results. Plan of care discussed in detial with patient's daughter Johana

## 2022-11-10 NOTE — DISCHARGE NOTE PROVIDER - ATTENDING DISCHARGE PHYSICAL EXAMINATION:
Vital Signs Last 24 Hrs  T(C): 36.5 (10 Nov 2022 04:51), Max: 36.8 (09 Nov 2022 11:51)  T(F): 97.7 (10 Nov 2022 04:51), Max: 98.3 (09 Nov 2022 11:51)  HR: 88 (10 Nov 2022 04:51) (74 - 88)  BP: 120/74 (10 Nov 2022 04:51) (120/74 - 156/83)  BP(mean): --  RR: 18 (10 Nov 2022 04:51) (18 - 18)  SpO2: 94% (10 Nov 2022 04:51) (94% - 95%)    Parameters below as of 10 Nov 2022 04:51  Patient On (Oxygen Delivery Method): room air        PHYSICAL EXAM:    GENERAL: NAD,AOX2  HEAD:  Atraumatic, Normocephalic  EYES:conjunctiva and sclera clear  ENMT: Moist mucous membranes  NECK: Supple  NERVOUS SYSTEM:  Alert & Oriented x 2   CHEST/LUNG: Clear to auscultation bilaterally; No rales, rhonchi, wheezing, or rubs  HEART: Regular rate and rhythm; No murmurs, rubs, or gallops  ABDOMEN: Soft, Nontender, Nondistended; Bowel sounds present  EXTREMITIES:  2+ Peripheral Pulses, No clubbing, cyanosis, or edema

## 2022-11-10 NOTE — DISCHARGE NOTE PROVIDER - NSDCCPCAREPLAN_GEN_ALL_CORE_FT
PRINCIPAL DISCHARGE DIAGNOSIS  Diagnosis: Intracranial hemorrhage, subarachnoid  Assessment and Plan of Treatment:       SECONDARY DISCHARGE DIAGNOSES  Diagnosis: Hypertension  Assessment and Plan of Treatment:     Diagnosis: History of pulmonary embolism  Assessment and Plan of Treatment:      PRINCIPAL DISCHARGE DIAGNOSIS  Diagnosis: Intracranial hemorrhage, subarachnoid  Assessment and Plan of Treatment: Hold Eliquis x weeks  Supportive care  Continue Statin      SECONDARY DISCHARGE DIAGNOSES  Diagnosis: Hypertension  Assessment and Plan of Treatment: Maintain current medication RX  Follow up with your PMD for further management  Low sodium diet    Diagnosis: History of pulmonary embolism  Assessment and Plan of Treatment: LE duplex negative     PRINCIPAL DISCHARGE DIAGNOSIS  Diagnosis: Intracranial hemorrhage, subarachnoid  Assessment and Plan of Treatment: Discontinue Eliquis  Supportive care  Continue Statin  Follow up with Neurosurgery      SECONDARY DISCHARGE DIAGNOSES  Diagnosis: Hypertension  Assessment and Plan of Treatment: Maintain current medication RX  Follow up with your PMD for further management  Low sodium diet    Diagnosis: History of pulmonary embolism  Assessment and Plan of Treatment: LE duplex negative  CTA negative for PE  Discontinue Eliquis     PRINCIPAL DISCHARGE DIAGNOSIS  Diagnosis: Intracranial hemorrhage, subarachnoid  Assessment and Plan of Treatment: Discontinue Eliquis  Supportive care  Continue Statin  Follow up with Neurosurgery in 2 weeks      SECONDARY DISCHARGE DIAGNOSES  Diagnosis: Hypertension  Assessment and Plan of Treatment: Maintain current medication RX  Follow up with your PMD for further management  Low sodium diet    Diagnosis: History of pulmonary embolism  Assessment and Plan of Treatment: LE duplex negative  CTA negative for PE  Discontinue Eliquis

## 2022-11-11 RX ORDER — APIXABAN 5 MG/1
5 TABLET, FILM COATED ORAL
Qty: 180 | Refills: 0 | Status: COMPLETED | COMMUNITY
Start: 2021-06-09 | End: 2022-11-11

## 2022-11-16 ENCOUNTER — NON-APPOINTMENT (OUTPATIENT)
Age: 87
End: 2022-11-16

## 2022-11-17 ENCOUNTER — APPOINTMENT (OUTPATIENT)
Dept: INTERNAL MEDICINE | Facility: CLINIC | Age: 87
End: 2022-11-17

## 2022-11-17 VITALS
HEIGHT: 58 IN | SYSTOLIC BLOOD PRESSURE: 128 MMHG | DIASTOLIC BLOOD PRESSURE: 72 MMHG | TEMPERATURE: 98.2 F | RESPIRATION RATE: 16 BRPM

## 2022-11-17 DIAGNOSIS — M25.562 PAIN IN RIGHT KNEE: ICD-10-CM

## 2022-11-17 DIAGNOSIS — Z63.4 DISAPPEARANCE AND DEATH OF FAMILY MEMBER: ICD-10-CM

## 2022-11-17 DIAGNOSIS — Z72.3 LACK OF PHYSICAL EXERCISE: ICD-10-CM

## 2022-11-17 DIAGNOSIS — Z86.19 PERSONAL HISTORY OF OTHER INFECTIOUS AND PARASITIC DISEASES: ICD-10-CM

## 2022-11-17 DIAGNOSIS — M25.561 PAIN IN RIGHT KNEE: ICD-10-CM

## 2022-11-17 PROCEDURE — 99495 TRANSJ CARE MGMT MOD F2F 14D: CPT

## 2022-11-17 SDOH — SOCIAL STABILITY - SOCIAL INSECURITY: DISSAPEARANCE AND DEATH OF FAMILY MEMBER: Z63.4

## 2022-11-17 NOTE — REVIEW OF SYSTEMS
[Hearing Loss] : hearing loss [Wheezing] : wheezing [Anxiety] : anxiety [Depression] : depression [Easy Bruising] : easy bruising [Negative] : Heme/Lymph [Fever] : no fever [Discharge] : no discharge [Redness] : no redness [Earache] : no earache [Nasal Discharge] : no nasal discharge [Sore Throat] : no sore throat [Postnasal Drip] : no postnasal drip [Chest Pain] : no chest pain [Palpitations] : no palpitations [Lower Ext Edema] : no lower extremity edema [Heartburn] : no heartburn [Nocturia] : no nocturia [Frequency] : no frequency [Itching] : no itching [Skin Rash] : no skin rash [Dizziness] : no dizziness [Easy Bleeding] : no easy bleeding [Swollen Glands] : no swollen glands

## 2022-11-17 NOTE — PHYSICAL EXAM
[Normal TMs] : both tympanic membranes were normal [Declined Breast Exam] : declined breast exam  [Kyphosis] : kyphosis [No Skin Lesions] : no skin lesions [Memory Grossly Normal] : memory grossly normal [Agitated] : agitated [Depressed] : depressed [de-identified] :  lungs were clear to auscultate Tatian today did not appreciate any wheezing [No Acute Distress] : no acute distress [Well Nourished] : well nourished [Well Developed] : well developed [Well-Appearing] : well-appearing [Normal Sclera/Conjunctiva] : normal sclera/conjunctiva [PERRL] : pupils equal round and reactive to light [EOMI] : extraocular movements intact [Normal Outer Ear/Nose] : the outer ears and nose were normal in appearance [Normal Oropharynx] : the oropharynx was normal [No JVD] : no jugular venous distention [No Lymphadenopathy] : no lymphadenopathy [Supple] : supple [Thyroid Normal, No Nodules] : the thyroid was normal and there were no nodules present [No Respiratory Distress] : no respiratory distress  [No Accessory Muscle Use] : no accessory muscle use [Clear to Auscultation] : lungs were clear to auscultation bilaterally [Normal Rate] : normal rate  [Regular Rhythm] : with a regular rhythm [Normal S1, S2] : normal S1 and S2 [No Carotid Bruits] : no carotid bruits [No Abdominal Bruit] : a ~M bruit was not heard ~T in the abdomen [Pedal Pulses Present] : the pedal pulses are present [No Palpable Aorta] : no palpable aorta [No Extremity Clubbing/Cyanosis] : no extremity clubbing/cyanosis [Soft] : abdomen soft [Non Tender] : non-tender [Non-distended] : non-distended [No Masses] : no abdominal mass palpated [No HSM] : no HSM [Normal Bowel Sounds] : normal bowel sounds [Obese] : obese [Normal Posterior Cervical Nodes] : no posterior cervical lymphadenopathy [Normal Anterior Cervical Nodes] : no anterior cervical lymphadenopathy [No CVA Tenderness] : no CVA  tenderness [No Spinal Tenderness] : no spinal tenderness [No Joint Swelling] : no joint swelling [Grossly Normal Strength/Tone] : grossly normal strength/tone [No Rash] : no rash [Coordination Grossly Intact] : coordination grossly intact [No Focal Deficits] : no focal deficits [Deep Tendon Reflexes (DTR)] : deep tendon reflexes were 2+ and symmetric [Speech Grossly Normal] : speech grossly normal [Normal Affect] : the affect was normal [Alert and Oriented x3] : oriented to person, place, and time [Normal Mood] : the mood was normal [Normal Insight/Judgement] : insight and judgment were intact [01441 - Moderate Complexity requires multiple possible diagnoses and/or the management options, moderate complexity of the medical data (tests, etc.) to be reviewed, and moderate risk of significant complications, morbidity, and/or mortality as well as co] : Moderate Complexity [de-identified] :  sitting in wheelchair comfortable [de-identified] : Wearing hearing aids and glasses,  slight lateral deviation of the tongue [de-identified] :  positive varicosities,   1-2+ edema lower extremity [de-identified] :  with murmur 4/6 [de-identified] :  as per gynecology [FreeTextEntry1] :  as per gynecology [de-identified] :  as per gynecology [de-identified] :  weakness lower extremity crepitus bilateral knees with locking left knee [de-identified] :  ecchymosis noted bilateral upper extremity [de-identified] :  unable to walk in wheelchair

## 2022-11-17 NOTE — HEALTH RISK ASSESSMENT
[Never] : Never [1 or 2 (0 pts)] : 1 or 2 (0 points) [Never (0 pts)] : Never (0 points) [No] : In the past 12 months have you used drugs other than those required for medical reasons? No [No falls in past year] : Patient reported no falls in the past year [1] : 2) Feeling down, depressed, or hopeless for several days (1) [Alone] : lives alone [Retired] : retired [] :  [Independent] : using telephone [Some assistance needed] : managing medications [Full assistance needed] : managing finances [Reports changes in hearing] : Reports changes in hearing [Smoke Detector] : smoke detector [Seat Belt] :  uses seat belt [Audit-CScore] : 0 [de-identified] : no [de-identified] : regular  [[id number]] : 2 [Sexually Active] : not sexually active [de-identified] :  has a living in the house [de-identified] : Wears a hearing aid

## 2022-11-17 NOTE — HISTORY OF PRESENT ILLNESS
[Post-hospitalization from ___ Hospital] : Post-hospitalization from [unfilled] Hospital [Admitted on: ___] : The patient was admitted on [unfilled] [Discharged on ___] : discharged on [unfilled] [Discharge Summary] : discharge summary [Radiology Findings] : radiology findings [Med Reconciliation] : medication reconciliation has been completed [Patient Contacted By: ____] : and contacted by [unfilled] [FreeTextEntry3] : Was admitted for mental status changes [FreeTextEntry2] :  [facility] hospital course was unremarkable.  Had repeat MRI done which showed stable small bilateral ophthalmic hemorrhages and a stable tiny left cerebral hemisphere hemorrhage.\par  had a CT scan of the chest pulmonary embolism from prior CT scan has resolved venous Doppler of the lower extremity was normal.  Patient was taken off Eliquis.\par \par  CT scan on 11/7/2022 showed a 4.5 mm focus  representing representing a small focus of acute hemorrhage.\par Chest x-ray on November 7, 2022 showed large hiatal hernia lungs were clear\par  venous Doppler lower extremity bilateral showed no DVT on November 9, 2022\par CT angio chest on November 10, 2022 showed no pulmonary emboli, slight tiny gallstone, colonic diverticulosis, large hiatal hernia.\par  MRI of the head 11/10/2022 showed no changes in the cerebral hemorrhage

## 2022-11-17 NOTE — PLAN
[FreeTextEntry1] : \par Vasc - venous insufficiency - Low sodium  diet - Elevated legs - Continue with Lasix.  \par \par Psych HALI - Extra counseling was given to the pt about depression and anxiety.  Continue with current medications   Consider therapy.   check - renewed Xanax today..   \par \par Cardio - Hypertension -  Patient was educated about hypertension and the importance of controlling the pressure through lifestyle modification which include low sodium  diet and  aerobic  exercise.  Also discussed the use of prescription medication which included their benefits and their side effects.  \par   Advised patient a few weeks we will send a prescription to [facility] to check comprehensive metabolic, CBC\par \par Hyperlipidemia -   Advised low fat cholesterol diet.  Advised importance of having low cholesterol / LDL/ triglycerides. Advised life style modifications.  Continue with current medications.   Monitor LFt/FLP\par \par Peripheral neuropathy -Continue with Neurontin ADRIANA  - advised pt \par \par Rheumatology\par Advised patient the need for bone density scan to evaluate for postmenopausal osteoporosis\par pt refused - education given.  prescription given to check vitamin D levels.\par \par We discussed the importance of healthy lifestyles which include exercise, weight control and good diet.\par Patient's questions were answered in full detail. Advise patient if any other concerns arise to please call office to have a discussion. \par \par Patient  education  - COVID-19   Counseling and education provided to the patient.  Advised sign and symptoms of the virus.  Advised contact precautions.  Educated patient on proper hand washing and to participate in social distancing. Had the covid vax \par \par \par I spent 47 Minutes with the patient, half of which we discussed finding on physical exam  and coordinated care.  As well as reviewed my plans and follow ups. \par  \par

## 2022-11-17 NOTE — COUNSELING
[Fall prevention counseling provided] : Fall prevention counseling provided [Adequate lighting] : Adequate lighting [No throw rugs] : No throw rugs [Use recommended devices] : Use recommended devices [Engage in a relaxing activity] : Engage in a relaxing activity [AUDIT-C Screening administered and reviewed] : AUDIT-C Screening administered and reviewed [Healthy eating counseling provided] : healthy eating [Behavioral health counseling provided] : behavioral health  [Needs reinforcement, provided] : Patient needs reinforcement on understanding of disease, goals and obesity follow-up plan; reinforcement was provided [Low Fat Diet] : Low fat diet [Low Salt Diet] : Low salt diet [Decrease Portions] : Decrease food portions [Walking] : Walking [Transportation Issues] : Transportation issues [Patient motivation] : Patient motivation [de-identified] : consider counseling \par Follow up with Derm and audiologist.  \par Advised to get BDS

## 2022-11-17 NOTE — ASSESSMENT
[FreeTextEntry1] : A 89-year-old female who presents to office for a renewal of medication  and posthospital follow-up status post mental status changes

## 2022-11-29 ENCOUNTER — APPOINTMENT (OUTPATIENT)
Dept: ORTHOPEDIC SURGERY | Facility: CLINIC | Age: 87
End: 2022-11-29

## 2022-11-29 DIAGNOSIS — M19.012 PRIMARY OSTEOARTHRITIS, LEFT SHOULDER: ICD-10-CM

## 2022-11-29 PROCEDURE — 99203 OFFICE O/P NEW LOW 30 MIN: CPT | Mod: 25

## 2022-11-29 PROCEDURE — 20610 DRAIN/INJ JOINT/BURSA W/O US: CPT | Mod: LT

## 2022-11-29 PROCEDURE — 73030 X-RAY EXAM OF SHOULDER: CPT | Mod: LT

## 2022-11-29 PROCEDURE — 73562 X-RAY EXAM OF KNEE 3: CPT | Mod: LT

## 2022-11-29 PROCEDURE — L1833: CPT | Mod: LT

## 2022-11-29 NOTE — PROCEDURE
[Large Joint Injection] : Large joint injection [Left] : of the left [Knee] : knee [Pain] : pain [X-ray evidence of Osteoarthritis on this or prior visit] : x-ray evidence of Osteoarthritis on this or prior visit [Betadine] : betadine [Ethyl Chloride sprayed topically] : ethyl chloride sprayed topically [___ cc    1%] : Lidocaine ~Vcc of 1%  [___ cc    40mg] : Methylprednisolone (Depomedrol) ~Vcc of 40 mg  [] : Patient tolerated procedure well [Call if redness, pain or fever occur] : call if redness, pain or fever occur [Apply ice for 15min out of every hour for the next 12-24 hours as tolerated] : apply ice for 15 minutes out of every hour for the next 12-24 hours as tolerated [Risks, benefits, alternatives discussed / Verbal consent obtained] : the risks benefits, and alternatives have been discussed, and verbal consent was obtained

## 2022-11-29 NOTE — PHYSICAL EXAM
[Right] : right knee [NL (0)] : extension 0 degrees [Able to Communicate] : able to communicate [Well Developed] : well developed [Well Nourished] : well nourished [Left] : left shoulder [Type 3 acromion] : Type 3 acromion [Glenohumeral arthritis] : Glenohumeral arthritis [AP] : anteroposterior [Lateral] : lateral [Sachse] : skyline [Degenerative change] : Degenerative change [Sitting] : sitting [Bilateral] : knee bilaterally [] : negative Lachmann [FreeTextEntry9] : Bone spurs lateral left knee.  Normal right knee.  X-rays were limited as the patient could not standing for standing views. [TWNoteComboBox7] : flexion 135 degrees

## 2022-11-29 NOTE — HISTORY OF PRESENT ILLNESS
[Left Arm] : left arm [Left Leg] : left leg [Right Leg] : right leg [Gradual] : gradual [Sudden] : sudden [7] : 7 [6] : 6 [Burning] : burning [Shooting] : shooting [Stabbing] : stabbing [Intermittent] : intermittent [Rest] : rest [Exercising] : exercising [de-identified] : 11/29/22: Initial visit for this 89 year female whose left knee locked up one week ago while she was at her doctor's office.  Her daughters state that within the last six months her walking has worsened.  Is in a  wheelchair.  Her family is with her for today's visit.\par \par Also complaining of left shoulder pain that began at least 10 years ago and is worsening.  It is most painful when reaching behind   Is RHD.   Tylenol as needed.  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

## 2022-11-30 DIAGNOSIS — I26.99 OTHER PULMONARY EMBOLISM W/OUT ACUTE COR PULMONALE: ICD-10-CM

## 2022-12-02 ENCOUNTER — APPOINTMENT (OUTPATIENT)
Dept: NEUROLOGY | Facility: CLINIC | Age: 87
End: 2022-12-02

## 2022-12-02 VITALS
BODY MASS INDEX: 29.39 KG/M2 | DIASTOLIC BLOOD PRESSURE: 74 MMHG | SYSTOLIC BLOOD PRESSURE: 114 MMHG | HEIGHT: 58 IN | HEART RATE: 98 BPM | WEIGHT: 140 LBS

## 2022-12-02 DIAGNOSIS — F03.90 UNSPECIFIED DEMENTIA W/OUT BEHAVIORAL DISTURBANCE: ICD-10-CM

## 2022-12-02 DIAGNOSIS — I61.0 NONTRAUMATIC INTRACEREBRAL HEMORRHAGE IN HEMISPHERE, SUBCORTICAL: ICD-10-CM

## 2022-12-02 DIAGNOSIS — Z86.59 PERSONAL HISTORY OF OTHER MENTAL AND BEHAVIORAL DISORDERS: ICD-10-CM

## 2022-12-02 PROCEDURE — 99214 OFFICE O/P EST MOD 30 MIN: CPT

## 2022-12-02 NOTE — PHYSICAL EXAM
[FreeTextEntry1] : Head:  Normocephalic Neck: Supple nontender no carotid bruits.  \par \par Mental Status:  Alert oriented to the month but not the year or day of the date.  She is confused.  She follows simple commands.  She does not appear to be aphasic or dysarthric\par \par Cranial Nerves:  PERRL, Fundi not visualized.  Visual Fields full  EOMI no diplopia no ptosis no nystagmus, V through XII intact.\par \par Motor: Moves all 4 limbs without definite focality.  There is increased tone bilaterally with slowed rapid movements.  There is a mild postural hand tremor seen bilaterally.\par \par DTRs: Symmetric .  Plantars withdrawal no Clonus.\par \par Sensory: Equal pin and touch bilaterally.  Uncooperative for more detailed testing.\par \par Gait: Seated in the wheelchair left knee brace not an independent ambulator.\par

## 2022-12-02 NOTE — HISTORY OF PRESENT ILLNESS
[FreeTextEntry1] : This patient is seen for an office consultation with her daughter and full-time aide.  This patient is a 89-year-old woman who became acutely confused and was seen initially at [facility] on 11/7/2022.  There was evidence of a small medial right thalamic hemorrhage.  She was transferred to [facility].  She did have periods of confusion and agitation at the hospital but there was no definite focality.  On 11/10/2022 she had an MRI of the brain which was complicated by motion artifact.  According to the records there were bilateral thalamic hemorrhages and a small left cerebellar hemorrhage..  There was no significant mass-effect.  The patient is now at home with her full-time aide.  The Eliquis has been stopped.  That medication was prescribed for for pulmonary emboli occurring several months ago.  There was no headache seizure or any definite focal neurological findings.  There was no evidence of head trauma\par \par Past medical history remarkable for anxiety hemifacial spasm pulmonary emboli dementia.\par \par \par \par

## 2022-12-02 NOTE — ASSESSMENT
[FreeTextEntry1] : Impression: This 89-year-old female patient with a history dementia anxiety and hemifacial spasm and pulmonary emboli on Eliquis for the last several months presents with an acute change in mental status.  She was found to have bilateral small thalamic hemorrhages and a left cerebellar hemorrhage according to an MRI report 11/10/2022.  The Eliquis has been stopped.  She has evidence by neurological exam of dementia.  Her exam appears nonfocal.\par \par Recommendations: Repeat CAT scan of the brain noncontrast as an outpatient.  Eliquis has been stopped.  Neurological follow-up as directed.  Agree with home with 24-hour care.  The patient needs total ADL assist.  Discussed with the patient's daughter and aide.\par

## 2022-12-12 ENCOUNTER — OUTPATIENT (OUTPATIENT)
Dept: OUTPATIENT SERVICES | Facility: HOSPITAL | Age: 87
LOS: 1 days | End: 2022-12-12
Payer: MEDICARE

## 2022-12-12 ENCOUNTER — RX RENEWAL (OUTPATIENT)
Age: 87
End: 2022-12-12

## 2022-12-12 ENCOUNTER — APPOINTMENT (OUTPATIENT)
Dept: CT IMAGING | Facility: CLINIC | Age: 87
End: 2022-12-12

## 2022-12-12 DIAGNOSIS — I61.0 NONTRAUMATIC INTRACEREBRAL HEMORRHAGE IN HEMISPHERE, SUBCORTICAL: ICD-10-CM

## 2022-12-12 PROCEDURE — 70450 CT HEAD/BRAIN W/O DYE: CPT | Mod: 26

## 2022-12-12 PROCEDURE — 70450 CT HEAD/BRAIN W/O DYE: CPT

## 2022-12-13 ENCOUNTER — NON-APPOINTMENT (OUTPATIENT)
Age: 87
End: 2022-12-13

## 2022-12-15 ENCOUNTER — NON-APPOINTMENT (OUTPATIENT)
Age: 87
End: 2022-12-15

## 2023-01-04 ENCOUNTER — NON-APPOINTMENT (OUTPATIENT)
Age: 88
End: 2023-01-04

## 2023-02-13 ENCOUNTER — RX RENEWAL (OUTPATIENT)
Age: 88
End: 2023-02-13

## 2023-02-27 ENCOUNTER — NON-APPOINTMENT (OUTPATIENT)
Age: 88
End: 2023-02-27

## 2023-03-09 ENCOUNTER — RX RENEWAL (OUTPATIENT)
Age: 88
End: 2023-03-09

## 2023-03-27 ENCOUNTER — RX RENEWAL (OUTPATIENT)
Age: 88
End: 2023-03-27

## 2023-03-29 ENCOUNTER — APPOINTMENT (OUTPATIENT)
Dept: NEUROLOGY | Facility: CLINIC | Age: 88
End: 2023-03-29
Payer: MEDICARE

## 2023-03-29 PROCEDURE — 96132 NRPSYC TST EVAL PHYS/QHP 1ST: CPT

## 2023-03-29 PROCEDURE — 96116 NUBHVL XM PHYS/QHP 1ST HR: CPT

## 2023-04-05 ENCOUNTER — APPOINTMENT (OUTPATIENT)
Dept: NEUROLOGY | Facility: CLINIC | Age: 88
End: 2023-04-05

## 2023-04-11 ENCOUNTER — RX RENEWAL (OUTPATIENT)
Age: 88
End: 2023-04-11

## 2023-04-11 ENCOUNTER — APPOINTMENT (OUTPATIENT)
Dept: INTERNAL MEDICINE | Facility: CLINIC | Age: 88
End: 2023-04-11
Payer: MEDICARE

## 2023-04-11 PROCEDURE — 99213 OFFICE O/P EST LOW 20 MIN: CPT | Mod: 25,95

## 2023-04-11 NOTE — COUNSELING
[Fall prevention counseling provided] : Fall prevention counseling provided [Adequate lighting] : Adequate lighting [No throw rugs] : No throw rugs [Use recommended devices] : Use recommended devices [Engage in a relaxing activity] : Engage in a relaxing activity [AUDIT-C Screening administered and reviewed] : AUDIT-C Screening administered and reviewed [Healthy eating counseling provided] : healthy eating [Behavioral health counseling provided] : behavioral health  [Needs reinforcement, provided] : Patient needs reinforcement on understanding of disease, goals and obesity follow-up plan; reinforcement was provided [Low Fat Diet] : Low fat diet [Low Salt Diet] : Low salt diet [Decrease Portions] : Decrease food portions [Walking] : Walking [Transportation Issues] : Transportation issues [Patient motivation] : Patient motivation [de-identified] : consider counseling \par Follow up with Derm and audiologist.  \par Advised to get BDS

## 2023-04-11 NOTE — HISTORY OF PRESENT ILLNESS
[Home] : at home, [unfilled] , at the time of the visit. [Medical Office: ([facility])___] : at the medical office located in  [Family Member] : family member [Formal Caregiver] : formal caregiver [FreeTextEntry1] : Medication renewal for HALI  [de-identified] : Mrs. WASHINGTON is a 89 year  female, with a past medical history as noted below,who present to the office  today for medication renewal.\par Patient's daughter and caretaker was present during the evaluation/exam.  Patient denies any new chief complaint.  States she takes all her medication without any complications.  Daughter and caretaker agrees that the medication is helping her anxiety and also requesting a renewal for escitalopram as well as Xanax.\par   Patient states she did follow-up with neurology but had to cancel the second appointment secondary to her sister testing positive for COVID-19.  Will call back and reschedule the appointment.  Denies any new medication recommendation or change in her medical status.\par  Does state that her  ambulation is getting worse

## 2023-04-11 NOTE — PHYSICAL EXAM
[No Acute Distress] : no acute distress [Well Nourished] : well nourished [Well Developed] : well developed [Well-Appearing] : well-appearing [Normal Sclera/Conjunctiva] : normal sclera/conjunctiva [Normal Outer Ear/Nose] : the outer ears and nose were normal in appearance [Normal Oropharynx] : the oropharynx was normal [Thyroid Normal, No Nodules] : the thyroid was normal and there were no nodules present [No CVA Tenderness] : no CVA  tenderness [No Spinal Tenderness] : no spinal tenderness [Grossly Normal Strength/Tone] : grossly normal strength/tone [Coordination Grossly Intact] : coordination grossly intact [No Focal Deficits] : no focal deficits [Deep Tendon Reflexes (DTR)] : deep tendon reflexes were 2+ and symmetric [Speech Grossly Normal] : speech grossly normal [Normal Affect] : the affect was normal [Alert and Oriented x3] : oriented to person, place, and time [Normal Mood] : the mood was normal [Normal Insight/Judgement] : insight and judgment were intact [de-identified] :  sitting in wheelchair comfortable [de-identified] :  right eye slightly closed patient without any discomfort or complaints states she just woke up [de-identified] :  as per gynecology [FreeTextEntry1] :  as per gynecology [de-identified] :  as per gynecology [de-identified] :  unable to walk in wheelchair

## 2023-04-11 NOTE — REVIEW OF SYSTEMS
[Hearing Loss] : hearing loss [Anxiety] : anxiety [Depression] : depression [Easy Bruising] : easy bruising [Fever] : no fever [Discharge] : no discharge [Redness] : no redness [Earache] : no earache [Nasal Discharge] : no nasal discharge [Sore Throat] : no sore throat [Postnasal Drip] : no postnasal drip [Chest Pain] : no chest pain [Palpitations] : no palpitations [Lower Ext Edema] : no lower extremity edema [Wheezing] : no wheezing [Nausea] : no nausea [Heartburn] : no heartburn [Nocturia] : no nocturia [Frequency] : no frequency [Itching] : no itching [Skin Rash] : no skin rash [Dizziness] : no dizziness [Easy Bleeding] : no easy bleeding [Swollen Glands] : no swollen glands [Negative] : Respiratory

## 2023-04-11 NOTE — PLAN
[FreeTextEntry1] : Neuro -    Dr. Grewal  advised patient the need to follow-up with the neuro psychiatrist as well as a neurologist.

## 2023-04-11 NOTE — DATA REVIEWED
[FreeTextEntry1] :    reviewed reference # [id number].\par  reviewed neuro consult from December 13, 2022

## 2023-04-11 NOTE — HEALTH RISK ASSESSMENT
[1 or 2 (0 pts)] : 1 or 2 (0 points) [Never (0 pts)] : Never (0 points) [No] : In the past 12 months have you used drugs other than those required for medical reasons? No [No falls in past year] : Patient reported no falls in the past year [1] : 2) Feeling down, depressed, or hopeless for several days (1) [Former] : Former [de-identified] : Neuro  [Audit-CScore] : 0 [de-identified] : no [de-identified] : regular  [ORY6Csojs] : 2

## 2023-04-11 NOTE — ASSESSMENT
[FreeTextEntry1] : A 89-year-old female who presents to  televideo appointment for medication renewal and general checkup

## 2023-04-20 NOTE — PATIENT PROFILE ADULT - INTERNATIONAL TRAVEL
No Jo Santamaria)  Otolaryngology  [address] [place] [address]  Phone: [phone]  Fax: [phone]  Follow Up Time:

## 2023-05-15 ENCOUNTER — NON-APPOINTMENT (OUTPATIENT)
Age: 88
End: 2023-05-15

## 2023-05-27 ENCOUNTER — INPATIENT (INPATIENT)
Facility: HOSPITAL | Age: 88
LOS: 5 days | Discharge: HOME | DRG: 299 | End: 2023-06-02
Attending: HOSPITALIST | Admitting: STUDENT IN AN ORGANIZED HEALTH CARE EDUCATION/TRAINING PROGRAM
Payer: MEDICARE

## 2023-05-27 VITALS
RESPIRATION RATE: 19 BRPM | DIASTOLIC BLOOD PRESSURE: 90 MMHG | OXYGEN SATURATION: 93 % | SYSTOLIC BLOOD PRESSURE: 156 MMHG | TEMPERATURE: 98 F | WEIGHT: 139.99 LBS | HEART RATE: 67 BPM

## 2023-05-27 DIAGNOSIS — Z96.60 PRESENCE OF UNSPECIFIED ORTHOPEDIC JOINT IMPLANT: Chronic | ICD-10-CM

## 2023-05-27 DIAGNOSIS — I82.412 ACUTE EMBOLISM AND THROMBOSIS OF LEFT FEMORAL VEIN: ICD-10-CM

## 2023-05-27 DIAGNOSIS — Z90.710 ACQUIRED ABSENCE OF BOTH CERVIX AND UTERUS: Chronic | ICD-10-CM

## 2023-05-27 LAB
ALBUMIN SERPL ELPH-MCNC: 2.7 G/DL — LOW (ref 3.3–5)
ALP SERPL-CCNC: 94 U/L — SIGNIFICANT CHANGE UP (ref 40–120)
ALT FLD-CCNC: 21 U/L — SIGNIFICANT CHANGE UP (ref 12–78)
ANION GAP SERPL CALC-SCNC: 2 MMOL/L — LOW (ref 5–17)
APTT BLD: 27.7 SEC — SIGNIFICANT CHANGE UP (ref 27.5–35.5)
AST SERPL-CCNC: 26 U/L — SIGNIFICANT CHANGE UP (ref 15–37)
BASOPHILS # BLD AUTO: 0.04 K/UL — SIGNIFICANT CHANGE UP (ref 0–0.2)
BASOPHILS NFR BLD AUTO: 0.5 % — SIGNIFICANT CHANGE UP (ref 0–2)
BILIRUB SERPL-MCNC: 0.3 MG/DL — SIGNIFICANT CHANGE UP (ref 0.2–1.2)
BUN SERPL-MCNC: 19 MG/DL — SIGNIFICANT CHANGE UP (ref 7–23)
CALCIUM SERPL-MCNC: 9 MG/DL — SIGNIFICANT CHANGE UP (ref 8.5–10.1)
CHLORIDE SERPL-SCNC: 104 MMOL/L — SIGNIFICANT CHANGE UP (ref 96–108)
CO2 SERPL-SCNC: 34 MMOL/L — HIGH (ref 22–31)
CREAT SERPL-MCNC: 1.1 MG/DL — SIGNIFICANT CHANGE UP (ref 0.5–1.3)
EGFR: 47 ML/MIN/1.73M2 — LOW
EOSINOPHIL # BLD AUTO: 0.33 K/UL — SIGNIFICANT CHANGE UP (ref 0–0.5)
EOSINOPHIL NFR BLD AUTO: 3.9 % — SIGNIFICANT CHANGE UP (ref 0–6)
GLUCOSE SERPL-MCNC: 93 MG/DL — SIGNIFICANT CHANGE UP (ref 70–99)
HCT VFR BLD CALC: 32.6 % — LOW (ref 34.5–45)
HGB BLD-MCNC: 10.3 G/DL — LOW (ref 11.5–15.5)
IMM GRANULOCYTES NFR BLD AUTO: 0.4 % — SIGNIFICANT CHANGE UP (ref 0–0.9)
INR BLD: 1.03 RATIO — SIGNIFICANT CHANGE UP (ref 0.88–1.16)
LYMPHOCYTES # BLD AUTO: 1.41 K/UL — SIGNIFICANT CHANGE UP (ref 1–3.3)
LYMPHOCYTES # BLD AUTO: 16.8 % — SIGNIFICANT CHANGE UP (ref 13–44)
MCHC RBC-ENTMCNC: 31.3 PG — SIGNIFICANT CHANGE UP (ref 27–34)
MCHC RBC-ENTMCNC: 31.6 GM/DL — LOW (ref 32–36)
MCV RBC AUTO: 99.1 FL — SIGNIFICANT CHANGE UP (ref 80–100)
MONOCYTES # BLD AUTO: 0.91 K/UL — HIGH (ref 0–0.9)
MONOCYTES NFR BLD AUTO: 10.9 % — SIGNIFICANT CHANGE UP (ref 2–14)
NEUTROPHILS # BLD AUTO: 5.66 K/UL — SIGNIFICANT CHANGE UP (ref 1.8–7.4)
NEUTROPHILS NFR BLD AUTO: 67.5 % — SIGNIFICANT CHANGE UP (ref 43–77)
NRBC # BLD: 0 /100 WBCS — SIGNIFICANT CHANGE UP (ref 0–0)
NT-PROBNP SERPL-SCNC: 474 PG/ML — HIGH (ref 0–450)
PLATELET # BLD AUTO: 298 K/UL — SIGNIFICANT CHANGE UP (ref 150–400)
POTASSIUM SERPL-MCNC: 3.9 MMOL/L — SIGNIFICANT CHANGE UP (ref 3.5–5.3)
POTASSIUM SERPL-SCNC: 3.9 MMOL/L — SIGNIFICANT CHANGE UP (ref 3.5–5.3)
PROT SERPL-MCNC: 7 G/DL — SIGNIFICANT CHANGE UP (ref 6–8.3)
PROTHROM AB SERPL-ACNC: 12 SEC — SIGNIFICANT CHANGE UP (ref 10.5–13.4)
RBC # BLD: 3.29 M/UL — LOW (ref 3.8–5.2)
RBC # FLD: 14.9 % — HIGH (ref 10.3–14.5)
SODIUM SERPL-SCNC: 140 MMOL/L — SIGNIFICANT CHANGE UP (ref 135–145)
WBC # BLD: 8.38 K/UL — SIGNIFICANT CHANGE UP (ref 3.8–10.5)
WBC # FLD AUTO: 8.38 K/UL — SIGNIFICANT CHANGE UP (ref 3.8–10.5)

## 2023-05-27 PROCEDURE — 93971 EXTREMITY STUDY: CPT | Mod: 26,LT

## 2023-05-27 PROCEDURE — 71275 CT ANGIOGRAPHY CHEST: CPT | Mod: 26,MA

## 2023-05-27 PROCEDURE — 99285 EMERGENCY DEPT VISIT HI MDM: CPT

## 2023-05-27 PROCEDURE — 99223 1ST HOSP IP/OBS HIGH 75: CPT | Mod: GC

## 2023-05-27 PROCEDURE — 93010 ELECTROCARDIOGRAM REPORT: CPT

## 2023-05-27 PROCEDURE — 70450 CT HEAD/BRAIN W/O DYE: CPT | Mod: 26,MA

## 2023-05-27 PROCEDURE — 74160 CT ABDOMEN W/CONTRAST: CPT | Mod: 26,MA

## 2023-05-27 RX ORDER — HEPARIN SODIUM 5000 [USP'U]/ML
5000 INJECTION INTRAVENOUS; SUBCUTANEOUS ONCE
Refills: 0 | Status: DISCONTINUED | OUTPATIENT
Start: 2023-05-27 | End: 2023-05-27

## 2023-05-27 RX ORDER — HEPARIN SODIUM 5000 [USP'U]/ML
INJECTION INTRAVENOUS; SUBCUTANEOUS
Qty: 25000 | Refills: 0 | Status: DISCONTINUED | OUTPATIENT
Start: 2023-05-27 | End: 2023-05-31

## 2023-05-27 RX ORDER — HEPARIN SODIUM 5000 [USP'U]/ML
5000 INJECTION INTRAVENOUS; SUBCUTANEOUS EVERY 6 HOURS
Refills: 0 | Status: DISCONTINUED | OUTPATIENT
Start: 2023-05-27 | End: 2023-05-31

## 2023-05-27 RX ORDER — HEPARIN SODIUM 5000 [USP'U]/ML
2500 INJECTION INTRAVENOUS; SUBCUTANEOUS EVERY 6 HOURS
Refills: 0 | Status: DISCONTINUED | OUTPATIENT
Start: 2023-05-27 | End: 2023-05-31

## 2023-05-27 NOTE — H&P ADULT - PROBLEM SELECTOR PLAN 3
H/H 10.3/32.6  - No signs or symptoms of overt bleeding  - F/u iron studies  - Monitor CBC daily Chronic  - Continue neurontin 100mg BID

## 2023-05-27 NOTE — ED ADULT NURSE REASSESSMENT NOTE - NS ED NURSE REASSESS COMMENT FT1
Pt resting comfortably in bed at this time, offers no complaints; daughter at bedside.  Denies any chest pain or SOB.  No n/v/d.  LLE +2 edema noted, some redness noted as well.  + strong pedal pulses bilat.  Ultrasound completed.  Maintain comfort and safety.

## 2023-05-27 NOTE — H&P ADULT - PROBLEM SELECTOR PLAN 2
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

## 2023-05-27 NOTE — H&P ADULT - NSICDXPASTMEDICALHX_GEN_ALL_CORE_FT
PAST MEDICAL HISTORY:  Anxiety     CVA (cerebral vascular accident)     Fall     GI bleed     Hip fracture     Hyperlipidemia     Hypertension     PUD (peptic ulcer disease)     SDH (subdural hematoma)

## 2023-05-27 NOTE — ED PROVIDER NOTE - NS ED ATTENDING STATEMENT MOD
This was a shared visit with the ALLY. I reviewed and verified the documentation and independently performed the documented:

## 2023-05-27 NOTE — H&P ADULT - NSHPPHYSICALEXAM_GEN_ALL_CORE
T(C): 36.7 (05-27-23 @ 17:35), Max: 36.7 (05-27-23 @ 17:35)  HR: 67 (05-27-23 @ 17:35) (67 - 67)  BP: 156/90 (05-27-23 @ 17:35) (156/90 - 156/90)  RR: 19 (05-27-23 @ 17:35) (19 - 19)  SpO2: 93% (05-27-23 @ 17:35) (93% - 93%) T(C): 36.7 (05-27-23 @ 17:35), Max: 36.7 (05-27-23 @ 17:35)  HR: 67 (05-27-23 @ 17:35) (67 - 67)  BP: 156/90 (05-27-23 @ 17:35) (156/90 - 156/90)  RR: 19 (05-27-23 @ 17:35) (19 - 19)  SpO2: 93% (05-27-23 @ 17:35) (93% - 93%)    GENERAL: patient appears well, no acute distress, appropriate, pleasant  EYES: sclera clear, no exudates  ENMT: oropharynx clear without erythema, no exudates, moist mucous membranes  NECK: supple, soft  LUNGS: good air entry bilaterally, clear to auscultation, symmetric breath sounds, no wheezing or rhonchi appreciated  HEART: soft S1/S2, regular rate and rhythm, no murmurs noted, no lower extremity edema  GASTROINTESTINAL: abdomen is soft, nontender, nondistended, normoactive bowel sounds, no palpable masses  INTEGUMENT: good skin turgor, no lesions noted  MUSCULOSKELETAL: no clubbing or cyanosis, no obvious deformity  NEUROLOGIC: awake, alert, oriented x3, good muscle tone in 4 extremities, no obvious sensory deficits  PSYCHIATRIC: mood is good, affect is congruent, linear and logical thought process  HEME/LYMPH: no obvious ecchymosis or petechiae T(C): 36.7 (05-27-23 @ 17:35), Max: 36.7 (05-27-23 @ 17:35)  HR: 67 (05-27-23 @ 17:35) (67 - 67)  BP: 156/90 (05-27-23 @ 17:35) (156/90 - 156/90)  RR: 19 (05-27-23 @ 17:35) (19 - 19)  SpO2: 93% (05-27-23 @ 17:35) (93% - 93%)    GENERAL: patient appears well, no acute distress, appropriate, pleasant  EYES: sclera clear, no exudates  ENMT: oropharynx clear without erythema, no exudates, moist mucous membranes  NECK: supple, soft  LUNGS: good air entry bilaterally, clear to auscultation, symmetric breath sounds, no wheezing or rhonchi appreciated  HEART: soft S1/S2, regular rate and rhythm, +murmur  GASTROINTESTINAL: abdomen is soft, nontender, nondistended, normoactive bowel sounds, no palpable masses  INTEGUMENT: good skin turgor, no lesions noted  MUSCULOSKELETAL: no clubbing or cyanosis, no obvious deformity  NEUROLOGIC: awake, alert, oriented x3, good muscle tone in 4 extremities, no obvious sensory deficits  PSYCHIATRIC: mood is good, affect is congruent, linear and logical thought process  HEME/LYMPH: no obvious ecchymosis or petechiae, significant edema of left lower extremity up to distal thigh with erythema, non TTP T(C): 36.7 (05-27-23 @ 17:35), Max: 36.7 (05-27-23 @ 17:35)  HR: 67 (05-27-23 @ 17:35) (67 - 67)  BP: 156/90 (05-27-23 @ 17:35) (156/90 - 156/90)  RR: 19 (05-27-23 @ 17:35) (19 - 19)  SpO2: 93% (05-27-23 @ 17:35) (93% - 93%)    GENERAL: patient appears well, no acute distress  EYES: sclera clear, no exudates  ENMT: oropharynx clear without erythema, no exudates, moist mucous membranes  NECK: supple, soft  LUNGS: good air entry bilaterally, clear to auscultation, symmetric breath sounds, no wheezing or rhonchi appreciated  HEART: soft S1/S2, regular rate and rhythm, +murmur  GASTROINTESTINAL: abdomen is soft, nontender, nondistended, normoactive bowel sounds, no palpable masses  INTEGUMENT: good skin turgor, no lesions noted  MUSCULOSKELETAL: no clubbing or cyanosis, no obvious deformity  NEUROLOGIC: awake, alert, oriented x3, good muscle tone in 4 extremities, no obvious sensory deficits  PSYCHIATRIC: mood is good, affect is congruent, linear and logical thought process  HEME/LYMPH: no obvious ecchymosis or petechiae, significant edema of left lower extremity up to distal thigh with erythema, non TTP

## 2023-05-27 NOTE — ED ADULT NURSE NOTE - NSFALLHARMRISKINTERV_ED_ALL_ED

## 2023-05-27 NOTE — ED PROVIDER NOTE - PROGRESS NOTE DETAILS
Call received from radiologist extensive DVT LLE. Call placed to vascular PA Shailesh will be down to see pt shortly. Advised to get CTA chest/ abd/ pelvis ot evaluate extent of DVT and CT head as pt has prior SDH. if needs ot start heparin will need negative CT head.

## 2023-05-27 NOTE — ED ADULT TRIAGE NOTE - SPO2 (%)
93 Trilobed Flap Text: The defect edges were debeveled with a #15 scalpel blade.  Given the location of the defect and the proximity to free margins a trilobed flap was deemed most appropriate.  Using a sterile surgical marker, an appropriate trilobed flap drawn around the defect.    The area thus outlined was incised deep to adipose tissue with a #15 scalpel blade.  The skin margins were undermined to an appropriate distance in all directions utilizing iris scissors.

## 2023-05-27 NOTE — ED PROVIDER NOTE - NSICDXPASTMEDICALHX_GEN_ALL_CORE_FT
PAST MEDICAL HISTORY:  Anxiety     CVA (cerebral vascular accident)     Fall     GI bleed     Hip fracture     Hyperlipidemia     Hypertension     PUD (peptic ulcer disease)     SDH (subdural hematoma)      DC instructions/d/c by MD. iv removed by MD

## 2023-05-27 NOTE — H&P ADULT - PROBLEM SELECTOR PLAN 5
Chronic  - Continue home medication: H/H 10.3/32.6  - No signs or symptoms of overt bleeding  - F/u iron studies  - Monitor CBC daily

## 2023-05-27 NOTE — H&P ADULT - ASSESSMENT
**CHARTING IN PROGRESS**      89y female with PMHx HTN, PE, SDH while on Eliquis (now off due to spontaneous thalamic bleed on Eliquis) presents to ED for 3 days of L leg swelling and pain. Admitted for DVT management. 89y female with PMHx HTN, PE, SDH while on Eliquis (now off due to spontaneous thalamic bleed on Eliquis) presents to ED for 3 days of L leg swelling and pain. Admitted for DVT management. 89y female with PMHx of HTN, PE (previously on eliquis), SDH while on Eliquis (now off due to spontaneous thalamic bleed) presents to ED for 3 days of L leg swelling and pain. Admitted for acute extensive LLE DVT management.

## 2023-05-27 NOTE — H&P ADULT - NSHPREVIEWOFSYSTEMS_GEN_ALL_CORE
CONSTITUTIONAL: denies fever, chills, fatigue, weakness  HEENT: denies blurred vision, sore throat  SKIN: denies new lesions, rash  CARDIOVASCULAR: denies chest pain, chest pressure, palpitations  RESPIRATORY: denies shortness of breath, sputum production  GASTROINTESTINAL: denies nausea, vomiting, diarrhea, abdominal pain  GENITOURINARY: denies dysuria, discharge  NEUROLOGICAL: denies numbness, headache, focal weakness  MUSCULOSKELETAL: denies new joint pain, muscle aches  HEMATOLOGIC: denies gross bleeding, bruising  LYMPHATICS: denies enlarged lymph nodes, extremity swelling  PSYCHIATRIC: denies recent changes in anxiety, depression  ENDOCRINOLOGIC: denies sweating, cold or heat intolerance CONSTITUTIONAL: denies fever, chills, fatigue, weakness  HEENT: denies blurred vision, sore throat  SKIN: denies new lesions, rash  CARDIOVASCULAR: denies chest pain, chest pressure, palpitations  RESPIRATORY: denies shortness of breath, sputum production  GASTROINTESTINAL: denies nausea, vomiting, diarrhea, abdominal pain  GENITOURINARY: denies dysuria, discharge  NEUROLOGICAL: denies numbness, headache, focal weakness  MUSCULOSKELETAL: denies new joint pain, muscle aches  HEMATOLOGIC: denies gross bleeding, bruising, +LLE swelling and redness  LYMPHATICS: denies enlarged lymph nodes  PSYCHIATRIC: denies recent changes in anxiety, depression  ENDOCRINOLOGIC: denies sweating, cold or heat intolerance

## 2023-05-27 NOTE — H&P ADULT - PROBLEM SELECTOR PLAN 1
Presents with swelling and leg pain  - LE Doppler: Left lower extremity deep venous thrombosis: above and below the knee.  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 Evaluation of subsegmental areas limited by poor   opacification and motion.  - Vascular consulted, f/u recs re: candidacy for filter  - Neuro (Aniya) consulted, f/u recs Presents with swelling and leg pain secondary to acute LLE DVT  - LE Doppler: Left lower extremity deep venous thrombosis: above and below the knee.  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 Evaluation of subsegmental areas limited by poor opacification and motion.  - Vascular consulted, f/u recs re: candidacy for IVC filter  - Neuro (Aniya) consulted, f/u recs

## 2023-05-27 NOTE — H&P ADULT - NSICDXFAMILYHX_GEN_ALL_CORE_FT
Yes
FAMILY HISTORY:  Father  Still living? Unknown  FHx: congestive heart failure, Age at diagnosis: Age Unknown    Mother  Still living? Unknown  Family history of malignant neoplasm of colon, Age at diagnosis: Age Unknown

## 2023-05-27 NOTE — H&P ADULT - PROBLEM SELECTOR PLAN 4
Chronic  - Continue home medications: Chronic  - Continue Wellbutrin, Escitalopram  - Patient may become confused/sundown per daughter; usually responds well to redirection

## 2023-05-27 NOTE — ED PROVIDER NOTE - CLINICAL SUMMARY MEDICAL DECISION MAKING FREE TEXT BOX
89-year-old female history of hypertension PE not on Eliquis anymore dementia SDH complaining about 3 days of left leg swelling and pain no chest pain no shortness of breath no fever no chills patient is bedbound at baseline has 24-hour care at home.    Physical exam: Awake alert normocephalic atraumatic regular rate and rhythm clear to auscultation bilaterally abdomen soft nontender nondistended noted to have left lower extremity edema swelling.  +2 pedal pulses bilaterally.    Ultrasound left lower extremity positive for extensive DVT above and below the knee.  CT head negative.  Discussed case with surgery PA as well as Dr. Erickson.  Upon further research patient has a history of a spontaneous subdural recommended to start heparin drip only.  Does not recommend giving heparin bolus.  Dr. Erickson will consult neurology.

## 2023-05-27 NOTE — ED PROVIDER NOTE - OBJECTIVE STATEMENT
89 y/o female w/ hx of HTN, PE ( no longer on eliquis),  dementia, SDH presents to ED for c/o 3 days of L leg swelling and pain. No CP or SOB. No fever or chills. Pt daughter states she is bed bound and has 24 hour care at home. Lives alone with aids. Fell when transferring form bed to chair yesterday. did not hit head. No LOC. Denies neck and back pain. Daughter states concern for L leg swelling. Kamini suffer abrasion to R leg during fall but no other injury.

## 2023-05-27 NOTE — ED PROVIDER NOTE - PHYSICAL EXAMINATION
PE:   GEN: Awake, alert, interactive, NAD, non-toxic appearing.   HEAD: Atraumatic  EYES: Sclera white, conjunctiva pink, PERRLA  CARDIAC: Reg rate and rhythm, S1,S2, no murmur/rub/gallop. LLE edema, 2+ BL pedal pulses   RESP: No distress noted. L/S clear = Bilat without accessory muscle use, wheeze, rhonchi, rales.   ABD: soft, supple, non-tender, no guarding. BS x 4, normoactive.   NEURO: AOx3, CN II-XII grossly intact without focal deficit.   MSK: Moving all extremities with no apparent deformities. no midline c/t/l spine TTP, pelvis stable   SKIN: Warm, dry, normal color, without apparent rashes. superficial abrasion to R shin

## 2023-05-27 NOTE — ED ADULT NURSE NOTE - OBJECTIVE STATEMENT
pt to er by ambulance has swelling to left ankle and foot slightly redened calf skin warm and pink denies pain denies sob is alert oriented able to answer all questions but is hard of hearing family at bedside with pt

## 2023-05-27 NOTE — H&P ADULT - ATTENDING COMMENTS
89y female with PMHx of HTN, PE (previously on eliquis), SDH while on Eliquis (now off due to spontaneous thalamic bleed) presents to ED for 3 days of L leg swelling and pain. Admitted for acute extensive LLE DVT management. Admit to medicine. Started on heparin gtt by ER after negative CT head, as per consultant recommendations. High risk to be on AC due to history of nontraumatic thalamic hemorrhage on eliquis. Requested ER to not give initial bolus of heparin. Discussed with vascular PA that patient is likely not a good candidate for long term AC. To discuss with her attending (and patient) regarding possible IVC filter. Neurology consult Dr. Kwan. Vascular following, Dr. Hannah Edwards. Follow up consultant recommendations,    Agree with H&P as outlined above, edited where appropriate.

## 2023-05-27 NOTE — ED ADULT NURSE REASSESSMENT NOTE - NSFALLHARMRISKINTERV_ED_ALL_ED

## 2023-05-27 NOTE — H&P ADULT - CONVERSATION DETAILS
Met with patient and daughter Johana who is HCP at bedside for Goals of Care conversation. Reviewed patient's medical and social history as well as events leading to patient's hospitalization. Discussed patient's current diagnosis ( LE DVT, history of nontraumatic hemorrhage on anticoagulation ), medical condition and management,  prognosis. Inquired about patient's wishes regarding extent of medical care to be provided. Inquired about thoughts regarding cardiopulmonary resuscitation, intubation, escalation to ICU level of care, use of vasopressor support, artificial nutrition, antibiotics and further diagnostic studies such as blood draws and radiology. Daughter and patient  showed good insight into medical condition. Have filled out previous MOLST form, family to bring it in. Patient is DNR/DNI.

## 2023-05-27 NOTE — H&P ADULT - NSHPSOCIALHISTORY_GEN_ALL_CORE
Lives at home alone with 24 hours aids  Denies alcohol, drug, or tobacco use Lives at home alone with 24 hour aids  Denies alcohol, drug, or tobacco use

## 2023-05-27 NOTE — H&P ADULT - HISTORY OF PRESENT ILLNESS
89y female with PMHx 89y female with PMHx HTN, PE, SDH while on Eliquis (now off due to spontaneous thalamic bleed on Eliquis) presents to ED for 3 days of L leg swelling and pain.     ED Course:  Vitals: T 98, HR 67, /90, RR 19, spo2% 93% on RA  Labs significant for: H/H 10.3/32.6, HCO3 34, Albumin 2.7, pro bnp 474  ECG:  LE Doppler: Left lower extremity deep venous thrombosis: above and below the knee.  CT Head: Stable exam. No acute intracranial hemorrhage, vasogenic edema or   extra-axial collection. Chronic findings as above.  CTA: No pulmonary embolism. Evaluation of subsegmental areas limited by poor   opacification and motion.  CXR:    Received heparin gtt in ED 89y female with PMHx HTN, PE, SDH while on Eliquis (now off due to spontaneous thalamic bleed on Eliquis) presents to ED for L leg swelling and redness. Patient and family noticed swelling started a couple of weeks ago, and in the last three days it has gotten much worse. Also noted redness of left leg. She denies any pain. Daughter notes that patient also fell last night on her right knee while going from her motorized stair chair to her wheelchair. Denies head strike or loss of consciousness. Patient denies any shortness of breath, chest pain, lightheadedness, fevers, chills.     ED Course:  Vitals: T 98, HR 67, /90, RR 19, spo2% 93% on RA  Labs significant for: H/H 10.3/32.6, HCO3 34, Albumin 2.7, pro bnp 474  ECG:  LE Doppler: Left lower extremity deep venous thrombosis: above and below the knee.  CT Head: Stable exam. No acute intracranial hemorrhage, vasogenic edema or   extra-axial collection. Chronic findings as above.  CTA: No pulmonary embolism. Evaluation of subsegmental areas limited by poor   opacification and motion.    Received heparin gtt in ED 89y female with PMHx of HTN, PE (previously on eliquis), 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 Patient and family noticed swelling started a couple of weeks ago, and in the last three days it has gotten much worse. Also noted redness of left leg. She denies any pain. Daughter notes that patient also fell last night on her right knee while going from her motorized stair chair to her wheelchair. Denies head strike or loss of consciousness. Patient denies any shortness of breath, chest pain, lightheadedness, fevers, chills.     ED Course:  Vitals: T 98, HR 67, /90, RR 19, spo2% 93% on RA  Labs significant for: H/H 10.3/32.6, HCO3 34, Albumin 2.7, pro bnp 474  ECG: NSR at 74bpm, LAD, cannot rule out anterior infarct (unchanged)  LE Doppler: Left lower extremity deep venous thrombosis: above and below the knee.  CT Head: Stable exam. No acute intracranial hemorrhage, vasogenic edema or extra-axial collection. Chronic findings as above.  CTA: No pulmonary embolism. Evaluation of subsegmental areas limited by poor opacification and motion.  Received heparin gtt in ED

## 2023-05-28 DIAGNOSIS — I10 ESSENTIAL (PRIMARY) HYPERTENSION: ICD-10-CM

## 2023-05-28 DIAGNOSIS — I80.229 PHLEBITIS AND THROMBOPHLEBITIS OF UNSPECIFIED POPLITEAL VEIN: ICD-10-CM

## 2023-05-28 DIAGNOSIS — G51.39 CLONIC HEMIFACIAL SPASM, UNSPECIFIED: ICD-10-CM

## 2023-05-28 DIAGNOSIS — Z29.9 ENCOUNTER FOR PROPHYLACTIC MEASURES, UNSPECIFIED: ICD-10-CM

## 2023-05-28 DIAGNOSIS — Z86.79 PERSONAL HISTORY OF OTHER DISEASES OF THE CIRCULATORY SYSTEM: ICD-10-CM

## 2023-05-28 DIAGNOSIS — I82.409 ACUTE EMBOLISM AND THROMBOSIS OF UNSPECIFIED DEEP VEINS OF UNSPECIFIED LOWER EXTREMITY: ICD-10-CM

## 2023-05-28 DIAGNOSIS — I61.9 NONTRAUMATIC INTRACEREBRAL HEMORRHAGE, UNSPECIFIED: ICD-10-CM

## 2023-05-28 DIAGNOSIS — D64.9 ANEMIA, UNSPECIFIED: ICD-10-CM

## 2023-05-28 DIAGNOSIS — E78.5 HYPERLIPIDEMIA, UNSPECIFIED: ICD-10-CM

## 2023-05-28 DIAGNOSIS — F03.90 UNSPECIFIED DEMENTIA, UNSPECIFIED SEVERITY, WITHOUT BEHAVIORAL DISTURBANCE, PSYCHOTIC DISTURBANCE, MOOD DISTURBANCE, AND ANXIETY: ICD-10-CM

## 2023-05-28 LAB
ANION GAP SERPL CALC-SCNC: 4 MMOL/L — LOW (ref 5–17)
APTT BLD: 119.2 SEC — HIGH (ref 27.5–35.5)
APTT BLD: 125.2 SEC — CRITICAL HIGH (ref 27.5–35.5)
APTT BLD: >200 SEC — CRITICAL HIGH (ref 27.5–35.5)
BASOPHILS # BLD AUTO: 0.05 K/UL — SIGNIFICANT CHANGE UP (ref 0–0.2)
BASOPHILS NFR BLD AUTO: 0.5 % — SIGNIFICANT CHANGE UP (ref 0–2)
BUN SERPL-MCNC: 16 MG/DL — SIGNIFICANT CHANGE UP (ref 7–23)
CALCIUM SERPL-MCNC: 8.8 MG/DL — SIGNIFICANT CHANGE UP (ref 8.5–10.1)
CHLORIDE SERPL-SCNC: 101 MMOL/L — SIGNIFICANT CHANGE UP (ref 96–108)
CO2 SERPL-SCNC: 34 MMOL/L — HIGH (ref 22–31)
CREAT SERPL-MCNC: 1 MG/DL — SIGNIFICANT CHANGE UP (ref 0.5–1.3)
EGFR: 53 ML/MIN/1.73M2 — LOW
EOSINOPHIL # BLD AUTO: 0.41 K/UL — SIGNIFICANT CHANGE UP (ref 0–0.5)
EOSINOPHIL NFR BLD AUTO: 4.1 % — SIGNIFICANT CHANGE UP (ref 0–6)
FERRITIN SERPL-MCNC: 146 NG/ML — SIGNIFICANT CHANGE UP (ref 15–150)
FOLATE SERPL-MCNC: >20 NG/ML — SIGNIFICANT CHANGE UP
GLUCOSE SERPL-MCNC: 94 MG/DL — SIGNIFICANT CHANGE UP (ref 70–99)
HCT VFR BLD CALC: 31.3 % — LOW (ref 34.5–45)
HGB BLD-MCNC: 10 G/DL — LOW (ref 11.5–15.5)
IMM GRANULOCYTES NFR BLD AUTO: 0.3 % — SIGNIFICANT CHANGE UP (ref 0–0.9)
IRON SATN MFR SERPL: 18 % — SIGNIFICANT CHANGE UP (ref 14–50)
IRON SATN MFR SERPL: 44 UG/DL — SIGNIFICANT CHANGE UP (ref 30–160)
LYMPHOCYTES # BLD AUTO: 1.66 K/UL — SIGNIFICANT CHANGE UP (ref 1–3.3)
LYMPHOCYTES # BLD AUTO: 16.7 % — SIGNIFICANT CHANGE UP (ref 13–44)
MCHC RBC-ENTMCNC: 31.4 PG — SIGNIFICANT CHANGE UP (ref 27–34)
MCHC RBC-ENTMCNC: 31.9 GM/DL — LOW (ref 32–36)
MCV RBC AUTO: 98.4 FL — SIGNIFICANT CHANGE UP (ref 80–100)
MONOCYTES # BLD AUTO: 0.84 K/UL — SIGNIFICANT CHANGE UP (ref 0–0.9)
MONOCYTES NFR BLD AUTO: 8.5 % — SIGNIFICANT CHANGE UP (ref 2–14)
NEUTROPHILS # BLD AUTO: 6.93 K/UL — SIGNIFICANT CHANGE UP (ref 1.8–7.4)
NEUTROPHILS NFR BLD AUTO: 69.9 % — SIGNIFICANT CHANGE UP (ref 43–77)
NRBC # BLD: 0 /100 WBCS — SIGNIFICANT CHANGE UP (ref 0–0)
PLATELET # BLD AUTO: 276 K/UL — SIGNIFICANT CHANGE UP (ref 150–400)
POTASSIUM SERPL-MCNC: 3.5 MMOL/L — SIGNIFICANT CHANGE UP (ref 3.5–5.3)
POTASSIUM SERPL-SCNC: 3.5 MMOL/L — SIGNIFICANT CHANGE UP (ref 3.5–5.3)
RBC # BLD: 3.18 M/UL — LOW (ref 3.8–5.2)
RBC # FLD: 14.8 % — HIGH (ref 10.3–14.5)
SODIUM SERPL-SCNC: 139 MMOL/L — SIGNIFICANT CHANGE UP (ref 135–145)
TIBC SERPL-MCNC: 244 UG/DL — SIGNIFICANT CHANGE UP (ref 220–430)
TRANSFERRIN SERPL-MCNC: 184 MG/DL — LOW (ref 200–360)
UIBC SERPL-MCNC: 201 UG/DL — SIGNIFICANT CHANGE UP (ref 110–370)
VIT B12 SERPL-MCNC: 1277 PG/ML — HIGH (ref 232–1245)
WBC # BLD: 9.92 K/UL — SIGNIFICANT CHANGE UP (ref 3.8–10.5)
WBC # FLD AUTO: 9.92 K/UL — SIGNIFICANT CHANGE UP (ref 3.8–10.5)

## 2023-05-28 PROCEDURE — 99233 SBSQ HOSP IP/OBS HIGH 50: CPT

## 2023-05-28 RX ORDER — ESCITALOPRAM OXALATE 10 MG/1
5 TABLET, FILM COATED ORAL AT BEDTIME
Refills: 0 | Status: DISCONTINUED | OUTPATIENT
Start: 2023-05-28 | End: 2023-06-02

## 2023-05-28 RX ORDER — CHOLECALCIFEROL (VITAMIN D3) 125 MCG
1000 CAPSULE ORAL DAILY
Refills: 0 | Status: DISCONTINUED | OUTPATIENT
Start: 2023-05-28 | End: 2023-06-02

## 2023-05-28 RX ORDER — FERROUS SULFATE 325(65) MG
325 TABLET ORAL
Refills: 0 | Status: DISCONTINUED | OUTPATIENT
Start: 2023-05-28 | End: 2023-06-02

## 2023-05-28 RX ORDER — BUPROPION HYDROCHLORIDE 150 MG/1
100 TABLET, EXTENDED RELEASE ORAL DAILY
Refills: 0 | Status: DISCONTINUED | OUTPATIENT
Start: 2023-05-28 | End: 2023-05-28

## 2023-05-28 RX ORDER — ATORVASTATIN CALCIUM 80 MG/1
20 TABLET, FILM COATED ORAL AT BEDTIME
Refills: 0 | Status: DISCONTINUED | OUTPATIENT
Start: 2023-05-28 | End: 2023-06-02

## 2023-05-28 RX ORDER — ACETAMINOPHEN 500 MG
650 TABLET ORAL EVERY 6 HOURS
Refills: 0 | Status: DISCONTINUED | OUTPATIENT
Start: 2023-05-28 | End: 2023-06-02

## 2023-05-28 RX ORDER — BUPROPION HYDROCHLORIDE 150 MG/1
100 TABLET, EXTENDED RELEASE ORAL DAILY
Refills: 0 | Status: DISCONTINUED | OUTPATIENT
Start: 2023-05-28 | End: 2023-06-02

## 2023-05-28 RX ORDER — ALPRAZOLAM 0.25 MG
0.25 TABLET ORAL DAILY
Refills: 0 | Status: DISCONTINUED | OUTPATIENT
Start: 2023-05-28 | End: 2023-05-30

## 2023-05-28 RX ORDER — LANOLIN ALCOHOL/MO/W.PET/CERES
3 CREAM (GRAM) TOPICAL AT BEDTIME
Refills: 0 | Status: DISCONTINUED | OUTPATIENT
Start: 2023-05-28 | End: 2023-06-02

## 2023-05-28 RX ORDER — GABAPENTIN 400 MG/1
100 CAPSULE ORAL
Refills: 0 | Status: DISCONTINUED | OUTPATIENT
Start: 2023-05-28 | End: 2023-06-02

## 2023-05-28 RX ORDER — HYDRALAZINE HCL 50 MG
25 TABLET ORAL ONCE
Refills: 0 | Status: COMPLETED | OUTPATIENT
Start: 2023-05-28 | End: 2023-05-28

## 2023-05-28 RX ORDER — SUCRALFATE 1 G
1 TABLET ORAL
Refills: 0 | Status: DISCONTINUED | OUTPATIENT
Start: 2023-05-28 | End: 2023-06-02

## 2023-05-28 RX ORDER — FUROSEMIDE 40 MG
20 TABLET ORAL DAILY
Refills: 0 | Status: DISCONTINUED | OUTPATIENT
Start: 2023-05-28 | End: 2023-06-02

## 2023-05-28 RX ORDER — ONDANSETRON 8 MG/1
4 TABLET, FILM COATED ORAL EVERY 8 HOURS
Refills: 0 | Status: DISCONTINUED | OUTPATIENT
Start: 2023-05-28 | End: 2023-06-02

## 2023-05-28 RX ADMIN — HEPARIN SODIUM 0 UNIT(S)/HR: 5000 INJECTION INTRAVENOUS; SUBCUTANEOUS at 14:06

## 2023-05-28 RX ADMIN — GABAPENTIN 100 MILLIGRAM(S): 400 CAPSULE ORAL at 17:43

## 2023-05-28 RX ADMIN — Medication 1 GRAM(S): at 05:30

## 2023-05-28 RX ADMIN — BUPROPION HYDROCHLORIDE 100 MILLIGRAM(S): 150 TABLET, EXTENDED RELEASE ORAL at 14:56

## 2023-05-28 RX ADMIN — HEPARIN SODIUM 800 UNIT(S)/HR: 5000 INJECTION INTRAVENOUS; SUBCUTANEOUS at 15:24

## 2023-05-28 RX ADMIN — HEPARIN SODIUM 1100 UNIT(S)/HR: 5000 INJECTION INTRAVENOUS; SUBCUTANEOUS at 00:39

## 2023-05-28 RX ADMIN — Medication 1000 UNIT(S): at 12:01

## 2023-05-28 RX ADMIN — Medication 0.25 MILLIGRAM(S): at 12:01

## 2023-05-28 RX ADMIN — HEPARIN SODIUM 1000 UNIT(S)/HR: 5000 INJECTION INTRAVENOUS; SUBCUTANEOUS at 07:21

## 2023-05-28 RX ADMIN — Medication 20 MILLIGRAM(S): at 05:29

## 2023-05-28 RX ADMIN — GABAPENTIN 100 MILLIGRAM(S): 400 CAPSULE ORAL at 05:29

## 2023-05-28 RX ADMIN — Medication 325 MILLIGRAM(S): at 05:29

## 2023-05-28 RX ADMIN — HEPARIN SODIUM 1000 UNIT(S)/HR: 5000 INJECTION INTRAVENOUS; SUBCUTANEOUS at 07:18

## 2023-05-28 RX ADMIN — Medication 25 MILLIGRAM(S): at 02:00

## 2023-05-28 RX ADMIN — ATORVASTATIN CALCIUM 20 MILLIGRAM(S): 80 TABLET, FILM COATED ORAL at 21:10

## 2023-05-28 RX ADMIN — Medication 1 TABLET(S): at 12:01

## 2023-05-28 RX ADMIN — ESCITALOPRAM OXALATE 5 MILLIGRAM(S): 10 TABLET, FILM COATED ORAL at 21:10

## 2023-05-28 RX ADMIN — Medication 1 GRAM(S): at 17:43

## 2023-05-28 RX ADMIN — HEPARIN SODIUM 700 UNIT(S)/HR: 5000 INJECTION INTRAVENOUS; SUBCUTANEOUS at 21:57

## 2023-05-28 RX ADMIN — HEPARIN SODIUM 800 UNIT(S)/HR: 5000 INJECTION INTRAVENOUS; SUBCUTANEOUS at 19:01

## 2023-05-28 NOTE — CONSULT NOTE ADULT - ASSESSMENT
89yoF with PMHx of PE/DVT in May 2021 tx'd with Eliquis, spontaneous thalamic bleed on Nov 2022 (eliquis d/c'd), HTN presents to ED for L leg swelling and redness x 3 weeks. U/S with Nonocclusive DVT in L common femoral, popliteal & gastrocnemius veins; occlusive DVT in L   superficial and deep femoral veins; visualized left external iliac vein is patent. Vascular surgery consulted for management of DVT. VSS, exam with erythema/edema of the LLE. CT head stable, no acute intracranial hemorrhage, CT angio with no findings of PE.

## 2023-05-28 NOTE — PATIENT PROFILE ADULT - FUNCTIONAL ASSESSMENT - BASIC MOBILITY 6.
1-calculated by average/Not able to assess (calculate score using [facility] averaging method)

## 2023-05-28 NOTE — CONSULT NOTE ADULT - ASSESSMENT
89yo woman w hx HTN, PE (dx'd 5/2021, was on Eliquis, stopped when had spon thalamic bleed/SDH 11/2022)  Adm w several weeks left leg swelling that incr x 3 days  Doppler Duplex left leg 5/27 sig for Nonocclusive DVT left common fem/pop/gastrocnemius, and occlusive DVT left superficail and deep fem v    seen by Vascular Surgery    -recureent thromboembolism first PE 2021 now let leg DVT, w pt chronically decreased activity therefore at continued incr thromboembolic risk, but also high risk of bleeding as noted by the spontaneous thalamic bleed in 2022, also advanced age  -recommend IVC filter, avoid long term anticoagulation    discussed w both daughter  discussed w Medicine    thank you, please call if any questions  -

## 2023-05-28 NOTE — PROGRESS NOTE ADULT - ASSESSMENT
89y female with PMHx of HTN, PE (previously on eliquis), SDH while on Eliquis (now off due to spontaneous thalamic bleed) presents to ED for 3 days of L leg swelling and pain. Admitted for acute extensive LLE DVT management.

## 2023-05-28 NOTE — PROGRESS NOTE ADULT - SUBJECTIVE AND OBJECTIVE BOX
Patient is a 89y old  Female who presents with a chief complaint of acute DVT (28 May 2023 02:42)       INTERVAL HPI/OVERNIGHT EVENTS: Patient seen and examined at bedside. Denies any new complaints but wants to go home, had HHA.     MEDICATIONS  (STANDING):  ALPRAZolam 0.25 milliGRAM(s) Oral daily  atorvastatin 20 milliGRAM(s) Oral at bedtime  buPROPion SR (12-Hour) 100 milliGRAM(s) Oral daily  cholecalciferol 1000 Unit(s) Oral daily  escitalopram 5 milliGRAM(s) Oral at bedtime  ferrous    sulfate 325 milliGRAM(s) Oral <User Schedule>  furosemide    Tablet 20 milliGRAM(s) Oral daily  gabapentin 100 milliGRAM(s) Oral two times a day  heparin  Infusion.  Unit(s)/Hr (11 mL/Hr) IV Continuous <Continuous>  multivitamin 1 Tablet(s) Oral daily  sucralfate 1 Gram(s) Oral two times a day    MEDICATIONS  (PRN):  acetaminophen     Tablet .. 650 milliGRAM(s) Oral every 6 hours PRN Temp greater or equal to 38C (100.4F), Mild Pain (1 - 3)  aluminum hydroxide/magnesium hydroxide/simethicone Suspension 30 milliLiter(s) Oral every 4 hours PRN Dyspepsia  heparin   Injectable 2500 Unit(s) IV Push every 6 hours PRN For aPTT between 40 - 57  heparin   Injectable 5000 Unit(s) IV Push every 6 hours PRN For aPTT less than 40  melatonin 3 milliGRAM(s) Oral at bedtime PRN Insomnia  ondansetron Injectable 4 milliGRAM(s) IV Push every 8 hours PRN Nausea and/or Vomiting      Allergies    penicillins (Unknown)  mold, cheese and wine (Unknown)    Intolerances        REVIEW OF SYSTEMS:  CONSTITUTIONAL: denies fever, chills, fatigue, weakness  HEENT: denies blurred vision, sore throat  SKIN: denies new lesions, rash  CARDIOVASCULAR: denies chest pain, chest pressure, palpitations  RESPIRATORY: denies shortness of breath, sputum production  GASTROINTESTINAL: denies nausea, vomiting, diarrhea, abdominal pain  GENITOURINARY: denies dysuria, discharge  NEUROLOGICAL: denies numbness, headache, focal weakness  MUSCULOSKELETAL: denies new joint pain, muscle aches  HEMATOLOGIC: denies gross bleeding, bruising, +LLE swelling and redness  LYMPHATICS: denies enlarged lymph nodes  PSYCHIATRIC: denies recent changes in anxiety, depression  ENDOCRINOLOGIC: denies sweating, cold or heat intolerance    Vital Signs Last 24 Hrs  T(C): 36.4 (28 May 2023 04:26), Max: 36.7 (27 May 2023 17:35)  T(F): 97.5 (28 May 2023 04:26), Max: 98 (27 May 2023 17:35)  HR: 73 (28 May 2023 04:26) (67 - 75)  BP: 163/81 (28 May 2023 04:26) (156/90 - 174/99)  BP(mean): --  RR: 17 (28 May 2023 04:26) (17 - 19)  SpO2: 92% (28 May 2023 04:26) (90% - 94%)    Parameters below as of 28 May 2023 04:26  Patient On (Oxygen Delivery Method): nasal cannula        PHYSICAL EXAM:  GENERAL: patient appears well, no acute distress  EYES: sclera clear, no exudates  ENMT: oropharynx clear without erythema, no exudates, moist mucous membranes  NECK: supple, soft  LUNGS: good air entry bilaterally, clear to auscultation, symmetric breath sounds, no wheezing or rhonchi appreciated  HEART: soft S1/S2, regular rate and rhythm, +murmur  GASTROINTESTINAL: abdomen is soft, nontender, nondistended, normoactive bowel sounds, no palpable masses  INTEGUMENT: good skin turgor, no lesions noted  MUSCULOSKELETAL: no clubbing or cyanosis, no obvious deformity  NEUROLOGIC: awake, alert, oriented x3, good muscle tone in 4 extremities, no obvious sensory deficits  PSYCHIATRIC: mood is good, affect is congruent, linear and logical thought process  HEME/LYMPH: no obvious ecchymosis or petechiae, significant edema of left lower extremity up to distal thigh with erythema, non TTP  LABS:                        10.0   9.92  )-----------( 276      ( 28 May 2023 05:00 )             31.3     28 May 2023 05:00    139    |  101    |  16     ----------------------------<  94     3.5     |  34     |  1.00     Ca    8.8        28 May 2023 05:00    TPro  7.0    /  Alb  2.7    /  TBili  0.3    /  DBili  x      /  AST  26     /  ALT  21     /  AlkPhos  94     27 May 2023 21:00    PT/INR - ( 27 May 2023 21:00 )   PT: 12.0 sec;   INR: 1.03 ratio         PTT - ( 28 May 2023 05:00 )  PTT:119.2 sec  CAPILLARY BLOOD GLUCOSE        BLOOD CULTURE    RADIOLOGY & ADDITIONAL TESTS:    Imaging Personally Reviewed:  [ ] YES     Consultant(s) Notes Reviewed:      Care Discussed with Consultants/Other Providers: Patient is a 89y old  Female who presents with a chief complaint of acute DVT (28 May 2023 02:42)       INTERVAL HPI/OVERNIGHT EVENTS: Patient seen and examined at bedside. Denies any new complaints but wants to go home, has HHA.     MEDICATIONS  (STANDING):  ALPRAZolam 0.25 milliGRAM(s) Oral daily  atorvastatin 20 milliGRAM(s) Oral at bedtime  buPROPion SR (12-Hour) 100 milliGRAM(s) Oral daily  cholecalciferol 1000 Unit(s) Oral daily  escitalopram 5 milliGRAM(s) Oral at bedtime  ferrous    sulfate 325 milliGRAM(s) Oral <User Schedule>  furosemide    Tablet 20 milliGRAM(s) Oral daily  gabapentin 100 milliGRAM(s) Oral two times a day  heparin  Infusion.  Unit(s)/Hr (11 mL/Hr) IV Continuous <Continuous>  multivitamin 1 Tablet(s) Oral daily  sucralfate 1 Gram(s) Oral two times a day    MEDICATIONS  (PRN):  acetaminophen     Tablet .. 650 milliGRAM(s) Oral every 6 hours PRN Temp greater or equal to 38C (100.4F), Mild Pain (1 - 3)  aluminum hydroxide/magnesium hydroxide/simethicone Suspension 30 milliLiter(s) Oral every 4 hours PRN Dyspepsia  heparin   Injectable 2500 Unit(s) IV Push every 6 hours PRN For aPTT between 40 - 57  heparin   Injectable 5000 Unit(s) IV Push every 6 hours PRN For aPTT less than 40  melatonin 3 milliGRAM(s) Oral at bedtime PRN Insomnia  ondansetron Injectable 4 milliGRAM(s) IV Push every 8 hours PRN Nausea and/or Vomiting      Allergies    penicillins (Unknown)  mold, cheese and wine (Unknown)    Intolerances        REVIEW OF SYSTEMS:  CONSTITUTIONAL: denies fever, chills, fatigue, weakness  HEENT: denies blurred vision, sore throat  SKIN: denies new lesions, rash  CARDIOVASCULAR: denies chest pain, chest pressure, palpitations  RESPIRATORY: denies shortness of breath, sputum production  GASTROINTESTINAL: denies nausea, vomiting, diarrhea, abdominal pain  GENITOURINARY: denies dysuria, discharge  NEUROLOGICAL: denies numbness, headache, focal weakness  MUSCULOSKELETAL: denies new joint pain, muscle aches  HEMATOLOGIC: denies gross bleeding, bruising, +LLE swelling and redness  LYMPHATICS: denies enlarged lymph nodes  PSYCHIATRIC: denies recent changes in anxiety, depression  ENDOCRINOLOGIC: denies sweating, cold or heat intolerance    Vital Signs Last 24 Hrs  T(C): 36.4 (28 May 2023 04:26), Max: 36.7 (27 May 2023 17:35)  T(F): 97.5 (28 May 2023 04:26), Max: 98 (27 May 2023 17:35)  HR: 73 (28 May 2023 04:26) (67 - 75)  BP: 163/81 (28 May 2023 04:26) (156/90 - 174/99)  BP(mean): --  RR: 17 (28 May 2023 04:26) (17 - 19)  SpO2: 92% (28 May 2023 04:26) (90% - 94%)    Parameters below as of 28 May 2023 04:26  Patient On (Oxygen Delivery Method): nasal cannula        PHYSICAL EXAM:  GENERAL: patient appears well, no acute distress  EYES: sclera clear, no exudates  ENMT: oropharynx clear without erythema, no exudates, moist mucous membranes  NECK: supple, soft  LUNGS: good air entry bilaterally, clear to auscultation, symmetric breath sounds, no wheezing or rhonchi appreciated  HEART: soft S1/S2, regular rate and rhythm, +murmur  GASTROINTESTINAL: abdomen is soft, nontender, nondistended, normoactive bowel sounds, no palpable masses  INTEGUMENT: good skin turgor, no lesions noted  MUSCULOSKELETAL: no clubbing or cyanosis, no obvious deformity  NEUROLOGIC: awake, alert, oriented x3, good muscle tone in 4 extremities, no obvious sensory deficits  PSYCHIATRIC: mood is good, affect is congruent, linear and logical thought process  HEME/LYMPH: no obvious ecchymosis or petechiae, significant edema of left lower extremity up to distal thigh with erythema, non TTP  LABS:                        10.0   9.92  )-----------( 276      ( 28 May 2023 05:00 )             31.3     28 May 2023 05:00    139    |  101    |  16     ----------------------------<  94     3.5     |  34     |  1.00     Ca    8.8        28 May 2023 05:00    TPro  7.0    /  Alb  2.7    /  TBili  0.3    /  DBili  x      /  AST  26     /  ALT  21     /  AlkPhos  94     27 May 2023 21:00    PT/INR - ( 27 May 2023 21:00 )   PT: 12.0 sec;   INR: 1.03 ratio         PTT - ( 28 May 2023 05:00 )  PTT:119.2 sec  CAPILLARY BLOOD GLUCOSE        BLOOD CULTURE    RADIOLOGY & ADDITIONAL TESTS:    Imaging Personally Reviewed:  [ ] YES     Consultant(s) Notes Reviewed:      Care Discussed with Consultants/Other Providers:

## 2023-05-28 NOTE — CONSULT NOTE ADULT - PROBLEM SELECTOR RECOMMENDATION 9
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 Hannah Edwards

## 2023-05-28 NOTE — PROGRESS NOTE ADULT - TIME BILLING
Note written by attending. Meds, labs, vitals, chart reviewed. Plan d/w patient. Suggested not ready for discharge yet. Note written by attending. Meds, labs, vitals, chart reviewed. Plan d/w patient. Suggested not ready for discharge yet. D/e daughters, one over the phone and another at bedside. They agreed with IVC filter and stated that patient becomes confused in the hospital and at baseline also and they will talk to patient and advised to talk to them about discharge planning since she is not with it at times

## 2023-05-28 NOTE — CONSULT NOTE ADULT - NS ATTEND AMEND GEN_ALL_CORE FT
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 This has been held since that time.   On hep gtt now without incident  No complications  If contraindication to anticoagulation is determined, IR for IVC filter placement this week  Patient is bedbound and has a history of prior DVT, so she is high risk for IVC thrombosis if IVC filter is placed and anticoagulation is withheld

## 2023-05-29 LAB
ANION GAP SERPL CALC-SCNC: 2 MMOL/L — LOW (ref 5–17)
APTT BLD: 101.2 SEC — HIGH (ref 27.5–35.5)
APTT BLD: 70.7 SEC — HIGH (ref 27.5–35.5)
APTT BLD: 83 SEC — HIGH (ref 27.5–35.5)
BUN SERPL-MCNC: 17 MG/DL — SIGNIFICANT CHANGE UP (ref 7–23)
CALCIUM SERPL-MCNC: 8.3 MG/DL — LOW (ref 8.5–10.1)
CHLORIDE SERPL-SCNC: 105 MMOL/L — SIGNIFICANT CHANGE UP (ref 96–108)
CO2 SERPL-SCNC: 34 MMOL/L — HIGH (ref 22–31)
CREAT SERPL-MCNC: 1 MG/DL — SIGNIFICANT CHANGE UP (ref 0.5–1.3)
EGFR: 53 ML/MIN/1.73M2 — LOW
GLUCOSE SERPL-MCNC: 93 MG/DL — SIGNIFICANT CHANGE UP (ref 70–99)
HCT VFR BLD CALC: 27.7 % — LOW (ref 34.5–45)
HGB BLD-MCNC: 9 G/DL — LOW (ref 11.5–15.5)
MCHC RBC-ENTMCNC: 31.7 PG — SIGNIFICANT CHANGE UP (ref 27–34)
MCHC RBC-ENTMCNC: 32.5 GM/DL — SIGNIFICANT CHANGE UP (ref 32–36)
MCV RBC AUTO: 97.5 FL — SIGNIFICANT CHANGE UP (ref 80–100)
NRBC # BLD: 0 /100 WBCS — SIGNIFICANT CHANGE UP (ref 0–0)
PLATELET # BLD AUTO: 263 K/UL — SIGNIFICANT CHANGE UP (ref 150–400)
POTASSIUM SERPL-MCNC: 3.8 MMOL/L — SIGNIFICANT CHANGE UP (ref 3.5–5.3)
POTASSIUM SERPL-SCNC: 3.8 MMOL/L — SIGNIFICANT CHANGE UP (ref 3.5–5.3)
RBC # BLD: 2.84 M/UL — LOW (ref 3.8–5.2)
RBC # FLD: 14.9 % — HIGH (ref 10.3–14.5)
SODIUM SERPL-SCNC: 141 MMOL/L — SIGNIFICANT CHANGE UP (ref 135–145)
WBC # BLD: 10.18 K/UL — SIGNIFICANT CHANGE UP (ref 3.8–10.5)
WBC # FLD AUTO: 10.18 K/UL — SIGNIFICANT CHANGE UP (ref 3.8–10.5)

## 2023-05-29 PROCEDURE — 99232 SBSQ HOSP IP/OBS MODERATE 35: CPT

## 2023-05-29 PROCEDURE — 99239 HOSP IP/OBS DSCHRG MGMT >30: CPT

## 2023-05-29 RX ADMIN — HEPARIN SODIUM 600 UNIT(S)/HR: 5000 INJECTION INTRAVENOUS; SUBCUTANEOUS at 19:00

## 2023-05-29 RX ADMIN — Medication 1 GRAM(S): at 05:18

## 2023-05-29 RX ADMIN — HEPARIN SODIUM 600 UNIT(S)/HR: 5000 INJECTION INTRAVENOUS; SUBCUTANEOUS at 19:03

## 2023-05-29 RX ADMIN — HEPARIN SODIUM 600 UNIT(S)/HR: 5000 INJECTION INTRAVENOUS; SUBCUTANEOUS at 07:03

## 2023-05-29 RX ADMIN — BUPROPION HYDROCHLORIDE 100 MILLIGRAM(S): 150 TABLET, EXTENDED RELEASE ORAL at 12:10

## 2023-05-29 RX ADMIN — Medication 20 MILLIGRAM(S): at 05:18

## 2023-05-29 RX ADMIN — ATORVASTATIN CALCIUM 20 MILLIGRAM(S): 80 TABLET, FILM COATED ORAL at 21:16

## 2023-05-29 RX ADMIN — Medication 1000 UNIT(S): at 12:10

## 2023-05-29 RX ADMIN — Medication 1 TABLET(S): at 12:10

## 2023-05-29 RX ADMIN — GABAPENTIN 100 MILLIGRAM(S): 400 CAPSULE ORAL at 18:08

## 2023-05-29 RX ADMIN — ESCITALOPRAM OXALATE 5 MILLIGRAM(S): 10 TABLET, FILM COATED ORAL at 21:16

## 2023-05-29 RX ADMIN — GABAPENTIN 100 MILLIGRAM(S): 400 CAPSULE ORAL at 05:17

## 2023-05-29 RX ADMIN — HEPARIN SODIUM 600 UNIT(S)/HR: 5000 INJECTION INTRAVENOUS; SUBCUTANEOUS at 12:16

## 2023-05-29 RX ADMIN — Medication 0.25 MILLIGRAM(S): at 11:32

## 2023-05-29 RX ADMIN — Medication 1 GRAM(S): at 18:08

## 2023-05-29 RX ADMIN — HEPARIN SODIUM 600 UNIT(S)/HR: 5000 INJECTION INTRAVENOUS; SUBCUTANEOUS at 05:10

## 2023-05-29 NOTE — PROGRESS NOTE ADULT - SUBJECTIVE AND OBJECTIVE BOX
VASCULAR SURGERY PA NOTE ON BEHALF OF DR. LOCKE:    S: Patient seen and examined at bedside.       MEDICATIONS:  acetaminophen     Tablet .. 650 milliGRAM(s) Oral every 6 hours PRN  ALPRAZolam 0.25 milliGRAM(s) Oral daily  aluminum hydroxide/magnesium hydroxide/simethicone Suspension 30 milliLiter(s) Oral every 4 hours PRN  atorvastatin 20 milliGRAM(s) Oral at bedtime  buPROPion SR (12-Hour) 100 milliGRAM(s) Oral daily  cholecalciferol 1000 Unit(s) Oral daily  escitalopram 5 milliGRAM(s) Oral at bedtime  ferrous    sulfate 325 milliGRAM(s) Oral <User Schedule>  furosemide    Tablet 20 milliGRAM(s) Oral daily  gabapentin 100 milliGRAM(s) Oral two times a day  heparin   Injectable 2500 Unit(s) IV Push every 6 hours PRN  heparin   Injectable 5000 Unit(s) IV Push every 6 hours PRN  heparin  Infusion.  Unit(s)/Hr IV Continuous <Continuous>  melatonin 3 milliGRAM(s) Oral at bedtime PRN  multivitamin 1 Tablet(s) Oral daily  ondansetron Injectable 4 milliGRAM(s) IV Push every 8 hours PRN  sucralfate 1 Gram(s) Oral two times a day      O:  Vital Signs Last 24 Hrs  T(C): 36.9 (29 May 2023 05:05), Max: 36.9 (29 May 2023 05:05)  T(F): 98.5 (29 May 2023 05:05), Max: 98.5 (29 May 2023 05:05)  HR: 79 (29 May 2023 05:05) (73 - 79)  BP: 116/67 (29 May 2023 05:05) (116/67 - 145/83)  BP(mean): --  RR: 17 (29 May 2023 05:05) (17 - 17)  SpO2: 94% (29 May 2023 05:05) (93% - 95%)    Parameters below as of 29 May 2023 05:05  Patient On (Oxygen Delivery Method): nasal cannula  O2 Flow (L/min): 2      I&O SUMMARY:    05-27-23 @ 07:01  -  05-28-23 @ 07:00  --------------------------------------------------------  IN: 66 mL / OUT: 300 mL / NET: -234 mL    05-28-23 @ 07:01  -  05-29-23 @ 06:39  --------------------------------------------------------  IN: 372 mL / OUT: 0 mL / NET: 372 mL        PHYSICAL EXAM:  Lungs: CTA bilat without W/R/R  Card: S1S2  Abd: Soft, NT, ND.  +BS x 4.  No rebound/guarding.    Ext: Calves soft, NT, without edema bilat    LABS:                        9.0    10.18 )-----------( 263      ( 29 May 2023 04:20 )             27.7     05-29    141  |  105  |  17  ----------------------------<  93  3.8   |  34<H>  |  1.00    Ca    8.3<L>      29 May 2023 04:20    TPro  7.0  /  Alb  2.7<L>  /  TBili  0.3  /  DBili  x   /  AST  26  /  ALT  21  /  AlkPhos  94  05-27    PT/INR - ( 27 May 2023 21:00 )   PT: 12.0 sec;   INR: 1.03 ratio         PTT - ( 29 May 2023 04:20 )  PTT:101.2 sec          RADIOLOGY:    A:    P:       VASCULAR SURGERY PA NOTE ON BEHALF OF DR. YOUNG:    S: Patient seen and examined at bedside.   No acute events overnight.  Patient has no new complaints this am.  Denies fevers, chills, chest pain, SOB, palpitations, calf pain.      MEDICATIONS:  acetaminophen     Tablet .. 650 milliGRAM(s) Oral every 6 hours PRN  ALPRAZolam 0.25 milliGRAM(s) Oral daily  aluminum hydroxide/magnesium hydroxide/simethicone Suspension 30 milliLiter(s) Oral every 4 hours PRN  atorvastatin 20 milliGRAM(s) Oral at bedtime  buPROPion SR (12-Hour) 100 milliGRAM(s) Oral daily  cholecalciferol 1000 Unit(s) Oral daily  escitalopram 5 milliGRAM(s) Oral at bedtime  ferrous    sulfate 325 milliGRAM(s) Oral <User Schedule>  furosemide    Tablet 20 milliGRAM(s) Oral daily  gabapentin 100 milliGRAM(s) Oral two times a day  heparin   Injectable 2500 Unit(s) IV Push every 6 hours PRN  heparin   Injectable 5000 Unit(s) IV Push every 6 hours PRN  heparin  Infusion.  Unit(s)/Hr IV Continuous <Continuous>  melatonin 3 milliGRAM(s) Oral at bedtime PRN  multivitamin 1 Tablet(s) Oral daily  ondansetron Injectable 4 milliGRAM(s) IV Push every 8 hours PRN  sucralfate 1 Gram(s) Oral two times a day      O:  Vital Signs Last 24 Hrs  T(C): 36.9 (29 May 2023 05:05), Max: 36.9 (29 May 2023 05:05)  T(F): 98.5 (29 May 2023 05:05), Max: 98.5 (29 May 2023 05:05)  HR: 79 (29 May 2023 05:05) (73 - 79)  BP: 116/67 (29 May 2023 05:05) (116/67 - 145/83)  BP(mean): --  RR: 17 (29 May 2023 05:05) (17 - 17)  SpO2: 94% (29 May 2023 05:05) (93% - 95%)    Parameters below as of 29 May 2023 05:05  Patient On (Oxygen Delivery Method): nasal cannula  O2 Flow (L/min): 2      I&O SUMMARY:    05-27-23 @ 07:01 - 05-28-23 @ 07:00  --------------------------------------------------------  IN: 66 mL / OUT: 300 mL / NET: -234 mL    05-28-23 @ 07:01  -  05-29-23 @ 06:39  --------------------------------------------------------  IN: 372 mL / OUT: 0 mL / NET: 372 mL        PHYSICAL EXAM:  Lungs: CTA bilat without W/R/R  Card: S1S2  Abd: Soft, NT, ND.  +BS x 4.  No rebound/guarding.    Ext: LLE warm to touch, swollen, erythremic in distal extremity.      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  Currently on heparin gtt.  VSS.  Slight downtrend  H/H (9/27.7).        Plan:  - Continue heparin gtt  - Tentatively plan for IVC filter with IR this week  - Rest of care per primary team  - Vascular surgery will follow  - To discuss with Dr. Young

## 2023-05-29 NOTE — PROGRESS NOTE ADULT - SUBJECTIVE AND OBJECTIVE BOX
Neurology Follow up note    GIACOMO [id number]    HPI:  89y female with PMHx of HTN, PE (previously on eliquis), 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 Patient and family noticed swelling started a couple of weeks ago, and in the last three days it has gotten much worse. Also noted redness of left leg. She denies any pain. Daughter notes that patient also fell last night on her right knee while going from her motorized stair chair to her wheelchair. Denies head strike or loss of consciousness. Patient denies any shortness of breath, chest pain, lightheadedness, fevers, chills.     ED Course:  Vitals: T 98, HR 67, /90, RR 19, spo2% 93% on RA  Labs significant for: H/H 10.3/32.6, HCO3 34, Albumin 2.7, pro bnp 474  ECG: NSR at 74bpm, LAD, cannot rule out anterior infarct (unchanged)  LE Doppler: Left lower extremity deep venous thrombosis: above and below the knee.  CT Head: Stable exam. No acute intracranial hemorrhage, vasogenic edema or extra-axial collection. Chronic findings as above.  CTA: No pulmonary embolism. Evaluation of subsegmental areas limited by poor opacification and motion.  Received heparin gtt in ED (27 May 2023 23:47)      Interval History -no new events    Patient is seen, chart was reviewed and case was discussed with the treatment team.  Pt is not in any distress.   Lying on bed comfortably.   .    Vital Signs Last 24 Hrs  T(C): 36.4 (29 May 2023 12:08), Max: 36.9 (29 May 2023 05:05)  T(F): 97.6 (29 May 2023 12:08), Max: 98.5 (29 May 2023 05:05)  HR: 74 (29 May 2023 12:08) (73 - 79)  BP: 161/87 (29 May 2023 12:08) (116/67 - 161/87)  BP(mean): --  RR: 19 (29 May 2023 12:08) (17 - 19)  SpO2: 91% (29 May 2023 12:08) (91% - 95%)    Parameters below as of 29 May 2023 12:08  Patient On (Oxygen Delivery Method): nasal cannula  O2 Flow (L/min): 2          REVIEW OF SYSTEMS:    Constitutional: No fever, weight loss or fatigue  Eyes: No eye pain, visual disturbances, or discharge  ENT:  No difficulty hearing, tinnitus, vertigo; No sinus or throat pain  Neck: No pain or stiffness  Respiratory: No cough, wheezing, chills or hemoptysis  Cardiovascular: No chest pain, palpitations, shortness of breath, dizziness or leg swelling  Gastrointestinal: No abdominal or epigastric pain. No nausea, vomiting or hematemesis  Genitourinary: No dysuria, frequency, hematuria or incontinence  Neurological: No headaches, memory loss, loss of strength, numbness or tremors  Psychiatric: No depression, anxiety, mood swings or difficulty sleeping  Musculoskeletal: No joint pain or swelling;  Skin: No itching, burning, rashes or lesions   Lymph Nodes: No enlarged glands  Endocrine: No heat or cold intolerance; No hair loss,   Allergy and Immunologic: No hives or eczema    On Neurological Examination:    Mental Status - Pt is alert, awake, oriented X3.Follows commands well and able to answer questions appropriately.Mood and affect  normal    Speech -  Normal.     Cranial Nerves - Pupils 3 mm equal and reactive to light, extraocular eye movements intact. Pt has no visual field deficit.  Pt has no facial asymmetry. Facial sensation is intact.Tongue - is in midline.    Muscle tone - is normal  .      Motor Exam - 4+/5 of UE  LE 3-4/5  No drift. No shaking or tremors.    Sensory Exam -. Pt withdraws all extremities equally on stimulation. No asymmetry seen. No complaints of tingling, numbness.    coordination:    Finger to nose: normal      Deep tendon Reflexes - 2 plus all over.          Neck Supple -  Yes.     MEDICATIONS    acetaminophen     Tablet .. 650 milliGRAM(s) Oral every 6 hours PRN  ALPRAZolam 0.25 milliGRAM(s) Oral daily  aluminum hydroxide/magnesium hydroxide/simethicone Suspension 30 milliLiter(s) Oral every 4 hours PRN  atorvastatin 20 milliGRAM(s) Oral at bedtime  buPROPion SR (12-Hour) 100 milliGRAM(s) Oral daily  cholecalciferol 1000 Unit(s) Oral daily  escitalopram 5 milliGRAM(s) Oral at bedtime  ferrous    sulfate 325 milliGRAM(s) Oral <User Schedule>  furosemide    Tablet 20 milliGRAM(s) Oral daily  gabapentin 100 milliGRAM(s) Oral two times a day  heparin   Injectable 2500 Unit(s) IV Push every 6 hours PRN  heparin   Injectable 5000 Unit(s) IV Push every 6 hours PRN  heparin  Infusion.  Unit(s)/Hr IV Continuous <Continuous>  melatonin 3 milliGRAM(s) Oral at bedtime PRN  multivitamin 1 Tablet(s) Oral daily  ondansetron Injectable 4 milliGRAM(s) IV Push every 8 hours PRN  sucralfate 1 Gram(s) Oral two times a day      Allergies    penicillins (Unknown)  mold, cheese and wine (Unknown)    Intolerances        LABS:  CBC Full  -  ( 29 May 2023 04:20 )  WBC Count : 10.18 K/uL  RBC Count : 2.84 M/uL  Hemoglobin : 9.0 g/dL  Hematocrit : 27.7 %  Platelet Count - Automated : 263 K/uL  Mean Cell Volume : 97.5 fl  Mean Cell Hemoglobin : 31.7 pg  Mean Cell Hemoglobin Concentration : 32.5 gm/dL  Auto Neutrophil # : x  Auto Lymphocyte # : x  Auto Monocyte # : x        05-29    141  |  105  |  17  ----------------------------<  93  3.8   |  34<H>  |  1.00    Ca    8.3<L>      29 May 2023 04:20    TPro  7.0  /  Alb  2.7<L>  /  TBili  0.3  /  DBili  x   /  AST  26  /  ALT  21  /  AlkPhos  94  05-27    Hemoglobin A1C:     Vitamin B12     RADIOLOGY    ASSESSMENT AND PLAN:      SEEN FOR NEURO CLEARANCE FOR AC for DVT  HX ICH(SDH/parenchimal )    tolerating heparin  for IVC tomorrow  management dw daughter  Physical therapy evaluation.  OOB to chair/ambulation with assistance only.  Pain is accessed and addressed.  Plan of care was discussed with family. Questions answered.  Would continue to follow.

## 2023-05-29 NOTE — CARE COORDINATION ASSESSMENT. - NSCAREPROVIDERS_GEN_ALL_CORE_FT
CARE PROVIDERS:  Accepting Physician: Darren Erickson  Administration: Jabari Gallegos  Admitting: Darren Erickson  Attending: Romel Denton  Case Management: Behringer, Megan  Consultant: Charlie Kwan  Consultant: Adrianna Saenz  Consultant: Manvar-Singh, Pallavi  Consultant: Jarad Kamara  Consultant: Maren Meneses  ED ACP: Kimi Rosado  ED Attending: Jalil Arreola  ED Nurse: Ria Craven  HIM/Billing & Coding: Tyra Contreras  Nurse: Divya Neves  Nurse: Christophe Neves  Ordered: ADM, User  Outpatient Provider: Anil Amaya  Outpatient Provider: Ezekiel Covarrubias  Outpatient Provider: Josue Mendoza  Outpatient Provider: Jillian Chu  Override: Selene Kim  Override: Jesse Barrientos  Override: Mandi Goodson  Override: Ana Reynaga  PCA/Nursing Assistant: Mandi Goodson  PCA/Nursing Assistant: Jesse Barrientos  Primary Team: Maren Camacho  Primary Team: Romel Denton  Quality Review: Chio Rene  Registered Dietitian: Jennifer Rodriguez  UR// Supp. Assoc.: Radha Diaz  UR// Supp. Assoc.: Wilber Rivas

## 2023-05-29 NOTE — PATIENT CHOICE NOTE. - NSPTCHOICESTATE_GEN_ALL_CORE

## 2023-05-29 NOTE — CARE COORDINATION ASSESSMENT. - OTHER PERTINENT DISCHARGE PLANNING INFORMATION:
Discussed DC dispo with Johana , HCP over the phone .  Explained role of CM, verbalized understanding. johana was made aware a CM will remain available through hospitalization.  Contact information given in discharge/ transitions resource folder. Adm with DVT and planned to have IVC filter placed.

## 2023-05-29 NOTE — PROGRESS NOTE ADULT - ASSESSMENT
89y female with PMHx of HTN, PE (previously on eliquis), SDH while on Eliquis (now off due to spontaneous thalamic bleed) presents to ED for 3 days of L leg swelling and pain. Admitted for acute extensive LLE DVT management, on IV Heparin, planned for IVC filter

## 2023-05-29 NOTE — CARE COORDINATION ASSESSMENT. - PATIENT WAS INVOLVED WITH A HOMECARE AGENCY PRIOR TO ADMISSION?
24h HHA private pay  blood work - as needed  interested in house calls- unable to get mom to MD office

## 2023-05-29 NOTE — CAREGIVER ENGAGEMENT NOTE - CAREGIVER OUTREACH NOTES - FREE TEXT
Discussed DC dispo, transportation- home with ambulette and that family would be responsible for payment. we talked about house call and that CM would leave list at bedside. Dgts will arrange for HHA on DC

## 2023-05-29 NOTE — PROGRESS NOTE ADULT - SUBJECTIVE AND OBJECTIVE BOX
Patient is a 89y old  Female who presents with a chief complaint of acute DVT (29 May 2023 06:39)       INTERVAL HPI/OVERNIGHT EVENTS: Patient seen and examined at bedside. No new events/ complaints noted     MEDICATIONS  (STANDING):  ALPRAZolam 0.25 milliGRAM(s) Oral daily  atorvastatin 20 milliGRAM(s) Oral at bedtime  buPROPion SR (12-Hour) 100 milliGRAM(s) Oral daily  cholecalciferol 1000 Unit(s) Oral daily  escitalopram 5 milliGRAM(s) Oral at bedtime  ferrous    sulfate 325 milliGRAM(s) Oral <User Schedule>  furosemide    Tablet 20 milliGRAM(s) Oral daily  gabapentin 100 milliGRAM(s) Oral two times a day  heparin  Infusion.  Unit(s)/Hr (11 mL/Hr) IV Continuous <Continuous>  multivitamin 1 Tablet(s) Oral daily  sucralfate 1 Gram(s) Oral two times a day    MEDICATIONS  (PRN):  acetaminophen     Tablet .. 650 milliGRAM(s) Oral every 6 hours PRN Temp greater or equal to 38C (100.4F), Mild Pain (1 - 3)  aluminum hydroxide/magnesium hydroxide/simethicone Suspension 30 milliLiter(s) Oral every 4 hours PRN Dyspepsia  heparin   Injectable 2500 Unit(s) IV Push every 6 hours PRN For aPTT between 40 - 57  heparin   Injectable 5000 Unit(s) IV Push every 6 hours PRN For aPTT less than 40  melatonin 3 milliGRAM(s) Oral at bedtime PRN Insomnia  ondansetron Injectable 4 milliGRAM(s) IV Push every 8 hours PRN Nausea and/or Vomiting      Allergies    penicillins (Unknown)  mold, cheese and wine (Unknown)    Intolerances        REVIEW OF SYSTEMS:  CONSTITUTIONAL: denies fever, chills, fatigue, weakness  HEENT: denies blurred vision, sore throat  SKIN: denies new lesions, rash  CARDIOVASCULAR: denies chest pain, chest pressure, palpitations  RESPIRATORY: denies shortness of breath, sputum production  GASTROINTESTINAL: denies nausea, vomiting, diarrhea, abdominal pain  GENITOURINARY: denies dysuria, discharge  NEUROLOGICAL: denies numbness, headache, focal weakness  MUSCULOSKELETAL: denies new joint pain, muscle aches  HEMATOLOGIC: denies gross bleeding, bruising, +LLE swelling and redness  LYMPHATICS: denies enlarged lymph nodes  PSYCHIATRIC: denies recent changes in anxiety, depression  ENDOCRINOLOGIC: denies sweating, cold or heat intolerance  Vital Signs Last 24 Hrs  T(C): 36.9 (29 May 2023 05:05), Max: 36.9 (29 May 2023 05:05)  T(F): 98.5 (29 May 2023 05:05), Max: 98.5 (29 May 2023 05:05)  HR: 79 (29 May 2023 05:05) (73 - 79)  BP: 116/67 (29 May 2023 05:05) (116/67 - 145/83)  BP(mean): --  RR: 17 (29 May 2023 05:05) (17 - 17)  SpO2: 94% (29 May 2023 05:05) (93% - 95%)    Parameters below as of 29 May 2023 05:05  Patient On (Oxygen Delivery Method): nasal cannula  O2 Flow (L/min): 2      PHYSICAL EXAM:  GENERAL: NAD, Awake, Alert   EYES: sclera clear, no exudates  ENMT: oropharynx clear without erythema, no exudates, moist mucous membranes  NECK: supple, soft  LUNGS: good air entry bilaterally, clear to auscultation, symmetric breath sounds, no wheezing or rhonchi appreciated  HEART: soft S1/S2, regular rate and rhythm, +murmur  GASTROINTESTINAL: abdomen is soft, nontender, nondistended, normoactive bowel sounds, no palpable masses  INTEGUMENT: good skin turgor, no lesions noted  MUSCULOSKELETAL: no clubbing or cyanosis, no obvious deformity  NEUROLOGIC: awake, alert, oriented x3, good muscle tone in 4 extremities, no obvious sensory deficits  PSYCHIATRIC: mood is good, affect is congruent, linear and logical thought process  HEME/LYMPH: no obvious ecchymosis or petechiae, significant edema of left lower extremity up to distal thigh with erythema, non TTP  LABS:                        9.0    10.18 )-----------( 263      ( 29 May 2023 04:20 )             27.7     29 May 2023 04:20    141    |  105    |  17     ----------------------------<  93     3.8     |  34     |  1.00     Ca    8.3        29 May 2023 04:20      PT/INR - ( 27 May 2023 21:00 )   PT: 12.0 sec;   INR: 1.03 ratio         PTT - ( 29 May 2023 04:20 )  PTT:101.2 sec  CAPILLARY BLOOD GLUCOSE        BLOOD CULTURE    RADIOLOGY & ADDITIONAL TESTS:    Imaging Personally Reviewed:  [ ] YES     Consultant(s) Notes Reviewed:      Care Discussed with Consultants/Other Providers:

## 2023-05-29 NOTE — CARE COORDINATION ASSESSMENT. - NSPASTMEDSURGHISTORY_GEN_ALL_CORE_FT
Patient seen at bedside resting comfortably offers no current complaints. Patient is Voiding without difficulty. Patient is ambulating. Passing flatus and tolerating regular diet. Denies bowel movement. Both breast/bottle feeding . Denies HA, CP, SOB, N/V/D, dizziness, palpitations, worsening abdominal pain, worsening vaginal bleeding, or any other concerns.    Vital Signs Last 24 Hrs  T(C): 36.6, Max: 36.9 (06-13 @ 02:00)  T(F): 97.9, Max: 98.4 (06-13 @ 02:00)  HR: 78 (78 - 98)  BP: 105/55 (105/55 - 119/69)  BP(mean): --  RR: 16 (16 - 18)  SpO2: 99% (99% - 100%)    Physical Exam:     Gen: A&Ox 3, NAD  Chest: CTA B/L  Cardiac: S1+S2; RRR  Breast: Soft, nontender, nonengorged  Abdomen: +Bs; Soft, nontender,  ND; Fundus firm below umbilicus  Gyn: mod lochia, intact  Ext: Nontender, DTRS 2+, no worsening edema                          9.3    9.2   )-----------( 173      ( 2017 16:29 )             29.7       A/P:  PPD#0 s/p     -Continue pain management  -Encourage OOB and ambulation  -Check CBC  -Continue current care  -will d/w dr. Olanescu PAST MEDICAL & SURGICAL HISTORY:  Anxiety      Hip fracture      Fall      Hypertension      Hyperlipidemia      S/P hip replacement  left      S/P hysterectomy      CVA (cerebral vascular accident)      GI bleed      PUD (peptic ulcer disease)      SDH (subdural hematoma)

## 2023-05-30 ENCOUNTER — TRANSCRIPTION ENCOUNTER (OUTPATIENT)
Age: 88
End: 2023-05-30

## 2023-05-30 LAB
ANION GAP SERPL CALC-SCNC: 6 MMOL/L — SIGNIFICANT CHANGE UP (ref 5–17)
APTT BLD: 63.3 SEC — HIGH (ref 27.5–35.5)
BUN SERPL-MCNC: 16 MG/DL — SIGNIFICANT CHANGE UP (ref 7–23)
CALCIUM SERPL-MCNC: 8.8 MG/DL — SIGNIFICANT CHANGE UP (ref 8.5–10.1)
CHLORIDE SERPL-SCNC: 102 MMOL/L — SIGNIFICANT CHANGE UP (ref 96–108)
CO2 SERPL-SCNC: 30 MMOL/L — SIGNIFICANT CHANGE UP (ref 22–31)
CREAT SERPL-MCNC: 1 MG/DL — SIGNIFICANT CHANGE UP (ref 0.5–1.3)
EGFR: 53 ML/MIN/1.73M2 — LOW
GLUCOSE SERPL-MCNC: 78 MG/DL — SIGNIFICANT CHANGE UP (ref 70–99)
HCT VFR BLD CALC: 32.5 % — LOW (ref 34.5–45)
HGB BLD-MCNC: 10.4 G/DL — LOW (ref 11.5–15.5)
MCHC RBC-ENTMCNC: 31.5 PG — SIGNIFICANT CHANGE UP (ref 27–34)
MCHC RBC-ENTMCNC: 32 GM/DL — SIGNIFICANT CHANGE UP (ref 32–36)
MCV RBC AUTO: 98.5 FL — SIGNIFICANT CHANGE UP (ref 80–100)
NRBC # BLD: 0 /100 WBCS — SIGNIFICANT CHANGE UP (ref 0–0)
PLATELET # BLD AUTO: 293 K/UL — SIGNIFICANT CHANGE UP (ref 150–400)
POTASSIUM SERPL-MCNC: 3.7 MMOL/L — SIGNIFICANT CHANGE UP (ref 3.5–5.3)
POTASSIUM SERPL-SCNC: 3.7 MMOL/L — SIGNIFICANT CHANGE UP (ref 3.5–5.3)
RBC # BLD: 3.3 M/UL — LOW (ref 3.8–5.2)
RBC # FLD: 15.3 % — HIGH (ref 10.3–14.5)
SODIUM SERPL-SCNC: 138 MMOL/L — SIGNIFICANT CHANGE UP (ref 135–145)
WBC # BLD: 10.55 K/UL — HIGH (ref 3.8–10.5)
WBC # FLD AUTO: 10.55 K/UL — HIGH (ref 3.8–10.5)

## 2023-05-30 PROCEDURE — 99233 SBSQ HOSP IP/OBS HIGH 50: CPT

## 2023-05-30 RX ORDER — ALPRAZOLAM 0.25 MG
0.25 TABLET ORAL DAILY
Refills: 0 | Status: DISCONTINUED | OUTPATIENT
Start: 2023-05-31 | End: 2023-06-02

## 2023-05-30 RX ADMIN — HEPARIN SODIUM 600 UNIT(S)/HR: 5000 INJECTION INTRAVENOUS; SUBCUTANEOUS at 09:01

## 2023-05-30 RX ADMIN — Medication 1 TABLET(S): at 11:17

## 2023-05-30 RX ADMIN — Medication 325 MILLIGRAM(S): at 05:10

## 2023-05-30 RX ADMIN — Medication 1000 UNIT(S): at 11:17

## 2023-05-30 RX ADMIN — ATORVASTATIN CALCIUM 20 MILLIGRAM(S): 80 TABLET, FILM COATED ORAL at 21:00

## 2023-05-30 RX ADMIN — GABAPENTIN 100 MILLIGRAM(S): 400 CAPSULE ORAL at 17:48

## 2023-05-30 RX ADMIN — Medication 20 MILLIGRAM(S): at 05:10

## 2023-05-30 RX ADMIN — ESCITALOPRAM OXALATE 5 MILLIGRAM(S): 10 TABLET, FILM COATED ORAL at 21:01

## 2023-05-30 RX ADMIN — GABAPENTIN 100 MILLIGRAM(S): 400 CAPSULE ORAL at 05:10

## 2023-05-30 RX ADMIN — HEPARIN SODIUM 600 UNIT(S)/HR: 5000 INJECTION INTRAVENOUS; SUBCUTANEOUS at 07:13

## 2023-05-30 RX ADMIN — Medication 1 GRAM(S): at 17:48

## 2023-05-30 RX ADMIN — HEPARIN SODIUM 600 UNIT(S)/HR: 5000 INJECTION INTRAVENOUS; SUBCUTANEOUS at 19:05

## 2023-05-30 RX ADMIN — HEPARIN SODIUM 600 UNIT(S)/HR: 5000 INJECTION INTRAVENOUS; SUBCUTANEOUS at 17:57

## 2023-05-30 RX ADMIN — Medication 1 GRAM(S): at 05:10

## 2023-05-30 RX ADMIN — BUPROPION HYDROCHLORIDE 100 MILLIGRAM(S): 150 TABLET, EXTENDED RELEASE ORAL at 11:17

## 2023-05-30 RX ADMIN — Medication 0.25 MILLIGRAM(S): at 11:17

## 2023-05-30 NOTE — PROGRESS NOTE ADULT - PROBLEM SELECTOR PLAN 2
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

## 2023-05-30 NOTE — PROGRESS NOTE ADULT - SUBJECTIVE AND OBJECTIVE BOX
Neurology follow up note    GIACOMO [id number]      Interval History:    Patient feels ok no new complaints.    Allergies    penicillins (Unknown)  mold, cheese and wine (Unknown)    Intolerances        MEDICATIONS    acetaminophen     Tablet .. 650 milliGRAM(s) Oral every 6 hours PRN  ALPRAZolam 0.25 milliGRAM(s) Oral daily  aluminum hydroxide/magnesium hydroxide/simethicone Suspension 30 milliLiter(s) Oral every 4 hours PRN  atorvastatin 20 milliGRAM(s) Oral at bedtime  buPROPion SR (12-Hour) 100 milliGRAM(s) Oral daily  cholecalciferol 1000 Unit(s) Oral daily  escitalopram 5 milliGRAM(s) Oral at bedtime  ferrous    sulfate 325 milliGRAM(s) Oral <User Schedule>  furosemide    Tablet 20 milliGRAM(s) Oral daily  gabapentin 100 milliGRAM(s) Oral two times a day  heparin   Injectable 5000 Unit(s) IV Push every 6 hours PRN  heparin   Injectable 2500 Unit(s) IV Push every 6 hours PRN  heparin  Infusion.  Unit(s)/Hr IV Continuous <Continuous>  melatonin 3 milliGRAM(s) Oral at bedtime PRN  multivitamin 1 Tablet(s) Oral daily  ondansetron Injectable 4 milliGRAM(s) IV Push every 8 hours PRN  sucralfate 1 Gram(s) Oral two times a day              Vital Signs Last 24 Hrs  T(C): 36.3 (30 May 2023 12:46), Max: 36.4 (29 May 2023 21:05)  T(F): 97.3 (30 May 2023 12:46), Max: 97.5 (29 May 2023 21:05)  HR: 81 (30 May 2023 12:46) (76 - 85)  BP: 111/73 (30 May 2023 12:46) (111/73 - 167/93)  BP(mean): --  RR: 18 (30 May 2023 12:46) (18 - 19)  SpO2: 95% (30 May 2023 12:46) (85% - 95%)    Parameters below as of 30 May 2023 12:46  Patient On (Oxygen Delivery Method): nasal cannula  O2 Flow (L/min): 3      REVIEW OF SYSTEMS:  Constitutional:  The patient denies fever, chills, or night sweats.  Head:  No headaches.  Eyes:  No double vision or blurry vision.  Ears:  No ringing in the ears.  Neck:  No neck pain.  Respiratory:  No shortness of breath.  Cardiovascular:  No chest pain.  Abdomen:  No nausea, vomiting, or abdominal pain.  Extremities/Neurological:  No numbness or tingling.  Musculoskeletal:  No joint pain.  General:  Positive history of sleeping a lot.  Positive episode of feeling fatigued, feeling worn out.    PHYSICAL EXAMINATION:    HEENT:  Head:  Normocephalic, atraumatic.  Eyes:  No scleral icterus.  Ears:  Hearing bilaterally intact.  NECK:  Supple.  RESPIRATORY:  Good air entry bilaterally.  CARDIOVASCULAR:  S1 and S2 heard.  ABDOMEN:  Soft and nontender.  Extremities:  No clubbing or cyanosis was noted.      NEUROLOGIC:  The patient is awake and alert.    Extraocular movements were intact.  Pupils were equal, round, and reactive bilaterally 3 mm to 2 mm.  Speech was fluent.  Smile was symmetric.  Motor:  Right upper was 5/5, left upper; the patient has decreased range of motion of the left shoulder, but does have a history of trauma and fracture, left upper extremity was 4.  Bilateral extremities were 3-/5.         LABS:  CBC Full  -  ( 30 May 2023 06:45 )  WBC Count : 10.55 K/uL  RBC Count : 3.30 M/uL  Hemoglobin : 10.4 g/dL  Hematocrit : 32.5 %  Platelet Count - Automated : 293 K/uL  Mean Cell Volume : 98.5 fl  Mean Cell Hemoglobin : 31.5 pg  Mean Cell Hemoglobin Concentration : 32.0 gm/dL  Auto Neutrophil # : x  Auto Lymphocyte # : x  Auto Monocyte # : x  Auto Eosinophil # : x  Auto Basophil # : x  Auto Neutrophil % : x  Auto Lymphocyte % : x  Auto Monocyte % : x  Auto Eosinophil % : x  Auto Basophil % : x      05-30    138  |  102  |  16  ----------------------------<  78  3.7   |  30  |  1.00    Ca    8.8      30 May 2023 06:45      Hemoglobin A1C:       Vitamin B12   PTT - ( 30 May 2023 06:45 )  PTT:63.3 sec      RADIOLOGY  ANALYSIS AND PLAN:  An 89-year-old with an H/O of ICH now DVT  ICH with DVT risk vs benefits of AC for green field filter   anxiety and depression, home medications.s.  hemifacial spasms, continue the patient on her Neurontin.  Spoke to daughter bedside  5/30  Geater than 45 minutes of time was spent with the patient, plan of care, reviewing data, with greater than 50% of the visit was spent counseling and/or coordinating care with multidisciplinary healthcare team

## 2023-05-30 NOTE — PROGRESS NOTE ADULT - PROBLEM SELECTOR PLAN 3
Chronic  - Continue neurontin 100mg BID

## 2023-05-30 NOTE — ED PROVIDER NOTE - NIH STROKE SCALE: 9. BEST LANGUAGE, QM
-- DO NOT REPLY / DO NOT REPLY ALL --  -- Message is from Engagement Center Operations (ECO) --    Order Request  Lab: A1C    Message / reason: patient wife requesting order for A1C     Insurance type: medicare  Payor: [employer] / Plan: MEDICARE ADVANTAGE PPO / Product Type: PFFS    Preferred Delivery Method   Call when ready for pickup - phone number to notify: [phone]     Caller Information       Type Contact Phone/Fax    05/30/2023 09:38 AM CDT Phone (Incoming) Jennie Coker (Emergency Contact) [phone] (M)          Alternative phone number: no    Can a detailed message be left? Yes    Message Turnaround:     [place]:    Please give this turnaround time to the caller:   \"This message will be sent to [state Provider's name]. The clinical team will fulfill your request as soon as they review your message.\"   (0) No aphasia; normal

## 2023-05-30 NOTE — PROGRESS NOTE ADULT - PROBLEM SELECTOR PLAN 4
Chronic  - Continue Wellbutrin, Escitalopram  - Patient may become confused/sundown per daughter; usually responds well to redirection

## 2023-05-30 NOTE — PROGRESS NOTE ADULT - PROBLEM SELECTOR PLAN 5
H/H 10.3/32.6  - No signs or symptoms of overt bleeding  - F/u iron studies  - Monitor CBC daily

## 2023-05-30 NOTE — PROGRESS NOTE ADULT - PROBLEM SELECTOR PLAN 6
Chronic  - Continue furosemide 20mg qd

## 2023-05-30 NOTE — PROGRESS NOTE ADULT - PROBLEM SELECTOR PROBLEM 2
History of intracranial hemorrhage

## 2023-05-30 NOTE — CHART NOTE - NSCHARTNOTEFT_GEN_A_CORE
Spoke to IR about IVC filter placement. I was informed that it will be placed on Thursday. Ordered procedure

## 2023-05-30 NOTE — PROGRESS NOTE ADULT - PROBLEM SELECTOR PLAN 8
VTE prophylaxis: Fully anticoagulated on heparin gtt

## 2023-05-30 NOTE — PROGRESS NOTE ADULT - SUBJECTIVE AND OBJECTIVE BOX
Patient is a 89y old  Female who presents with a chief complaint of acute DVT (29 May 2023 13:27)       INTERVAL HPI/OVERNIGHT EVENTS: Patient seen and examined at bedside. No new complaints/symptoms/events noted     MEDICATIONS  (STANDING):  ALPRAZolam 0.25 milliGRAM(s) Oral daily  atorvastatin 20 milliGRAM(s) Oral at bedtime  buPROPion SR (12-Hour) 100 milliGRAM(s) Oral daily  cholecalciferol 1000 Unit(s) Oral daily  escitalopram 5 milliGRAM(s) Oral at bedtime  ferrous    sulfate 325 milliGRAM(s) Oral <User Schedule>  furosemide    Tablet 20 milliGRAM(s) Oral daily  gabapentin 100 milliGRAM(s) Oral two times a day  heparin  Infusion.  Unit(s)/Hr (11 mL/Hr) IV Continuous <Continuous>  multivitamin 1 Tablet(s) Oral daily  sucralfate 1 Gram(s) Oral two times a day    MEDICATIONS  (PRN):  acetaminophen     Tablet .. 650 milliGRAM(s) Oral every 6 hours PRN Temp greater or equal to 38C (100.4F), Mild Pain (1 - 3)  aluminum hydroxide/magnesium hydroxide/simethicone Suspension 30 milliLiter(s) Oral every 4 hours PRN Dyspepsia  heparin   Injectable 5000 Unit(s) IV Push every 6 hours PRN For aPTT less than 40  heparin   Injectable 2500 Unit(s) IV Push every 6 hours PRN For aPTT between 40 - 57  melatonin 3 milliGRAM(s) Oral at bedtime PRN Insomnia  ondansetron Injectable 4 milliGRAM(s) IV Push every 8 hours PRN Nausea and/or Vomiting      Allergies    penicillins (Unknown)  mold, cheese and wine (Unknown)    Intolerances        REVIEW OF SYSTEMS:  CONSTITUTIONAL: denies fever, chills, fatigue, weakness  HEENT: denies blurred vision, sore throat  SKIN: denies new lesions, rash  CARDIOVASCULAR: denies chest pain, chest pressure, palpitations  RESPIRATORY: denies shortness of breath, sputum production  GASTROINTESTINAL: denies nausea, vomiting, diarrhea, abdominal pain  GENITOURINARY: denies dysuria, discharge  NEUROLOGICAL: denies numbness, headache, focal weakness  MUSCULOSKELETAL: denies new joint pain, muscle aches  HEMATOLOGIC: denies gross bleeding, bruising, +LLE swelling and redness  LYMPHATICS: denies enlarged lymph nodes  PSYCHIATRIC: denies recent changes in anxiety, depression  ENDOCRINOLOGIC: denies sweating, cold or heat intolerance  Vital Signs Last 24 Hrs  T(C): 36.4 (30 May 2023 04:56), Max: 36.4 (29 May 2023 12:08)  T(F): 97.5 (30 May 2023 04:56), Max: 97.6 (29 May 2023 12:08)  HR: 76 (30 May 2023 04:56) (74 - 85)  BP: 158/87 (30 May 2023 04:56) (158/87 - 167/93)  BP(mean): --  RR: 19 (30 May 2023 04:56) (19 - 19)  SpO2: 91% (30 May 2023 04:56) (91% - 92%)    Parameters below as of 30 May 2023 04:56  Patient On (Oxygen Delivery Method): nasal cannula  O2 Flow (L/min): 2      PHYSICAL EXAM:  GENERAL: NAD, Awake, Alert   EYES: sclera clear, no exudates  ENMT: oropharynx clear without erythema, no exudates, moist mucous membranes  NECK: supple, soft  LUNGS: good air entry bilaterally, clear to auscultation, symmetric breath sounds, no wheezing or rhonchi appreciated  HEART: soft S1/S2, regular rate and rhythm, +murmur  GASTROINTESTINAL: abdomen is soft, nontender, nondistended, normoactive bowel sounds, no palpable masses  INTEGUMENT: good skin turgor, no lesions noted  MUSCULOSKELETAL: no clubbing or cyanosis, no obvious deformity  NEUROLOGIC: awake, alert, oriented x3, good muscle tone in 4 extremities, no obvious sensory deficits  PSYCHIATRIC: mood is good, affect is congruent, linear and logical thought process  HEME/LYMPH: no obvious ecchymosis or petechiae, significant edema of left lower extremity up to distal thigh with erythema, non TTP  LABS:                        10.4   10.55 )-----------( 293      ( 30 May 2023 06:45 )             32.5       Ca    8.3        29 May 2023 04:20      PTT - ( 30 May 2023 06:45 )  PTT:63.3 sec  CAPILLARY BLOOD GLUCOSE        BLOOD CULTURE    RADIOLOGY & ADDITIONAL TESTS:    Imaging Personally Reviewed:  [ ] YES     Consultant(s) Notes Reviewed:      Care Discussed with Consultants/Other Providers:

## 2023-05-30 NOTE — CHART NOTE - NSCHARTNOTESELECT_GEN_ALL_CORE
Event Note
Anus position normal and patency confirmed, rectal-cutaneous fistula absent, normal anal wink.

## 2023-05-30 NOTE — PROGRESS NOTE ADULT - PROBLEM SELECTOR PLAN 7
Chronic  - Atorvastatin as therapeutic interchange for Rosuvastatin

## 2023-05-30 NOTE — PROGRESS NOTE ADULT - PROBLEM SELECTOR PLAN 1
Presents with swelling and leg pain secondary to acute LLE DVT  - LE Doppler: Left lower extremity deep venous thrombosis: above and below the knee.  - Started on heparin gtt (w/o initial bolus) in the ED; will continue for now  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. D/w Hematologist Dr. Saenz,  not a candidate for long term AC given history. Planned for IVC filter by vascular   - Vascular, Hematology, Neuro consults appreciated
Presents with swelling and leg pain secondary to acute LLE DVT  - LE Doppler: Left lower extremity deep venous thrombosis: above and below the knee.  - Started on heparin gtt (w/o initial bolus) in the ED; will continue for now. Hematologist Dr. Saenz to see patient   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; likely not a candidate for long term AC given history. But will d/w Dr. Saenz   - CTA: No pulmonary embolism. Evaluation of subsegmental areas limited by poor opacification and motion.  - Vascular consulted, f/u recs re: candidacy for IVC filter  - Neuro (Aniya) consulted, f/u recs
Presents with swelling and leg pain secondary to acute LLE DVT  - LE Doppler: Left lower extremity deep venous thrombosis: above and below the knee.  - Started on heparin gtt (w/o initial bolus) in the ED; will continue for now  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; likely not a candidate for long term AC given history. Planned for IVC filter by vascular   - Vascular, Hematology, Neuro consults appreciated

## 2023-05-31 LAB
ANION GAP SERPL CALC-SCNC: 6 MMOL/L — SIGNIFICANT CHANGE UP (ref 5–17)
APTT BLD: 65.2 SEC — HIGH (ref 27.5–35.5)
BUN SERPL-MCNC: 18 MG/DL — SIGNIFICANT CHANGE UP (ref 7–23)
CALCIUM SERPL-MCNC: 8.6 MG/DL — SIGNIFICANT CHANGE UP (ref 8.5–10.1)
CHLORIDE SERPL-SCNC: 104 MMOL/L — SIGNIFICANT CHANGE UP (ref 96–108)
CO2 SERPL-SCNC: 29 MMOL/L — SIGNIFICANT CHANGE UP (ref 22–31)
CREAT SERPL-MCNC: 1.1 MG/DL — SIGNIFICANT CHANGE UP (ref 0.5–1.3)
EGFR: 47 ML/MIN/1.73M2 — LOW
GLUCOSE SERPL-MCNC: 117 MG/DL — HIGH (ref 70–99)
HCT VFR BLD CALC: 31.3 % — LOW (ref 34.5–45)
HGB BLD-MCNC: 9.8 G/DL — LOW (ref 11.5–15.5)
MCHC RBC-ENTMCNC: 31.1 PG — SIGNIFICANT CHANGE UP (ref 27–34)
MCHC RBC-ENTMCNC: 31.3 GM/DL — LOW (ref 32–36)
MCV RBC AUTO: 99.4 FL — SIGNIFICANT CHANGE UP (ref 80–100)
NRBC # BLD: 0 /100 WBCS — SIGNIFICANT CHANGE UP (ref 0–0)
PLATELET # BLD AUTO: 240 K/UL — SIGNIFICANT CHANGE UP (ref 150–400)
POTASSIUM SERPL-MCNC: 3.7 MMOL/L — SIGNIFICANT CHANGE UP (ref 3.5–5.3)
POTASSIUM SERPL-SCNC: 3.7 MMOL/L — SIGNIFICANT CHANGE UP (ref 3.5–5.3)
RBC # BLD: 3.15 M/UL — LOW (ref 3.8–5.2)
RBC # FLD: 15.2 % — HIGH (ref 10.3–14.5)
SODIUM SERPL-SCNC: 139 MMOL/L — SIGNIFICANT CHANGE UP (ref 135–145)
WBC # BLD: 10.28 K/UL — SIGNIFICANT CHANGE UP (ref 3.8–10.5)
WBC # FLD AUTO: 10.28 K/UL — SIGNIFICANT CHANGE UP (ref 3.8–10.5)

## 2023-05-31 PROCEDURE — 99232 SBSQ HOSP IP/OBS MODERATE 35: CPT

## 2023-05-31 RX ADMIN — GABAPENTIN 100 MILLIGRAM(S): 400 CAPSULE ORAL at 17:56

## 2023-05-31 RX ADMIN — ATORVASTATIN CALCIUM 20 MILLIGRAM(S): 80 TABLET, FILM COATED ORAL at 21:23

## 2023-05-31 RX ADMIN — Medication 1 GRAM(S): at 05:08

## 2023-05-31 RX ADMIN — GABAPENTIN 100 MILLIGRAM(S): 400 CAPSULE ORAL at 05:08

## 2023-05-31 RX ADMIN — BUPROPION HYDROCHLORIDE 100 MILLIGRAM(S): 150 TABLET, EXTENDED RELEASE ORAL at 11:11

## 2023-05-31 RX ADMIN — HEPARIN SODIUM 600 UNIT(S)/HR: 5000 INJECTION INTRAVENOUS; SUBCUTANEOUS at 07:06

## 2023-05-31 RX ADMIN — Medication 1 TABLET(S): at 11:12

## 2023-05-31 RX ADMIN — Medication 1 GRAM(S): at 17:56

## 2023-05-31 RX ADMIN — Medication 1000 UNIT(S): at 11:11

## 2023-05-31 RX ADMIN — ESCITALOPRAM OXALATE 5 MILLIGRAM(S): 10 TABLET, FILM COATED ORAL at 21:23

## 2023-05-31 RX ADMIN — Medication 20 MILLIGRAM(S): at 05:08

## 2023-05-31 NOTE — PROGRESS NOTE ADULT - ASSESSMENT
89y female with PMHx of HTN, PE (previously on eliquis), SDH while on Eliquis (now off due to spontaneous thalamic bleed) presents to ED for 3 days of L leg swelling and pain. Admitted for acute extensive LLE DVT management, on IV Heparin, planned for IVC filter        Problem/Plan - 1:  ·  Problem: DVT, lower extremity.   ·  Plan: Presents with swelling and leg pain secondary to acute LLE DVT  - LE Doppler: Left lower extremity deep venous thrombosis: above and below the knee.  - Started on heparin gtt (w/o initial bolus) in the ED;  heparin gtt discontinued this morning.    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. D/w Hematologist Dr. Saenz,  not a candidate for long term AC given history. Planned for IVC filter placement today  - Vascular, Hematology, Neuro consults appreciated.     Problem/Plan - 2:  ·  Problem: History of intracranial hemorrhage.   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     Problem/Plan - 3:  ·  Problem: Hemifacial spasm.   ·  Plan: Chronic  - Continue neurontin 100mg BID.     Problem/Plan - 4:  ·  Problem: Dementia.   ·  Plan: Chronic  - Continue Wellbutrin, Escitalopram  - Patient may become confused/sundown per daughter; usually responds well to redirection.     Problem/Plan - 5:  ·  Problem: Anemia.   ·  Plan: H/H 10.3/32.6  - No signs or symptoms of overt bleeding  - Monitor CBC daily.  Hbg has been stable.     Problem/Plan - 6:  ·  Problem: HTN (hypertension).   ·  Plan: Chronic  - Continue furosemide 20mg qd.     Problem/Plan - 7:  ·  Problem: HLD (hyperlipidemia).   ·  Plan: Chronic  - Atorvastatin as therapeutic interchange for Rosuvastatin.     Problem/Plan - 8:  ·  Problem: Need for prophylactic measure.   ·  Plan: VTE prophylaxis: Off heparin gtt this morning.  Pt. to have IVC filter placement today.       moist

## 2023-05-31 NOTE — PROGRESS NOTE ADULT - SUBJECTIVE AND OBJECTIVE BOX
CHIEF COMPLAINT/INTERVAL HISTORY:  Pt. seen and evaluated for left LE DVT.  Pt. is in no distress.  Denies having any chest pain or SOB.  Awaiting IVC filter placement.      REVIEW OF SYSTEMS:  No fever, CP, SOB, or abdominal pain    Vital Signs Last 24 Hrs  T(C): 36.4 (31 May 2023 04:38), Max: 36.6 (30 May 2023 20:59)  T(F): 97.5 (31 May 2023 04:38), Max: 97.8 (30 May 2023 20:59)  HR: 73 (31 May 2023 04:38) (73 - 81)  BP: 157/96 (31 May 2023 04:38) (111/73 - 157/96)  BP(mean): --  RR: 18 (31 May 2023 04:38) (18 - 18)  SpO2: 92% (31 May 2023 04:38) (92% - 98%)    Parameters below as of 31 May 2023 04:38  Patient On (Oxygen Delivery Method): nasal cannula  O2 Flow (L/min): 3      PHYSICAL EXAM:  GENERAL: NAD  HEENT: EOMI, hearing normal, conjunctiva and sclera clear  Chest: CTA bilaterally, no wheezing  CV: S1S2, RRR,   GI: soft, +BS, NT/ND  Musculoskeletal: decreasing edema of left LE  Psychiatric: affect nL, mood nL  Skin: warm and dry    LABS:                        9.8    10.28 )-----------( 240      ( 31 May 2023 09:40 )             31.3     05-31    139  |  104  |  18  ----------------------------<  117<H>  3.7   |  29  |  1.10    Ca    8.6      31 May 2023 09:40      PTT - ( 31 May 2023 09:40 )  PTT:65.2 sec

## 2023-05-31 NOTE — PROGRESS NOTE ADULT - SUBJECTIVE AND OBJECTIVE BOX
Neurology follow up note    GIACOMO [id number]      Interval History:    Patient feels ok no new complaints.    Allergies    penicillins (Unknown)  mold, cheese and wine (Unknown)    Intolerances        MEDICATIONS    acetaminophen     Tablet .. 650 milliGRAM(s) Oral every 6 hours PRN  ALPRAZolam 0.25 milliGRAM(s) Oral daily PRN  aluminum hydroxide/magnesium hydroxide/simethicone Suspension 30 milliLiter(s) Oral every 4 hours PRN  atorvastatin 20 milliGRAM(s) Oral at bedtime  buPROPion SR (12-Hour) 100 milliGRAM(s) Oral daily  cholecalciferol 1000 Unit(s) Oral daily  escitalopram 5 milliGRAM(s) Oral at bedtime  ferrous    sulfate 325 milliGRAM(s) Oral <User Schedule>  furosemide    Tablet 20 milliGRAM(s) Oral daily  gabapentin 100 milliGRAM(s) Oral two times a day  heparin   Injectable 2500 Unit(s) IV Push every 6 hours PRN  heparin   Injectable 5000 Unit(s) IV Push every 6 hours PRN  heparin  Infusion.  Unit(s)/Hr IV Continuous <Continuous>  melatonin 3 milliGRAM(s) Oral at bedtime PRN  multivitamin 1 Tablet(s) Oral daily  ondansetron Injectable 4 milliGRAM(s) IV Push every 8 hours PRN  sucralfate 1 Gram(s) Oral two times a day              Vital Signs Last 24 Hrs  T(C): 36.4 (31 May 2023 04:38), Max: 36.6 (30 May 2023 20:59)  T(F): 97.5 (31 May 2023 04:38), Max: 97.8 (30 May 2023 20:59)  HR: 73 (31 May 2023 04:38) (73 - 81)  BP: 157/96 (31 May 2023 04:38) (111/73 - 157/96)  BP(mean): --  RR: 18 (31 May 2023 04:38) (18 - 18)  SpO2: 92% (31 May 2023 04:38) (92% - 98%)    Parameters below as of 31 May 2023 04:38  Patient On (Oxygen Delivery Method): nasal cannula  O2 Flow (L/min): 3      REVIEW OF SYSTEMS:  Constitutional:  The patient denies fever, chills, or night sweats.  Head:  No headaches.  Eyes:  No double vision or blurry vision.  Ears:  No ringing in the ears.  Neck:  No neck pain.  Respiratory:  No shortness of breath.  Cardiovascular:  No chest pain.  Abdomen:  No nausea, vomiting, or abdominal pain.  Extremities/Neurological:  No numbness or tingling.  Musculoskeletal:  No joint pain.  General:  Positive history of sleeping a lot.  Positive episode of feeling fatigued, feeling worn out.    PHYSICAL EXAMINATION:    HEENT:  Head:  Normocephalic, atraumatic.  Eyes:  No scleral icterus.  Ears:  Hearing bilaterally intact.  NECK:  Supple.  RESPIRATORY:  Good air entry bilaterally.  CARDIOVASCULAR:  S1 and S2 heard.  ABDOMEN:  Soft and nontender.  Extremities:  No clubbing or cyanosis was noted.      NEUROLOGIC:  The patient is awake and alert.    Extraocular movements were intact.  Pupils were equal, round, and reactive bilaterally 3 mm to 2 mm.  Speech was fluent.  Smile was symmetric.  Motor:  Right upper was 5/5, left upper; the patient has decreased range of motion of the left shoulder, but does have a history of trauma and fracture, left upper extremity was 4.  Bilateral extremities were 3-/5.         LABS:  CBC Full  -  ( 30 May 2023 06:45 )  WBC Count : 10.55 K/uL  RBC Count : 3.30 M/uL  Hemoglobin : 10.4 g/dL  Hematocrit : 32.5 %  Platelet Count - Automated : 293 K/uL  Mean Cell Volume : 98.5 fl  Mean Cell Hemoglobin : 31.5 pg  Mean Cell Hemoglobin Concentration : 32.0 gm/dL  Auto Neutrophil # : x  Auto Lymphocyte # : x  Auto Monocyte # : x  Auto Eosinophil # : x  Auto Basophil # : x  Auto Neutrophil % : x  Auto Lymphocyte % : x  Auto Monocyte % : x  Auto Eosinophil % : x  Auto Basophil % : x      05-30    138  |  102  |  16  ----------------------------<  78  3.7   |  30  |  1.00    Ca    8.8      30 May 2023 06:45      Hemoglobin A1C:       Vitamin B12   PTT - ( 30 May 2023 06:45 )  PTT:63.3 sec      RADIOLOGY    ANALYSIS AND PLAN:  An 89-year-old with an H/O of ICH now DVT  ICH with DVT risk vs benefits of AC for green field filter   anxiety and depression, home medications  hemifacial spasms, continue the patient on her Neurontin.  neurologic wise stable   Spoke to daughter bedside  5/30  Greater than 45 minutes of time was spent with the patient, plan of care, reviewing data, with greater than 50% of the visit was spent counseling and/or coordinating care with multidisciplinary healthcare team

## 2023-06-01 ENCOUNTER — TRANSCRIPTION ENCOUNTER (OUTPATIENT)
Age: 88
End: 2023-06-01

## 2023-06-01 LAB
ANION GAP SERPL CALC-SCNC: 4 MMOL/L — LOW (ref 5–17)
BUN SERPL-MCNC: 22 MG/DL — SIGNIFICANT CHANGE UP (ref 7–23)
CALCIUM SERPL-MCNC: 8.9 MG/DL — SIGNIFICANT CHANGE UP (ref 8.5–10.1)
CHLORIDE SERPL-SCNC: 102 MMOL/L — SIGNIFICANT CHANGE UP (ref 96–108)
CO2 SERPL-SCNC: 33 MMOL/L — HIGH (ref 22–31)
CREAT SERPL-MCNC: 1 MG/DL — SIGNIFICANT CHANGE UP (ref 0.5–1.3)
EGFR: 53 ML/MIN/1.73M2 — LOW
GLUCOSE SERPL-MCNC: 89 MG/DL — SIGNIFICANT CHANGE UP (ref 70–99)
HCT VFR BLD CALC: 30.2 % — LOW (ref 34.5–45)
HGB BLD-MCNC: 9.6 G/DL — LOW (ref 11.5–15.5)
MAGNESIUM SERPL-MCNC: 2.2 MG/DL — SIGNIFICANT CHANGE UP (ref 1.6–2.6)
MCHC RBC-ENTMCNC: 31.3 PG — SIGNIFICANT CHANGE UP (ref 27–34)
MCHC RBC-ENTMCNC: 31.8 GM/DL — LOW (ref 32–36)
MCV RBC AUTO: 98.4 FL — SIGNIFICANT CHANGE UP (ref 80–100)
NRBC # BLD: 0 /100 WBCS — SIGNIFICANT CHANGE UP (ref 0–0)
NT-PROBNP SERPL-SCNC: 633 PG/ML — HIGH (ref 0–450)
PLATELET # BLD AUTO: 253 K/UL — SIGNIFICANT CHANGE UP (ref 150–400)
POTASSIUM SERPL-MCNC: 3.8 MMOL/L — SIGNIFICANT CHANGE UP (ref 3.5–5.3)
POTASSIUM SERPL-SCNC: 3.8 MMOL/L — SIGNIFICANT CHANGE UP (ref 3.5–5.3)
RBC # BLD: 3.07 M/UL — LOW (ref 3.8–5.2)
RBC # FLD: 15 % — HIGH (ref 10.3–14.5)
SODIUM SERPL-SCNC: 139 MMOL/L — SIGNIFICANT CHANGE UP (ref 135–145)
WBC # BLD: 11.1 K/UL — HIGH (ref 3.8–10.5)
WBC # FLD AUTO: 11.1 K/UL — HIGH (ref 3.8–10.5)

## 2023-06-01 PROCEDURE — 71045 X-RAY EXAM CHEST 1 VIEW: CPT | Mod: 26

## 2023-06-01 PROCEDURE — 99233 SBSQ HOSP IP/OBS HIGH 50: CPT

## 2023-06-01 RX ADMIN — GABAPENTIN 100 MILLIGRAM(S): 400 CAPSULE ORAL at 17:05

## 2023-06-01 RX ADMIN — Medication 1 GRAM(S): at 17:05

## 2023-06-01 RX ADMIN — Medication 1 GRAM(S): at 05:34

## 2023-06-01 RX ADMIN — ATORVASTATIN CALCIUM 20 MILLIGRAM(S): 80 TABLET, FILM COATED ORAL at 22:46

## 2023-06-01 RX ADMIN — GABAPENTIN 100 MILLIGRAM(S): 400 CAPSULE ORAL at 05:33

## 2023-06-01 RX ADMIN — BUPROPION HYDROCHLORIDE 100 MILLIGRAM(S): 150 TABLET, EXTENDED RELEASE ORAL at 11:04

## 2023-06-01 RX ADMIN — Medication 1 TABLET(S): at 11:04

## 2023-06-01 RX ADMIN — Medication 325 MILLIGRAM(S): at 05:33

## 2023-06-01 RX ADMIN — Medication 1000 UNIT(S): at 11:04

## 2023-06-01 RX ADMIN — ESCITALOPRAM OXALATE 5 MILLIGRAM(S): 10 TABLET, FILM COATED ORAL at 22:47

## 2023-06-01 NOTE — DISCHARGE NOTE PROVIDER - NSDCMRMEDTOKEN_GEN_ALL_CORE_FT
buPROPion 100 mg/12 hours (SR) oral tablet, extended release: 1 tab(s) orally once a day  escitalopram 5 mg oral tablet: 1 tab(s) orally once a day  ferrous sulfate 325 mg (65 mg elemental iron) oral delayed release tablet: 1 tab(s) orally every other day  furosemide 20 mg oral tablet: 1 tab(s) orally once a day  multivitamin: 1 tab(s) orally once a day  Neurontin 100 mg oral capsule: 1 cap(s) orally 2 times a day  rosuvastatin 10 mg oral tablet: 1 tab(s) orally once a day  sucralfate 1 g oral tablet: 1 tab(s) orally 2 times a day  Vitamin D3 1000 intl units (25 mcg) oral tablet: 1 tab(s) orally once a day  Xanax 0.25 mg oral tablet: 1 orally once a day   acetaminophen 325 mg oral tablet: 2 tab(s) orally every 6 hours As needed Temp greater or equal to 38C (100.4F), Mild Pain (1 - 3)  buPROPion 100 mg/12 hours (SR) oral tablet, extended release: 1 tab(s) orally once a day  escitalopram 5 mg oral tablet: 1 tab(s) orally once a day  ferrous sulfate 325 mg (65 mg elemental iron) oral delayed release tablet: 1 tab(s) orally every other day  furosemide 20 mg oral tablet: 1 tab(s) orally once a day  multivitamin: 1 tab(s) orally once a day  Neurontin 100 mg oral capsule: 1 cap(s) orally 2 times a day  Protonix 40 mg oral delayed release tablet: 1 tab(s) orally once a day  rosuvastatin 10 mg oral tablet: 1 tab(s) orally once a day  sucralfate 1 g oral tablet: 1 tab(s) orally 2 times a day  Vitamin D3 1000 intl units (25 mcg) oral tablet: 1 tab(s) orally once a day  Xanax 0.25 mg oral tablet: 1 orally once a day

## 2023-06-01 NOTE — PROGRESS NOTE ADULT - SUBJECTIVE AND OBJECTIVE BOX
Neurology follow up note    GIACOMO [id number]      Interval History:    Patient feels ok no new complaints.    Allergies    penicillins (Unknown)  mold, cheese and wine (Unknown)    Intolerances        MEDICATIONS    acetaminophen     Tablet .. 650 milliGRAM(s) Oral every 6 hours PRN  ALPRAZolam 0.25 milliGRAM(s) Oral daily PRN  aluminum hydroxide/magnesium hydroxide/simethicone Suspension 30 milliLiter(s) Oral every 4 hours PRN  atorvastatin 20 milliGRAM(s) Oral at bedtime  buPROPion SR (12-Hour) 100 milliGRAM(s) Oral daily  cholecalciferol 1000 Unit(s) Oral daily  escitalopram 5 milliGRAM(s) Oral at bedtime  ferrous    sulfate 325 milliGRAM(s) Oral <User Schedule>  furosemide    Tablet 20 milliGRAM(s) Oral daily  gabapentin 100 milliGRAM(s) Oral two times a day  melatonin 3 milliGRAM(s) Oral at bedtime PRN  multivitamin 1 Tablet(s) Oral daily  ondansetron Injectable 4 milliGRAM(s) IV Push every 8 hours PRN  sucralfate 1 Gram(s) Oral two times a day              Vital Signs Last 24 Hrs  T(C): 36.7 (01 Jun 2023 04:58), Max: 36.8 (31 May 2023 16:47)  T(F): 98.1 (01 Jun 2023 04:58), Max: 98.3 (31 May 2023 16:47)  HR: 80 (01 Jun 2023 04:58) (73 - 98)  BP: 108/67 (01 Jun 2023 04:58) (108/67 - 158/89)  BP(mean): --  RR: 19 (01 Jun 2023 04:58) (16 - 21)  SpO2: 95% (01 Jun 2023 04:58) (92% - 100%)    Parameters below as of 01 Jun 2023 04:58  Patient On (Oxygen Delivery Method): nasal cannula  O2 Flow (L/min): 3      REVIEW OF SYSTEMS:  Constitutional:  The patient denies fever, chills, or night sweats.  Head:  No headaches.  Eyes:  No double vision or blurry vision.  Ears:  No ringing in the ears.  Neck:  No neck pain.  Respiratory:  No shortness of breath.  Cardiovascular:  No chest pain.  Abdomen:  No nausea, vomiting, or abdominal pain.  Extremities/Neurological:  No numbness or tingling.  Musculoskeletal:  No joint pain.  General:  Positive history of sleeping a lot.  Positive episode of feeling fatigued, feeling worn out.    PHYSICAL EXAMINATION:    HEENT:  Head:  Normocephalic, atraumatic.  Eyes:  No scleral icterus.  Ears:  Hearing bilaterally intact.  NECK:  Supple.  RESPIRATORY:  Good air entry bilaterally.  CARDIOVASCULAR:  S1 and S2 heard.  ABDOMEN:  Soft and nontender.  Extremities:  No clubbing or cyanosis was noted.      NEUROLOGIC:  The patient is awake and alert.    Extraocular movements were intact.  Pupils were equal, round, and reactive bilaterally 3 mm to 2 mm.  Speech was fluent.  Smile was symmetric.  Motor:  Right upper was 5/5, left upper; the patient has decreased range of motion of the left shoulder, but does have a history of trauma and fracture, left upper extremity was 4.  Bilateral extremities were 3-/5.           LABS:  CBC Full  -  ( 01 Jun 2023 06:45 )  WBC Count : 11.10 K/uL  RBC Count : 3.07 M/uL  Hemoglobin : 9.6 g/dL  Hematocrit : 30.2 %  Platelet Count - Automated : 253 K/uL  Mean Cell Volume : 98.4 fl  Mean Cell Hemoglobin : 31.3 pg  Mean Cell Hemoglobin Concentration : 31.8 gm/dL  Auto Neutrophil # : x  Auto Lymphocyte # : x  Auto Monocyte # : x  Auto Eosinophil # : x  Auto Basophil # : x  Auto Neutrophil % : x  Auto Lymphocyte % : x  Auto Monocyte % : x  Auto Eosinophil % : x  Auto Basophil % : x      06-01    139  |  102  |  22  ----------------------------<  89  3.8   |  33<H>  |  1.00    Ca    8.9      01 Jun 2023 06:45  Mg     2.2     06-01      Hemoglobin A1C:       Vitamin B12   PTT - ( 31 May 2023 09:40 )  PTT:65.2 sec      RADIOLOGY        ANALYSIS AND PLAN:  An 89-year-old with an H/O of ICH now DVT  ICH with DVT risk vs benefits of AC for green field filter   anxiety and depression, home medications  hemifacial spasms, continue the patient on her Neurontin.  neurologic wise stable   Spoke to daughter bedside  5/30  Greater than 40 minutes of time was spent with the patient, plan of care, reviewing data, with greater than 50% of the visit was spent counseling and/or coordinating care with multidisciplinary healthcare team     Neurology follow up note    GIACOMO [id number]      Interval History:    Patient feels ok no new complaints.    Allergies    penicillins (Unknown)  mold, cheese and wine (Unknown)    Intolerances        MEDICATIONS    acetaminophen     Tablet .. 650 milliGRAM(s) Oral every 6 hours PRN  ALPRAZolam 0.25 milliGRAM(s) Oral daily PRN  aluminum hydroxide/magnesium hydroxide/simethicone Suspension 30 milliLiter(s) Oral every 4 hours PRN  atorvastatin 20 milliGRAM(s) Oral at bedtime  buPROPion SR (12-Hour) 100 milliGRAM(s) Oral daily  cholecalciferol 1000 Unit(s) Oral daily  escitalopram 5 milliGRAM(s) Oral at bedtime  ferrous    sulfate 325 milliGRAM(s) Oral <User Schedule>  furosemide    Tablet 20 milliGRAM(s) Oral daily  gabapentin 100 milliGRAM(s) Oral two times a day  melatonin 3 milliGRAM(s) Oral at bedtime PRN  multivitamin 1 Tablet(s) Oral daily  ondansetron Injectable 4 milliGRAM(s) IV Push every 8 hours PRN  sucralfate 1 Gram(s) Oral two times a day              Vital Signs Last 24 Hrs  T(C): 36.7 (01 Jun 2023 04:58), Max: 36.8 (31 May 2023 16:47)  T(F): 98.1 (01 Jun 2023 04:58), Max: 98.3 (31 May 2023 16:47)  HR: 80 (01 Jun 2023 04:58) (73 - 98)  BP: 108/67 (01 Jun 2023 04:58) (108/67 - 158/89)  BP(mean): --  RR: 19 (01 Jun 2023 04:58) (16 - 21)  SpO2: 95% (01 Jun 2023 04:58) (92% - 100%)    Parameters below as of 01 Jun 2023 04:58  Patient On (Oxygen Delivery Method): nasal cannula  O2 Flow (L/min): 3      REVIEW OF SYSTEMS:  Constitutional:  The patient denies fever, chills, or night sweats.  Head:  No headaches.  Eyes:  No double vision or blurry vision.  Ears:  No ringing in the ears.  Neck:  No neck pain.  Respiratory:  No shortness of breath.  Cardiovascular:  No chest pain.  Abdomen:  No nausea, vomiting, or abdominal pain.  Extremities/Neurological:  No numbness or tingling.  Musculoskeletal:  No joint pain.  General:  Positive history of sleeping a lot.  Positive episode of feeling fatigued, feeling worn out.    PHYSICAL EXAMINATION:    HEENT:  Head:  Normocephalic, atraumatic.  Eyes:  No scleral icterus.  Ears:  Hearing bilaterally intact.  NECK:  Supple.  RESPIRATORY:  Good air entry bilaterally.  CARDIOVASCULAR:  S1 and S2 heard.  ABDOMEN:  Soft and nontender.  Extremities:  No clubbing or cyanosis was noted.      NEUROLOGIC:  The patient is awake and alert.    Extraocular movements were intact.  Pupils were equal, round, and reactive bilaterally 3 mm to 2 mm.  Speech was fluent.  Smile was symmetric.  Motor:  Right upper was 5/5, left upper; the patient has decreased range of motion of the left shoulder, but does have a history of trauma and fracture, left upper extremity was 4.  Bilateral extremities were 3-/5.           LABS:  CBC Full  -  ( 01 Jun 2023 06:45 )  WBC Count : 11.10 K/uL  RBC Count : 3.07 M/uL  Hemoglobin : 9.6 g/dL  Hematocrit : 30.2 %  Platelet Count - Automated : 253 K/uL  Mean Cell Volume : 98.4 fl  Mean Cell Hemoglobin : 31.3 pg  Mean Cell Hemoglobin Concentration : 31.8 gm/dL  Auto Neutrophil # : x  Auto Lymphocyte # : x  Auto Monocyte # : x  Auto Eosinophil # : x  Auto Basophil # : x  Auto Neutrophil % : x  Auto Lymphocyte % : x  Auto Monocyte % : x  Auto Eosinophil % : x  Auto Basophil % : x      06-01    139  |  102  |  22  ----------------------------<  89  3.8   |  33<H>  |  1.00    Ca    8.9      01 Jun 2023 06:45  Mg     2.2     06-01      Hemoglobin A1C:       Vitamin B12   PTT - ( 31 May 2023 09:40 )  PTT:65.2 sec      RADIOLOGY        ANALYSIS AND PLAN:  An 89-year-old with an H/O of ICH now DVT  ICH with DVT risk vs benefits of AC for green field filter   anxiety and depression, home medications  hemifacial spasms, continue the patient on her Neurontin.  neurologic wise stable   no new complaints   Greater than 40 minutes of time was spent with the patient, plan of care, reviewing data, with greater than 50% of the visit was spent counseling and/or coordinating care with multidisciplinary healthcare team

## 2023-06-01 NOTE — PROGRESS NOTE ADULT - SUBJECTIVE AND OBJECTIVE BOX
CHIEF COMPLAINT/INTERVAL HISTORY:  Pt. seen and evaluated for left LE DVT.  Pt. is in no distress.  s/p IVC filter placement yesterday.  Denies any CP or SOB.  Noted desat down to 83% when off supplemental O2.     REVIEW OF SYSTEMS:  No fever, CP, SOB, or abdominal pain    Vital Signs Last 24 Hrs  T(C): 36.5 (01 Jun 2023 12:12), Max: 36.8 (31 May 2023 16:47)  T(F): 97.7 (01 Jun 2023 12:12), Max: 98.3 (31 May 2023 16:47)  HR: 81 (01 Jun 2023 12:12) (73 - 98)  BP: 118/68 (01 Jun 2023 12:12) (108/67 - 158/89)  BP(mean): --  RR: 18 (01 Jun 2023 12:12) (16 - 21)  SpO2: 93% (01 Jun 2023 12:12) (92% - 100%)    Parameters below as of 01 Jun 2023 12:12  Patient On (Oxygen Delivery Method): nasal cannula        PHYSICAL EXAM:  GENERAL: NAD  HEENT: EOMI, hearing normal, conjunctiva and sclera clear  Chest: diminished BS at bases, no wheezing  CV: S1S2, RRR,   GI: soft, +BS, NT/ND  Musculoskeletal: decreased edema of left LE  Psychiatric: affect nL, mood nL  Skin: warm and dry    LABS:                        9.6    11.10 )-----------( 253      ( 01 Jun 2023 06:45 )             30.2     06-01    139  |  102  |  22  ----------------------------<  89  3.8   |  33<H>  |  1.00    Ca    8.9      01 Jun 2023 06:45  Mg     2.2     06-01      PTT - ( 31 May 2023 09:40 )  PTT:65.2 sec

## 2023-06-01 NOTE — DISCHARGE NOTE PROVIDER - HOSPITAL COURSE
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 Patient and family noticed swelling started a couple of weeks ago, and in the last three days it has gotten much worse. Also noted redness of left leg. She denies any pain. Daughter notes that patient also fell last night on her right knee while going from her motorized stair chair to her wheelchair. Denies head strike or loss of consciousness. Patient denies any shortness of breath, chest pain, lightheadedness, fevers, chills.     ED Course:  Vitals: T 98, HR 67, /90, RR 19, spo2% 93% on RA  Labs significant for: H/H 10.3/32.6, HCO3 34, Albumin 2.7, pro bnp 474  ECG: NSR at 74bpm, LAD, cannot rule out anterior infarct (unchanged)  LE Doppler: Left lower extremity deep venous thrombosis: above and below the knee.  CT Head: Stable exam. No acute intracranial hemorrhage, vasogenic edema or extra-axial collection. Chronic findings as above.  CTA: No pulmonary embolism. Evaluation of subsegmental areas limited by poor opacification and motion.  Received heparin gtt in ED    Pt. was admitted with Vascular surgery and hematology/oncology consult.  She was deemed to be poor candidate for long term anticoagulation.  Pt. was subsequently taken off heparin gtt and IVC filter was placed on 5/31.  She was noted to have desaturation when off supplemental O2.  Pulmonary was consulted .  CXR showed lung were grossly clear.   Very large hiatal hernia.  Room air SpO2 was 86% at rest.  Pt. will benefit in having arrangement for home O2. 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 Patient and family noticed swelling started a couple of weeks ago, and in the last three days it has gotten much worse. Also noted redness of left leg. She denies any pain. Daughter notes that patient also fell last night on her right knee while going from her motorized stair chair to her wheelchair. Denies head strike or loss of consciousness. Patient denies any shortness of breath, chest pain, lightheadedness, fevers, chills.     ED Course:  Vitals: T 98, HR 67, /90, RR 19, spo2% 93% on RA  Labs significant for: H/H 10.3/32.6, HCO3 34, Albumin 2.7, pro bnp 474  ECG: NSR at 74bpm, LAD, cannot rule out anterior infarct (unchanged)  LE Doppler: Left lower extremity deep venous thrombosis: above and below the knee.  CT Head: Stable exam. No acute intracranial hemorrhage, vasogenic edema or extra-axial collection. Chronic findings as above.  CTA: No pulmonary embolism. Evaluation of subsegmental areas limited by poor opacification and motion.  Received heparin gtt in ED    Pt. was admitted with Vascular surgery and hematology/oncology consult.  She was deemed to be poor candidate for long term anticoagulation.  Pt. was subsequently taken off heparin gtt and IVC filter was placed on 5/31.  She was noted to have desaturation when off supplemental O2.  Pulmonary was consulted .  CXR showed lung were grossly clear.   Very large hiatal hernia.  Room air SpO2 was 86% at rest.  Desaturation likely secondary to large hiatal hernia which may cause GERD, Atelectasis, and Dyspnea.  Silent aspiration may be related to Large Hiatal Hernia.  Pt. will benefit in having arrangement for home O2.      On day of discharge patient is in no distress.  Afebrile and hemodynamically stable.  Denies feeling SOB.

## 2023-06-01 NOTE — CONSULT NOTE ADULT - SUBJECTIVE AND OBJECTIVE BOX
All records reviewed.  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  CT Head: Stable exam. No acute intracranial hemorrhage, vasogenic edema or extra-axial collection. Chronic findings as above.  CTA chest a/p: No pulmonary embolism. Fatty infiltration pancreas. stable subcentimeter pulm nodules when compared w 11/2022Evaluation of subsegmental areas limited by poor opacification and motion.  Received heparin gtt in ED       PAST MEDICAL & SURGICAL HISTORY:  Hyperlipidemia      Hypertension      Fall      Hip fracture      Anxiety      CVA (cerebral vascular accident)      PUD (peptic ulcer disease)      GI bleed      SDH (subdural hematoma)      S/P hysterectomy      S/P hip replacement  left          Review of System:  no SOB CP excessive bleeds  chronically inactive, sedatary    MEDICATIONS  (STANDING):  ALPRAZolam 0.25 milliGRAM(s) Oral daily  atorvastatin 20 milliGRAM(s) Oral at bedtime  buPROPion SR (12-Hour) 100 milliGRAM(s) Oral daily  cholecalciferol 1000 Unit(s) Oral daily  escitalopram 5 milliGRAM(s) Oral at bedtime  ferrous    sulfate 325 milliGRAM(s) Oral <User Schedule>  furosemide    Tablet 20 milliGRAM(s) Oral daily  gabapentin 100 milliGRAM(s) Oral two times a day  heparin  Infusion.  Unit(s)/Hr (11 mL/Hr) IV Continuous <Continuous>  multivitamin 1 Tablet(s) Oral daily  sucralfate 1 Gram(s) Oral two times a day    MEDICATIONS  (PRN):  acetaminophen     Tablet .. 650 milliGRAM(s) Oral every 6 hours PRN Temp greater or equal to 38C (100.4F), Mild Pain (1 - 3)  aluminum hydroxide/magnesium hydroxide/simethicone Suspension 30 milliLiter(s) Oral every 4 hours PRN Dyspepsia  heparin   Injectable 2500 Unit(s) IV Push every 6 hours PRN For aPTT between 40 - 57  heparin   Injectable 5000 Unit(s) IV Push every 6 hours PRN For aPTT less than 40  melatonin 3 milliGRAM(s) Oral at bedtime PRN Insomnia  ondansetron Injectable 4 milliGRAM(s) IV Push every 8 hours PRN Nausea and/or Vomiting      Allergies    penicillins (Unknown)  mold, cheese and wine (Unknown)    Intolerances        SOCIAL HISTORY:  distant hx of social smoking/rare ciagrrettes    FAMILY HISTORY:  FHx: congestive heart failure (Father)    Family history of malignant neoplasm of colon (Mother)    no thromboembolism      Vital Signs Last 24 Hrs  T(C): 36.3 (28 May 2023 12:05), Max: 36.7 (27 May 2023 17:35)  T(F): 97.3 (28 May 2023 12:05), Max: 98 (27 May 2023 17:35)  HR: 73 (28 May 2023 12:05) (67 - 75)  BP: 145/83 (28 May 2023 12:05) (145/83 - 174/99)  BP(mean): --  RR: 17 (28 May 2023 12:05) (17 - 19)  SpO2: 93% (28 May 2023 12:05) (90% - 94%)    Parameters below as of 28 May 2023 12:05  Patient On (Oxygen Delivery Method): nasal cannula  O2 Flow (L/min): 2      PHYSICAL EXAM:      General:comfortable in bed, in no acute distress  Eyes:sclera anicteric, pupils equal and EOMI  ENMT:buccal mucosa moist, nose midline  Neck:supple, trachea midline  Lungs:clear, no wheeze/rhonchi  Cardiovascular:regular rate and rhythm, S1 S2  Abdomen:soft, nontender, no organomegaly present, bowel sounds normal  Neurological:alert and oriented x3, Cranial Nerves II-XII grossly intact  Skin:no increased ecchymosis/petechiae/purpura  Lymph Nodes:no palpable cervical/supraclavicular lymph nodes enlargements  Extremities:no cyanosis, trace edema left leg from foot to thigh(daughter Johana reports improved, via pic through phone)        LABS:                        10.0   9.92  )-----------( 276      ( 28 May 2023 05:00 )             31.3     05-28 @ 05:00  WBC9.92  RBC3.18 Hgb10.0 Hct31.3  MCV98.4  Louo278  Auto Vzgeex41.9 Band-- Auto Lymph16.7 Atypical Lymph-- Reactive Lymph-- Auto Mono8.5 Auto Eos4.1 Auto Baso0.5        05-27 @ 21:00  WBC8.38  RBC3.29 Hgb10.3 Hct32.6  MCV99.1  Pjxp063  Auto Psuici92.5 Band-- Auto Lymph16.8 Atypical Lymph-- Reactive Lymph-- Auto Mono10.9 Auto Eos3.9 Auto Baso0.5          05-28    139  |  101  |  16  ----------------------------<  94  3.5   |  34<H>  |  1.00    Ca    8.8      28 May 2023 05:00    TPro  7.0  /  Alb  2.7<L>  /  TBili  0.3  /  DBili  x   /  AST  26  /  ALT  21  /  AlkPhos  94  05-27 05-28 @ 05:00  PT-- INR--  WFJ887.2  05-27 @ 21:00  PT12.0 INR1.03  PTT27.7        PERIPHERAL BLOOD SMEAR REVIEW:      RADIOLOGY & ADDITIONAL STUDIES:  < from: CT Angio Chest PE Protocol w/ IV Cont (05.27.23 @ 22:59) >    ACC: [id number] EXAM:  CT ABDOMEN ONLY IC   ORDERED BY: NARCISO DIAZ     ACC: [id number] [facility]  [facility]   ORDERED BY: NARCISO DIAZ     PROCEDURE DATE:  05/27/2023          INTERPRETATION:  CLINICAL INFORMATION: History of pulmonary embolism   currently not on anticoagulation. Pain. Leg swelling. Question pulmonary   embolism    COMPARISON: CT chest 11/10/2022.    CONTRAST/COMPLICATIONS:  IV Contrast: IV contrast documented in unlinked concurrent exam   ([id number]), Omnipaque 350 (accession [id number])  0 cc   administered (accession [id number]), 90 cc administered (accession   [id number])   0 cc discarded (accession [id number]), 10 cc discarded   (accession [id number])  Oral Contrast: NONE  Complications: Nonereported at time of study completion    PROCEDURE:  CT Angiography of the Chest was performed followed by portal venous phase   imaging of the Abdomen.  Sagittal and coronal reformats were performed as well as 3D (MIP)   reconstructions.    FINDINGS:  CHEST:  LUNGS AND LARGE AIRWAYS: Patent central airways. Mild dependent   atelectatic changes. A few scattered subcentimeter calcified nodules   unchanged.  PLEURA: No pleural effusion.  VESSELS: No pulmonary embolism. Evaluation of subsegmental areas limited   by poor opacification and motion.  HEART: Heart size is normal. No pericardial effusion. Coronary artery   calcifications.  MEDIASTINUM AND TIFFANIE: No lymphadenopathy.  CHEST WALL AND LOWER NECK: Within normal limits.    ABDOMEN AND PELVIS:  LIVER: Within normal limits.  BILE DUCTS: Normal caliber.  GALLBLADDER: Within normal limits.  SPLEEN: Within normal limits.  PANCREAS: Fatty infiltration.  ADRENALS: Within normal limits.  KIDNEYS/URETERS: Within normal limits.    BOWEL: Large hiatal hernia. No bowel obstruction. Appendix  PERITONEUM: No ascites.  VESSELS: Atherosclerotic changes. Right upper quadrant embolization coils.  RETROPERITONEUM/LYMPH NODES: No lymphadenopathy.  ABDOMINAL WALL: Within normal limits.  BONES: Degenerative changes. Scoliosis.    IMPRESSION:    No pulmonary embolism. Evaluation of subsegmental areas limited by poor   opacification and motion.      --- End of Report ---      < end of copied text >  < from: CT Head No Cont (05.27.23 @ 22:54) >    ACC: [id number] EXAM:  CT BRAIN   ORDERED BY: NARCISO DIAZ     PROCEDURE DATE:  05/27/2023          INTERPRETATION:  CLINICAL HISTORY:  Trauma. Status post fall. Hit head.    TECHNIQUE:  CT of the head without contrast.  Contiguous transaxial images of the head were acquired from the skull   base to the vertex without the administration of iodinated contrast.   Coronal and sagittal reformatted images are provided.    COMPARISON: CT head 12/12/2022    FINDINGS:    There is no acute intracranial hemorrhage, vasogenic edema, or evidence   for acute large vascular territory infarct. Stable chronic microvascular   changes with patchy and confluent areas of low-attenuation.    The ventricles, sulci and cisternal spaces are diffusely prominentbut in   proportion demonstrating ventriculomegaly superimposed on cerebral volume   loss.  There is no midline shift or abnormal extra-axial fluid collection.    The calvarium is intact.  There are no osteoblastic or lytic calvarial or   skull base lesions.  The paranasal sinuses and mastoid air cells are   clear.    IMPRESSION:  Stable exam. No acute intracranial hemorrhage, vasogenic edema or   extra-axial collection. Chronic findings as above.      < end of copied text >  < from: US Duplex Venous Lower Ext Ltd, Left (05.27.23 @ 20:45) >    ACC: [id number] EXAM:  US DPLX LWR EXT VEINS LTD LT   ORDERED BY: NARCISO DIAZ     PROCEDURE DATE:  05/27/2023          INTERPRETATION:  CLINICAL INFORMATION: Left lower extremity swelling.    COMPARISON: Bilateral lower extremity duplex ultrasound from 11/9/2022.    TECHNIQUE: Duplex sonography of the LEFT LOWER extremity veins with color   and spectral Doppler, with and without compression.    FINDINGS:  Nonocclusive deep venous thrombosis in the left common femoral, popliteal   and gastrocnemius veins. Occlusive deep venous thrombus in the left   superficial and deep femoral veins.    The left posterior tibial, peroneal and soleal veins are not visualized.    The visualized left external iliac vein is patent.    IMPRESSION:  Left lower extremity deep venous thrombosis: above and below the knee.    Findings were discussed with SACHIN DIAZ [id number] 5/27/2023   8:56 PM by Dr. ANTONIO with read back confirmation.    --- End of Report ---    < end of copied text >  
SURGERY PA CONSULT NOTE:    CHIEF COMPLAINT:  Patient is a 89y old  Female who presents with a chief complaint of acute DVT (27 May 2023 23:47)      HPI FROM ED:  HPI:  89y female with PMHx of HTN, PE (previously on eliquis), 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 Patient and family noticed swelling started a couple of weeks ago, and in the last three days it has gotten much worse. Also noted redness of left leg. She denies any pain. Daughter notes that patient also fell last night on her right knee while going from her motorized stair chair to her wheelchair. Denies head strike or loss of consciousness. Patient denies any shortness of breath, chest pain, lightheadedness, fevers, chills.     ED Course:  Vitals: T 98, HR 67, /90, RR 19, spo2% 93% on RA  Labs significant for: H/H 10.3/32.6, HCO3 34, Albumin 2.7, pro bnp 474  ECG: NSR at 74bpm, LAD, cannot rule out anterior infarct (unchanged)  LE Doppler: Left lower extremity deep venous thrombosis: above and below the knee.  CT Head: Stable exam. No acute intracranial hemorrhage, vasogenic edema or extra-axial collection. Chronic findings as above.  CTA: No pulmonary embolism. Evaluation of subsegmental areas limited by poor opacification and motion.  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 Daughter at bedside assisting with providing history. Pt and family endorse swelling of the LLE x 3 weeks, with redness more recently noticed. Pt denies pain of the LLE. Per pt and daughter, pt is uses wheelchair and only stands when transferring from 1 chair to another. Pt denies changes in sensation, pain of the LLE, denies taking any a/c or anti-platelet medication at this time since SDH in November. Pt denies headache dizziness, vision changes, chest pain, SBO, n/v/d.     PAST MEDICAL & SURGICAL HISTORY:  Hyperlipidemia  Hypertension  Fall  Hip fracture  Anxiety  CVA (cerebral vascular accident)  PUD (peptic ulcer disease)  GI bleed  SDH (subdural hematoma)  S/P hysterectomy  S/P hip replacement left    REVIEW OF SYSTEMS:  Constitutional: Denies fever, fatigue or weight loss.  Skin: Denies rash.  Eyes: Denies recent vision problems or eye pain.  ENT: Denies congestion, ear pain, or sore throat.  Endocrine: Denies thyroid problems.  Cardiovascular: Denies chest pain or palpation.  Respiratory: Denies cough, shortness of breath, congestion, or wheezing.  Gastrointestinal: Denies abdominal pain, nausea, vomiting, or diarrhea.  Genitourinary: Denies dysuria.  Musculoskeletal: +LLE swelling.  Neurologic: Denies headache.  Psych: +anxiety    MEDICATIONS:  Home Medications:  buPROPion 100 mg/12 hours (SR) oral tablet, extended release: 1 tab(s) orally once a day (28 May 2023 01:03)  escitalopram 5 mg oral tablet: 1 tab(s) orally once a day (28 May 2023 01:03)  ferrous sulfate 325 mg (65 mg elemental iron) oral delayed release tablet: 1 tab(s) orally every other day (28 May 2023 01:03)  furosemide 20 mg oral tablet: 1 tab(s) orally once a day (28 May 2023 01:03)  multivitamin: 1 tab(s) orally once a day (28 May 2023 01:03)  Neurontin 100 mg oral capsule: 1 cap(s) orally 2 times a day (28 May 2023 01:03)  rosuvastatin 10 mg oral tablet: 1 tab(s) orally once a day (28 May 2023 01:03)  sucralfate 1 g oral tablet: 1 tab(s) orally 2 times a day (28 May 2023 01:03)  Vitamin D3 1000 intl units (25 mcg) oral tablet: 1 tab(s) orally once a day (28 May 2023 01:03)  Xanax 0.25 mg oral tablet: 1 orally once a day (28 May 2023 01:02)    MEDICATIONS  (STANDING):  ALPRAZolam 0.25 milliGRAM(s) Oral daily  atorvastatin 20 milliGRAM(s) Oral at bedtime  buPROPion SR (12-Hour) 100 milliGRAM(s) Oral daily  cholecalciferol 1000 Unit(s) Oral daily  escitalopram 5 milliGRAM(s) Oral at bedtime  ferrous    sulfate 325 milliGRAM(s) Oral <User Schedule>  furosemide    Tablet 20 milliGRAM(s) Oral daily  gabapentin 100 milliGRAM(s) Oral two times a day  heparin  Infusion.  Unit(s)/Hr (11 mL/Hr) IV Continuous <Continuous>  multivitamin 1 Tablet(s) Oral daily  sucralfate 1 Gram(s) Oral two times a day    MEDICATIONS  (PRN):  acetaminophen     Tablet .. 650 milliGRAM(s) Oral every 6 hours PRN Temp greater or equal to 38C (100.4F), Mild Pain (1 - 3)  aluminum hydroxide/magnesium hydroxide/simethicone Suspension 30 milliLiter(s) Oral every 4 hours PRN Dyspepsia  heparin   Injectable 5000 Unit(s) IV Push every 6 hours PRN For aPTT less than 40  heparin   Injectable 2500 Unit(s) IV Push every 6 hours PRN For aPTT between 40 - 57  melatonin 3 milliGRAM(s) Oral at bedtime PRN Insomnia  ondansetron Injectable 4 milliGRAM(s) IV Push every 8 hours PRN Nausea and/or Vomiting    ALLERGIES:  penicillins (Unknown)  mold, cheese and wine (Unknown)    Intolerances    SOCIAL HISTORY:  Lives at home alone with 24 hour aids  Denies alcohol, drug, or tobacco use (27 May 2023 23:47)  Remote hx of tobacco use    FAMILY HISTORY:  FHx: congestive heart failure (Father)  Family history of malignant neoplasm of colon (Mother)    PHYSICAL EXAM:  T(C): 36.5 (05-28-23 @ 02:37), Max: 36.7 (05-27-23 @ 17:35)  HR: 67 (05-28-23 @ 02:37) (67 - 75)  BP: 164/82 (05-28-23 @ 02:37) (156/90 - 174/99)  RR: 17 (05-28-23 @ 02:37) (17 - 19)  SpO2: 94% (05-28-23 @ 02:37) (90% - 94%)    CONSTITUTIONAL: No apparent distress, lying in bed  EYES: PERRLA and symmetric, EOMI, No conjunctival or scleral injection, non-icteric, purulent discharge of the L upper eyelid  ENMT: Oral mucosa with moist membranes  NECK: Supple, symmetric and without tracheal deviation   RESP: No respiratory distress, no use of accessory muscles  CV: RRR  GI: Soft, NT, ND, well healed surgical incision infra-umbilical, no rebound, no guarding; no palpable masses; no hernia palpated  LYMPH: No cervical LAD or tenderness  SKIN: LLE with erythema, edema, warm to touch from thigh to foot, skin induration of the lateral LLE as compared to RLE, no ulcers/subcutaneous nodules palpated.   MSK: Skin changes as above, sensation intact at the L and R feet, reduced strength of the b/l lower extremities  VASC: palpable pulses of b/l PT and DP, popliteal, femoral pulses  PSYCH: A&Ox1-2    LABS:                        10.3   8.38  )-----------( 298      ( 27 May 2023 21:00 )             32.6     05-27    140  |  104  |  19  ----------------------------<  93  3.9   |  34<H>  |  1.10    Ca    9.0      27 May 2023 21:00    TPro  7.0  /  Alb  2.7<L>  /  TBili  0.3  /  DBili  x   /  AST  26  /  ALT  21  /  AlkPhos  94  05-27    PT/INR - ( 27 May 2023 21:00 )   PT: 12.0 sec;   INR: 1.03 ratio    PTT - ( 27 May 2023 21:00 )  PTT:27.7 sec    LIVER FUNCTIONS - ( 27 May 2023 21:00 )  Alb: 2.7 g/dL / Pro: 7.0 g/dL / ALK PHOS: 94 U/L / ALT: 21 U/L / AST: 26 U/L / GGT: x           RADIOLOGY & ADDITIONAL STUDIES:  < from: US Duplex Venous Lower Ext Ltd, Left (05.27.23 @ 20:45) >  ACC: [id number] EXAM:  US DPLX LWR EXT VEINS LTD LT   ORDERED BY: NARCISO DIAZ     PROCEDURE DATE:  05/27/2023      INTERPRETATION:  CLINICAL INFORMATION: Left lower extremity swelling.    COMPARISON: Bilateral lower extremity duplex ultrasound from 11/9/2022.    TECHNIQUE: Duplex sonography of the LEFT LOWER extremity veins with color   and spectral Doppler, with and without compression.    FINDINGS:  Nonocclusive deep venous thrombosis in the left common femoral, popliteal   and gastrocnemius veins. Occlusive deep venous thrombus in the left   superficial and deep femoral veins.    The left posterior tibial, peroneal and soleal veins are not visualized.    The visualized left external iliac vein is patent.    IMPRESSION:  Left lower extremity deep venous thrombosis: above and below the knee.    Findings were discussed with NP NARCISO DIAZ [id number] 5/27/2023   8:56 PM by Dr. ANTONIO with read back confirmation.    --- End of Report ---    NIKKI ANTONIO MD; Attending Radiologist  This document has been electronically signed. May 27 2023  9:09PM    < end of copied text >    < from: CT Head No Cont (05.27.23 @ 22:54) >  ACC: [id number] EXAM:  CT BRAIN   ORDERED BY: NARCISO DIAZ     PROCEDURE DATE:  05/27/2023      INTERPRETATION:  CLINICAL HISTORY:  Trauma. Status post fall. Hit head.    TECHNIQUE:  CT of the head without contrast.  Contiguous transaxial images of the head were acquired from the skull   base to the vertex without the administration of iodinated contrast.   Coronal and sagittal reformatted images are provided.    COMPARISON: CT head 12/12/2022    FINDINGS:    There is no acute intracranial hemorrhage, vasogenic edema, or evidence   for acute large vascular territory infarct. Stable chronic microvascular   changes with patchy and confluent areas of low-attenuation.    The ventricles, sulci and cisternal spaces are diffusely prominentbut in   proportion demonstrating ventriculomegaly superimposed on cerebral volume   loss.  There is no midline shift or abnormal extra-axial fluid collection.    The calvarium is intact.  There are no osteoblastic or lytic calvarial or   skull base lesions.  The paranasal sinuses and mastoid air cells are   clear.    IMPRESSION:  Stable exam. No acute intracranial hemorrhage, vasogenic edema or   extra-axial collection. Chronic findings as above.    --- End of Report ---    GUS VERDE MD; Attending Radiologist  This document has been electronically signed. May 27 2023 11:30PM    < end of copied text >  < from: CT Angio Chest PE Protocol w/ IV Cont (05.27.23 @ 22:59) >    ACC: [id number] EXAM:  CT ABDOMEN ONLY IC   ORDERED BY: NARCISO DIAZ   ACC: [id number] EXAM:  CT ANGIO CHEST PULM ART WAWIC   ORDERED BY: NARCISO DIAZ     PROCEDURE DATE:  05/27/2023      INTERPRETATION:  CLINICAL INFORMATION: History of pulmonary embolism   currently not on anticoagulation. Pain. Leg swelling. Question pulmonary   embolism    COMPARISON: CT chest 11/10/2022.    CONTRAST/COMPLICATIONS:  IV Contrast: IV contrast documented in unlinked concurrent exam   ([id number]), Omnipaque 350 (accession [id number])  0 cc   administered (accession [id number]), 90 cc administered (accession   [id number])   0 cc discarded (accession [id number]), 10 cc discarded   (accession [id number])  Oral Contrast: NONE  Complications: Nonereported at time of study completion    PROCEDURE:  CT Angiography of the Chest was performed followed by portal venous phase   imaging of the Abdomen.  Sagittal and coronal reformats were performed as well as 3D (MIP)   reconstructions.    FINDINGS:  CHEST:  LUNGS AND LARGE AIRWAYS: Patent central airways. Mild dependent   atelectatic changes. A few scattered subcentimeter calcified nodules   unchanged.  PLEURA: No pleural effusion.  VESSELS: No pulmonary embolism. Evaluation of subsegmental areas limited   by poor opacification and motion.  HEART: Heart size is normal. No pericardial effusion. Coronary artery   calcifications.  MEDIASTINUM AND TIFFANIE: No lymphadenopathy.  CHEST WALL AND LOWER NECK: Within normal limits.    ABDOMEN AND PELVIS:  LIVER: Within normal limits.  BILE DUCTS: Normal caliber.  GALLBLADDER: Within normal limits.  SPLEEN: Within normal limits.  PANCREAS: Fatty infiltration.  ADRENALS: Within normal limits.  KIDNEYS/URETERS: Within normal limits.    BOWEL: Large hiatal hernia. No bowel obstruction. Appendix  PERITONEUM: No ascites.  VESSELS: Atherosclerotic changes. Right upper quadrant embolization coils.  RETROPERITONEUM/LYMPH NODES: No lymphadenopathy.  ABDOMINAL WALL: Within normal limits.  BONES: Degenerative changes. Scoliosis.    IMPRESSION:    No pulmonary embolism. Evaluation of subsegmental areas limited by poor   opacification and motion.    --- End of Report ---    TREVOR INIGUEZ MD; Attending Radiologist  This document has been electronically signed. May 27 2023 11:48PM    < end of copied text >  
Date/Time Patient Seen:  		  Referring MD:   Data Reviewed	       Patient is a 89y old  Female who presents with a chief complaint of acute DVT (01 Jun 2023 12:28)      Subjective/HPI   89y female with PMHx of HTN, PE (previously on eliquis), 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 Patient and family noticed swelling started a couple of weeks ago, and in the last three days it has gotten much worse. Also noted redness of left leg. She denies any pain. Daughter notes that patient also fell last night on her right knee while going from her motorized stair chair to her wheelchair. Denies head strike or loss of consciousness. Patient denies any shortness of breath, chest pain, lightheadedness, fevers, chills.   PAST MEDICAL & SURGICAL HISTORY:  Hyperlipidemia    Hypertension    Fall    Hip fracture    Anxiety    CVA (cerebral vascular accident)    PUD (peptic ulcer disease)    GI bleed    SDH (subdural hematoma)    S/P hysterectomy    S/P hip replacement  left    PAST SURGICAL HISTORY:  S/P hip replacement left    S/P hysterectomy.     FAMILY HISTORY:  Father  Still living? Unknown  FHx: congestive heart failure, Age at diagnosis: Age Unknown    Mother  Still living? Unknown  Family history of malignant neoplasm of colon, Age at diagnosis: Age Unknown.     Social History:  · Substance use	No  · Social History (marital status, living situation, occupation, and sexual history)	Lives at home alone with 24 hour aids  Denies alcohol, drug, or tobacco use     Tobacco Screening:  · Core Measure Site	Yes  · Has the patient used tobacco in the past 30 days?	No        Medication list         MEDICATIONS  (STANDING):  atorvastatin 20 milliGRAM(s) Oral at bedtime  buPROPion SR (12-Hour) 100 milliGRAM(s) Oral daily  cholecalciferol 1000 Unit(s) Oral daily  escitalopram 5 milliGRAM(s) Oral at bedtime  ferrous    sulfate 325 milliGRAM(s) Oral <User Schedule>  furosemide    Tablet 20 milliGRAM(s) Oral daily  gabapentin 100 milliGRAM(s) Oral two times a day  multivitamin 1 Tablet(s) Oral daily  sucralfate 1 Gram(s) Oral two times a day    MEDICATIONS  (PRN):  acetaminophen     Tablet .. 650 milliGRAM(s) Oral every 6 hours PRN Temp greater or equal to 38C (100.4F), Mild Pain (1 - 3)  ALPRAZolam 0.25 milliGRAM(s) Oral daily PRN Anxiety/agitation  aluminum hydroxide/magnesium hydroxide/simethicone Suspension 30 milliLiter(s) Oral every 4 hours PRN Dyspepsia  melatonin 3 milliGRAM(s) Oral at bedtime PRN Insomnia  ondansetron Injectable 4 milliGRAM(s) IV Push every 8 hours PRN Nausea and/or Vomiting         Vitals log        ICU Vital Signs Last 24 Hrs  T(C): 36.5 (01 Jun 2023 12:12), Max: 36.8 (31 May 2023 16:47)  T(F): 97.7 (01 Jun 2023 12:12), Max: 98.3 (31 May 2023 16:47)  HR: 81 (01 Jun 2023 12:12) (73 - 94)  BP: 118/68 (01 Jun 2023 12:12) (108/67 - 154/84)  BP(mean): --  ABP: --  ABP(mean): --  RR: 18 (01 Jun 2023 12:12) (16 - 21)  SpO2: 86% (01 Jun 2023 12:55) (86% - 100%)    O2 Parameters below as of 01 Jun 2023 12:55  Patient On (Oxygen Delivery Method): room air                 Input and Output:  I&O's Detail    31 May 2023 07:01  -  01 Jun 2023 07:00  --------------------------------------------------------  IN:    Heparin Infusion: 24 mL    Oral Fluid: 690 mL  Total IN: 714 mL    OUT:    Voided (mL): 600 mL  Total OUT: 600 mL    Total NET: 114 mL      01 Jun 2023 07:01  -  01 Jun 2023 14:45  --------------------------------------------------------  IN:    Oral Fluid: 240 mL  Total IN: 240 mL    OUT:    Voided (mL): 300 mL  Total OUT: 300 mL    Total NET: -60 mL          Lab Data                        9.6    11.10 )-----------( 253      ( 01 Jun 2023 06:45 )             30.2     06-01    139  |  102  |  22  ----------------------------<  89  3.8   |  33<H>  |  1.00    Ca    8.9      01 Jun 2023 06:45  Mg     2.2     06-01              Review of Systems	      Objective     Physical Examination        Pertinent Lab findings & Imaging      Martínez:  NO   Adequate UO     I&O's Detail    31 May 2023 07:01  -  01 Jun 2023 07:00  --------------------------------------------------------  IN:    Heparin Infusion: 24 mL    Oral Fluid: 690 mL  Total IN: 714 mL    OUT:    Voided (mL): 600 mL  Total OUT: 600 mL    Total NET: 114 mL      01 Jun 2023 07:01  -  01 Jun 2023 14:45  --------------------------------------------------------  IN:    Oral Fluid: 240 mL  Total IN: 240 mL    OUT:    Voided (mL): 300 mL  Total OUT: 300 mL    Total NET: -60 mL               Discussed with:     Cultures:	        Radiology    ACC: [id number] EXAM:  CT ABDOMEN ONLY IC   ORDERED BY: NARCISO DIAZ     ACC: [id number] [facility]   ORDERED BY: NARCISO DIAZ     PROCEDURE DATE:  05/27/2023          INTERPRETATION:  CLINICAL INFORMATION: History of pulmonary embolism   currently not on anticoagulation. Pain. Leg swelling. Question pulmonary   embolism    COMPARISON: CT chest 11/10/2022.    CONTRAST/COMPLICATIONS:  IV Contrast: IV contrast documented in unlinked concurrent exam   (accession [id number]), Omnipaque 350 (accession [id number])  0 cc   administered (accession [id number]), 90 cc administered (accession   [id number])   0 cc discarded (accession [id number]), 10 cc discarded   (accession [id number])  Oral Contrast: NONE  Complications: None reported at time of study completion    PROCEDURE:  CT Angiography of the Chest was performed followed by portal venous phase   imaging of the Abdomen.  Sagittal and coronal reformats were performed as well as 3D (MIP)   reconstructions.    FINDINGS:  CHEST:  LUNGS AND LARGE AIRWAYS: Patent central airways. Mild dependent   atelectatic changes. A few scattered subcentimeter calcified nodules   unchanged.  PLEURA: No pleural effusion.  VESSELS: No pulmonary embolism. Evaluation of subsegmental areas limited   by poor opacification and motion.  HEART: Heart size is normal. No pericardial effusion. Coronary artery   calcifications.  MEDIASTINUM AND TIFFANIE: No lymphadenopathy.  CHEST WALL AND LOWER NECK: Within normal limits.    ABDOMEN AND PELVIS:  LIVER: Within normal limits.  BILE DUCTS: Normal caliber.  GALLBLADDER: Within normal limits.  SPLEEN: Within normal limits.  PANCREAS: Fatty infiltration.  ADRENALS: Within normal limits.  KIDNEYS/URETERS: Within normal limits.    BOWEL: Large hiatal hernia. No bowel obstruction. Appendix  PERITONEUM: No ascites.  VESSELS: Atherosclerotic changes. Right upper quadrant embolization coils.  RETROPERITONEUM/LYMPH NODES: No lymphadenopathy.  ABDOMINAL WALL: Within normal limits.  BONES: Degenerative changes. Scoliosis.    IMPRESSION:    No pulmonary embolism. Evaluation of subsegmental areas limited by poor   opacification and motion.      --- End of Report ---            TREVOR INIGUEZ MD; Attending Radiologist  This document has been electronically signed. May 27 2023 11:48PM

## 2023-06-01 NOTE — DISCHARGE NOTE PROVIDER - CARE PROVIDER_API CALL
Josue Mendoza  Internal Medicine  [address] [place] [address], [address]  [place] [address]  Phone: [phone]  Fax: [phone]  Follow Up Time: 1 week

## 2023-06-01 NOTE — DISCHARGE NOTE PROVIDER - ATTENDING DISCHARGE PHYSICAL EXAMINATION:
Vitals:  T: 97.6  P:  81  BP:  128/78  RR:  18  SPO2:  90%2L NC  GENERAL: NAD  HEENT: EOMI, hearing normal, conjunctiva and sclera clear  Chest: diminished BS at bases, no wheezing  CV: S1S2, RRR,   GI: soft, +BS, NT/ND  Musculoskeletal: decreased edema of left LE  Psychiatric: affect nL, mood nL  Skin: warm and dry

## 2023-06-01 NOTE — DISCHARGE NOTE PROVIDER - NSDCCPCAREPLAN_GEN_ALL_CORE_FT
PRINCIPAL DISCHARGE DIAGNOSIS  Diagnosis: Left femoral vein DVT  Assessment and Plan of Treatment: You were deemed not to be a candidate for long term anticoagulation due to your history of spontaneous intracranial hemorrhage.  As a result an IVC filter has been placed to protect you from having a pulmonary embolism.  Please follow up with your PMD in 1 week.      SECONDARY DISCHARGE DIAGNOSES  Diagnosis: Hypoxia  Assessment and Plan of Treatment: This is most likely due to a large hiatal hernia.  Please use 2 liters via nasal canula to maintain SpO2>90%.    Diagnosis: HLD (hyperlipidemia)  Assessment and Plan of Treatment: Continue your cholesterol lowering medication.

## 2023-06-01 NOTE — CONSULT NOTE ADULT - ASSESSMENT
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    dvt  atelectasis  HTN  hx of PE  SDH hx  dementia  anemia  HTN  HLD   89y female with PMHx of HTN, PE (previously on eliquis), 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    dvt  atelectasis  HTN  hx of PE  SDH hx  dementia  anemia  HTN  HLD      large hiatal hernia on imaging noted, may cause GERD - Anemia - Atelectasis and Dyspnea  rec - PPI for Hiatal hernia  silent aspiration may be related to Large Hiatal Hernia  fio2 titration  CXR - CT chest - no acute Pulm Path  I sneha  upright in bed  monitor VS and HD  AC for hx of VTE if ok with Neuro and given SDH hx  GOC discussion - DNR  assist with needs

## 2023-06-01 NOTE — PROGRESS NOTE ADULT - ASSESSMENT
89y female with PMHx of HTN, PE (previously on eliquis), SDH while on Eliquis (now off due to spontaneous thalamic bleed) presents to ED for 3 days of L leg swelling and pain. Admitted for acute extensive LLE DVT management, on IV Heparin, planned for IVC filter        Problem/Plan - 1:  ·  Problem: DVT, lower extremity.   ·  Plan: Presents with swelling and leg pain secondary to acute LLE DVT  - LE Doppler: Left lower extremity deep venous thrombosis: above and below the knee.  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 D/w Hematologist Dr. Saenz,  not a candidate for long term AC given history.   - Vascular, Hematology, Neuro consults appreciated.     Problem/Plan - 2:  ·  Problem: History of intracranial hemorrhage.   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     Problem/Plan - 3:  ·  Problem: Hemifacial spasm.   ·  Plan: Chronic  - Continue neurontin 100mg BID.     Problem/Plan - 4:  ·  Problem: Dementia.   ·  Plan: Chronic  - Continue Wellbutrin, Escitalopram  - Patient may become confused/sundown per daughter; usually responds well to redirection.     Problem/Plan - 5:  ·  Problem: Anemia.   ·  Plan: H/H 10.3/32.6  - No signs or symptoms of overt bleeding  - Monitor CBC daily.  Hbg has been stable.     Problem/Plan - 6:  ·  Problem: HTN (hypertension).   ·  Plan: Chronic  - Continue furosemide 20mg qd.     Problem/Plan - 7:  ·  Problem: HLD (hyperlipidemia).   ·  Plan: Chronic  - Atorvastatin as therapeutic interchange for Rosuvastatin.     Problem/Plan - 8:  ·  Problem: Need for prophylactic measure.   ·  Plan: VTE prophylaxis: s/p IVC filter placement     #Hypoxia:  check CXR and BNP.  Pulmonary consult.  Check room air Spo2.  Pt. will likely need arrangement for home O2.       89y female with PMHx of HTN, PE (previously on eliquis), SDH while on Eliquis (now off due to spontaneous thalamic bleed) presents to ED for 3 days of L leg swelling and pain. Admitted for acute extensive LLE DVT management, on IV Heparin, planned for IVC filter        Problem/Plan - 1:  ·  Problem: DVT, lower extremity.   ·  Plan: Presents with swelling and leg pain secondary to acute LLE DVT  - LE Doppler: Left lower extremity deep venous thrombosis: above and below the knee.  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 D/w Hematologist Dr. Saenz,  not a candidate for long term AC given history.   - Vascular, Hematology, Neuro consults appreciated.     Problem/Plan - 2:  ·  Problem: History of intracranial hemorrhage.   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     Problem/Plan - 3:  ·  Problem: Hemifacial spasm.   ·  Plan: Chronic  - Continue neurontin 100mg BID.     Problem/Plan - 4:  ·  Problem: Dementia.   ·  Plan: Chronic  - Continue Wellbutrin, Escitalopram  - Patient may become confused/sundown per daughter; usually responds well to redirection.     Problem/Plan - 5:  ·  Problem: Anemia.   ·  Plan: H/H 10.3/32.6  - No signs or symptoms of overt bleeding  - Monitor CBC daily.  Hbg has been stable.     Problem/Plan - 6:  ·  Problem: HTN (hypertension).   ·  Plan: Chronic  - Continue furosemide 20mg qd.     Problem/Plan - 7:  ·  Problem: HLD (hyperlipidemia).   ·  Plan: Chronic  - Atorvastatin as therapeutic interchange for Rosuvastatin.     Problem/Plan - 8:  ·  Problem: Need for prophylactic measure.   ·  Plan: VTE prophylaxis: s/p IVC filter placement     #Acute hypoxic respiratory failure:  check CXR and BNP.  Pulmonary consult.  Check room air Spo2.  Pt. will likely need arrangement for home O2.

## 2023-06-02 ENCOUNTER — TRANSCRIPTION ENCOUNTER (OUTPATIENT)
Age: 88
End: 2023-06-02

## 2023-06-02 VITALS
TEMPERATURE: 98 F | SYSTOLIC BLOOD PRESSURE: 128 MMHG | RESPIRATION RATE: 18 BRPM | OXYGEN SATURATION: 90 % | DIASTOLIC BLOOD PRESSURE: 78 MMHG | HEART RATE: 81 BPM

## 2023-06-02 DIAGNOSIS — D64.9 ANEMIA, UNSPECIFIED: ICD-10-CM

## 2023-06-02 LAB
ANION GAP SERPL CALC-SCNC: 2 MMOL/L — LOW (ref 5–17)
BASOPHILS # BLD AUTO: 0.05 K/UL — SIGNIFICANT CHANGE UP (ref 0–0.2)
BASOPHILS NFR BLD AUTO: 0.5 % — SIGNIFICANT CHANGE UP (ref 0–2)
BUN SERPL-MCNC: 22 MG/DL — SIGNIFICANT CHANGE UP (ref 7–23)
CALCIUM SERPL-MCNC: 8.7 MG/DL — SIGNIFICANT CHANGE UP (ref 8.5–10.1)
CHLORIDE SERPL-SCNC: 104 MMOL/L — SIGNIFICANT CHANGE UP (ref 96–108)
CO2 SERPL-SCNC: 34 MMOL/L — HIGH (ref 22–31)
CREAT SERPL-MCNC: 0.98 MG/DL — SIGNIFICANT CHANGE UP (ref 0.5–1.3)
EGFR: 54 ML/MIN/1.73M2 — LOW
EOSINOPHIL # BLD AUTO: 0.35 K/UL — SIGNIFICANT CHANGE UP (ref 0–0.5)
EOSINOPHIL NFR BLD AUTO: 3.3 % — SIGNIFICANT CHANGE UP (ref 0–6)
GLUCOSE SERPL-MCNC: 85 MG/DL — SIGNIFICANT CHANGE UP (ref 70–99)
HCT VFR BLD CALC: 29.8 % — LOW (ref 34.5–45)
HGB BLD-MCNC: 9.3 G/DL — LOW (ref 11.5–15.5)
IMM GRANULOCYTES NFR BLD AUTO: 0.4 % — SIGNIFICANT CHANGE UP (ref 0–0.9)
LYMPHOCYTES # BLD AUTO: 1.28 K/UL — SIGNIFICANT CHANGE UP (ref 1–3.3)
LYMPHOCYTES # BLD AUTO: 12 % — LOW (ref 13–44)
MCHC RBC-ENTMCNC: 31.2 GM/DL — LOW (ref 32–36)
MCHC RBC-ENTMCNC: 31.3 PG — SIGNIFICANT CHANGE UP (ref 27–34)
MCV RBC AUTO: 100.3 FL — HIGH (ref 80–100)
MONOCYTES # BLD AUTO: 1.02 K/UL — HIGH (ref 0–0.9)
MONOCYTES NFR BLD AUTO: 9.6 % — SIGNIFICANT CHANGE UP (ref 2–14)
NEUTROPHILS # BLD AUTO: 7.94 K/UL — HIGH (ref 1.8–7.4)
NEUTROPHILS NFR BLD AUTO: 74.2 % — SIGNIFICANT CHANGE UP (ref 43–77)
NRBC # BLD: 0 /100 WBCS — SIGNIFICANT CHANGE UP (ref 0–0)
PLATELET # BLD AUTO: 239 K/UL — SIGNIFICANT CHANGE UP (ref 150–400)
POTASSIUM SERPL-MCNC: 3.9 MMOL/L — SIGNIFICANT CHANGE UP (ref 3.5–5.3)
POTASSIUM SERPL-SCNC: 3.9 MMOL/L — SIGNIFICANT CHANGE UP (ref 3.5–5.3)
RBC # BLD: 2.97 M/UL — LOW (ref 3.8–5.2)
RBC # FLD: 15.4 % — HIGH (ref 10.3–14.5)
SODIUM SERPL-SCNC: 140 MMOL/L — SIGNIFICANT CHANGE UP (ref 135–145)
WBC # BLD: 10.68 K/UL — HIGH (ref 3.8–10.5)
WBC # FLD AUTO: 10.68 K/UL — HIGH (ref 3.8–10.5)

## 2023-06-02 PROCEDURE — 76937 US GUIDE VASCULAR ACCESS: CPT

## 2023-06-02 PROCEDURE — C1894: CPT

## 2023-06-02 PROCEDURE — 83735 ASSAY OF MAGNESIUM: CPT

## 2023-06-02 PROCEDURE — 85610 PROTHROMBIN TIME: CPT

## 2023-06-02 PROCEDURE — 93971 EXTREMITY STUDY: CPT

## 2023-06-02 PROCEDURE — 99239 HOSP IP/OBS DSCHRG MGMT >30: CPT

## 2023-06-02 PROCEDURE — 82746 ASSAY OF FOLIC ACID SERUM: CPT

## 2023-06-02 PROCEDURE — 82728 ASSAY OF FERRITIN: CPT

## 2023-06-02 PROCEDURE — 84466 ASSAY OF TRANSFERRIN: CPT

## 2023-06-02 PROCEDURE — 85027 COMPLETE CBC AUTOMATED: CPT

## 2023-06-02 PROCEDURE — 99285 EMERGENCY DEPT VISIT HI MDM: CPT

## 2023-06-02 PROCEDURE — C1880: CPT

## 2023-06-02 PROCEDURE — 85730 THROMBOPLASTIN TIME PARTIAL: CPT

## 2023-06-02 PROCEDURE — C1769: CPT

## 2023-06-02 PROCEDURE — 71275 CT ANGIOGRAPHY CHEST: CPT | Mod: MA

## 2023-06-02 PROCEDURE — 83550 IRON BINDING TEST: CPT

## 2023-06-02 PROCEDURE — 36415 COLL VENOUS BLD VENIPUNCTURE: CPT

## 2023-06-02 PROCEDURE — 74160 CT ABDOMEN W/CONTRAST: CPT | Mod: MA

## 2023-06-02 PROCEDURE — 83540 ASSAY OF IRON: CPT

## 2023-06-02 PROCEDURE — 83880 ASSAY OF NATRIURETIC PEPTIDE: CPT

## 2023-06-02 PROCEDURE — 80053 COMPREHEN METABOLIC PANEL: CPT

## 2023-06-02 PROCEDURE — 70450 CT HEAD/BRAIN W/O DYE: CPT | Mod: MA

## 2023-06-02 PROCEDURE — 71045 X-RAY EXAM CHEST 1 VIEW: CPT

## 2023-06-02 PROCEDURE — 93005 ELECTROCARDIOGRAM TRACING: CPT

## 2023-06-02 PROCEDURE — 85025 COMPLETE CBC W/AUTO DIFF WBC: CPT

## 2023-06-02 PROCEDURE — 37191 INS ENDOVAS VENA CAVA FILTR: CPT

## 2023-06-02 PROCEDURE — 80048 BASIC METABOLIC PNL TOTAL CA: CPT

## 2023-06-02 PROCEDURE — 82607 VITAMIN B-12: CPT

## 2023-06-02 PROCEDURE — 76000 FLUOROSCOPY <1 HR PHYS/QHP: CPT

## 2023-06-02 RX ORDER — PANTOPRAZOLE SODIUM 20 MG/1
1 TABLET, DELAYED RELEASE ORAL
Qty: 30 | Refills: 0
Start: 2023-06-02

## 2023-06-02 RX ORDER — ACETAMINOPHEN 500 MG
2 TABLET ORAL
Qty: 0 | Refills: 0 | DISCHARGE
Start: 2023-06-02

## 2023-06-02 RX ADMIN — BUPROPION HYDROCHLORIDE 100 MILLIGRAM(S): 150 TABLET, EXTENDED RELEASE ORAL at 11:28

## 2023-06-02 RX ADMIN — Medication 1000 UNIT(S): at 11:28

## 2023-06-02 RX ADMIN — Medication 20 MILLIGRAM(S): at 05:14

## 2023-06-02 RX ADMIN — Medication 1 GRAM(S): at 05:14

## 2023-06-02 RX ADMIN — GABAPENTIN 100 MILLIGRAM(S): 400 CAPSULE ORAL at 05:16

## 2023-06-02 RX ADMIN — Medication 1 TABLET(S): at 11:28

## 2023-06-02 NOTE — PROGRESS NOTE ADULT - ASSESSMENT
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    dvt  atelectasis  HTN  hx of PE  SDH hx  dementia  anemia  HTN  HLD    large hiatal hernia on imaging noted, may cause GERD - Anemia - Atelectasis and Dyspnea  rec - PPI for Hiatal hernia  silent aspiration may be related to Large Hiatal Hernia  fio2 titration  CXR - CT chest - no acute Pulm Path  I sneha  upright in bed  monitor VS and HD  AC for hx of VTE if ok with Neuro and given SDH hx  GOC discussion - DNR  assist with needs

## 2023-06-02 NOTE — DISCHARGE NOTE NURSING/CASE MANAGEMENT/SOCIAL WORK - NSDCPEFALRISK_GEN_ALL_CORE
For information on Fall & Injury Prevention, visit: [email or web address] OR  [email or web address] OR  https://www.cdc.gov/steadi/patient.html

## 2023-06-02 NOTE — CASE MANAGEMENT PROGRESS NOTE - NSCMPROGRESSNOTE_GEN_ALL_CORE
Discussed patient with Dr. Perez. Patient is medically ready for transition to home today. Met with patient and daughter Johana at bedside to discuss transition plan . Patient requiring new home O2 which has been accepted and delivered by [facility]. [facility] arranged for VN SOC 6/3. Daughter Margo has arranged for patient's private hire 24/7 HHA to be at patient's home at 3pm today. Ambulance arranged for 3pm  with Yuliana. Patient and daughter both verbalize understanding and agree with above transition plan. CM will remain available.

## 2023-06-02 NOTE — PROGRESS NOTE ADULT - SUBJECTIVE AND OBJECTIVE BOX
Neurology follow up note    GIACOMO [id number]      Interval History:    Patient feels ok no new complaints.    Allergies    penicillins (Unknown)  mold, cheese and wine (Unknown)    Intolerances        MEDICATIONS    acetaminophen     Tablet .. 650 milliGRAM(s) Oral every 6 hours PRN  ALPRAZolam 0.25 milliGRAM(s) Oral daily PRN  aluminum hydroxide/magnesium hydroxide/simethicone Suspension 30 milliLiter(s) Oral every 4 hours PRN  atorvastatin 20 milliGRAM(s) Oral at bedtime  buPROPion SR (12-Hour) 100 milliGRAM(s) Oral daily  cholecalciferol 1000 Unit(s) Oral daily  escitalopram 5 milliGRAM(s) Oral at bedtime  ferrous    sulfate 325 milliGRAM(s) Oral <User Schedule>  furosemide    Tablet 20 milliGRAM(s) Oral daily  gabapentin 100 milliGRAM(s) Oral two times a day  melatonin 3 milliGRAM(s) Oral at bedtime PRN  multivitamin 1 Tablet(s) Oral daily  ondansetron Injectable 4 milliGRAM(s) IV Push every 8 hours PRN  sucralfate 1 Gram(s) Oral two times a day              Vital Signs Last 24 Hrs  T(C): 36.6 (02 Jun 2023 04:41), Max: 36.7 (01 Jun 2023 21:32)  T(F): 97.9 (02 Jun 2023 04:41), Max: 98 (01 Jun 2023 21:32)  HR: 77 (02 Jun 2023 04:41) (77 - 83)  BP: 127/81 (02 Jun 2023 04:41) (118/68 - 134/76)  BP(mean): --  RR: 18 (02 Jun 2023 04:41) (18 - 18)  SpO2: 91% (02 Jun 2023 04:41) (86% - 96%)    Parameters below as of 02 Jun 2023 04:41  Patient On (Oxygen Delivery Method): nasal cannula        REVIEW OF SYSTEMS:  Constitutional:  The patient denies fever, chills, or night sweats.  Head:  No headaches.  Eyes:  No double vision or blurry vision.  Ears:  No ringing in the ears.  Neck:  No neck pain.  Respiratory:  No shortness of breath.  Cardiovascular:  No chest pain.  Abdomen:  No nausea, vomiting, or abdominal pain.  Extremities/Neurological:  No numbness or tingling.  Musculoskeletal:  No joint pain.  General:  Positive history of sleeping a lot.  Positive episode of feeling fatigued, feeling worn out.    PHYSICAL EXAMINATION:    HEENT:  Head:  Normocephalic, atraumatic.  Eyes:  No scleral icterus.  Ears:  Hearing bilaterally intact.  NECK:  Supple.  RESPIRATORY:  Good air entry bilaterally.  CARDIOVASCULAR:  S1 and S2 heard.  ABDOMEN:  Soft and nontender.  Extremities:  No clubbing or cyanosis was noted.      NEUROLOGIC:  The patient is awake and alert.    Extraocular movements were intact.  Pupils were equal, round, and reactive bilaterally 3 mm to 2 mm.  Speech was fluent.  Smile was symmetric.  Motor:  Right upper was 5/5, left upper; the patient has decreased range of motion of the left shoulder, but does have a history of trauma and fracture, left upper extremity was 4.  Bilateral extremities were 3-/5.         LABS:  CBC Full  -  ( 01 Jun 2023 06:45 )  WBC Count : 11.10 K/uL  RBC Count : 3.07 M/uL  Hemoglobin : 9.6 g/dL  Hematocrit : 30.2 %  Platelet Count - Automated : 253 K/uL  Mean Cell Volume : 98.4 fl  Mean Cell Hemoglobin : 31.3 pg  Mean Cell Hemoglobin Concentration : 31.8 gm/dL  Auto Neutrophil # : x  Auto Lymphocyte # : x  Auto Monocyte # : x  Auto Eosinophil # : x  Auto Basophil # : x  Auto Neutrophil % : x  Auto Lymphocyte % : x  Auto Monocyte % : x  Auto Eosinophil % : x  Auto Basophil % : x      06-01    139  |  102  |  22  ----------------------------<  89  3.8   |  33<H>  |  1.00    Ca    8.9      01 Jun 2023 06:45  Mg     2.2     06-01      Hemoglobin A1C:       Vitamin B12   PTT - ( 31 May 2023 09:40 )  PTT:65.2 sec      RADIOLOGY        ANALYSIS AND PLAN:  An 89-year-old with an H/O of ICH now DVT  ICH with DVT    anxiety and depression, home medications  hemifacial spasms, continue the patient on her Neurontin.  neurologic wise stable   S/P IVC filter   no new complaints   Greater than 34 minutes of time was spent with the patient, plan of care, reviewing data, with greater than 50% of the visit was spent counseling and/or coordinating care with multidisciplinary healthcare team     Neurology follow up note    GIACOMO [id number]      Interval History:    Patient feels ok no new complaints.    Allergies    penicillins (Unknown)  mold, cheese and wine (Unknown)    Intolerances        MEDICATIONS    acetaminophen     Tablet .. 650 milliGRAM(s) Oral every 6 hours PRN  ALPRAZolam 0.25 milliGRAM(s) Oral daily PRN  aluminum hydroxide/magnesium hydroxide/simethicone Suspension 30 milliLiter(s) Oral every 4 hours PRN  atorvastatin 20 milliGRAM(s) Oral at bedtime  buPROPion SR (12-Hour) 100 milliGRAM(s) Oral daily  cholecalciferol 1000 Unit(s) Oral daily  escitalopram 5 milliGRAM(s) Oral at bedtime  ferrous    sulfate 325 milliGRAM(s) Oral <User Schedule>  furosemide    Tablet 20 milliGRAM(s) Oral daily  gabapentin 100 milliGRAM(s) Oral two times a day  melatonin 3 milliGRAM(s) Oral at bedtime PRN  multivitamin 1 Tablet(s) Oral daily  ondansetron Injectable 4 milliGRAM(s) IV Push every 8 hours PRN  sucralfate 1 Gram(s) Oral two times a day              Vital Signs Last 24 Hrs  T(C): 36.6 (02 Jun 2023 04:41), Max: 36.7 (01 Jun 2023 21:32)  T(F): 97.9 (02 Jun 2023 04:41), Max: 98 (01 Jun 2023 21:32)  HR: 77 (02 Jun 2023 04:41) (77 - 83)  BP: 127/81 (02 Jun 2023 04:41) (118/68 - 134/76)  BP(mean): --  RR: 18 (02 Jun 2023 04:41) (18 - 18)  SpO2: 91% (02 Jun 2023 04:41) (86% - 96%)    Parameters below as of 02 Jun 2023 04:41  Patient On (Oxygen Delivery Method): nasal cannula        REVIEW OF SYSTEMS:  Constitutional:  The patient denies fever, chills, or night sweats.  Head:  No headaches.  Eyes:  No double vision or blurry vision.  Ears:  No ringing in the ears.  Neck:  No neck pain.  Respiratory:  No shortness of breath.  Cardiovascular:  No chest pain.  Abdomen:  No nausea, vomiting, or abdominal pain.  Extremities/Neurological:  No numbness or tingling.  Musculoskeletal:  No joint pain.  General:  Positive history of sleeping a lot.  Positive episode of feeling fatigued, feeling worn out.    PHYSICAL EXAMINATION:    HEENT:  Head:  Normocephalic, atraumatic.  Eyes:  No scleral icterus.  Ears:  Hearing bilaterally intact.  NECK:  Supple.  RESPIRATORY:  Good air entry bilaterally.  CARDIOVASCULAR:  S1 and S2 heard.  ABDOMEN:  Soft and nontender.  Extremities:  No clubbing or cyanosis was noted.      NEUROLOGIC:  The patient is awake and alert.    Extraocular movements were intact.  Pupils were equal, round, and reactive bilaterally 3 mm to 2 mm.  Speech was fluent.  Smile was symmetric.  Motor:  Right upper was 5/5, left upper; the patient has decreased range of motion of the left shoulder, but does have a history of trauma and fracture, left upper extremity was 4.  Bilateral extremities were 3-/5.         LABS:  CBC Full  -  ( 01 Jun 2023 06:45 )  WBC Count : 11.10 K/uL  RBC Count : 3.07 M/uL  Hemoglobin : 9.6 g/dL  Hematocrit : 30.2 %  Platelet Count - Automated : 253 K/uL  Mean Cell Volume : 98.4 fl  Mean Cell Hemoglobin : 31.3 pg  Mean Cell Hemoglobin Concentration : 31.8 gm/dL  Auto Neutrophil # : x  Auto Lymphocyte # : x  Auto Monocyte # : x  Auto Eosinophil # : x  Auto Basophil # : x  Auto Neutrophil % : x  Auto Lymphocyte % : x  Auto Monocyte % : x  Auto Eosinophil % : x  Auto Basophil % : x      06-01    139  |  102  |  22  ----------------------------<  89  3.8   |  33<H>  |  1.00    Ca    8.9      01 Jun 2023 06:45  Mg     2.2     06-01      Hemoglobin A1C:       Vitamin B12   PTT - ( 31 May 2023 09:40 )  PTT:65.2 sec      RADIOLOGY        ANALYSIS AND PLAN:  An 89-year-old with an H/O of ICH now DVT  ICH with DVT    anxiety and depression, home medications  hemifacial spasms, continue the patient on her Neurontin.  neurologic wise stable   S/P IVC filter   no new complaints   Greater than 31 minutes of time was spent with the patient, plan of care, reviewing data, with greater than 50% of the visit was spent counseling and/or coordinating care with multidisciplinary healthcare team

## 2023-06-02 NOTE — DISCHARGE NOTE NURSING/CASE MANAGEMENT/SOCIAL WORK - PATIENT PORTAL LINK FT
You can access the FollowMyHealth Patient Portal offered by [facility] by registering at the following website: [email or web address]. By joining [employer]’s FollowMyHealth portal, you will also be able to view your health information using other applications (apps) compatible with our system.

## 2023-06-02 NOTE — PROGRESS NOTE ADULT - SUBJECTIVE AND OBJECTIVE BOX
Date/Time Patient Seen:  		  Referring MD:   Data Reviewed	       Patient is a 89y old  Female who presents with a chief complaint of acute DVT (01 Jun 2023 16:58)      Subjective/HPI     PAST MEDICAL & SURGICAL HISTORY:  Hyperlipidemia    Hypertension    Fall    Hip fracture    Anxiety    CVA (cerebral vascular accident)    PUD (peptic ulcer disease)    GI bleed    SDH (subdural hematoma)    S/P hysterectomy    S/P hip replacement  left          Medication list         MEDICATIONS  (STANDING):  atorvastatin 20 milliGRAM(s) Oral at bedtime  buPROPion SR (12-Hour) 100 milliGRAM(s) Oral daily  cholecalciferol 1000 Unit(s) Oral daily  escitalopram 5 milliGRAM(s) Oral at bedtime  ferrous    sulfate 325 milliGRAM(s) Oral <User Schedule>  furosemide    Tablet 20 milliGRAM(s) Oral daily  gabapentin 100 milliGRAM(s) Oral two times a day  multivitamin 1 Tablet(s) Oral daily  sucralfate 1 Gram(s) Oral two times a day    MEDICATIONS  (PRN):  acetaminophen     Tablet .. 650 milliGRAM(s) Oral every 6 hours PRN Temp greater or equal to 38C (100.4F), Mild Pain (1 - 3)  ALPRAZolam 0.25 milliGRAM(s) Oral daily PRN Anxiety/agitation  aluminum hydroxide/magnesium hydroxide/simethicone Suspension 30 milliLiter(s) Oral every 4 hours PRN Dyspepsia  melatonin 3 milliGRAM(s) Oral at bedtime PRN Insomnia  ondansetron Injectable 4 milliGRAM(s) IV Push every 8 hours PRN Nausea and/or Vomiting         Vitals log        ICU Vital Signs Last 24 Hrs  T(C): 36.6 (02 Jun 2023 04:41), Max: 36.7 (01 Jun 2023 21:32)  T(F): 97.9 (02 Jun 2023 04:41), Max: 98 (01 Jun 2023 21:32)  HR: 77 (02 Jun 2023 04:41) (77 - 83)  BP: 127/81 (02 Jun 2023 04:41) (118/68 - 134/76)  BP(mean): --  ABP: --  ABP(mean): --  RR: 18 (02 Jun 2023 04:41) (18 - 18)  SpO2: 91% (02 Jun 2023 04:41) (86% - 96%)    O2 Parameters below as of 02 Jun 2023 04:41  Patient On (Oxygen Delivery Method): nasal cannula                 Input and Output:  I&O's Detail    31 May 2023 07:01  -  01 Jun 2023 07:00  --------------------------------------------------------  IN:    Heparin Infusion: 24 mL    Oral Fluid: 690 mL  Total IN: 714 mL    OUT:    Voided (mL): 600 mL  Total OUT: 600 mL    Total NET: 114 mL      01 Jun 2023 07:01  -  02 Jun 2023 05:19  --------------------------------------------------------  IN:    Oral Fluid: 490 mL  Total IN: 490 mL    OUT:    Voided (mL): 700 mL  Total OUT: 700 mL    Total NET: -210 mL          Lab Data                        9.6    11.10 )-----------( 253      ( 01 Jun 2023 06:45 )             30.2     06-01    139  |  102  |  22  ----------------------------<  89  3.8   |  33<H>  |  1.00    Ca    8.9      01 Jun 2023 06:45  Mg     2.2     06-01              Review of Systems	      Objective     Physical Examination    heart s1s2  lung dc BS  head nc      Pertinent Lab findings & Imaging      Martínez:  NO   Adequate UO     I&O's Detail    31 May 2023 07:01  -  01 Jun 2023 07:00  --------------------------------------------------------  IN:    Heparin Infusion: 24 mL    Oral Fluid: 690 mL  Total IN: 714 mL    OUT:    Voided (mL): 600 mL  Total OUT: 600 mL    Total NET: 114 mL      01 Jun 2023 07:01  -  02 Jun 2023 05:19  --------------------------------------------------------  IN:    Oral Fluid: 490 mL  Total IN: 490 mL    OUT:    Voided (mL): 700 mL  Total OUT: 700 mL    Total NET: -210 mL               Discussed with:     Cultures:	        Radiology

## 2023-06-02 NOTE — PROGRESS NOTE ADULT - REASON FOR ADMISSION
acute DVT

## 2023-06-02 NOTE — PROGRESS NOTE ADULT - PROVIDER SPECIALTY LIST ADULT
Hospitalist
Neurology
Pulmonology
Vascular Surgery
Hospitalist
Neurology
Internal Medicine

## 2023-06-05 ENCOUNTER — NON-APPOINTMENT (OUTPATIENT)
Age: 88
End: 2023-06-05

## 2023-06-06 ENCOUNTER — RX RENEWAL (OUTPATIENT)
Age: 88
End: 2023-06-06

## 2023-06-07 PROBLEM — S06.5XAA TRAUMATIC SUBDURAL HEMORRHAGE WITH LOSS OF CONSCIOUSNESS STATUS UNKNOWN, INITIAL ENCOUNTER: Chronic | Status: ACTIVE | Noted: 2023-05-27

## 2023-06-08 ENCOUNTER — APPOINTMENT (OUTPATIENT)
Dept: CARE COORDINATION | Facility: HOME HEALTH | Age: 88
End: 2023-06-08

## 2023-06-20 ENCOUNTER — APPOINTMENT (OUTPATIENT)
Dept: CARE COORDINATION | Facility: HOME HEALTH | Age: 88
End: 2023-06-20
Payer: MEDICARE

## 2023-06-20 DIAGNOSIS — I61.9 NONTRAUMATIC INTRACEREBRAL HEMORRHAGE, UNSPECIFIED: ICD-10-CM

## 2023-06-20 DIAGNOSIS — Z92.89 PERSONAL HISTORY OF OTHER MEDICAL TREATMENT: ICD-10-CM

## 2023-06-20 DIAGNOSIS — I82.409 ACUTE EMBOLISM AND THROMBOSIS OF UNSPECIFIED DEEP VEINS OF UNSPECIFIED LOWER EXTREMITY: ICD-10-CM

## 2023-06-20 PROCEDURE — 99349 HOME/RES VST EST MOD MDM 40: CPT | Mod: 95

## 2023-06-26 ENCOUNTER — RX RENEWAL (OUTPATIENT)
Age: 88
End: 2023-06-26

## 2023-06-27 PROBLEM — Z92.89 HISTORY OF RECENT HOSPITALIZATION: Status: ACTIVE | Noted: 2023-06-27

## 2023-06-27 PROBLEM — I61.9 CEREBRAL HEMORRHAGE: Status: ACTIVE | Noted: 2022-11-17

## 2023-06-27 PROBLEM — I82.409 DVT (DEEP VENOUS THROMBOSIS): Status: ACTIVE | Noted: 2023-06-27

## 2023-06-27 NOTE — COUNSELING
[Good understanding] : Patient has a good understanding of lifestyle changes and steps needed to achieve self management goal [de-identified] : Pt has 24hr aide and family assists with ADLs and medical decision making

## 2023-06-27 NOTE — PHYSICAL EXAM
[No Acute Distress] : no acute distress [Well Nourished] : well nourished [No Respiratory Distress] : no respiratory distress  [No Accessory Muscle Use] : no accessory muscle use [de-identified] : Limited Visual Physical Exam during TeleHealth Visit [de-identified] : Awake and alert [de-identified] : Calm

## 2023-06-27 NOTE — HISTORY OF PRESENT ILLNESS
[Home] : at home, [unfilled] , at the time of the visit. [Other Location: e.g. Home (Enter Location, City,State)___] : at [unfilled] [Verbal consent obtained from patient] : the patient, [unfilled] [Family Member] : family member [Other:____] : [unfilled] [FreeTextEntry4] : Home Care [facility] [FreeTextEntry1] : Follow up post discharge s/p recent hospitalization\par  [de-identified] : This patient is Enrolled in the Post-Discharge [facility] Care Services Follow Up Program through [facility] for Continuity of Care S/P Recent Hospitalization until seen by PCP.\par Copied As per [facility] Discharge Summary\par \par "89 year old female with PMHx of HTN, PE (previously on eliquis), 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 Patient and family noticed swelling started a couple of weeks ago, and in the last three days it has gotten much worse. Also noted redness of left leg. She denies any pain. Daughter notes that patient also fell last night on \par her right knee while going from her motorized stair chair to her wheelchair. Denies head strike or loss of consciousness. Patient denies any shortness of breath, chest pain, lightheadedness, fevers, chills.  The patient was discharged home in stable condition with home care services and follow up care.\par \par During the TeleHealth the patient was in no acute distress.  Able to speak with complete sentences and no audible wheeze noted.\par The patient denies chest pain, shortness of breath, cough, hemoptysis, fever, palpitations, syncope. \par \par CN reviewed that pt  is under the [facility] program for home care until pt is seen by  PCP.   CN will be assigned to sign Home Care orders post-discharge.\par \par Home Care SOC report received from RN. \par

## 2023-06-27 NOTE — ASSESSMENT
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 Patient and family noticed swelling started a couple of weeks ago, and in the last three days it has gotten much worse. Also noted redness of left leg. She denies any pain. Daughter notes that patient also fell last night on her right knee while going from her motorized stair chair to her wheelchair. Denies head strike or loss of consciousness. Patient denies any shortness of breath, chest pain, lightheadedness, fevers, chills.  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\par Continue established treatment plan with follow up\par Continue bleed, fall precautions\par Eliquis on hold\par Monitor for worsening  signs and symptoms  and reviewed when to call medical provider\par Pt verbalized good understanding\par Follow up with Medical providers: PCP, Cardiology, Neurology\par \par HLD: stable\par Continue established treatment plan with follow up\par Continue Crestor\par Lipid panel and bloodwork follow up as per PCP\par Pt verbalized good understanding\par Follow up with Medical providers: PCP, Cardiology\par \par Anemia, hx of GIB: stable\par Continue established treatment plan with follow up\par Continue Feosol, Protonix\par Monitor for worsening  signs and symptoms  and reviewed when to call medical provider\par Pt verbalized good understanding\par Follow up with Medical providers: PCP\par \par \par Pt will need continued Home Care Services RN\par This patient is Enrolled in the [facility] Follow Up Program through [employer]\par Recommended  referral to assist with PCP follow up appointment\par CN reviewed with the pt that pt is under the [facility] program for home care until pt is seen by the PCP.    Reviewed with pt and Home Care RN if symptoms worsen pt will need to call 911 or EMS to be evaluated at local ED.  Pt and Home Care RN verbalized good understanding.\par CN will be assigned to sign Home Care orders post-discharge for continuity of care.\par \par \par

## 2023-06-27 NOTE — PLAN
[FreeTextEntry1] : CV and pulmonary status stable\par Patient advised to continue with medication regimen as directed \par Medication education discussed in full detail with + teach back with RN, trinidad. \par Encouraged verbalization of fears and concerns. \par Report all symptoms not relieved by rest or medication\par Educated on monitoring blood pressure\par Maintain Balanced diet \par Exercise as appropriate\par Patient educated on s/s of when to call medical providers with + teach back. \par Reminded of NP role and advised to call with any questions/concerns. \par Follow up with medical providers as scheduled\par Instructed the patient to call TCM Team or CCC with any questions or concerns.\par

## 2023-06-28 ENCOUNTER — RX RENEWAL (OUTPATIENT)
Age: 88
End: 2023-06-28

## 2023-06-30 NOTE — DATA REVIEWED
Detail Level: Detailed
Add 70632 Cpt? (Important Note: In 2017 The Use Of 85795 Is Being Tracked By Cms To Determine Future Global Period Reimbursement For Global Periods): yes
[FreeTextEntry1] : [facility] [id number]

## 2023-07-06 ENCOUNTER — APPOINTMENT (OUTPATIENT)
Dept: INTERNAL MEDICINE | Facility: CLINIC | Age: 88
End: 2023-07-06
Payer: MEDICARE

## 2023-07-06 PROCEDURE — 99213 OFFICE O/P EST LOW 20 MIN: CPT | Mod: 95

## 2023-07-06 RX ORDER — AMLODIPINE BESYLATE 2.5 MG/1
2.5 TABLET ORAL
Qty: 180 | Refills: 0 | Status: COMPLETED | COMMUNITY
Start: 2019-10-04 | End: 2023-07-06

## 2023-07-06 RX ORDER — ASPIRIN 81 MG/1
81 TABLET ORAL
Qty: 30 | Refills: 5 | Status: COMPLETED | COMMUNITY
Start: 2019-03-01 | End: 2023-07-06

## 2023-07-06 NOTE — PHYSICAL EXAM
[Speech Grossly Normal] : speech grossly normal [Normal Affect] : the affect was normal [Normal Mood] : the mood was normal [de-identified] :  patient sitting upright comfortable in a chair using oxygen

## 2023-07-06 NOTE — ASSESSMENT
[FreeTextEntry1] : Ms. WASHINGTON is a 89 year  female, with a past medical history as noted above ,who present  for telemetry video appointment for renewal of medication status post hospitalization

## 2023-07-06 NOTE — HISTORY OF PRESENT ILLNESS
[Home] : at home, [unfilled] , at the time of the visit. [Medical Office: ([facility])___] : at the medical office located in  [Family Member] : family member [Verbal consent obtained from patient] : the patient, [unfilled] [FreeTextEntry1] : Medication renewal  [de-identified] : Ms. WASHINGTON is a 89 year  female, with a past medical history as noted below,who present to the office  today for Medication renewal for GERD and anxiety.  Patient daughter is in the room with her during the time of the visit.  Overall patient states she feels well denies any adverse side effects to her current medication regimen.   Daughter states she is on 2 L of home oxygen which is helping her cognition.\par  at last visit had a filter placed in for the DVT is off all blood thinners at this time.  Patient denies having chest pain, nausea, shortness of breath, dyspnea on exertion.  States she is not ambulating and hoping to start home physical therapy next week to help her transfer from chair to chair and from bed to chair.

## 2023-07-06 NOTE — REVIEW OF SYSTEMS
[Sore Throat] : no sore throat [Chest Pain] : no chest pain [Palpitations] : no palpitations [Shortness Of Breath] : no shortness of breath [Cough] : no cough [Abdominal Pain] : no abdominal pain [Vomiting] : no vomiting [Joint Pain] : no joint pain [Itching] : no itching [Headache] : no headache [Memory Loss] : no memory loss [Unsteady Walking] : ataxia [Easy Bruising] : easy bruising [Negative] : Psychiatric [de-identified] :  under the breast rash

## 2023-07-06 NOTE — PLAN
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  Advised patient to follow-up with cardiology and vascular.  Keep legs elevated.\par \par   Psychiatry-General anxiety disorder continue with escitalopram.  Reviewed  refilled Xanax to take 0.25 as needed as needed anxiety.  Advised abuse potential and side effects to the medication.\par   Advised importance of return to office   for an evaluation\par \par GI -  history of GERD-  continue with Protonix 1 tab daily.  Refilled medication today.  Advised low acidic diet\par \par Ortho    lower extremity weakness-advised to start physical therapy for gait training and transferring from chair to chair.\par \par  education was provided to the patient regarding televideo appointments and the lack of diagnostic ability advised patient return to office for evaluation and blood work \par \par Pulm  - SOB - VELÁZQUEZ   Advised patient to the wear the oxygen on a daily basis  as well as when she is sleeping.\par  advised to follow-up with cardiology and pulmonology.\par \par I spent 21 Minutes with the patient, half of which we discussed finding on physical exam  and coordinated care.  As well as reviewed my plans and follow ups.\par Dragon speech recognition software was used to create portions of this document.  An attempt at proofreading has been made to minimize errors please call if any questions arise.

## 2023-07-10 ENCOUNTER — RX RENEWAL (OUTPATIENT)
Age: 88
End: 2023-07-10

## 2023-07-14 NOTE — ED ADULT NURSE REASSESSMENT NOTE - NS ED NURSE REASSESS COMMENT FT1
Comfort care measures provided; patient hooked up to external urinary catheter again.  Red socks and blankets placed on patient.  Pending CT results; daughter is still at the bedside.  Stretcher in lowest position with side rails up and safety maintained.
Report received from [facility].  Patient is pending CT.
Male

## 2023-07-20 ENCOUNTER — RX RENEWAL (OUTPATIENT)
Age: 88
End: 2023-07-20

## 2023-08-29 ENCOUNTER — APPOINTMENT (OUTPATIENT)
Dept: INTERNAL MEDICINE | Facility: CLINIC | Age: 88
End: 2023-08-29
Payer: MEDICARE

## 2023-08-29 VITALS
RESPIRATION RATE: 17 BRPM | BODY MASS INDEX: 29.39 KG/M2 | DIASTOLIC BLOOD PRESSURE: 70 MMHG | SYSTOLIC BLOOD PRESSURE: 118 MMHG | HEART RATE: 83 BPM | TEMPERATURE: 98.9 F | HEIGHT: 58 IN | WEIGHT: 140 LBS | OXYGEN SATURATION: 93 %

## 2023-08-29 DIAGNOSIS — D50.9 IRON DEFICIENCY ANEMIA, UNSPECIFIED: ICD-10-CM

## 2023-08-29 DIAGNOSIS — K21.9 GASTRO-ESOPHAGEAL REFLUX DISEASE W/OUT ESOPHAGITIS: ICD-10-CM

## 2023-08-29 DIAGNOSIS — M17.12 UNILATERAL PRIMARY OSTEOARTHRITIS, LEFT KNEE: ICD-10-CM

## 2023-08-29 DIAGNOSIS — M79.89 OTHER SPECIFIED SOFT TISSUE DISORDERS: ICD-10-CM

## 2023-08-29 PROCEDURE — 99214 OFFICE O/P EST MOD 30 MIN: CPT

## 2023-08-30 NOTE — HISTORY OF PRESENT ILLNESS
[Family Member] : family member [FreeTextEntry1] : Medication renewal and a general checkup. [de-identified] : Mrs. WASHINGTON is a 89 year- female, with a past medical history as noted below, who present to the office today for medication renewal and a general checkup.  Pt is on 2  liters of 02 via nasal canula. Patient daughter states she has been more alert since she has been on home oxygen.  Patient denies any chest pain, shortness of breath, nausea or vomiting.  Patient's daughter is requesting a Zakiya lift secondary to the aids having a difficult time getting into the wheelchair from her bed and vice versa.  Patient is very unsteady on her feet and unable to bear full weight with standing. Daughter is also requesting medical forms to be filled out for her job to be able to take off during her mom back and forth to medical visits.  Also requesting forms to be filled out for /transportation. .Patient is requesting a refill on her Xanax and escitalopram.  Which she takes for anxiety.  Denies any side effects.  Patient but is states the Xanax helps control her anxiety and anger

## 2023-08-30 NOTE — PLAN
[FreeTextEntry1] : Vasc  -  recent history of DVT  hx of vena cava filter.  Advised patient to follow-up with cardiology and vascular.  Keep legs elevated.  Psychiatry-General anxiety disorder continue with escitalopram.  Reviewed  refilled Xanax to take 0.25 as needed as needed anxiety.  Advised abuse potential and side effects to the medication. Advised importance of return to office for an evaluation. Rxn for CMP, TSH  GI - history of GERD- continue with Protonix 1 tab daily.  Refilled medication today.  Advised low acidic diet.  Ortho    lower extremity weakness-- Consider PT.  Ordered codie lift.   Pulm  - SOB - VELÁZQUEZ   Advised patient to the wear the oxygen on a daily basis as well as when she is sleeping.  advised to follow-up with cardiology and pulmonology.  See medical form filled out for ambulette transportation as well as medical forms for the door to the takeoff [facility]  Dermatology GIACOMO was encouraged to wear sun protective clothing, sun block, and proper use of SPF board brim hats, to seek shade and to avoid the sun in peak hours.  ABCD of skin moles was advised.  Advised to keep skin clean and dry.  Apply antifungal as needed for rash on either breast.  Currently no rash appreciated refilled medication.  I spent 30 Minutes with the patient, half of which we discussed finding on physical exam and coordinated care.  As well as reviewed my plans and follow ups. Dragon speech recognition software was used to create portions of this document.  An attempt at proofreading has been made to minimize errors please call if any questions arise.

## 2023-08-30 NOTE — REVIEW OF SYSTEMS
[Hearing Loss] : hearing loss [Anxiety] : anxiety [Depression] : depression [Easy Bruising] : easy bruising [Fever] : no fever [Chills] : no chills [Fatigue] : fatigue [Discharge] : no discharge [Redness] : no redness [Itching] : no itching [Earache] : no earache [Nosebleed] : no nosebleeds [Nasal Discharge] : no nasal discharge [Sore Throat] : no sore throat [Postnasal Drip] : no postnasal drip [Chest Pain] : no chest pain [Palpitations] : no palpitations [Lower Ext Edema] : lower extremity edema [Wheezing] : no wheezing [Nausea] : no nausea [Heartburn] : no heartburn [Nocturia] : no nocturia [Frequency] : no frequency [Joint Stiffness] : joint stiffness [Muscle Weakness] : muscle weakness [Skin Rash] : no skin rash [Dizziness] : no dizziness [Easy Bleeding] : no easy bleeding [Swollen Glands] : no swollen glands [Negative] : ENT

## 2023-08-30 NOTE — DATA REVIEWED
[FreeTextEntry1] :   [facility] reviewed reference # [id number] see medical forms filled out for transportation and care taker

## 2023-08-30 NOTE — ASSESSMENT
[FreeTextEntry1] : Ms. WASHINGTON is a 89 year female, with a past medical history as noted above, who present to the office today for medication renewal and general check up. Requesting medical forms to be filled out

## 2023-08-30 NOTE — PHYSICAL EXAM
[No Acute Distress] : no acute distress [Well Nourished] : well nourished [Well Developed] : well developed [Well-Appearing] : well-appearing [Normal Sclera/Conjunctiva] : normal sclera/conjunctiva [Normal Outer Ear/Nose] : the outer ears and nose were normal in appearance [Normal Oropharynx] : the oropharynx was normal [Thyroid Normal, No Nodules] : the thyroid was normal and there were no nodules present [No CVA Tenderness] : no CVA  tenderness [No Spinal Tenderness] : no spinal tenderness [Grossly Normal Strength/Tone] : grossly normal strength/tone [Coordination Grossly Intact] : coordination grossly intact [No Focal Deficits] : no focal deficits [Deep Tendon Reflexes (DTR)] : deep tendon reflexes were 2+ and symmetric [Speech Grossly Normal] : speech grossly normal [Normal Affect] : the affect was normal [Alert and Oriented x3] : oriented to person, place, and time [Normal Mood] : the mood was normal [Normal Insight/Judgement] : insight and judgment were intact [de-identified] :  right eye slightly closed patient without any discomfort or complaints states she just woke up [PERRL] : pupils equal round and reactive to light [EOMI] : extraocular movements intact [Normal TMs] : both tympanic membranes were normal [No JVD] : no jugular venous distention [No Lymphadenopathy] : no lymphadenopathy [Supple] : supple [No Respiratory Distress] : no respiratory distress  [No Accessory Muscle Use] : no accessory muscle use [Clear to Auscultation] : lungs were clear to auscultation bilaterally [Normal Rate] : normal rate  [Regular Rhythm] : with a regular rhythm [Normal S1, S2] : normal S1 and S2 [No Murmur] : no murmur heard [No Carotid Bruits] : no carotid bruits [No Abdominal Bruit] : a ~M bruit was not heard ~T in the abdomen [No Edema] : there was no peripheral edema [No Palpable Aorta] : no palpable aorta [No Extremity Clubbing/Cyanosis] : no extremity clubbing/cyanosis [Soft] : abdomen soft [Non Tender] : non-tender [Non-distended] : non-distended [No Masses] : no abdominal mass palpated [No HSM] : no HSM [Normal Bowel Sounds] : normal bowel sounds [Normal Posterior Cervical Nodes] : no posterior cervical lymphadenopathy [Normal Anterior Cervical Nodes] : no anterior cervical lymphadenopathy [No Joint Swelling] : no joint swelling [No Rash] : no rash [Normal Gait] : normal gait [de-identified] :  sitting in wheelchair comfortable [de-identified] : w/ murmur  [de-identified] : 1 plus edema  [de-identified] :  as per gynecology [FreeTextEntry1] :  as per gynecology [de-identified] :  as per gynecology [de-identified] :  bruising left wirst area    [de-identified] :  unable to walk in wheelchair

## 2023-08-30 NOTE — COUNSELING
[Fall prevention counseling provided] : Fall prevention counseling provided [Adequate lighting] : Adequate lighting [No throw rugs] : No throw rugs [Use recommended devices] : Use recommended devices [Engage in a relaxing activity] : Engage in a relaxing activity [AUDIT-C Screening administered and reviewed] : AUDIT-C Screening administered and reviewed [Healthy eating counseling provided] : healthy eating [Behavioral health counseling provided] : behavioral health  [Needs reinforcement, provided] : Patient needs reinforcement on understanding of disease, goals and obesity follow-up plan; reinforcement was provided [Low Fat Diet] : Low fat diet [Low Salt Diet] : Low salt diet [Decrease Portions] : Decrease food portions [Walking] : Walking [Transportation Issues] : Transportation issues [Patient motivation] : Patient motivation [de-identified] : consider counseling \par  Follow up with Derm and audiologist.  \par  Advised to get BDS

## 2023-08-30 NOTE — HEALTH RISK ASSESSMENT
[1 or 2 (0 pts)] : 1 or 2 (0 points) [Never (0 pts)] : Never (0 points) [No] : In the past 12 months have you used drugs other than those required for medical reasons? No [No falls in past year] : Patient reported no falls in the past year [1] : 2) Feeling down, depressed, or hopeless for several days (1) [Former] : Former [de-identified] : Neuro  [Audit-CScore] : 0 [de-identified] : no [de-identified] : regular  [[id number]] : 2

## 2023-09-08 NOTE — ED ADULT NURSE NOTE - IS THE PATIENT ABLE TO BE SCREENED?
Spoke with family, TSH too low, will stop levothyroxine for now   Recheck labs in 2 weeks, family voiced understanding
Yes

## 2023-11-03 NOTE — ED ADULT NURSE NOTE - PRO INTERPRETER NEED 2
OUTPATIENT PROGRESS NOTE    DATE OF SERVICE: 11/3/2023     PROBLEM LIST:  1. Liver mass with obstructive jaundice  1. Presented to the ED on 8/27/2023 with complaint of yellowing skin and eyes. He was admitted from 8/27/2023-8/29/2023 for obstructive jaundice.   2. CT AP on 8/27/2023 showed moderate intrahepatic biliary dilatation diffusely with an ill-defined hypoattenuating lesion/region in the left hepatic lobe measuring approximately 6.7 x 3.9 x 3.6 cm, suspicious for cholangiocarcinoma. It also notes a few tiny pulmonary nodules which are nonspecific.  3. MRI Liver on 8/28/2023 showed an infiltrative mass in the left hepatic lobe measuring up to 6.1 cm demonstrating progressive enhancement. It also noted borderline enlarged portacaval lymph node as well as a 6 mm cystic lesion in the junction of the body and tail the pancreas.   4. Underwent a EGD, EUS, and ERCP on 8/28/2023 with Dr. Dean.   5. Pathology revealed rare atypical cells in the right hepatic duct. All specimen were negative for a distinct population of malignant cells.   6. PT scan on 9/7/2023 showed an FGD avid left hepatic mass, compatible with primary maligancy as well as the upper abdominal lymph nodes with mild uptake. There was uptake along the CBD stent/pancreatic head without noted correkate on MRI, likely inflammatory. It also notes indeterminate focal right  acetabular uptake, heterogeneous marrow vs osseous lesion, as well as increased uptake with surrounding stranding. There are tiny pulmonary nodules, below resolution for PET/CT.  7. Radiologic findings suggests cholangiocarcinoma.   8. MDC recommendation on 9/11/2023 with chemotherapy with cisplatin + gemcitabine followed by chemo-RT with 5-FU throughout RT followed by evaluation for a liver transplant with plan for treatment with Xeloda while waiting for the transplant once diagnosis is confirmed.  9. Pathology from biopsy on 9/20/2023 again showed atypical cells but has not  confirmed malignancy.   10. Pathology further reviewed by [facility] and specialist in [place], [place] with no definitive diagnosis but similar opinion of likely cholangiocarcinoma.   11. Although no definitive diagnosis can be determined pathology samples show strong indication of being cholangiocarcinoma, recommended proceeding with treatment   12. Initiated treatment with Cisplatin + Gemzar on 10/6/2023.     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     SYMPTOMS:  Patient presents to the clinic today for follow up and treatment. Patient is okay. He says his appetite remains strong noting he has gained a little weight. His bowel movements are okay noting he has 1 bowel movement qd. Denies pain issues, nausea, vomitting, fever, chills, signs of infection.    REVIEW OF SYSTEMS:   Detailed 10 point review of systems was performed which was negative other than the pertinent positives as discussed above.    ALLERGIES:  No Known Allergies    Current Outpatient Medications   Medication Sig Dispense Refill   • dexAMETHasone (DECADRON) 4 MG tablet Take 2 tablets by mouth daily. Start the day after chemotherapy for 3 days. 12 tablet 5   • OLANZapine (ZyPREXA) 5 MG tablet Take 1 tablet by mouth nightly. Start the day of chemotherapy x 4 days for prevention of nausea/vomiting. 8 tablet 11   • lidocaine-prilocaine (EMLA) 2.5-2.5 % cream Apply topically over port site 15 mins prior to access. 30 g 11   • prochlorperazine (COMPAZINE) 10 MG tablet Take 1 tablet by mouth every 6 hours as needed for Nausea or Vomiting. 30 tablet 5   • acetaminophen (TYLENOL) 500 MG tablet Take 1,000 mg by mouth every 6 hours as needed for Pain.     • Probiotic Product (PROBIOTIC DAILY PO) Take 1 capsule by mouth daily.       No current facility-administered medications for this visit.       PHYSICAL EXAM:  GENERAL: The patient is sitting up in chair in no apparent distress.  Visit Vitals   Visit  Vitals  /76   Pulse 98   Temp 98 °F (36.7 °C)   Resp 16   Wt 119.3 kg (263 lb 1.9 oz)   SpO2 95%   BMI 36.70 kg/m²        EYES: ANABEL, EOMI. Conjunctival pallor is present. Sclerae: no icterus present.  ENT: No hearing deficit. Tongue: No masses. Uvula in midline. Tonsils: Unremarkable.   NECK: No thyromegaly.   LYMPHATIC: No lymphadenopathy in neck, axillary or inguinal areas.   PULMONARY: Clear vesicular breath sounds, no wheezing or crackles. No   dullness on percussion.   CARDIOVASCULAR: S1, S2 heard, regular rate and rhythm. No mechanical   sounds or murmurs noted. Apical pulse normal.   ABDOMEN: Soft, non-tender. Mild fullness noted over RUQ, no clear masses identified. No liver/spleen palpable. BSs- 2 to 3 per minute. No ascites noted.  EXTREMITIES: Trace edema noted over bilateral lower extremities.    SKIN: No ulceration. No unusual masses. No unusual pigmentation.   CNS: Alert and oriented x 3. Cranial nerves- Intact. Power- 5/5 all over. Sensations- Intact to touch. Grossly non-focal.   PSYCHIATRIC: Normal affect.    LABS:  Reviewed and confirmed in the EMR (Electronic Medical Record).    ASSESSMENT AND PLAN:  1. 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 Patient admitted from 8/27/2023-8/29/2023 for obstructive jaundice. CT AP on 8/27/2023 showed moderate intrahepatic biliary dilatation diffusely with an ill-defined hypoattenuating lesion/region in the left hepatic lobe measuring approximately 6.7 x 3.9 x 3.6 cm, suspicious for cholangiocarcinoma. It also notes a few tiny pulmonary nodules which are nonspecific. MRI Liver on 8/28/2023 showed an infiltrative mass in the left hepatic lobe measuring up to 6.1 cm demonstrating progressive enhancement. It also noted borderline enlarged portacaval lymph node as well as a 6 mm cystic lesion in the junction of the body and tail the pancreas. Patient underwent EGD, EUS, and ERCP on 8/28/2023. Pathology revealed rare atypical cells in  the right hepatic duct. All specimen were negative for a distinct population of malignant cells. PET scan on 9/7/2023 showed an FGD avid left hepatic mass, compatible with primary maligancy as well as the upper abdominal lymph nodes with mild uptake. It also notes indeterminate focal right  acetabular uptake, heterogeneous marrow vs osseous lesion, as well as increased uptake with surrounding stranding. There are tiny pulmonary nodules, below resolution for PET/CT. Radiologic findings suggests cholangiocarcinoma. MDC recommendation on 9/11/2023 with chemotherapy with cisplatin + gemcitabine followed by chemo-RT with 5-FU throughout RT followed by evaluation for a liver transplant with plan for treatment with Xeloda while waiting for the transplant.  Met with Rad Onc on 9/11/2023 with plan to follow up once neoadjuvant chemotherapy with cisplatin + gemcitabine is completed. Pathology from biopsy on 9/20/2023 again showed atypical cells but did not confirm malignancy. Pathology further reviewed by [facility] and specialist in [place], [place] with no definitive diagnosis but similar opinion of likely cholangiocarcinoma. Although no definitive diagnosis can be determined pathology samples show strong indication of being cholangiocarcinoma. As such, recommended treatment with Cisplatin and Gemzar, patient comfortable with amount of work up done and agreed to treatment plan. Initiated treatment with Cisplatin + Gemzar on 10/6/2023.   All labs from today (11/3/2023) were reviewed. Albumin is lowered at 3.4, advised protein intake. Other counts are stable with no concerns. Counts are adequate to proceed with Cycle 2 Day 8 of Cisplatin + Gemzar today. Will plan to obtain CT CAP at the end of cycle 3 to assess his response to treatment. Patient is agreeable. Patient met with Rad Onc on 9/11/2023 with plan to follow up once neoadjuvant chemotherapy with cisplatin + gemcitabine is completed. Encouraged patient to maintain  adequate fluid intake and follow a high protein diet. He was strongly encouraged to visit the ER and notify me if he develops any signs or symptoms suggestive of infection, including fevers of 100.4F or higher. I plan to see him again in about 2 weeks for follow up with labs and treatment.       The above was discussed with the patient and his wife at length. They exhibit complete understanding of the above and agree to proceed with the above plan of care.     Jonel Contreras MD    FOLLOW UP:  Return in 2 weeks (around 11/17/2023) for MD visit with cbc, comp, mg for Cis/Gordon  1. Cis/Gordon today.    This chart was documented by Deb Helms, acting as a scribe for Jonel Contreras MD. 11/3/2023, 10:49 AM.      The documentation recorded by the scribe accurately and completely reflects the service(s) I personally performed and the decisions made by me.   Jonel Contrreas MD          English

## 2023-11-22 ENCOUNTER — RX RENEWAL (OUTPATIENT)
Age: 88
End: 2023-11-22

## 2023-12-01 ENCOUNTER — APPOINTMENT (OUTPATIENT)
Dept: INTERNAL MEDICINE | Facility: CLINIC | Age: 88
End: 2023-12-01
Payer: MEDICARE

## 2023-12-01 PROCEDURE — 99214 OFFICE O/P EST MOD 30 MIN: CPT | Mod: 95

## 2023-12-01 RX ORDER — NYSTATIN AND TRIAMCINOLONE ACETONIDE 100000; 1 MG/G; MG/G
100000-0.1 CREAM TOPICAL
Qty: 60 | Refills: 1 | Status: ACTIVE | COMMUNITY
Start: 2022-02-24 | End: 1900-01-01

## 2023-12-12 ENCOUNTER — RX RENEWAL (OUTPATIENT)
Age: 88
End: 2023-12-12

## 2023-12-18 ENCOUNTER — RX RENEWAL (OUTPATIENT)
Age: 88
End: 2023-12-18

## 2023-12-22 ENCOUNTER — RX RENEWAL (OUTPATIENT)
Age: 88
End: 2023-12-22

## 2024-01-08 ENCOUNTER — RX RENEWAL (OUTPATIENT)
Age: 89
End: 2024-01-08

## 2024-01-08 RX ORDER — PANTOPRAZOLE 40 MG/1
40 TABLET, DELAYED RELEASE ORAL
Qty: 90 | Refills: 1 | Status: ACTIVE | COMMUNITY
Start: 2023-07-06 | End: 1900-01-01

## 2024-01-17 ENCOUNTER — RX RENEWAL (OUTPATIENT)
Age: 89
End: 2024-01-17

## 2024-02-19 ENCOUNTER — APPOINTMENT (OUTPATIENT)
Dept: INTERNAL MEDICINE | Facility: CLINIC | Age: 89
End: 2024-02-19
Payer: MEDICARE

## 2024-02-19 ENCOUNTER — MED ADMIN CHARGE (OUTPATIENT)
Age: 89
End: 2024-02-19

## 2024-02-19 VITALS
HEIGHT: 55 IN | WEIGHT: 140 LBS | TEMPERATURE: 98.7 F | BODY MASS INDEX: 32.4 KG/M2 | RESPIRATION RATE: 18 BRPM | HEART RATE: 88 BPM

## 2024-02-19 DIAGNOSIS — F41.9 ANXIETY DISORDER, UNSPECIFIED: ICD-10-CM

## 2024-02-19 DIAGNOSIS — Z23 ENCOUNTER FOR IMMUNIZATION: ICD-10-CM

## 2024-02-19 PROCEDURE — 90662 IIV NO PRSV INCREASED AG IM: CPT

## 2024-02-19 PROCEDURE — 99214 OFFICE O/P EST MOD 30 MIN: CPT | Mod: 25

## 2024-02-19 PROCEDURE — G0008: CPT

## 2024-02-19 NOTE — PHYSICAL EXAM
[Speech Grossly Normal] : speech grossly normal [Normal Affect] : the affect was normal [Normal Mood] : the mood was normal [PERRL] : pupils equal round and reactive to light [EOMI] : extraocular movements intact [Normal Outer Ear/Nose] : the outer ears and nose were normal in appearance [Normal Oropharynx] : the oropharynx was normal [No Lymphadenopathy] : no lymphadenopathy [Supple] : supple [Thyroid Normal, No Nodules] : the thyroid was normal and there were no nodules present [No Respiratory Distress] : no respiratory distress  [No Accessory Muscle Use] : no accessory muscle use [Normal Rate] : normal rate  [No Carotid Bruits] : no carotid bruits [No Abdominal Bruit] : a ~M bruit was not heard ~T in the abdomen [Pedal Pulses Present] : the pedal pulses are present [No Palpable Aorta] : no palpable aorta [Non-distended] : non-distended [Non Tender] : non-tender [No Masses] : no abdominal mass palpated [No HSM] : no HSM [Normal Bowel Sounds] : normal bowel sounds [Normal Posterior Cervical Nodes] : no posterior cervical lymphadenopathy [Normal Anterior Cervical Nodes] : no anterior cervical lymphadenopathy [No CVA Tenderness] : no CVA  tenderness [No Spinal Tenderness] : no spinal tenderness [No Joint Swelling] : no joint swelling [de-identified] :  patient sitting upright comfortable in a chair using oxygen on 3 liters via nasal cannula [de-identified] : b/l scattered crepitus  [de-identified] : w/ murmur  [de-identified] : trace edma lower ext  [de-identified] : as per gyn  [de-identified] : bruising noted RLE     [de-identified] : in wheelchair   weakness lower ext

## 2024-02-19 NOTE — REVIEW OF SYSTEMS
[Unsteady Walking] : ataxia [Easy Bruising] : easy bruising [Negative] : Psychiatric [Fever] : no fever [Chills] : no chills [Sore Throat] : no sore throat [Chest Pain] : no chest pain [Palpitations] : no palpitations [Shortness Of Breath] : no shortness of breath [Cough] : no cough [Abdominal Pain] : no abdominal pain [Vomiting] : no vomiting [Joint Pain] : no joint pain [Itching] : no itching [Headache] : no headache [Memory Loss] : no memory loss [de-identified] :  under the breast rash

## 2024-02-19 NOTE — ASSESSMENT
[FreeTextEntry1] : Ms. WASHINGTON is a 89 year female, with a past medical history as noted above, received a flu shot and medication renewal. Visit done at the pt house.

## 2024-02-19 NOTE — HISTORY OF PRESENT ILLNESS
[Family Member] : family member [Formal Caregiver] : formal caregiver [FreeTextEntry1] : Home visit for Medication renewal and flu shot.  [de-identified] : Home visit-patient seen in her house secondary to unable to get to the office due to inclement weather as well as her lack of ability to ambulate. Ms. WASHINGTON is a 89 year- female, with a past medical history as noted below, who present to the office today for flu shot and medication Renewal of Xanax and Neurontin.  Patient states she generally feels well.  Denies any chest pain or shortness of breath.  Denies any recent infection.  Denies any adverse side effects to current medication regimen. Patient states she has a bruise on her right shin denies any pain unaware of how it happened.

## 2024-02-19 NOTE — PLAN
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  Advised patient to follow-up with cardiology and vascular.  Keep legs elevated.    Psychiatry-General anxiety disorder continue with escitalopram.  Reviewed  refilled Xanax to take 0.25 as needed as needed anxiety.  Advised abuse potential and side effects to the medication.  reviewed. Xanax refilled.   GI -  history of GERD- continue with Protonix 1 tab daily. Advised low acidic diet. Check labs  rxn given   Ortho    lower extremity weakness-advised to start physical therapy for gait training and transferring from chair to chair.  Pulm  - SOB - VELÁZQUEZ   Advised patient to the wear the oxygen on a daily basis  as well as when she is sleeping. Advised to follow-up with cardiology and pulmonology. Advise incentive spirometry.  Immunization - Flu vaccination discussed. Education provided -  Fluzone  HD  0.7 mL administered intramuscularly in the deltoid area.  See consent form for lot number and expiration date.  Administration vax left deltoid 0.7ml.   I spent 30  Minutes with the patient, half of which we discussed finding on physical exam and coordinated care.  As well as reviewed my plans and follow ups. Dragon speech recognition software was used to create portions of this document.  An attempt at proofreading has been made to minimize errors please call if any questions arise.   will Ordered labs, for 03/24 CBC, comprehensive metabolic, TSH, iron, ferritin count to be done by home draw.

## 2024-03-12 ENCOUNTER — INPATIENT (INPATIENT)
Facility: HOSPITAL | Age: 89
LOS: 1 days | Discharge: HOME | DRG: 65 | End: 2024-03-14
Attending: STUDENT IN AN ORGANIZED HEALTH CARE EDUCATION/TRAINING PROGRAM | Admitting: STUDENT IN AN ORGANIZED HEALTH CARE EDUCATION/TRAINING PROGRAM
Payer: MEDICARE

## 2024-03-12 VITALS
TEMPERATURE: 97 F | OXYGEN SATURATION: 89 % | SYSTOLIC BLOOD PRESSURE: 109 MMHG | RESPIRATION RATE: 18 BRPM | HEART RATE: 85 BPM | DIASTOLIC BLOOD PRESSURE: 72 MMHG

## 2024-03-12 DIAGNOSIS — Z96.60 PRESENCE OF UNSPECIFIED ORTHOPEDIC JOINT IMPLANT: Chronic | ICD-10-CM

## 2024-03-12 DIAGNOSIS — Z90.710 ACQUIRED ABSENCE OF BOTH CERVIX AND UTERUS: Chronic | ICD-10-CM

## 2024-03-12 DIAGNOSIS — R41.82 ALTERED MENTAL STATUS, UNSPECIFIED: ICD-10-CM

## 2024-03-12 LAB
ALBUMIN SERPL ELPH-MCNC: 3 G/DL — LOW (ref 3.3–5)
ALP SERPL-CCNC: 85 U/L — SIGNIFICANT CHANGE UP (ref 40–120)
ALT FLD-CCNC: 30 U/L — SIGNIFICANT CHANGE UP (ref 12–78)
ANION GAP SERPL CALC-SCNC: 9 MMOL/L — SIGNIFICANT CHANGE UP (ref 5–17)
APPEARANCE UR: ABNORMAL
APTT BLD: 27.8 SEC — SIGNIFICANT CHANGE UP (ref 24.5–35.6)
AST SERPL-CCNC: 33 U/L — SIGNIFICANT CHANGE UP (ref 15–37)
BACTERIA # UR AUTO: ABNORMAL /HPF
BASOPHILS # BLD AUTO: 0.03 K/UL — SIGNIFICANT CHANGE UP (ref 0–0.2)
BASOPHILS NFR BLD AUTO: 0.3 % — SIGNIFICANT CHANGE UP (ref 0–2)
BILIRUB SERPL-MCNC: 0.6 MG/DL — SIGNIFICANT CHANGE UP (ref 0.2–1.2)
BILIRUB UR-MCNC: NEGATIVE — SIGNIFICANT CHANGE UP
BUN SERPL-MCNC: 30 MG/DL — HIGH (ref 7–23)
CALCIUM SERPL-MCNC: 8.8 MG/DL — SIGNIFICANT CHANGE UP (ref 8.5–10.1)
CHLORIDE SERPL-SCNC: 104 MMOL/L — SIGNIFICANT CHANGE UP (ref 96–108)
CO2 SERPL-SCNC: 30 MMOL/L — SIGNIFICANT CHANGE UP (ref 22–31)
COLOR SPEC: YELLOW — SIGNIFICANT CHANGE UP
CREAT SERPL-MCNC: 1.5 MG/DL — HIGH (ref 0.5–1.3)
DIFF PNL FLD: NEGATIVE — SIGNIFICANT CHANGE UP
EGFR: 32 ML/MIN/1.73M2 — LOW
EOSINOPHIL # BLD AUTO: 0.23 K/UL — SIGNIFICANT CHANGE UP (ref 0–0.5)
EOSINOPHIL NFR BLD AUTO: 2 % — SIGNIFICANT CHANGE UP (ref 0–6)
EPI CELLS # UR: PRESENT
FLUAV AG NPH QL: SIGNIFICANT CHANGE UP
FLUBV AG NPH QL: SIGNIFICANT CHANGE UP
GLUCOSE SERPL-MCNC: 99 MG/DL — SIGNIFICANT CHANGE UP (ref 70–99)
GLUCOSE UR QL: NEGATIVE MG/DL — SIGNIFICANT CHANGE UP
HCT VFR BLD CALC: 35.1 % — SIGNIFICANT CHANGE UP (ref 34.5–45)
HGB BLD-MCNC: 11.6 G/DL — SIGNIFICANT CHANGE UP (ref 11.5–15.5)
IMM GRANULOCYTES NFR BLD AUTO: 0.3 % — SIGNIFICANT CHANGE UP (ref 0–0.9)
INR BLD: 0.96 RATIO — SIGNIFICANT CHANGE UP (ref 0.85–1.18)
KETONES UR-MCNC: NEGATIVE MG/DL — SIGNIFICANT CHANGE UP
LEUKOCYTE ESTERASE UR-ACNC: ABNORMAL
LYMPHOCYTES # BLD AUTO: 1.54 K/UL — SIGNIFICANT CHANGE UP (ref 1–3.3)
LYMPHOCYTES # BLD AUTO: 13.2 % — SIGNIFICANT CHANGE UP (ref 13–44)
MCHC RBC-ENTMCNC: 33 GM/DL — SIGNIFICANT CHANGE UP (ref 32–36)
MCHC RBC-ENTMCNC: 33 PG — SIGNIFICANT CHANGE UP (ref 27–34)
MCV RBC AUTO: 99.7 FL — SIGNIFICANT CHANGE UP (ref 80–100)
MONOCYTES # BLD AUTO: 0.83 K/UL — SIGNIFICANT CHANGE UP (ref 0–0.9)
MONOCYTES NFR BLD AUTO: 7.1 % — SIGNIFICANT CHANGE UP (ref 2–14)
NEUTROPHILS # BLD AUTO: 8.98 K/UL — HIGH (ref 1.8–7.4)
NEUTROPHILS NFR BLD AUTO: 77.1 % — HIGH (ref 43–77)
NITRITE UR-MCNC: NEGATIVE — SIGNIFICANT CHANGE UP
NRBC # BLD: 0 /100 WBCS — SIGNIFICANT CHANGE UP (ref 0–0)
PH UR: 5.5 — SIGNIFICANT CHANGE UP (ref 5–8)
PLATELET # BLD AUTO: 207 K/UL — SIGNIFICANT CHANGE UP (ref 150–400)
POTASSIUM SERPL-MCNC: 4.1 MMOL/L — SIGNIFICANT CHANGE UP (ref 3.5–5.3)
POTASSIUM SERPL-SCNC: 4.1 MMOL/L — SIGNIFICANT CHANGE UP (ref 3.5–5.3)
PROT SERPL-MCNC: 7.3 G/DL — SIGNIFICANT CHANGE UP (ref 6–8.3)
PROT UR-MCNC: NEGATIVE MG/DL — SIGNIFICANT CHANGE UP
PROTHROM AB SERPL-ACNC: 11.3 SEC — SIGNIFICANT CHANGE UP (ref 9.5–13)
RBC # BLD: 3.52 M/UL — LOW (ref 3.8–5.2)
RBC # FLD: 13.8 % — SIGNIFICANT CHANGE UP (ref 10.3–14.5)
RBC CASTS # UR COMP ASSIST: 0 /HPF — SIGNIFICANT CHANGE UP (ref 0–4)
RSV RNA NPH QL NAA+NON-PROBE: SIGNIFICANT CHANGE UP
SARS-COV-2 RNA SPEC QL NAA+PROBE: SIGNIFICANT CHANGE UP
SODIUM SERPL-SCNC: 143 MMOL/L — SIGNIFICANT CHANGE UP (ref 135–145)
SP GR SPEC: 1.04 — HIGH (ref 1–1.03)
TROPONIN I, HIGH SENSITIVITY RESULT: 31.6 NG/L — SIGNIFICANT CHANGE UP
UROBILINOGEN FLD QL: 0.2 MG/DL — SIGNIFICANT CHANGE UP (ref 0.2–1)
WBC # BLD: 11.65 K/UL — HIGH (ref 3.8–10.5)
WBC # FLD AUTO: 11.65 K/UL — HIGH (ref 3.8–10.5)
WBC UR QL: 5 /HPF — SIGNIFICANT CHANGE UP (ref 0–5)

## 2024-03-12 PROCEDURE — 70498 CT ANGIOGRAPHY NECK: CPT | Mod: 26,MC

## 2024-03-12 PROCEDURE — 93010 ELECTROCARDIOGRAM REPORT: CPT

## 2024-03-12 PROCEDURE — 99285 EMERGENCY DEPT VISIT HI MDM: CPT

## 2024-03-12 PROCEDURE — 70450 CT HEAD/BRAIN W/O DYE: CPT | Mod: 26,MC,XU

## 2024-03-12 PROCEDURE — 70496 CT ANGIOGRAPHY HEAD: CPT | Mod: 26,MC

## 2024-03-12 PROCEDURE — 0042T: CPT | Mod: MC

## 2024-03-12 RX ORDER — SUCRALFATE 1 G
1 TABLET ORAL
Refills: 0 | Status: DISCONTINUED | OUTPATIENT
Start: 2024-03-12 | End: 2024-03-14

## 2024-03-12 RX ORDER — ESCITALOPRAM OXALATE 10 MG/1
5 TABLET, FILM COATED ORAL AT BEDTIME
Refills: 0 | Status: DISCONTINUED | OUTPATIENT
Start: 2024-03-12 | End: 2024-03-14

## 2024-03-12 RX ORDER — LANOLIN ALCOHOL/MO/W.PET/CERES
1 CREAM (GRAM) TOPICAL
Refills: 0 | DISCHARGE

## 2024-03-12 RX ORDER — ROSUVASTATIN CALCIUM 5 MG/1
1 TABLET ORAL
Refills: 0 | DISCHARGE

## 2024-03-12 RX ORDER — BUPROPION HYDROCHLORIDE 150 MG/1
100 TABLET, EXTENDED RELEASE ORAL DAILY
Refills: 0 | Status: DISCONTINUED | OUTPATIENT
Start: 2024-03-12 | End: 2024-03-13

## 2024-03-12 RX ORDER — ESCITALOPRAM OXALATE 10 MG/1
1 TABLET, FILM COATED ORAL
Refills: 0 | DISCHARGE

## 2024-03-12 RX ORDER — SODIUM CHLORIDE 9 MG/ML
1000 INJECTION INTRAMUSCULAR; INTRAVENOUS; SUBCUTANEOUS ONCE
Refills: 0 | Status: COMPLETED | OUTPATIENT
Start: 2024-03-12 | End: 2024-03-12

## 2024-03-12 RX ORDER — AMLODIPINE BESYLATE 2.5 MG/1
1 TABLET ORAL
Qty: 0 | Refills: 0 | DISCHARGE

## 2024-03-12 RX ORDER — ACETAMINOPHEN 500 MG
650 TABLET ORAL EVERY 6 HOURS
Refills: 0 | Status: DISCONTINUED | OUTPATIENT
Start: 2024-03-12 | End: 2024-03-14

## 2024-03-12 RX ORDER — GABAPENTIN 400 MG/1
100 CAPSULE ORAL
Refills: 0 | Status: DISCONTINUED | OUTPATIENT
Start: 2024-03-12 | End: 2024-03-14

## 2024-03-12 RX ORDER — ALPRAZOLAM 0.25 MG
1 TABLET ORAL
Qty: 0 | Refills: 0 | DISCHARGE

## 2024-03-12 RX ORDER — FUROSEMIDE 40 MG
1 TABLET ORAL
Qty: 0 | Refills: 0 | DISCHARGE

## 2024-03-12 RX ORDER — ALPRAZOLAM 0.25 MG
0.25 TABLET ORAL DAILY
Refills: 0 | Status: DISCONTINUED | OUTPATIENT
Start: 2024-03-12 | End: 2024-03-14

## 2024-03-12 RX ORDER — GABAPENTIN 400 MG/1
1 CAPSULE ORAL
Qty: 0 | Refills: 0 | DISCHARGE

## 2024-03-12 RX ORDER — ACETAMINOPHEN 500 MG
2 TABLET ORAL
Qty: 0 | Refills: 0 | DISCHARGE

## 2024-03-12 RX ORDER — LANOLIN ALCOHOL/MO/W.PET/CERES
5 CREAM (GRAM) TOPICAL AT BEDTIME
Refills: 0 | Status: DISCONTINUED | OUTPATIENT
Start: 2024-03-12 | End: 2024-03-14

## 2024-03-12 RX ORDER — ALPRAZOLAM 0.25 MG
1 TABLET ORAL
Refills: 0 | DISCHARGE

## 2024-03-12 RX ORDER — FUROSEMIDE 40 MG
20 TABLET ORAL DAILY
Refills: 0 | Status: DISCONTINUED | OUTPATIENT
Start: 2024-03-12 | End: 2024-03-13

## 2024-03-12 RX ORDER — PANTOPRAZOLE SODIUM 20 MG/1
40 TABLET, DELAYED RELEASE ORAL
Refills: 0 | Status: DISCONTINUED | OUTPATIENT
Start: 2024-03-12 | End: 2024-03-14

## 2024-03-12 RX ORDER — ATORVASTATIN CALCIUM 80 MG/1
40 TABLET, FILM COATED ORAL AT BEDTIME
Refills: 0 | Status: DISCONTINUED | OUTPATIENT
Start: 2024-03-12 | End: 2024-03-14

## 2024-03-12 RX ORDER — SUCRALFATE 1 G
1 TABLET ORAL
Refills: 0 | DISCHARGE

## 2024-03-12 RX ORDER — CHOLECALCIFEROL (VITAMIN D3) 125 MCG
1000 CAPSULE ORAL EVERY OTHER DAY
Refills: 0 | Status: DISCONTINUED | OUTPATIENT
Start: 2024-03-12 | End: 2024-03-14

## 2024-03-12 RX ORDER — CHOLECALCIFEROL (VITAMIN D3) 125 MCG
1 CAPSULE ORAL
Refills: 0 | DISCHARGE

## 2024-03-12 RX ORDER — BUPROPION HYDROCHLORIDE 150 MG/1
1 TABLET, EXTENDED RELEASE ORAL
Qty: 0 | Refills: 0 | DISCHARGE

## 2024-03-12 RX ORDER — FERROUS SULFATE 325(65) MG
1 TABLET ORAL
Qty: 0 | Refills: 0 | DISCHARGE

## 2024-03-12 RX ADMIN — SODIUM CHLORIDE 1000 MILLILITER(S): 9 INJECTION INTRAMUSCULAR; INTRAVENOUS; SUBCUTANEOUS at 22:45

## 2024-03-12 NOTE — ED PROVIDER NOTE - DIFFERENTIAL DIAGNOSIS
Ddx includes but not limited to CVA, LVO, ICH, SAH, TIA, posterior CVA, migraines headache, hypoglycemia, electrolyte imbalance Differential Diagnosis

## 2024-03-12 NOTE — ED PROVIDER NOTE - PROGRESS NOTE DETAILS
NIH recorded as 6 due to symmetrical bilateral leg weakness.  Per daughter, at baseline patient does not ambulate and inability to raise legs is chronic.

## 2024-03-12 NOTE — ED ADULT NURSE NOTE - NSFALLHARMRISKINTERV_ED_ALL_ED

## 2024-03-12 NOTE — ED ADULT TRIAGE NOTE - CHIEF COMPLAINT QUOTE
per ems from home with worsening slurred speech since last night, pt normally has slurred speech per ems report. pt normally on nasal cannula at home, ems took oxygen off for transport  per daughter pt was tired last night and today, became disoriented this afternoon. daughter noticed slurred speech on the phone about a half hour ago. per daughter pt does not have a hx of slurred speech, pt does have a hx of facial spasms.

## 2024-03-12 NOTE — ED ADULT NURSE NOTE - OBJECTIVE STATEMENT
Pt is AOX1-2, pt brought in from home to the ED with c/o of slurred speech and incr disorientation as per daughter. Pts daughter states pt more disoriented this afternoon. daughter noticed slurred speech on the phone about a half hour ago. per daughter pt does not have a hx of slurred speech, pt does have a hx of facial spasms. Pt denies n/v/HA/SOB/CP. Allergies to penicillin. no acute neuro deficits at this time, ER MD at bedside. No difficulty swallowing. Pending lab and radiology results. care ongoing.

## 2024-03-12 NOTE — ED PROVIDER NOTE - OBJECTIVE STATEMENT
89-year-old female with a history of subdural hematoma, GI bleed, PUD, CVA, anxiety, HTN, HLD, IVC filter presents with disorientation and slurred speech.  History provided by EMS and patient's daughter at the bedside.  Patient lives at home with 24-hour aides.  Daughter was informed that this evening that patient has been weak with increased fatigue since last night.  The aide reported to the daughter that patient was having difficulty transferring from bed to chair since last night.  At 7:30 PM daughter spoke to the patient and noted that she appeared to be more confused than usual and have some slurred speech.  It is unclear when the symptoms started.  Daughter states that patient does get disoriented from time to time however her mental status has improved since being on home oxygen which was started in May 2023.  At baseline patient does not walk.  She had an IVC filter placed in May 2023.  She is currently not on any anticoagulation.  She is DNR/DNI.  PMD Josue Person

## 2024-03-12 NOTE — ED PROVIDER NOTE - CHPI ED SYMPTOMS NEG
no blurred vision/no fever/no loss of consciousness/no nausea/no numbness/no vomiting/no change in level of consciousness

## 2024-03-12 NOTE — ED PROVIDER NOTE - CLINICAL SUMMARY MEDICAL DECISION MAKING FREE TEXT BOX
89 year old female p/w weakness and slurred speech. Per daughter, she noted the slurred speech while talking to patient on the phone at 7:30pm yesterday.  Patient has 24-hr aide who informed daughter that weakness and confusion have been presents since last night.  Patient does not ambulate and has a history of disorientation at baseline.  She is DNR/DNI.  Stroke code called, CT head/CTA head/neck, labs, NIH, labs, CXR, EKG, admit

## 2024-03-12 NOTE — ED PROVIDER NOTE - NSICDXFAMILYHX_GEN_ALL_CORE_FT
FAMILY HISTORY:  Father  Still living? Unknown  FHx: congestive heart failure, Age at diagnosis: Age Unknown    Mother  Still living? Unknown  Family history of malignant neoplasm of colon, Age at diagnosis: Age Unknown

## 2024-03-13 ENCOUNTER — TRANSCRIPTION ENCOUNTER (OUTPATIENT)
Age: 89
End: 2024-03-13

## 2024-03-13 DIAGNOSIS — Z99.81 DEPENDENCE ON SUPPLEMENTAL OXYGEN: ICD-10-CM

## 2024-03-13 DIAGNOSIS — N17.9 ACUTE KIDNEY FAILURE, UNSPECIFIED: ICD-10-CM

## 2024-03-13 DIAGNOSIS — Z29.9 ENCOUNTER FOR PROPHYLACTIC MEASURES, UNSPECIFIED: ICD-10-CM

## 2024-03-13 DIAGNOSIS — E78.5 HYPERLIPIDEMIA, UNSPECIFIED: ICD-10-CM

## 2024-03-13 DIAGNOSIS — I63.9 CEREBRAL INFARCTION, UNSPECIFIED: ICD-10-CM

## 2024-03-13 DIAGNOSIS — K27.9 PEPTIC ULCER, SITE UNSPECIFIED, UNSPECIFIED AS ACUTE OR CHRONIC, WITHOUT HEMORRHAGE OR PERFORATION: ICD-10-CM

## 2024-03-13 DIAGNOSIS — R41.82 ALTERED MENTAL STATUS, UNSPECIFIED: ICD-10-CM

## 2024-03-13 LAB
ANION GAP SERPL CALC-SCNC: 6 MMOL/L — SIGNIFICANT CHANGE UP (ref 5–17)
BUN SERPL-MCNC: 24 MG/DL — HIGH (ref 7–23)
CALCIUM SERPL-MCNC: 8.3 MG/DL — LOW (ref 8.5–10.1)
CHLORIDE SERPL-SCNC: 108 MMOL/L — SIGNIFICANT CHANGE UP (ref 96–108)
CO2 SERPL-SCNC: 31 MMOL/L — SIGNIFICANT CHANGE UP (ref 22–31)
CREAT SERPL-MCNC: 1.3 MG/DL — SIGNIFICANT CHANGE UP (ref 0.5–1.3)
EGFR: 38 ML/MIN/1.73M2 — LOW
GLUCOSE SERPL-MCNC: 85 MG/DL — SIGNIFICANT CHANGE UP (ref 70–99)
HCT VFR BLD CALC: 30.9 % — LOW (ref 34.5–45)
HGB BLD-MCNC: 9.9 G/DL — LOW (ref 11.5–15.5)
MAGNESIUM SERPL-MCNC: 2 MG/DL — SIGNIFICANT CHANGE UP (ref 1.6–2.6)
MCHC RBC-ENTMCNC: 32 GM/DL — SIGNIFICANT CHANGE UP (ref 32–36)
MCHC RBC-ENTMCNC: 32.5 PG — SIGNIFICANT CHANGE UP (ref 27–34)
MCV RBC AUTO: 101.3 FL — HIGH (ref 80–100)
NRBC # BLD: 0 /100 WBCS — SIGNIFICANT CHANGE UP (ref 0–0)
PHOSPHATE SERPL-MCNC: 4.1 MG/DL — SIGNIFICANT CHANGE UP (ref 2.5–4.5)
PLATELET # BLD AUTO: 180 K/UL — SIGNIFICANT CHANGE UP (ref 150–400)
POTASSIUM SERPL-MCNC: 3.7 MMOL/L — SIGNIFICANT CHANGE UP (ref 3.5–5.3)
POTASSIUM SERPL-SCNC: 3.7 MMOL/L — SIGNIFICANT CHANGE UP (ref 3.5–5.3)
RBC # BLD: 3.05 M/UL — LOW (ref 3.8–5.2)
RBC # FLD: 13.8 % — SIGNIFICANT CHANGE UP (ref 10.3–14.5)
SODIUM SERPL-SCNC: 145 MMOL/L — SIGNIFICANT CHANGE UP (ref 135–145)
WBC # BLD: 10.92 K/UL — HIGH (ref 3.8–10.5)
WBC # FLD AUTO: 10.92 K/UL — HIGH (ref 3.8–10.5)

## 2024-03-13 PROCEDURE — 70551 MRI BRAIN STEM W/O DYE: CPT | Mod: 26

## 2024-03-13 PROCEDURE — 99222 1ST HOSP IP/OBS MODERATE 55: CPT

## 2024-03-13 RX ORDER — HEPARIN SODIUM 5000 [USP'U]/ML
5000 INJECTION INTRAVENOUS; SUBCUTANEOUS EVERY 8 HOURS
Refills: 0 | Status: DISCONTINUED | OUTPATIENT
Start: 2024-03-13 | End: 2024-03-14

## 2024-03-13 RX ORDER — BUPROPION HYDROCHLORIDE 150 MG/1
100 TABLET, EXTENDED RELEASE ORAL DAILY
Refills: 0 | Status: DISCONTINUED | OUTPATIENT
Start: 2024-03-13 | End: 2024-03-14

## 2024-03-13 RX ORDER — ASPIRIN/CALCIUM CARB/MAGNESIUM 324 MG
81 TABLET ORAL DAILY
Refills: 0 | Status: DISCONTINUED | OUTPATIENT
Start: 2024-03-13 | End: 2024-03-14

## 2024-03-13 RX ORDER — ALPRAZOLAM 0.25 MG
0.25 TABLET ORAL
Refills: 0 | Status: DISCONTINUED | OUTPATIENT
Start: 2024-03-13 | End: 2024-03-14

## 2024-03-13 RX ORDER — SODIUM CHLORIDE 9 MG/ML
1000 INJECTION INTRAMUSCULAR; INTRAVENOUS; SUBCUTANEOUS
Refills: 0 | Status: DISCONTINUED | OUTPATIENT
Start: 2024-03-13 | End: 2024-03-14

## 2024-03-13 RX ADMIN — HEPARIN SODIUM 5000 UNIT(S): 5000 INJECTION INTRAVENOUS; SUBCUTANEOUS at 21:22

## 2024-03-13 RX ADMIN — Medication 0.25 MILLIGRAM(S): at 17:17

## 2024-03-13 RX ADMIN — Medication 1000 UNIT(S): at 11:31

## 2024-03-13 RX ADMIN — BUPROPION HYDROCHLORIDE 100 MILLIGRAM(S): 150 TABLET, EXTENDED RELEASE ORAL at 11:31

## 2024-03-13 RX ADMIN — GABAPENTIN 100 MILLIGRAM(S): 400 CAPSULE ORAL at 17:17

## 2024-03-13 RX ADMIN — Medication 1 GRAM(S): at 17:17

## 2024-03-13 RX ADMIN — SODIUM CHLORIDE 75 MILLILITER(S): 9 INJECTION INTRAMUSCULAR; INTRAVENOUS; SUBCUTANEOUS at 01:54

## 2024-03-13 RX ADMIN — PANTOPRAZOLE SODIUM 40 MILLIGRAM(S): 20 TABLET, DELAYED RELEASE ORAL at 05:46

## 2024-03-13 RX ADMIN — Medication 81 MILLIGRAM(S): at 11:08

## 2024-03-13 RX ADMIN — Medication 1 TABLET(S): at 11:08

## 2024-03-13 RX ADMIN — Medication 1 GRAM(S): at 05:45

## 2024-03-13 RX ADMIN — ATORVASTATIN CALCIUM 40 MILLIGRAM(S): 80 TABLET, FILM COATED ORAL at 21:23

## 2024-03-13 RX ADMIN — ESCITALOPRAM OXALATE 5 MILLIGRAM(S): 10 TABLET, FILM COATED ORAL at 21:23

## 2024-03-13 RX ADMIN — Medication 20 MILLIGRAM(S): at 05:46

## 2024-03-13 RX ADMIN — Medication 5 MILLIGRAM(S): at 21:23

## 2024-03-13 RX ADMIN — GABAPENTIN 100 MILLIGRAM(S): 400 CAPSULE ORAL at 05:46

## 2024-03-13 RX ADMIN — Medication 0.25 MILLIGRAM(S): at 11:08

## 2024-03-13 RX ADMIN — Medication 0.25 MILLIGRAM(S): at 21:25

## 2024-03-13 NOTE — CARE COORDINATION ASSESSMENT. - OTHER PERTINENT DISCHARGE PLANNING INFORMATION:
CM met with the patient at the bedside and explained role of CM and transition planning. Patient alert and oriented x 2 (self and place) on assessment, however could provide detail for assessment questions. Patient stated she lives in a private home and has two 12 hour private hire aides. Patient stated the aide assist with dressing, bathing, cooking. Patient stated she has 5 stairs outside and chairlift inside. Patient stated she does not ambulate and is primarily in the wheelchair. Patient stated she has a wheelchair and home oxygen PTA. Patient denies home care visiting nurse/PT PTA. Patient stated her daughter arranges her medications in a pillbox. CM provided direct contact/resource folder and remains available. CM made contact with the patient's daughter Johana who confirmed the information that the patient provided. Only discrepancy is that the PA from her PCP Dr Josue Mendoza office does home visits (patient stated the aide drives her to appointments).

## 2024-03-13 NOTE — PROGRESS NOTE ADULT - PROBLEM SELECTOR PLAN 1
- CTH: no acute pathology  - UA negative (contaminated with squamous epithelial cells)  - RSV/COVID/Flu swab negative  - dysphagia screen passed, started on puree diet  - NS 75mL/hr  - MRI brain ordered f/u results  - Neuro checks  - f/u AM labs  - f/u urine culture  - PT eval - CTH: no acute pathology  - UA negative (contaminated with squamous epithelial cells)  - RSV/COVID/Flu swab negative  - dysphagia screen passed, started on puree diet  - NS 75mL/hr  - MRI brain ordered f/u results  - Neuro checks  - f/u AM labs  - f/u urine culture  - PT eval -> no skilled PT needs  - OT eval - CTH: no acute pathology  - UA negative (contaminated with squamous epithelial cells)  - RSV/COVID/Flu swab negative  - dysphagia screen passed, started on puree diet  - NS 75mL/hr - hold furosemide for now  - MRI brain showed Acute infarct in the subcortical white matter of the posterior right parietal lobe. Chronic small vessel disease.  - Neuro checks  - Monitor on telemetry for arrhythmia   - Continue ASA/statin - d/w neuro  - Monitor mental status  - f/u urine culture  - PT eval -> home with 24h aide  - OT eval

## 2024-03-13 NOTE — H&P ADULT - NSHPPHYSICALEXAM_GEN_ALL_CORE
T(C): 36.2 (03-12-24 @ 21:30), Max: 36.3 (03-12-24 @ 20:59)  HR: 72 (03-12-24 @ 22:30) (72 - 85)  BP: 138/64 (03-12-24 @ 22:30) (109/72 - 138/64)  RR: 19 (03-12-24 @ 22:30) (18 - 20)  SpO2: 97% (03-12-24 @ 22:30) (89% - 97%)    General: No apparent distress, frail  Head: normocephalic, atraumatic  Eyes: EOMI, anicteric  ENT: moist mucous membranes, no pharyngeal exudates  Heart: rrr, S1, S2, no murmurs  Chest: CTA b/l, no rales, rhonchi, or wheezes  Abd: BS+, soft, NT, ND  Extr: no edema or cyanosis, ecchymoses noted  Neuro: AA&Ox2, no focal weakness, sensation to light touch intact, strength weak BUE 4/5, BLE 4/5  Psych: normal affect

## 2024-03-13 NOTE — H&P ADULT - ASSESSMENT
Patient is a 89yo F, PMH subdural hematoma, GI bleed, PUD, CVA, anxiety, HTN, HLD, DVT s/p IVC filter (poor AC candidate), low lung volumes on home O2 (2L at baseline), presents to ED with AMS and slurred speech.   CT head unremarkable  UA negative (contaminated with squamous epithelial cells)  RSV/COVID/Flu swab negative  WBC 11.65    AMS  Admit to Medicine, telemetry  continuous cardiac monitoring  continue O2 2L, keep SaO2 >92%  dysphagia screen passed, started on puree diet  NS 75mL/hr  Continue home meds  Consider Neurology consult in AM  MRI brain ordered  Neuro checks  f/u AM labs  f/u urine culture  PT eval    HLD/PUD  Continue home meds  Sucralfate 1gm BID  Protonix 40mg daily  Crestor interchange, Continue Atorvastatin 40mg QHS      Code status: DNR/DNI  Dispo: DC planning pending MRI and further hospital course  social work consult: daughter requesting ambulance upon discharge  please contact daughter, Johana, with updates

## 2024-03-13 NOTE — OCCUPATIONAL THERAPY INITIAL EVALUATION ADULT - NS ASR FOLLOW COMMAND OT EVAL
with occasional physical prompts and cueing/100% of the time/able to follow single-step instructions

## 2024-03-13 NOTE — OCCUPATIONAL THERAPY INITIAL EVALUATION ADULT - RANGE OF MOTION EXAMINATION, UPPER EXTREMITY
except minimal AROM noted b/l shoulers. Fine motor skills: WFL's/bilateral UE Active Assistive ROM was WFL  (within functional limits)

## 2024-03-13 NOTE — OCCUPATIONAL THERAPY INITIAL EVALUATION ADULT - PERTINENT HX OF CURRENT PROBLEM, REHAB EVAL
89 y/o female PMH subdural hematoma, GI bleed, PUD, CVA, anxiety, HTN, HLD, DVT s/p IVC filter (poor AC candidate), low lung volumes on home O2 (2L at baseline), presents to ED with AMS and slurred speech. Patient is a poor historian and unable to provide much of the history. Information obtained from daughter who states that at baseline, pt is usually not oriented to the year/date. She is usually able to remember the President's name (Alex Mccall) but was unable to do so today. She usually knows that she is at home, but today she was confused as to where she was. Also, the daughter noted that the pt was talking about her mother's death (which occurred ~50 years ago). Pt's daughter also noted some weakness and slurred speech today. During my interview, pt was unable to answer to time, but was able to note that she was at [facility] ([facility] and the daughter noted the slurred speech to have resolved. At baseline, pt is normally in a chair/wheelchair or in bed, hardly ambulates. Patient's daughter, Johana, noted that in the past, pt would become confused and oxygen helped. She does not take deep breaths, and as a result, becomes hypoxic/hypercapneic and becomes confused. Pt reports LUE pain, but denies chest pain, SOB, abdominal pain, nausea, vomiting, fever, chills, dysuria, hematuria, blood in stool. CT head: unremarkable. Patient admitted 3/12/24 with altered mental status.

## 2024-03-13 NOTE — DISCHARGE NOTE NURSING/CASE MANAGEMENT/SOCIAL WORK - PATIENT PORTAL LINK FT
You can access the FollowMyHealth Patient Portal offered by [facility] by registering at the following website: [email or web address]. By joining [facility]’s FollowMyHealth portal, you will also be able to view your health information using other applications (apps) compatible with our system.

## 2024-03-13 NOTE — PROGRESS NOTE ADULT - PROBLEM SELECTOR PROBLEM 6
Spoke with Mrs Canales reminding her of her appointment for Monday, December 2nd, 2019 at 945 am with Dr Pepper. Mrs Canales confirmed she would be here.    Need for prophylactic measure

## 2024-03-13 NOTE — OCCUPATIONAL THERAPY INITIAL EVALUATION ADULT - ADDITIONAL COMMENTS
Pt reports that she lives in a private house with a stall shower. Pt reports that she has two 12 hour private hire aides and that she receives sponge bathes daily and rarely showers. Pt received assistance with all ADLs though she can feed herself. Pt stated that she has 5 stairs outside and +chairlift inside. Pt reports that she is able to transfer with assist to her wheelchair though does not ambulate. Pt reports that she has a w/c, rolling walker and home O2. Pt is right hand dominant. Pt requires assistance with ADL's and transfers due to decreased AROM bilateral shoulders, decreased strength, impaired cognition, decreased endurance and impaired sitting/standing balance.

## 2024-03-13 NOTE — PHYSICAL THERAPY INITIAL EVALUATION ADULT - PERTINENT HX OF CURRENT PROBLEM, REHAB EVAL
89yo F, PMH subdural hematoma, GI bleed, PUD, CVA, anxiety, HTN, HLD, DVT s/p IVC filter (poor AC candidate), low lung volumes on home O2 (2L at baseline), presents to ED with AMS and slurred speech. Patient is a poor historian and unable to provide much of the history. Information obtained from daughter who states that at baseline, pt is usually not oriented to the year/date. She is usually able to remember the President's name (Alex Mccall) but was unable to do so today. She usually knows that she is at home, but today she was confused as to where she was. Also, the daughter noted that the pt was talking about her mother's death (which occurred ~50 years ago). Pt's daughter also noted some weakness and slurred speech today. During my interview, pt was unable to answer to time, but was able to note that she was at [facility] (previous name for [facility]), and the daughter noted the slurred speech to have resolved. At baseline, pt is normally in a chair/wheelchair or in bed, hardly ambulates. Patient's daughter, Johana, noted that in the past, pt would become confused and oxygen helped. She does not take deep breaths, and as a result, becomes hypoxic/hypercapneic and becomes confused. Pt reports LUE pain, but denies chest pain, SOB, abdominal pain, nausea, vomiting, fever, chills, dysuria, hematuria, blood in stool.

## 2024-03-13 NOTE — H&P ADULT - HISTORY OF PRESENT ILLNESS
Patient is a 89yo F, PMH subdural hematoma, GI bleed, PUD, CVA, anxiety, HTN, HLD, DVT s/p IVC filter (poor AC candidate), low lung volumes on home O2 (2L at baseline), presents to ED with AMS and slurred speech. Patient is a poor historian and unable to provide much of the history. Information obtained from daughter who states that at baseline, pt is usually not oriented to the year/date. She is usually able to remember the President's name (Alex Mccall) but was unable to do so today. She usually knows that she is at home, but today she was confused as to where she was. Also, the daughter noted that the pt was talking about her mother's death (which occurred ~50 years ago). Pt's daughter also noted some weakness and slurred speech today. During my interview, pt was unable to answer to time, but was able to note that she was at [facility] (previous name for [facility]), and the daughter noted the slurred speech to have resolved. At baseline, pt is normally in a chair/wheelchair or in bed, hardly ambulates. Patient's daughter, Johana, noted that in the past, pt would become confused and oxygen helped. She does not take deep breaths, and as a result, becomes hypoxic/hypercapneic and becomes confused.   Pt reports LUE pain, but denies chest pain, SOB, abdominal pain, nausea, vomiting, fever, chills, dysuria, hematuria, blood in stool.

## 2024-03-13 NOTE — PHYSICAL THERAPY INITIAL EVALUATION ADULT - ADDITIONAL COMMENTS
Pt lives in a condo w/ stair lift.  Pt reportedly limited ambulation, primarily wheelchair/chair bound.  Pt has 24/7 HHA.

## 2024-03-13 NOTE — DISCHARGE NOTE NURSING/CASE MANAGEMENT/SOCIAL WORK - NSDCPEFALRISK_GEN_ALL_CORE
For information on Fall & Injury Prevention, visit: [email or web address] OR  [email or web address] OR  https://www.cdc.gov/steadi/patient.html

## 2024-03-13 NOTE — CARE COORDINATION ASSESSMENT. - NSCAREPROVIDERS_GEN_ALL_CORE_FT
CARE PROVIDERS:  Accepting Physician: Clarisse Mcfarlane  Administration: Raghu Hung  Administration: Tabitha Bhatt  Administration: Dom Vora  Administration: Hrashal Walker  Administration: Ana Rosa Mccormick  Admitting: Clarisse Mcfarlane  Attending: Clarisse Mcfarlane  Case Management: Joaquina Bernabe  Consultant: Anil Amaya  Covering Team: Darren Erickson  ED Attending: Dawna Botello  ED Nurse: Jay Gonzalez  Nurse: Luis Faria  Nurse: Tyra Hernandez  Nurse: Mera Whitten  Nurse: Tianna Dumont  Nurse: Luis Lira  Occupational Therapy: Leslie Sims  Outpatient Provider: Ezekiel Covarrubias  Outpatient Provider: Josue Mendoza  Outpatient Provider: Jillian Chu  Outpatient Provider: Anil Amaya  Override: Luis Lira  PCA/Nursing Assistant: Imani Rosenthal  Primary Team: Garima Gomez  Primary Team: Ernie Abraham  Primary Team: Kimi Allen  Primary Team: Mikki Luna  Primary Team: Jaime Inman  Quality Review: Chio Rene  Registered Dietitian: Johana Alcantar  Respiratory Therapy: Brandi Almodovar  : Miriam Taylor  Student: Deanna Olson  Team: [facility]  Hospitalists, Team

## 2024-03-13 NOTE — CARE COORDINATION ASSESSMENT. - NSDCPLANSERVICES_GEN_ALL_CORE
Anticipate transition home with resumption of private hire aides (daughter stated she will resume when medically cleared)./Same as Prior Admission/Transportation

## 2024-03-13 NOTE — OCCUPATIONAL THERAPY INITIAL EVALUATION ADULT - TRANSFER SAFETY CONCERNS NOTED: SIT/STAND, REHAB EVAL
decreased weight-shifting ability losing balance/decreased step length/decreased weight-shifting ability

## 2024-03-13 NOTE — CARE COORDINATION ASSESSMENT. - NSPASTMEDSURGHISTORY_GEN_ALL_CORE_FT
PAST MEDICAL & SURGICAL HISTORY:  Anxiety      Hip fracture      Fall      Hypertension      Hyperlipidemia      S/P hip replacement  left      S/P hysterectomy      CVA (cerebral vascular accident)      GI bleed      PUD (peptic ulcer disease)      SDH (subdural hematoma)

## 2024-03-13 NOTE — PATIENT PROFILE ADULT - LOCATION #3
4/7/2023      RE: Sal Magaña  [address] [address]     Dear Colleague,    Thank you for the opportunity to participate in the care of your patient, Sal Magaña, at the [facility] at [facility]. Please see a copy of my visit note below.    Pediatric Hematology Oncology Clinic Note      History:  Sal Magaña is a 18 month old male, referred to hematology clinic by his PCP, Dr. Ac Cedeno. Sal was noted to be anemic at his 12m well child check and was started on ferrous sulfate at that time. He was on 3mL for 1 month, labs were rechecked, stayed fairly stable, so changed to a multi-vitamin with iron instead. Upon recheck after another 1 month, labs continued to demonstrate anemia. Sal was then referred to hematology for further evaluation. He comes to clinic today with his mom for initial consult.      HPI:  Sal is in good health today. He has not had any acute ill symptoms, including no cough, rhinorrhea, SOB, pharyngitis, mucositis, GI upset, rashes, or fever. His mom does describe his past medical history, beginning back in December 2021 with red spots in his diaper. This was tested for blood and came back negative. Sal had several episodes of continued red spots in his diaper. In November 2022 he had gastroenteritis. His urine was tested at that time and also had fecal matter in it as well. He's been seen by urology, nephrology, and genetics. Sal has also had a renal US, a voiding cystogram, and a water soluble colon study done, none of which give any indication as to why he'd have fecal matter in his urine. Sal hasn't had any red spots in his diaper since November. Aside from this concern, he is described as very healthy. Immunizations are up to date. No surgeries.    Sal eats a really good diet, including foods from all food groups. He drinks whole milk with meals, about 16  "ounces total per day, and drinks water in between. Sal isn't described as a picky eater. He doesn't have any obvious sources of bleeding, including no epistaxis, mucosal bleeding, bad bruising, or obvious blood in stool.     Sal has an older sister Vicky who is also healthy, no family history of anemia.     History obtained from patient as well as the following historian: Mom    ROS: comprehensive review of systems obtained; negative unless noted above in HPI.    Medications:  Current Outpatient Medications   Medication     hydrocortisone 2.5 % ointment     multivitamin  peds with C and FA (FLINTSTONES/MY FIRST) CHEW     No current facility-administered medications for this visit.       Allergies:   No Known Allergies    Social History:   Sal Magaña lives at home with both parents and older sister, Vicky. He does attend .       Physical Exam:  /58 (BP Location: Left arm, Patient Position: Sitting, Cuff Size: Child)   Pulse 137   Temp 97.5  F (36.4  C) (Axillary)   Resp 25   Ht 0.754 m (2' 5.69\")   Wt 11.4 kg (25 lb 2.1 oz)   SpO2 98%   BMI 20.05 kg/m      Ht Readings from Last 2 Encounters:   04/07/23 0.754 m (2' 5.69\") (<1 %, Z= -2.74)*   03/20/23 0.787 m (2' 7\") (9 %, Z= -1.33)*     * Growth percentiles are based on WHO (Boys, 0-2 years) data.       Wt Readings from Last 2 Encounters:   04/07/23 11.4 kg (25 lb 2.1 oz) (60 %, Z= 0.26)*   03/20/23 10.8 kg (23 lb 12 oz) (44 %, Z= -0.15)*     * Growth percentiles are based on WHO (Boys, 0-2 years) data.       General: Well nourished, well developed without apparent distress  HEENT: Normocephalic. Full head of dark hair. Eyes are non-injected without drainage. PEERL. Nares patient without drainage. TMs clear with positive landmarks. Oropharynx: uvula midline. No erythema, nor edema. No mucositis.  Chest: Symmetrical  Lungs: clear to bases bilaterally. No cough. No wheezing.   Heart: regular rate. No murmur  Abdomen: Soft, non-tender, No " HSM.  Extremities/MSK: GARNER with full ROM and good perfusion.   Skin: no bumps, rashes, nor bruising.   Neuro: PERRL, cranial nerves II-XII grossly in tact.  : deferred.     Labs/Data:  Results for orders placed or performed in visit on 04/07/23   CBC with platelets and differential     Status: Abnormal   Result Value Ref Range    WBC Count 9.5 6.0 - 17.5 10e3/uL    RBC Count 4.27 3.70 - 5.30 10e6/uL    Hemoglobin 10.6 10.5 - 14.0 g/dL    Hematocrit 32.2 31.5 - 43.0 %    MCV 75 70 - 100 fL    MCH 24.8 (L) 26.5 - 33.0 pg    MCHC 32.9 31.5 - 36.5 g/dL    RDW 14.7 10.0 - 15.0 %    Platelet Count 328 150 - 450 10e3/uL   Manual Differential     Status: None   Result Value Ref Range    % Neutrophils 33 %    % Lymphocytes 56 %    % Monocytes 7 %    % Eosinophils 4 %    % Basophils 0 %    Absolute Neutrophils 3.1 0.8 - 7.7 10e3/uL    Absolute Lymphocytes 5.3 2.3 - 13.3 10e3/uL    Absolute Monocytes 0.7 0.0 - 1.1 10e3/uL    Absolute Eosinophils 0.4 0.0 - 0.7 10e3/uL    Absolute Basophils 0.0 0.0 - 0.2 10e3/uL    RBC Morphology Confirmed RBC Indices     Platelet Assessment  Automated Count Confirmed. Platelet morphology is normal.     Automated Count Confirmed. Platelet morphology is normal.   CBC with platelets differential     Status: Abnormal    Narrative    The following orders were created for panel order CBC with platelets differential.  Procedure                               Abnormality         Status                     ---------                               -----------         ------                     CBC with platelets and d...[[id number]]  Abnormal            Final result               Manual Differential[[id number]]                              Final result                 Please view results for these tests on the individual orders.     The following tests were ordered and interpreted by me today:  CBC      Assessment:  Sal Magaña is a 18 month old male with mild normocytic anemia. Hemoglobin is low  normal today as well. Given Sal's history of fecal matter in his urine, and discoloration in his diaper of unknown etiology, concerned for additional process yet to be determined. Ordered occult blood in stool; will obtain at local lab. Diet is well rounded and doesn't seem to be contributing factor at this time. Clinically well appearing.     Plan:  1) Labs reviewed, also discused importance of adding on occult blood in stool  2) Will likely refer to GI as well; will await stool results first  3) Requested that he switch back to ferrous sulfate and increase dose to 4mL daily (~5.5mg/kg)  4) RTC will be determined based on labs      Ifrah Velásquez CNP    Total time spent on the following services on the date of the encounter:  Preparing to see patient, chart review, review of outside records, Ordering medications, test, procedures, chemotherapy, Referring or communicating with other healthcare professionals, Interpretation of labs, imaging and other tests, Performing a medically appropriate examination , Counseling and educating the patient/family/caregiver , Documenting clinical information in the electronic or other health record , Communicating results to the patient/family/caregiver  and Care coordination Total Time Spent: 60 minutes                   [place]

## 2024-03-13 NOTE — PROGRESS NOTE ADULT - ATTENDING COMMENTS
89yo F, PMH subdural hematoma, GI bleed, PUD, CVA, anxiety, HTN, HLD, DVT s/p IVC filter (poor AC candidate), low lung volumes on home O2 (2L at baseline), presents to ED with AMS and slurred speech. CT head unremarkable. MRI with acute infarct. Continue ASA/Statin. Monitor for arrhythmia. TTE ordered. Daughter updated by Dr. Abraham.

## 2024-03-13 NOTE — PROGRESS NOTE ADULT - ASSESSMENT
Patient is a 89yo F, PMH subdural hematoma, GI bleed, PUD, CVA, anxiety, HTN, HLD, DVT s/p IVC filter (poor AC candidate), low lung volumes on home O2 (2L at baseline), presents to ED with AMS and slurred speech.   CT head unremarkable    WBC 11.65    AMS      HLD/PUD  Continue home meds               89yo F, PMH subdural hematoma, GI bleed, PUD, CVA, anxiety, HTN, HLD, DVT s/p IVC filter (poor AC candidate), low lung volumes on home O2 (2L at baseline), presents to ED with AMS and slurred speech. CT head unremarkable. MRI with acute infarct.

## 2024-03-13 NOTE — PROGRESS NOTE ADULT - SUBJECTIVE AND OBJECTIVE BOX
Patient is a 89y old  Female who presents with a chief complaint of weakness, confusion (13 Mar 2024 08:52)      Subjective:  INTERVAL HPI/OVERNIGHT EVENTS: Patient seen and examined at bedside. No overnight events occurred. Patient states she feels lightheaded. Denies fevers, chills, headache, lightheadedness, chest pain, dyspnea, abdominal pain, n/v/d/c.    MEDICATIONS  (STANDING):  ALPRAZolam 0.25 milliGRAM(s) Oral daily  aspirin  chewable 81 milliGRAM(s) Oral daily  atorvastatin 40 milliGRAM(s) Oral at bedtime  buPROPion SR (12-Hour) 100 milliGRAM(s) Oral daily  cholecalciferol 1000 Unit(s) Oral every other day  escitalopram 5 milliGRAM(s) Oral at bedtime  furosemide    Tablet 20 milliGRAM(s) Oral daily  gabapentin 100 milliGRAM(s) Oral two times a day  melatonin 5 milliGRAM(s) Oral at bedtime  multivitamin 1 Tablet(s) Oral daily  pantoprazole    Tablet 40 milliGRAM(s) Oral before breakfast  sodium chloride 0.9%. 1000 milliLiter(s) (75 mL/Hr) IV Continuous <Continuous>  sucralfate 1 Gram(s) Oral two times a day    MEDICATIONS  (PRN):  acetaminophen     Tablet .. 650 milliGRAM(s) Oral every 6 hours PRN Temp greater or equal to 38C (100.4F), Mild Pain (1 - 3)  LORazepam   Injectable 0.25 milliGRAM(s) IV Push once PRN mri      Allergies    mold, cheese and wine (Unknown)  penicillins (Unknown)    Intolerances        REVIEW OF SYSTEMS:  CONSTITUTIONAL: No fever or chills  HEENT:  No headache, no sore throat  RESPIRATORY: No cough, wheezing, or shortness of breath  CARDIOVASCULAR: No chest pain, palpitations  GASTROINTESTINAL: No abd pain, nausea, vomiting, or diarrhea  GENITOURINARY: No dysuria, frequency, or hematuria  NEUROLOGICAL: no focal weakness, +lightheaded  MUSCULOSKELETAL: no myalgias     Objective:  Vital Signs Last 24 Hrs  T(C): 36.6 (13 Mar 2024 05:07), Max: 36.6 (13 Mar 2024 02:00)  T(F): 97.8 (13 Mar 2024 05:07), Max: 97.8 (13 Mar 2024 02:00)  HR: 73 (13 Mar 2024 08:22) (69 - 85)  BP: 132/82 (13 Mar 2024 08:22) (109/72 - 149/83)  BP(mean): --  RR: 18 (13 Mar 2024 05:07) (18 - 20)  SpO2: 94% (13 Mar 2024 05:07) (89% - 97%)    Parameters below as of 13 Mar 2024 05:07  Patient On (Oxygen Delivery Method): nasal cannula  O2 Flow (L/min): 3      GENERAL: NAD  HEENT: Moist mucous membranes, conjunctiva and sclera clear   NECK: Supple  CHEST/LUNG: Clear to auscultation bilaterally; No rales, rhonchi, wheezing, or rubs. Unlabored respirations  HEART: Regular rate and rhythm; +murmur  ABDOMEN: Bowel sounds present; Soft, Nontender, Nondistended.  EXTREMITIES:  Palpable pulses. No clubbing, cyanosis, or edema  NERVOUS SYSTEM:  Alert & Oriented X2 (person and place), speech clear. Answers questions appropriately  SKIN: warm, dry    LABS:                        9.9    10.92 )-----------( 180      ( 13 Mar 2024 08:16 )             30.9     CBC Full  -  ( 13 Mar 2024 08:16 )  WBC Count : 10.92 K/uL  Hemoglobin : 9.9 g/dL  Hematocrit : 30.9 %  Platelet Count - Automated : 180 K/uL  Mean Cell Volume : 101.3 fl  Mean Cell Hemoglobin : 32.5 pg  Mean Cell Hemoglobin Concentration : 32.0 gm/dL  Auto Neutrophil # : x  Auto Lymphocyte # : x  Auto Monocyte # : x  Auto Eosinophil # : x  Auto Basophil # : x  Auto Neutrophil % : x  Auto Lymphocyte % : x  Auto Monocyte % : x  Auto Eosinophil % : x  Auto Basophil % : x    13 Mar 2024 08:16    145    |  108    |  24     ----------------------------<  85     3.7     |  31     |  1.30     Ca    8.3        13 Mar 2024 08:16  Phos  4.1       13 Mar 2024 08:16  Mg     2.0       13 Mar 2024 08:16    TPro  7.3    /  Alb  3.0    /  TBili  0.6    /  DBili  x      /  AST  33     /  ALT  30     /  AlkPhos  85     12 Mar 2024 21:20    PT/INR - ( 12 Mar 2024 21:20 )   PT: 11.3 sec;   INR: 0.96 ratio         PTT - ( 12 Mar 2024 21:20 )  PTT:27.8 sec  Urinalysis Basic - ( 13 Mar 2024 08:16 )    Color: x / Appearance: x / SG: x / pH: x  Gluc: 85 mg/dL / Ketone: x  / Bili: x / Urobili: x   Blood: x / Protein: x / Nitrite: x   Leuk Esterase: x / RBC: x / WBC x   Sq Epi: x / Non Sq Epi: x / Bacteria: x      CAPILLARY BLOOD GLUCOSE      POCT Blood Glucose.: 91 mg/dL (12 Mar 2024 21:14)            Consultant(s) Notes Reviewed:  [x] YES  [ ] NO     Patient is a 89y old  Female who presents with a chief complaint of weakness, confusion (13 Mar 2024 08:52)    Subjective:  INTERVAL HPI/OVERNIGHT EVENTS: Patient seen and examined at bedside. No overnight events occurred. Patient states she feels lightheaded. Denies fevers, chills, headache, lightheadedness, chest pain, dyspnea, abdominal pain, n/v/d/c.    MEDICATIONS  (STANDING):  ALPRAZolam 0.25 milliGRAM(s) Oral daily  aspirin  chewable 81 milliGRAM(s) Oral daily  atorvastatin 40 milliGRAM(s) Oral at bedtime  buPROPion SR (12-Hour) 100 milliGRAM(s) Oral daily  cholecalciferol 1000 Unit(s) Oral every other day  escitalopram 5 milliGRAM(s) Oral at bedtime  furosemide    Tablet 20 milliGRAM(s) Oral daily  gabapentin 100 milliGRAM(s) Oral two times a day  melatonin 5 milliGRAM(s) Oral at bedtime  multivitamin 1 Tablet(s) Oral daily  pantoprazole    Tablet 40 milliGRAM(s) Oral before breakfast  sodium chloride 0.9%. 1000 milliLiter(s) (75 mL/Hr) IV Continuous <Continuous>  sucralfate 1 Gram(s) Oral two times a day    MEDICATIONS  (PRN):  acetaminophen     Tablet .. 650 milliGRAM(s) Oral every 6 hours PRN Temp greater or equal to 38C (100.4F), Mild Pain (1 - 3)  LORazepam   Injectable 0.25 milliGRAM(s) IV Push once PRN mri      Allergies    mold, cheese and wine (Unknown)  penicillins (Unknown)    Intolerances        REVIEW OF SYSTEMS:  CONSTITUTIONAL: No fever or chills  HEENT:  No headache, no sore throat  RESPIRATORY: No cough, wheezing, or shortness of breath  CARDIOVASCULAR: No chest pain, palpitations  GASTROINTESTINAL: No abd pain, nausea, vomiting, or diarrhea  GENITOURINARY: No dysuria, frequency, or hematuria  NEUROLOGICAL: no focal weakness, +lightheaded  MUSCULOSKELETAL: no myalgias     Objective:  Vital Signs Last 24 Hrs  T(C): 36.6 (13 Mar 2024 05:07), Max: 36.6 (13 Mar 2024 02:00)  T(F): 97.8 (13 Mar 2024 05:07), Max: 97.8 (13 Mar 2024 02:00)  HR: 73 (13 Mar 2024 08:22) (69 - 85)  BP: 132/82 (13 Mar 2024 08:22) (109/72 - 149/83)  RR: 18 (13 Mar 2024 05:07) (18 - 20)  SpO2: 94% (13 Mar 2024 05:07) (89% - 97%)    Parameters below as of 13 Mar 2024 05:07  Patient On (Oxygen Delivery Method): nasal cannula  O2 Flow (L/min): 3      GENERAL: NAD  HEENT: Moist mucous membranes, conjunctiva and sclera clear   NECK: Supple  CHEST/LUNG: Clear to auscultation bilaterally; No rales, rhonchi, wheezing, or rubs. Unlabored respirations  HEART: Regular rate and rhythm; +murmur  ABDOMEN: Bowel sounds present; Soft, Nontender, Nondistended.  EXTREMITIES:  Palpable pulses. No clubbing, cyanosis, or edema  NERVOUS SYSTEM:  Alert & Oriented X2 (person and place), speech clear. Answers questions appropriately  SKIN: warm, dry    LABS:                        9.9    10.92 )-----------( 180      ( 13 Mar 2024 08:16 )             30.9     CBC Full  -  ( 13 Mar 2024 08:16 )  WBC Count : 10.92 K/uL  Hemoglobin : 9.9 g/dL  Hematocrit : 30.9 %  Platelet Count - Automated : 180 K/uL  Mean Cell Volume : 101.3 fl  Mean Cell Hemoglobin : 32.5 pg  Mean Cell Hemoglobin Concentration : 32.0 gm/dL  Auto Neutrophil # : x  Auto Lymphocyte # : x  Auto Monocyte # : x  Auto Eosinophil # : x  Auto Basophil # : x  Auto Neutrophil % : x  Auto Lymphocyte % : x  Auto Monocyte % : x  Auto Eosinophil % : x  Auto Basophil % : x    13 Mar 2024 08:16    145    |  108    |  24     ----------------------------<  85     3.7     |  31     |  1.30     Ca    8.3        13 Mar 2024 08:16  Phos  4.1       13 Mar 2024 08:16  Mg     2.0       13 Mar 2024 08:16    TPro  7.3    /  Alb  3.0    /  TBili  0.6    /  DBili  x      /  AST  33     /  ALT  30     /  AlkPhos  85     12 Mar 2024 21:20    PT/INR - ( 12 Mar 2024 21:20 )   PT: 11.3 sec;   INR: 0.96 ratio         PTT - ( 12 Mar 2024 21:20 )  PTT:27.8 sec  Urinalysis Basic - ( 13 Mar 2024 08:16 )    Color: x / Appearance: x / SG: x / pH: x  Gluc: 85 mg/dL / Ketone: x  / Bili: x / Urobili: x   Blood: x / Protein: x / Nitrite: x   Leuk Esterase: x / RBC: x / WBC x   Sq Epi: x / Non Sq Epi: x / Bacteria: x      CAPILLARY BLOOD GLUCOSE      POCT Blood Glucose.: 91 mg/dL (12 Mar 2024 21:14)            Consultant(s) Notes Reviewed:  [x] YES  [ ] NO

## 2024-03-13 NOTE — PROGRESS NOTE ADULT - PROBLEM SELECTOR PLAN 2
Cr 1.5 on admission. Baseline ~1  - cont. IVF Cr 1.5 on admission. Baseline ~1 - improving  - cont. IVF, hold lasix  - f/u AM labs

## 2024-03-13 NOTE — CARE COORDINATION ASSESSMENT. - CURRENT MENTAL STATUS/COGNITIVE FUNCTIONING
TODAY:  - labs  - pill packing  - case management consult  
alert/oriented to person/oriented to place

## 2024-03-13 NOTE — PHARMACOTHERAPY INTERVENTION NOTE - COMMENTS
Patient is a 89 yo F ordered for alprazolam 0.25 mg daily. Utilizing the age friendly 65+ report, recommended adding hold parameters for lethargy, sedation, or RR < 12. Order updated to reflect parameters per discussion with resident Dr. Gomez.
89 yo female presenting as code stroke. Medication history completed with patient at bedside (AMS), daughter at bedside and [facility] - [place], [place]. OMR updated to reflect outpatient medication regimen. Of note, daughter denies any aspirin/anticoagulant/blood thinner use. Medication reconciliation completed, findings relayed to Dr. Botello.

## 2024-03-13 NOTE — PROGRESS NOTE ADULT - PROBLEM SELECTOR PLAN 6
DVT ppx: subQ heparin      Code status: DNR/DNI  Dispo: DC planning pending MRI and further hospital course  social work consult: daughter requesting ambulance upon discharge  please contact daughter, Johana, with updates DVT ppx: subQ heparin      Code status: DNR/DNI  Dispo: DC planning  social work consult: daughter requesting ambulance upon discharge  please contact daughter, Johana, with updates

## 2024-03-14 ENCOUNTER — RESULT REVIEW (OUTPATIENT)
Age: 89
End: 2024-03-14

## 2024-03-14 ENCOUNTER — TRANSCRIPTION ENCOUNTER (OUTPATIENT)
Age: 89
End: 2024-03-14

## 2024-03-14 VITALS
HEART RATE: 62 BPM | OXYGEN SATURATION: 94 % | DIASTOLIC BLOOD PRESSURE: 88 MMHG | RESPIRATION RATE: 16 BRPM | SYSTOLIC BLOOD PRESSURE: 166 MMHG | TEMPERATURE: 99 F

## 2024-03-14 LAB
CULTURE RESULTS: SIGNIFICANT CHANGE UP
SPECIMEN SOURCE: SIGNIFICANT CHANGE UP

## 2024-03-14 PROCEDURE — 93005 ELECTROCARDIOGRAM TRACING: CPT

## 2024-03-14 PROCEDURE — 0042T: CPT | Mod: MC

## 2024-03-14 PROCEDURE — 70498 CT ANGIOGRAPHY NECK: CPT | Mod: MC

## 2024-03-14 PROCEDURE — 80048 BASIC METABOLIC PNL TOTAL CA: CPT

## 2024-03-14 PROCEDURE — 36415 COLL VENOUS BLD VENIPUNCTURE: CPT

## 2024-03-14 PROCEDURE — 85025 COMPLETE CBC W/AUTO DIFF WBC: CPT

## 2024-03-14 PROCEDURE — 84484 ASSAY OF TROPONIN QUANT: CPT

## 2024-03-14 PROCEDURE — 85730 THROMBOPLASTIN TIME PARTIAL: CPT

## 2024-03-14 PROCEDURE — 93306 TTE W/DOPPLER COMPLETE: CPT

## 2024-03-14 PROCEDURE — 84100 ASSAY OF PHOSPHORUS: CPT

## 2024-03-14 PROCEDURE — 85027 COMPLETE CBC AUTOMATED: CPT

## 2024-03-14 PROCEDURE — 99239 HOSP IP/OBS DSCHRG MGMT >30: CPT | Mod: GC

## 2024-03-14 PROCEDURE — 85610 PROTHROMBIN TIME: CPT

## 2024-03-14 PROCEDURE — 99285 EMERGENCY DEPT VISIT HI MDM: CPT

## 2024-03-14 PROCEDURE — 70496 CT ANGIOGRAPHY HEAD: CPT | Mod: MC

## 2024-03-14 PROCEDURE — 97166 OT EVAL MOD COMPLEX 45 MIN: CPT

## 2024-03-14 PROCEDURE — 70450 CT HEAD/BRAIN W/O DYE: CPT | Mod: MC

## 2024-03-14 PROCEDURE — 83735 ASSAY OF MAGNESIUM: CPT

## 2024-03-14 PROCEDURE — 70551 MRI BRAIN STEM W/O DYE: CPT | Mod: MC

## 2024-03-14 PROCEDURE — 82962 GLUCOSE BLOOD TEST: CPT

## 2024-03-14 PROCEDURE — 97162 PT EVAL MOD COMPLEX 30 MIN: CPT

## 2024-03-14 PROCEDURE — 87086 URINE CULTURE/COLONY COUNT: CPT

## 2024-03-14 PROCEDURE — 80053 COMPREHEN METABOLIC PANEL: CPT

## 2024-03-14 PROCEDURE — 81001 URINALYSIS AUTO W/SCOPE: CPT

## 2024-03-14 PROCEDURE — 87637 SARSCOV2&INF A&B&RSV AMP PRB: CPT

## 2024-03-14 RX ORDER — ASPIRIN/CALCIUM CARB/MAGNESIUM 324 MG
1 TABLET ORAL
Qty: 30 | Refills: 0
Start: 2024-03-14 | End: 2024-04-12

## 2024-03-14 RX ADMIN — PANTOPRAZOLE SODIUM 40 MILLIGRAM(S): 20 TABLET, DELAYED RELEASE ORAL at 05:39

## 2024-03-14 RX ADMIN — HEPARIN SODIUM 5000 UNIT(S): 5000 INJECTION INTRAVENOUS; SUBCUTANEOUS at 05:39

## 2024-03-14 RX ADMIN — Medication 1 GRAM(S): at 05:39

## 2024-03-14 RX ADMIN — GABAPENTIN 100 MILLIGRAM(S): 400 CAPSULE ORAL at 05:39

## 2024-03-14 NOTE — CAREGIVER ENGAGEMENT NOTE - CAREGIVER OUTREACH NOTES - FREE TEXT
Per MD, patient is medically cleared for discharge today. PT/OT- no skilled needs recommended. CM made contact with the patient's daughter to discuss transition planning. Patient's daughter confirmed the patient's private hire aide is available. Ambulance arranged for 4pm today. Patient's daughter manages medications and declines the need for a visiting nurse. Patient's daughter verbalized understanding of the transition plan and is in agreement. IMM reviewed with Johana over the phone. Bedside RN aware of plan. CM remains available.

## 2024-03-14 NOTE — PATIENT CHOICE NOTE. - NSPTCHOICESTATE_GEN_ALL_CORE

## 2024-03-14 NOTE — DISCHARGE NOTE PROVIDER - NSDCCPCAREPLAN_GEN_ALL_CORE_FT
PRINCIPAL DISCHARGE DIAGNOSIS  Diagnosis: Acute CVA (cerebrovascular accident)  Assessment and Plan of Treatment: You came to the hospital for weakness,      SECONDARY DISCHARGE DIAGNOSES  Diagnosis: Altered mental status  Assessment and Plan of Treatment:     Diagnosis: Weakness  Assessment and Plan of Treatment:      PRINCIPAL DISCHARGE DIAGNOSIS  Diagnosis: Acute CVA (cerebrovascular accident)  Assessment and Plan of Treatment: You came to the hospital for weakness, confusion, and slurred speech.  CT of your brain was unrevealing but MRI of the brain showed acute infarct in the subcortical white matter of the posterior right parietal lobe.   However, neurology has concluded you likely experienced a TIA (transient ischemic attack), a tempory onset of symptoms sometimes called a "mini stroke" and the MRI finding was incidental.   Your echo (ultrasound of the heart) was normal.  Aspirin 81mg was added to your medication management  Please continue to take aspirin and as well as your home statin.  Please follow up with your PCP and a neurologist in 1 week.      SECONDARY DISCHARGE DIAGNOSES  Diagnosis: MARYLOU (acute kidney injury)  Assessment and Plan of Treatment: When you came to the hospital, you were found to have an elevated creatinine. Your MARYLOU was treated with IV fluids.  Please follow up with your PCP in 1 week.    Diagnosis: PUD (peptic ulcer disease)  Assessment and Plan of Treatment: Please continue to take your home sucralfate and pantoprazole     PRINCIPAL DISCHARGE DIAGNOSIS  Diagnosis: Acute CVA (cerebrovascular accident)  Assessment and Plan of Treatment: You came to the hospital for weakness, confusion, and slurred speech.  CT of your brain was unrevealing but MRI of the brain showed acute infarct in the subcortical white matter of the posterior right parietal lobe.   However, neurology has concluded that your symptoms were likely secondary to a TIA (transient ischemic attack), a temporary onset of symptoms sometimes called a "mini stroke" and the MRI finding was incidental. PT/OT evaluated you.  Your echo (ultrasound of the heart) was normal.  Aspirin 81mg was added to your medication management  Please continue to take aspirin and as well as your home statin.  Please follow up with your PCP and a neurologist in 1 week.      SECONDARY DISCHARGE DIAGNOSES  Diagnosis: MARYLOU (acute kidney injury)  Assessment and Plan of Treatment: When you came to the hospital, you were found to have an elevated creatinine. This was likely due to dehydration. Your MARYLOU was treated with IV fluids. You can resume your home furosemide.  Please follow up with your PCP in 1 week.    Diagnosis: PUD (peptic ulcer disease)  Assessment and Plan of Treatment: Please continue to take your home sucralfate and pantoprazole

## 2024-03-14 NOTE — CONSULT NOTE ADULT - SUBJECTIVE AND OBJECTIVE BOX
dysarthria face asymmetry resolved -- tia  asa 81  statin  mri head   tele   echo  spoke to dtr in detail   
History of Present Illness: The patient is a 89 year old female with a history of HTN, HL, DVT s/p IVC filter, SDH, GI bleed, CVA who presents with AMS. The patient is confused and unable to provide additional history. She has no current complaints. She was noted by family to be weak and with slurred speech.    Past Medical/Surgical History:  HTN, HL, DVT s/p IVC filter, SDH, GI bleed, CVA     Medications:  Home Medications:  acetaminophen 325 mg oral tablet: 2 tab(s) orally every 6 hours As needed Temp greater or equal to 38C (100.4F), Mild Pain (1 - 3) (12 Mar 2024 21:36)  buPROPion 100 mg/12 hours (SR) oral tablet, extended release: 1 tab(s) orally once a day (12 Mar 2024 21:36)  Easy Iron 28 mg oral capsule: 1 cap(s) orally every other day (12 Mar 2024 23:19)  escitalopram 5 mg oral tablet: 1 tab(s) orally once a day (at bedtime) (12 Mar 2024 23:42)  furosemide 20 mg oral tablet: 1 tab(s) orally once a day (12 Mar 2024 21:36)  melatonin 5 mg oral tablet: 1 tab(s) orally once a day (at bedtime) (12 Mar 2024 23:19)  multivitamin: 1 tab(s) orally once a day (12 Mar 2024 21:36)  Neurontin 100 mg oral capsule: 1 cap(s) orally 2 times a day (12 Mar 2024 21:36)  rosuvastatin 10 mg oral tablet: 1 tab(s) orally once a day (at bedtime) (12 Mar 2024 23:43)  sucralfate 1 g oral tablet: 1 tab(s) orally 2 times a day Prescribed 1 tablet 3 times a day with meals; takes 1 tablet twice daily with meals (12 Mar 2024 21:37)  Vitamin D3 1000 intl units (25 mcg) oral tablet: 1 tab(s) orally every other day (12 Mar 2024 21:35)  Xanax 0.25 mg oral tablet: 1 tablet orally once a day Prescribed 1 tablet TID prn; takes 1 tablet daily in the morning per daughter. (12 Mar 2024 21:36)      Family History: Non-contributory family history of premature cardiovascular atherosclerotic disease    Social History: No tobacco, alcohol or drug use    Review of Systems:  General: No fevers, chills, weight gain  Skin: No rashes, color changes  Cardiovascular: No chest pain, orthopnea  Respiratory: No shortness of breath, cough  Gastrointestinal: No nausea, abdominal pain  Genitourinary: No incontinence, pain with urination  Musculoskeletal: No pain, swelling, decreased range of motion  Neurological: No headache, weakness  Psychiatric: No depression, anxiety  Endocrine: No weight gain, increased thirst  All other systems are comprehensively negative.    Physical Exam:  Vitals:        Vital Signs Last 24 Hrs  T(C): 36.4 (14 Mar 2024 04:58), Max: 36.8 (13 Mar 2024 19:55)  T(F): 97.6 (14 Mar 2024 04:58), Max: 98.2 (13 Mar 2024 19:55)  HR: 71 (14 Mar 2024 04:58) (67 - 71)  BP: 124/64 (14 Mar 2024 04:58) (119/70 - 132/70)  BP(mean): --  RR: 18 (14 Mar 2024 04:58) (18 - 18)  SpO2: 93% (14 Mar 2024 04:58) (91% - 97%)  Parameters below as of 14 Mar 2024 04:58  Patient On (Oxygen Delivery Method): nasal cannula  O2 Flow (L/min): 2  General: NAD  HEENT: MMM  Neck: No JVD, no carotid bruit  Lungs: CTAB  CV: RRR, nl S1/S2, no M/R/G  Abdomen: S/NT/ND, +BS  Extremities: No LE edema, no cyanosis  Neuro: AAOx3, non-focal  Skin: No rash    Labs:                        9.9    10.92 )-----------( 180      ( 13 Mar 2024 08:16 )             30.9     03-13    145  |  108  |  24<H>  ----------------------------<  85  3.7   |  31  |  1.30    Ca    8.3<L>      13 Mar 2024 08:16  Phos  4.1     03-13  Mg     2.0     03-13    TPro  7.3  /  Alb  3.0<L>  /  TBili  0.6  /  DBili  x   /  AST  33  /  ALT  30  /  AlkPhos  85  03-12        PT/INR - ( 12 Mar 2024 21:20 )   PT: 11.3 sec;   INR: 0.96 ratio         PTT - ( 12 Mar 2024 21:20 )  PTT:27.8 sec    ECG/Telemetry: NSR, LAD, no ST abnormality

## 2024-03-14 NOTE — DISCHARGE NOTE PROVIDER - NSDCMRMEDTOKEN_GEN_ALL_CORE_FT
acetaminophen 325 mg oral tablet: 2 tab(s) orally every 6 hours As needed Temp greater or equal to 38C (100.4F), Mild Pain (1 - 3)  aspirin 81 mg oral tablet, chewable: 1 tab(s) orally once a day  buPROPion 100 mg/12 hours (SR) oral tablet, extended release: 1 tab(s) orally once a day  Easy Iron 28 mg oral capsule: 1 cap(s) orally every other day  escitalopram 5 mg oral tablet: 1 tab(s) orally once a day (at bedtime)  furosemide 20 mg oral tablet: 1 tab(s) orally once a day  melatonin 5 mg oral tablet: 1 tab(s) orally once a day (at bedtime)  multivitamin: 1 tab(s) orally once a day  Neurontin 100 mg oral capsule: 1 cap(s) orally 2 times a day  Protonix 40 mg oral delayed release tablet: 1 tab(s) orally once a day  rosuvastatin 10 mg oral tablet: 1 tab(s) orally once a day (at bedtime)  sucralfate 1 g oral tablet: 1 tab(s) orally 2 times a day Prescribed 1 tablet 3 times a day with meals; takes 1 tablet twice daily with meals  Vitamin D3 1000 intl units (25 mcg) oral tablet: 1 tab(s) orally every other day  Xanax 0.25 mg oral tablet: 1 tablet orally once a day Prescribed 1 tablet TID prn; takes 1 tablet daily in the morning per daughter.

## 2024-03-14 NOTE — PROGRESS NOTE ADULT - SUBJECTIVE AND OBJECTIVE BOX
Neurology follow up note    GIACOMO [id number]      Interval History:    Patient feels ok no new complaints.    Allergies    mold, cheese and wine (Unknown)  penicillins (Unknown)    Intolerances        MEDICATIONS    acetaminophen     Tablet .. 650 milliGRAM(s) Oral every 6 hours PRN  ALPRAZolam 0.25 milliGRAM(s) Oral daily  ALPRAZolam 0.25 milliGRAM(s) Oral two times a day PRN  aspirin  chewable 81 milliGRAM(s) Oral daily  atorvastatin 40 milliGRAM(s) Oral at bedtime  buPROPion SR (12-Hour) 100 milliGRAM(s) Oral daily  cholecalciferol 1000 Unit(s) Oral every other day  escitalopram 5 milliGRAM(s) Oral at bedtime  gabapentin 100 milliGRAM(s) Oral two times a day  heparin   Injectable 5000 Unit(s) SubCutaneous every 8 hours  LORazepam   Injectable 0.25 milliGRAM(s) IV Push once PRN  melatonin 5 milliGRAM(s) Oral at bedtime  multivitamin 1 Tablet(s) Oral daily  pantoprazole    Tablet 40 milliGRAM(s) Oral before breakfast  sodium chloride 0.9%. 1000 milliLiter(s) IV Continuous <Continuous>  sucralfate 1 Gram(s) Oral two times a day              Vital Signs Last 24 Hrs  T(C): 36.4 (14 Mar 2024 04:58), Max: 36.8 (13 Mar 2024 19:55)  T(F): 97.6 (14 Mar 2024 04:58), Max: 98.2 (13 Mar 2024 19:55)  HR: 71 (14 Mar 2024 04:58) (67 - 73)  BP: 124/64 (14 Mar 2024 04:58) (119/70 - 132/82)  BP(mean): --  RR: 18 (14 Mar 2024 04:58) (18 - 18)  SpO2: 93% (14 Mar 2024 04:58) (91% - 97%)    Parameters below as of 14 Mar 2024 04:58  Patient On (Oxygen Delivery Method): nasal cannula  O2 Flow (L/min): 2      REVIEW OF SYSTEMS:  CONSTITUTIONAL:  The patient denies fever, chills, night sweats.  HEAD:  No headache.  EYES:  No double vision or blurry vision.  EARS:  No ringing in her ears.  NECK:  No neck pain.  CARDIOVASCULAR:  No chest pain.  RESPIRATORY:  Shortness of breath.  ABDOMEN:  No nausea, vomiting, abdominal pain.  EXTREMITIES/NEUROLOGICAL:  No numbness or tingling.  MUSCULOSKELETAL:  Occasional joint pain.    PHYSICAL EXAMINATION:  HEAD:  Normocephalic, atraumatic.  EYES:  No scleral icterus.  EARS:  Slightly hard of hearing.  NECK:  Supple.  CARDIOVASCULAR:  S1, S2 heard.  RESPIRATORY:  Air entry bilaterally.  ABDOMEN:  Soft, nontender.  EXTREMITIES:  No clubbing, cyanosis were noted.    NEUROLOGIC:  The patient is awake and alert, was able to state her correct age, could not remember the month, but does suffer from dementia.  Did not know the year.  Could not say how many nickels were in a quarter, dog backward spells god, was able to name simple objects.  Extraocular movements were intact.  Appeared to have full visual fields.  Speech was fluent, smile symmetric.  Motor, bilateral upper has decreased range of motion of her shoulders, suspect secondary to arthritis, would say overall 4/5 and bilateral lower extremities 3-/5.  Sensory, bilateral upper and lower intact to light touch.      LABS:  CBC Full  -  ( 13 Mar 2024 08:16 )  WBC Count : 10.92 K/uL  RBC Count : 3.05 M/uL  Hemoglobin : 9.9 g/dL  Hematocrit : 30.9 %  Platelet Count - Automated : 180 K/uL  Mean Cell Volume : 101.3 fl  Mean Cell Hemoglobin : 32.5 pg  Mean Cell Hemoglobin Concentration : 32.0 gm/dL  Auto Neutrophil # : x  Auto Lymphocyte # : x  Auto Monocyte # : x  Auto Eosinophil # : x  Auto Basophil # : x  Auto Neutrophil % : x  Auto Lymphocyte % : x  Auto Monocyte % : x  Auto Eosinophil % : x  Auto Basophil % : x    Urinalysis Basic - ( 13 Mar 2024 08:16 )    Color: x / Appearance: x / SG: x / pH: x  Gluc: 85 mg/dL / Ketone: x  / Bili: x / Urobili: x   Blood: x / Protein: x / Nitrite: x   Leuk Esterase: x / RBC: x / WBC x   Sq Epi: x / Non Sq Epi: x / Bacteria: x      03-13    145  |  108  |  24<H>  ----------------------------<  85  3.7   |  31  |  1.30    Ca    8.3<L>      13 Mar 2024 08:16  Phos  4.1     03-13  Mg     2.0     03-13    TPro  7.3  /  Alb  3.0<L>  /  TBili  0.6  /  DBili  x   /  AST  33  /  ALT  30  /  AlkPhos  85  03-12    Hemoglobin A1C:     LIVER FUNCTIONS - ( 12 Mar 2024 21:20 )  Alb: 3.0 g/dL / Pro: 7.3 g/dL / ALK PHOS: 85 U/L / ALT: 30 U/L / AST: 33 U/L / GGT: x           Vitamin B12   PT/INR - ( 12 Mar 2024 21:20 )   PT: 11.3 sec;   INR: 0.96 ratio         PTT - ( 12 Mar 2024 21:20 )  PTT:27.8 sec      RADIOLOGY    < from: MR Head No Cont (03.13.24 @ 14:13) >    INTERPRETATION:  EXAM: MRI OF THE BRAIN WITHOUT CONTRAST    HISTORY: Evaluation for stroke    TECHNIQUE: Multi-planar multi-sequential MR imaging of the brain was   performed without intravenous contrast.    COMPARISON: CT/CTA of the head March 12, 2024.    FINDINGS:    Small focus of diffusion restriction in the subcortical white matter of   the posterior right parietal lobe, compatible with acute infarct. Foci of   increased T2/FLAIR signal throughout the deep and periventricular white   matter as well as within the yuli, compatible with chronic small vessel   disease foci of susceptibility artifact scattered throughout the brain   parenchyma and along the sulci of the cerebral hemispheres, likely   combination of sequela of microhemorrhages and amyloid angiopathy   respectively. Parenchymal volume loss resulting in a ex vacuo dilatation   appearance of the bilateral ventricles. The visualized extra axial spaces   and basal cisterns are within normal limits. No midline shift or mass   effect present.    The craniocervical junction is within normal limits. The pituitary is   unremarkable. The major intracranial vessels demonstrate theexpected   signal void related to vascular flow. The paranasal sinuses are well   aerated. Few fluid-filled left mastoid air cells. The visualized orbits   are status post cataract surgery.      IMPRESSION:    1.  Acute infarct in the subcortical white matter of the posterior right   parietal lobe.  2.  Chronic small vessel disease.      ANALYSIS AND PLAN:  This is a 89-year-old with episode of dysarthria and facial asymmetry.    Clinical impression TIA suspect very small cerebrovascular accident incidental findings   I would recommend telemetry evaluation.  Echocardiogram.  MR imaging of the brain positive for CVA suspect incidental finding very small   The patient does have a history of bleeding from Eliquis.  We will recommend, since this is a TIA/CVA type of picture, aspirin 81 mg.  I would recommend high-dose statin.  For history of depression, continue the patient on home psychiatric medication.  For history of hemifacial spasms, continue the patient on gabapentin.    I spoke with daughter, Johana, at [phone], explained to her reason and thought process in addition of the aspirin, TIA diagnosis.  She seemed to understand this.    52 minutes of time was spent with patient, plan of care, reviewing data, speaking to multidisciplinary healthcare team with greater than 50% of time in counseling care and coordination.    PATIENT HAS NOT YET BEEN SEEN AND EXAMINED TODAY. NOTE AND CHART REVIEWED IN AM AND EXAM FORM PREVIOUS.  ONCE PATIENT SEEN, CHART WILL BE UPDATE AT PRESENT NOTE IS INCOMPLETE     Neurology follow up note    GIACOMO [id number]      Interval History:    Patient feels ok no new complaints.    Allergies    mold, cheese and wine (Unknown)  penicillins (Unknown)    Intolerances        MEDICATIONS    acetaminophen     Tablet .. 650 milliGRAM(s) Oral every 6 hours PRN  ALPRAZolam 0.25 milliGRAM(s) Oral daily  ALPRAZolam 0.25 milliGRAM(s) Oral two times a day PRN  aspirin  chewable 81 milliGRAM(s) Oral daily  atorvastatin 40 milliGRAM(s) Oral at bedtime  buPROPion SR (12-Hour) 100 milliGRAM(s) Oral daily  cholecalciferol 1000 Unit(s) Oral every other day  escitalopram 5 milliGRAM(s) Oral at bedtime  gabapentin 100 milliGRAM(s) Oral two times a day  heparin   Injectable 5000 Unit(s) SubCutaneous every 8 hours  LORazepam   Injectable 0.25 milliGRAM(s) IV Push once PRN  melatonin 5 milliGRAM(s) Oral at bedtime  multivitamin 1 Tablet(s) Oral daily  pantoprazole    Tablet 40 milliGRAM(s) Oral before breakfast  sodium chloride 0.9%. 1000 milliLiter(s) IV Continuous <Continuous>  sucralfate 1 Gram(s) Oral two times a day              Vital Signs Last 24 Hrs  T(C): 36.4 (14 Mar 2024 04:58), Max: 36.8 (13 Mar 2024 19:55)  T(F): 97.6 (14 Mar 2024 04:58), Max: 98.2 (13 Mar 2024 19:55)  HR: 71 (14 Mar 2024 04:58) (67 - 73)  BP: 124/64 (14 Mar 2024 04:58) (119/70 - 132/82)  BP(mean): --  RR: 18 (14 Mar 2024 04:58) (18 - 18)  SpO2: 93% (14 Mar 2024 04:58) (91% - 97%)    Parameters below as of 14 Mar 2024 04:58  Patient On (Oxygen Delivery Method): nasal cannula  O2 Flow (L/min): 2      REVIEW OF SYSTEMS:  CONSTITUTIONAL:  The patient denies fever, chills, night sweats.  HEAD:  No headache.  EYES:  No double vision or blurry vision.  EARS:  No ringing in her ears.  NECK:  No neck pain.  CARDIOVASCULAR:  No chest pain.  RESPIRATORY:  Shortness of breath.  ABDOMEN:  No nausea, vomiting, abdominal pain.  EXTREMITIES/NEUROLOGICAL:  No numbness or tingling.  MUSCULOSKELETAL:  Occasional joint pain.    PHYSICAL EXAMINATION:  HEAD:  Normocephalic, atraumatic.  EYES:  No scleral icterus.  EARS:  Slightly hard of hearing.  NECK:  Supple.  CARDIOVASCULAR:  S1, S2 heard.  RESPIRATORY:  Air entry bilaterally.  ABDOMEN:  Soft, nontender.  EXTREMITIES:  No clubbing, cyanosis were noted.    NEUROLOGIC:  The patient is awake and alert, was able to state her correct age, could not remember the month, but does suffer from dementia.  Did not know the year.  Could not say how many nickels were in a quarter, dog backward spells god, was able to name simple objects.  Extraocular movements were intact.  Appeared to have full visual fields.  Speech was fluent, smile symmetric.  Motor, bilateral upper has decreased range of motion of her shoulders, suspect secondary to arthritis, would say overall 4/5 and bilateral lower extremities 3-/5.  Sensory, bilateral upper and lower intact to light touch.      LABS:  CBC Full  -  ( 13 Mar 2024 08:16 )  WBC Count : 10.92 K/uL  RBC Count : 3.05 M/uL  Hemoglobin : 9.9 g/dL  Hematocrit : 30.9 %  Platelet Count - Automated : 180 K/uL  Mean Cell Volume : 101.3 fl  Mean Cell Hemoglobin : 32.5 pg  Mean Cell Hemoglobin Concentration : 32.0 gm/dL  Auto Neutrophil # : x  Auto Lymphocyte # : x  Auto Monocyte # : x  Auto Eosinophil # : x  Auto Basophil # : x  Auto Neutrophil % : x  Auto Lymphocyte % : x  Auto Monocyte % : x  Auto Eosinophil % : x  Auto Basophil % : x    Urinalysis Basic - ( 13 Mar 2024 08:16 )    Color: x / Appearance: x / SG: x / pH: x  Gluc: 85 mg/dL / Ketone: x  / Bili: x / Urobili: x   Blood: x / Protein: x / Nitrite: x   Leuk Esterase: x / RBC: x / WBC x   Sq Epi: x / Non Sq Epi: x / Bacteria: x      03-13    145  |  108  |  24<H>  ----------------------------<  85  3.7   |  31  |  1.30    Ca    8.3<L>      13 Mar 2024 08:16  Phos  4.1     03-13  Mg     2.0     03-13    TPro  7.3  /  Alb  3.0<L>  /  TBili  0.6  /  DBili  x   /  AST  33  /  ALT  30  /  AlkPhos  85  03-12    Hemoglobin A1C:     LIVER FUNCTIONS - ( 12 Mar 2024 21:20 )  Alb: 3.0 g/dL / Pro: 7.3 g/dL / ALK PHOS: 85 U/L / ALT: 30 U/L / AST: 33 U/L / GGT: x           Vitamin B12   PT/INR - ( 12 Mar 2024 21:20 )   PT: 11.3 sec;   INR: 0.96 ratio         PTT - ( 12 Mar 2024 21:20 )  PTT:27.8 sec      RADIOLOGY    < from: MR Head No Cont (03.13.24 @ 14:13) >    INTERPRETATION:  EXAM: MRI OF THE BRAIN WITHOUT CONTRAST    HISTORY: Evaluation for stroke    TECHNIQUE: Multi-planar multi-sequential MR imaging of the brain was   performed without intravenous contrast.    COMPARISON: CT/CTA of the head March 12, 2024.    FINDINGS:    Small focus of diffusion restriction in the subcortical white matter of   the posterior right parietal lobe, compatible with acute infarct. Foci of   increased T2/FLAIR signal throughout the deep and periventricular white   matter as well as within the yuli, compatible with chronic small vessel   disease foci of susceptibility artifact scattered throughout the brain   parenchyma and along the sulci of the cerebral hemispheres, likely   combination of sequela of microhemorrhages and amyloid angiopathy   respectively. Parenchymal volume loss resulting in a ex vacuo dilatation   appearance of the bilateral ventricles. The visualized extra axial spaces   and basal cisterns are within normal limits. No midline shift or mass   effect present.    The craniocervical junction is within normal limits. The pituitary is   unremarkable. The major intracranial vessels demonstrate theexpected   signal void related to vascular flow. The paranasal sinuses are well   aerated. Few fluid-filled left mastoid air cells. The visualized orbits   are status post cataract surgery.      IMPRESSION:    1.  Acute infarct in the subcortical white matter of the posterior right   parietal lobe.  2.  Chronic small vessel disease.      ANALYSIS AND PLAN:  This is a 89-year-old with episode of dysarthria and facial asymmetry.    Clinical impression TIA suspect very small cerebrovascular accident incidental findings   I would recommend telemetry evaluation.  Echocardiogram.  MR imaging of the brain positive for CVA suspect incidental finding very small   The patient does have a history of bleeding from Eliquis.  We will recommend, since this is a TIA/CVA type of picture, aspirin 81 mg.  I would recommend high-dose statin.  For history of depression, continue the patient on home psychiatric medication.  For history of hemifacial spasms, continue the patient on gabapentin.  please keep sbp below 140 is needed h/o of ICH agitation and sbp   for agitation spoke to dtr take xanax in am if needed can give extra dose as needed     I spoke with daughter, Johana, at [phone] 3/14, explained to her reason and thought process in addition of the aspirin, TIA diagnosis.  She seemed to understand this.    52 minutes of time was spent with patient, plan of care, reviewing data, speaking to multidisciplinary healthcare team with greater than 50% of time in counseling care and coordination.

## 2024-03-14 NOTE — DISCHARGE NOTE PROVIDER - HOSPITAL COURSE
FROM ADMISSION H+P:   HPI:  Patient is a 89yo F, PMH subdural hematoma, GI bleed, PUD, CVA, anxiety, HTN, HLD, DVT s/p IVC filter (poor AC candidate), low lung volumes on home O2 (2L at baseline), presents to ED with AMS and slurred speech. Patient is a poor historian and unable to provide much of the history. Information obtained from daughter who states that at baseline, pt is usually not oriented to the year/date. She is usually able to remember the President's name (Alex Mccall) but was unable to do so today. She usually knows that she is at home, but today she was confused as to where she was. Also, the daughter noted that the pt was talking about her mother's death (which occurred ~50 years ago). Pt's daughter also noted some weakness and slurred speech today. During my interview, pt was unable to answer to time, but was able to note that she was at [facility] (previous name for [facility]), and the daughter noted the slurred speech to have resolved. At baseline, pt is normally in a chair/wheelchair or in bed, hardly ambulates. Patient's daughter, Johana, noted that in the past, pt would become confused and oxygen helped. She does not take deep breaths, and as a result, becomes hypoxic/hypercapneic and becomes confused.   Pt reports LUE pain, but denies chest pain, SOB, abdominal pain, nausea, vomiting, fever, chills, dysuria, hematuria, blood in stool. (13 Mar 2024 00:21)      ---  HOSPITAL COURSE: Patient admitted on March 12, 2024 for weakeness, confusion, and slurred speech. CTH did not show any acute pathology. Neurology was consulted. MRI showed acute infarct in the subcortical white matter of the posterior right parietal lobe. However, neurology has concluded the patient likely experienced a TIA and the MRI finding was incidental. Patient was treated with aspirin and statin. TTE showed _________  Patient also had on MARYLOU on admission. Patient was treated with IVF and home lasix was held.    The patient was examined at the bedside on day of discharge. On day of discharge, patient is stable for discharge to home with close outpatient follow-up.    Physical exam on day of discharge:  LOS: 2d    VITALS:   T(C): 36.4 (03-14-24 @ 04:58), Max: 36.8 (03-13-24 @ 19:55)  HR: 71 (03-14-24 @ 04:58) (67 - 71)  BP: 124/64 (03-14-24 @ 04:58) (119/70 - 132/70)  RR: 18 (03-14-24 @ 04:58) (18 - 18)  SpO2: 93% (03-14-24 @ 04:58) (91% - 97%)    GENERAL: NAD, lying in bed comfortably  HEAD:  Atraumatic, Normocephalic  EYES: EOMI, PERRLA, conjunctiva and sclera clear  ENT: Moist mucous membranes  NECK: Supple, No JVD  CHEST/LUNG: Clear to auscultation bilaterally; No rales, rhonchi, wheezing, or rubs. Unlabored respirations  HEART: Regular rate and rhythm; No murmurs, rubs, or gallops  ABDOMEN: BSx4; Soft, nontender, nondistended  EXTREMITIES:  2+ Peripheral Pulses. No clubbing, cyanosis, or edema  NERVOUS SYSTEM:  A&Ox3, no focal deficits   SKIN: warm, dry    ---  CONSULTANTS:   Neuro - Dr. Kwan    ---  TIME SPENT:  I, the attending physician, was physically present for the key portions of the evaluation and management (E/M) service provided. The total amount of time spent reviewing the hospital notes, laboratory values, imaging findings, assessing/counseling the patient, discussing with consultant physicians, social work, nursing staff was -- minutes    ---  Primary care provider was made aware of plan for discharge:      [  ] NO     [  ] YES   FROM ADMISSION H+P:   HPI:  Patient is a 89yo F, PMH subdural hematoma, GI bleed, PUD, CVA, anxiety, HTN, HLD, DVT s/p IVC filter (poor AC candidate), low lung volumes on home O2 (2L at baseline), presents to ED with AMS and slurred speech. Patient is a poor historian and unable to provide much of the history. Information obtained from daughter who states that at baseline, pt is usually not oriented to the year/date. She is usually able to remember the President's name (Alex Mccall) but was unable to do so today. She usually knows that she is at home, but today she was confused as to where she was. Also, the daughter noted that the pt was talking about her mother's death (which occurred ~50 years ago). Pt's daughter also noted some weakness and slurred speech today. During my interview, pt was unable to answer to time, but was able to note that she was at [facility] (previous name for [facility]), and the daughter noted the slurred speech to have resolved. At baseline, pt is normally in a chair/wheelchair or in bed, hardly ambulates. Patient's daughter, Johana, noted that in the past, pt would become confused and oxygen helped. She does not take deep breaths, and as a result, becomes hypoxic/hypercapneic and becomes confused.   Pt reports LUE pain, but denies chest pain, SOB, abdominal pain, nausea, vomiting, fever, chills, dysuria, hematuria, blood in stool. (13 Mar 2024 00:21)      ---  HOSPITAL COURSE: Patient admitted on March 12, 2024 for weakeness, confusion, and slurred speech. CTH did not show any acute pathology. Neurology was consulted. MRI showed acute infarct in the subcortical white matter of the posterior right parietal lobe. However, neurology has concluded the patient likely experienced a TIA and the MRI finding was incidental. Patient was treated with aspirin and statin. TTE showed normal left ventricular systolic function with an ejection fraction visually estimated at 65 to 70 % and mildly dilated left atrium.  Patient also had on MARYLOU on admission. Patient was treated with IVF and home lasix was held.    The patient was examined at the bedside on day of discharge. On day of discharge, patient is stable for discharge to home with close outpatient follow-up.    Physical exam on day of discharge:  LOS: 2d    VITALS:   T(C): 36.4 (03-14-24 @ 04:58), Max: 36.8 (03-13-24 @ 19:55)  HR: 71 (03-14-24 @ 04:58) (67 - 71)  BP: 124/64 (03-14-24 @ 04:58) (119/70 - 132/70)  RR: 18 (03-14-24 @ 04:58) (18 - 18)  SpO2: 93% (03-14-24 @ 04:58) (91% - 97%)    GENERAL: NAD  HEENT: Moist mucous membranes, conjunctiva and sclera clear   NECK: Supple  CHEST/LUNG: Clear to auscultation bilaterally; No rales, rhonchi, wheezing, or rubs. Unlabored respirations  HEART: Regular rate and rhythm; +murmur  ABDOMEN: Bowel sounds present; Soft, Nontender, Nondistended.  EXTREMITIES:  Palpable pulses. No clubbing, cyanosis, or edema  NERVOUS SYSTEM:  Alert & Oriented X1. Answers questions appropriately with some redirection  SKIN: warm, dry    ---  CONSULTANTS:   Neuro - Dr. Kwan    ---  TIME SPENT:  I, the attending physician, was physically present for the key portions of the evaluation and management (E/M) service provided. The total amount of time spent reviewing the hospital notes, laboratory values, imaging findings, assessing/counseling the patient, discussing with consultant physicians, social work, nursing staff was -- minutes    ---  Primary care provider was made aware of plan for discharge:      [  ] NO     [  ] YES   FROM ADMISSION H+P:   HPI:  Patient is a 89yo F, PMH subdural hematoma, GI bleed, PUD, CVA, anxiety, HTN, HLD, DVT s/p IVC filter (poor AC candidate), low lung volumes on home O2 (2L at baseline), presents to ED with AMS and slurred speech. Patient is a poor historian and unable to provide much of the history. Information obtained from daughter who states that at baseline, pt is usually not oriented to the year/date. She is usually able to remember the President's name (Alex Mccall) but was unable to do so today. She usually knows that she is at home, but today she was confused as to where she was. Also, the daughter noted that the pt was talking about her mother's death (which occurred ~50 years ago). Pt's daughter also noted some weakness and slurred speech today. During my interview, pt was unable to answer to time, but was able to note that she was at [facility] (previous name for [facility]), and the daughter noted the slurred speech to have resolved. At baseline, pt is normally in a chair/wheelchair or in bed, hardly ambulates. Patient's daughter, Johana, noted that in the past, pt would become confused and oxygen helped. She does not take deep breaths, and as a result, becomes hypoxic/hypercapneic and becomes confused.   Pt reports LUE pain, but denies chest pain, SOB, abdominal pain, nausea, vomiting, fever, chills, dysuria, hematuria, blood in stool. (13 Mar 2024 00:21)      ---  HOSPITAL COURSE: Patient admitted on March 12, 2024 for weakness, confusion, and slurred speech. CTH did not show any acute pathology. Neurology was consulted. MRI showed acute infarct in the subcortical white matter of the posterior right parietal lobe. However, neurology has concluded the patient likely experienced a TIA (as symptoms did not correlate with presenting symptoms) and the MRI finding was incidental. Patient was treated with aspirin and statin. TTE showed normal left ventricular systolic function with an ejection fraction visually estimated at 65 to 70 % and mildly dilated left atrium. Patient also had on MARYLOU on admission. Patient was treated with IVF and home lasix was held. MARYLOU improved.    The patient was examined at the bedside on day of discharge. On day of discharge, patient is stable for discharge to home with close outpatient follow-up.    LOS: 2d    ---  CONSULTANTS:   Neuro - Dr. Kwan

## 2024-03-14 NOTE — DISCHARGE NOTE PROVIDER - ATTENDING DISCHARGE PHYSICAL EXAMINATION:
VITALS:   T(C): 36.4 (03-14-24 @ 04:58), Max: 36.8 (03-13-24 @ 19:55)  HR: 71 (03-14-24 @ 04:58) (67 - 71)  BP: 124/64 (03-14-24 @ 04:58) (119/70 - 132/70)  RR: 18 (03-14-24 @ 04:58) (18 - 18)  SpO2: 93% (03-14-24 @ 04:58) (91% - 97%)    GENERAL: NAD  HEENT: Moist mucous membranes, conjunctiva and sclera clear   NECK: Supple  CHEST/LUNG: Clear to auscultation bilaterally; No rales, rhonchi, wheezing, or rubs. Unlabored respirations  HEART: Regular rate and rhythm; +murmur  ABDOMEN: Bowel sounds present; Soft, Nontender, Nondistended.  EXTREMITIES:  Palpable pulses. No clubbing, cyanosis, or edema  NERVOUS SYSTEM:  Alert & Oriented X1. Answers questions appropriately with some redirection  SKIN: warm, dry  PSYCH: agitated - wants to go home

## 2024-03-14 NOTE — DISCHARGE NOTE PROVIDER - CARE PROVIDER_API CALL
Josue Mendoza  Internal Medicine  [address] [place] [address], [address]  [place] [address]  Phone: [phone]  Fax: [phone]  Established Patient  Follow Up Time: 1 week    Charlie Kwan  Neurology  [address] [place] [id number]  Phone: [phone]  Fax: [phone]  Follow Up Time: 1 week

## 2024-03-14 NOTE — DISCHARGE NOTE PROVIDER - PROVIDER TOKENS
PROVIDER:[TOKEN:[[id number]],FOLLOWUP:[1 week],ESTABLISHEDPATIENT:[T]],PROVIDER:[TOKEN:[[id number]],FOLLOWUP:[1 week]]

## 2024-03-14 NOTE — CAREGIVER ENGAGEMENT NOTE - CAREGIVER EDUCATION - TYPES DISCUSSED
Discharge plan/Transportation coordination
SW had consult for arranging transportation. CM has addressed and patient's daughter has requested CM to arrange an ambulance. Patient is A&Ox2, unable to ambulate. 5 stairs to enter.  PT- no skilled PT needs recommended./Discharge plan/Transportation coordination

## 2024-03-14 NOTE — DISCHARGE NOTE PROVIDER - NSDCQMSTROKERISK_NEU_ALL_CORE
High cholesterol/History of a stroke or TIA High blood pressure/High cholesterol/History of a stroke or TIA

## 2024-03-14 NOTE — CONSULT NOTE ADULT - ASSESSMENT
The patient is a 89 year old female with a history of HTN, HL, DVT s/p IVC filter, SDH, GI bleed, CVA who presents with AMS.    Plan:  - ECG with no evidence of ischemia or infarction  - CTA and and neck with mild left carotid bifurcation stenosis  - Echo pending  - MRI head with acute CVA  - Continue atorvastatin 40 mg daily  - Continue aspirin 81 mg daily  - Neurology follow-up  - Can consider outpatient cardiac monitoring although given prior history of bleeding, the patient would be a poor candidate for anticoagulation regardless if atrial fibrillation is diagnosed

## 2024-03-15 DIAGNOSIS — R41.82 ALTERED MENTAL STATUS, UNSPECIFIED: ICD-10-CM

## 2024-03-15 DIAGNOSIS — I63.9 CEREBRAL INFARCTION, UNSPECIFIED: ICD-10-CM

## 2024-03-15 RX ORDER — ASPIRIN ENTERIC COATED TABLETS 81 MG 81 MG/1
81 TABLET, DELAYED RELEASE ORAL
Refills: 0 | Status: ACTIVE | COMMUNITY
Start: 2024-03-15

## 2024-03-26 ENCOUNTER — APPOINTMENT (OUTPATIENT)
Dept: INTERNAL MEDICINE | Facility: CLINIC | Age: 89
End: 2024-03-26
Payer: MEDICARE

## 2024-03-26 DIAGNOSIS — I63.9 CEREBRAL INFARCTION, UNSPECIFIED: ICD-10-CM

## 2024-03-26 PROCEDURE — 99495 TRANSJ CARE MGMT MOD F2F 14D: CPT

## 2024-03-26 RX ORDER — ASPIRIN ENTERIC COATED TABLETS 81 MG 81 MG/1
81 TABLET, DELAYED RELEASE ORAL
Qty: 90 | Refills: 0 | Status: ACTIVE | COMMUNITY
Start: 2024-03-26 | End: 1900-01-01

## 2024-03-26 NOTE — ASSESSMENT
[FreeTextEntry1] : Ms. WASHINGTON is a 89 year female, with a past medical history as noted above, who present for televido appointment.

## 2024-03-26 NOTE — HISTORY OF PRESENT ILLNESS
[Family Member] : family member [Other:____] : [unfilled] [Verbal consent obtained from patient] : the patient, [unfilled] [Post-hospitalization from ___ Hospital] : Post-hospitalization from [unfilled] Hospital [Admitted on: ___] : The patient was admitted on [unfilled] [Discharged on ___] : discharged on [unfilled] [Patient Contacted By: ____] : and contacted by [unfilled] [Home] : at home, [unfilled] , at the time of the visit. [Medical Office: ([facility])___] : at the medical office located in  [Discharge Summary] : discharge summary [Radiology Findings] : radiology findings [Med Reconciliation] : medication reconciliation has been completed [FreeTextEntry2] : S/p acute infarct- Hospital course unremarkable.     PT during the start of the exam not wearing her oxygen - Daughter states her Mom gives her a tough time with using the nasal canula/ oxygen.   When she wears and uses the home oxygen her memory is better.

## 2024-03-26 NOTE — PLAN
[FreeTextEntry1] : Neuro  Dr. Kwan  for follow up s/p  CVA.  Refilled ASA   Patient poor candidate for anticoagulation secondary to history of subacute hemorrhage Advise caution with aspirin.  Advised the need to wear home Oxygen 24/7   MARYLOU-  Advised hydration will repeat renal function test next week.  Will have order a home draw.  Request daughter to get forms to get ambulate privileges secondary to inability to ambulate and being in the wheelchair  Advised patient daughter and caretaker if notice any change in the mental status return back to the ED  Encourage incentive spirometry  Continue all other medication as prescribed.

## 2024-03-26 NOTE — HEALTH RISK ASSESSMENT
[Intercurrent hospitalizations] : was admitted to the hospital  [No] : No [With Family] : lives with family [# of Members in Household ___] :  household currently consist of [unfilled] member(s) [Retired] : retired [College] : College [] :  [Feels Safe at Home] : Feels safe at home [Independent] : using telephone [Some assistance needed] : managing finances [Full assistance needed] : using transportation [Smoke Detector] : smoke detector [Seat Belt] :  uses seat belt [de-identified] : small cva  [de-identified] : cardio     Neuro  Dr. Kwan  [Reports changes in vision] : Reports no changes in vision [Former] : Former [> 15 Years] : > 15 Years

## 2024-03-26 NOTE — ADDENDUM
Onset: last few days    Location / description: pain in navel, notes area pops out when she coughs     Precipitating Factors: states that this has happened in the past. States it hurts when she coughs.     Pain Scale (1-10), 10 highest: 8/10    What improves/worsens symptoms: pain comes and goes. Notes coughing makes a pain worse. Breathing makes pain worse. She reports that provider is aware but nothing has been done. States she feels nauseated when she coughs a lot       Symptom specific medications: no    LMP : Patient's last menstrual period was 01/01/2000.    Are you pregnant or breast feeding: nt assessed     Recent Care: patient seen at  11/12 for cough    PLAN:    See Provider within next few days    Patient/Caller does not plan to follow recommendations.       The patient refuses the advised disposition of See provider in 24 hours. No openings noted in clinic- writer reccommended ED for further assessment.  The patient was advised that their symptoms are serious and if they refuse the disposition, it could result in delayed care and serious health consequences.    Patient states plans to .take the next available appointment which is not within the advised disposition Patient was advised and is aware that disposition site could redirect to other site for higher level care.    Appointment scheduled for 11/20 at 1330  with Dr. HUSSEIN Linn.    Reason for Disposition  • Patient wants to be seen    Protocols used: HERNIA-A-OH       [FreeTextEntry1] : Reviewed hospital summer  EKG no ischemia.Mild left carotid bifurcation stenosis on CTA of the neck MRI of the head with acute CVA.

## 2024-03-26 NOTE — REVIEW OF SYSTEMS
[Negative] : Heme/Lymph [Fatigue] : fatigue [Night Sweats] : no night sweats [Recent Change In Weight] : ~T no recent weight change [Discharge] : no discharge [Pain] : no pain [Redness] : no redness [Vision Problems] : no vision problems [Itching] : no itching [Nosebleed] : no nosebleeds [Hearing Loss] : no hearing loss [Sore Throat] : no sore throat [Chest Pain] : no chest pain [Leg Claudication] : no leg claudication [Palpitations] : no palpitations [Wheezing] : no wheezing [Shortness Of Breath] : no shortness of breath [Abdominal Pain] : no abdominal pain [Constipation] : no constipation [Nausea] : no nausea [Melena] : no melena [Diarrhea] : diarrhea [Incontinence] : no incontinence [Hematuria] : no hematuria [Vaginal Discharge] : no vaginal discharge [Frequency] : no frequency [Muscle Pain] : no muscle pain [Joint Swelling] : no joint swelling [Mole Changes] : no mole changes [Dizziness] : no dizziness [Headache] : no headache [Fainting] : no fainting [Unsteady Walking] : no ataxia [Memory Loss] : memory loss [Anxiety] : anxiety [Easy Bruising] : no easy bruising [Swollen Glands] : no swollen glands

## 2024-03-26 NOTE — PHYSICAL EXAM
[No Acute Distress] : no acute distress [Well Nourished] : well nourished [Well Developed] : well developed [Well-Appearing] : well-appearing [Speech Grossly Normal] : speech grossly normal [Memory Grossly Normal] : memory grossly normal [Normal Affect] : the affect was normal [Normal Mood] : the mood was normal [Alert and Oriented x3] : oriented to person, place, and time [89810 - Moderate Complexity requires multiple possible diagnoses and/or the management options, moderate complexity of the medical data (tests, etc.) to be reviewed, and moderate risk of significant complications, morbidity, and/or mortality as well as co] : Moderate Complexity

## 2024-04-16 ENCOUNTER — RX RENEWAL (OUTPATIENT)
Age: 89
End: 2024-04-16

## 2024-04-16 RX ORDER — SUCRALFATE 1 G/1
1 TABLET ORAL
Qty: 270 | Refills: 0 | Status: ACTIVE | COMMUNITY
Start: 2019-03-29 | End: 1900-01-01

## 2024-05-17 ENCOUNTER — RX RENEWAL (OUTPATIENT)
Age: 89
End: 2024-05-17

## 2024-05-17 RX ORDER — FUROSEMIDE 20 MG/1
20 TABLET ORAL
Qty: 90 | Refills: 0 | Status: ACTIVE | COMMUNITY
Start: 2021-03-12 | End: 1900-01-01

## 2024-05-30 ENCOUNTER — APPOINTMENT (OUTPATIENT)
Dept: INTERNAL MEDICINE | Facility: CLINIC | Age: 89
End: 2024-05-30
Payer: MEDICARE

## 2024-05-30 DIAGNOSIS — Z99.81 DEPENDENCE ON SUPPLEMENTAL OXYGEN: ICD-10-CM

## 2024-05-30 DIAGNOSIS — G62.9 POLYNEUROPATHY, UNSPECIFIED: ICD-10-CM

## 2024-05-30 LAB
CHOLEST SERPL-MCNC: 175
HDLC SERPL-MCNC: 62
LDLC SERPL CALC-MCNC: 98
TRIGL SERPL-MCNC: 84

## 2024-05-30 PROCEDURE — G2211 COMPLEX E/M VISIT ADD ON: CPT

## 2024-05-30 PROCEDURE — 99214 OFFICE O/P EST MOD 30 MIN: CPT

## 2024-05-30 RX ORDER — BUPROPION HYDROCHLORIDE 100 MG/1
100 TABLET, FILM COATED, EXTENDED RELEASE ORAL
Qty: 180 | Refills: 0 | Status: ACTIVE | COMMUNITY
Start: 2021-07-16 | End: 1900-01-01

## 2024-05-30 NOTE — HEALTH RISK ASSESSMENT
[Intercurrent hospitalizations] : was admitted to the hospital  [No] : No [Former] : Former [> 15 Years] : > 15 Years [de-identified] : small cva  [de-identified] : cardio     Neuro  Dr. Kwan  [Never] : Never

## 2024-05-30 NOTE — ASSESSMENT
[FreeTextEntry1] : Ms. WASHINGTON is a 89 year female, with a past medical history as noted above, who present to the office today for medication Management, Discussed recent increase in her agitation

## 2024-05-30 NOTE — PHYSICAL EXAM
[PERRL] : pupils equal round and reactive to light [EOMI] : extraocular movements intact [Normal Outer Ear/Nose] : the outer ears and nose were normal in appearance [Normal Oropharynx] : the oropharynx was normal [No Lymphadenopathy] : no lymphadenopathy [Supple] : supple [Thyroid Normal, No Nodules] : the thyroid was normal and there were no nodules present [No Respiratory Distress] : no respiratory distress  [No Accessory Muscle Use] : no accessory muscle use [Normal Rate] : normal rate  [No Carotid Bruits] : no carotid bruits [No Abdominal Bruit] : a ~M bruit was not heard ~T in the abdomen [Pedal Pulses Present] : the pedal pulses are present [No Palpable Aorta] : no palpable aorta [Non Tender] : non-tender [Non-distended] : non-distended [No Masses] : no abdominal mass palpated [No HSM] : no HSM [Normal Bowel Sounds] : normal bowel sounds [Normal Posterior Cervical Nodes] : no posterior cervical lymphadenopathy [Normal Anterior Cervical Nodes] : no anterior cervical lymphadenopathy [No CVA Tenderness] : no CVA  tenderness [No Spinal Tenderness] : no spinal tenderness [No Joint Swelling] : no joint swelling [Speech Grossly Normal] : speech grossly normal [Normal Affect] : the affect was normal [Normal Mood] : the mood was normal [de-identified] :  patient sitting upright comfortable in a chair using oxygen on 3 liters via nasal cannula [de-identified] : b/l scattered crepitus  [de-identified] : w/ murmur  [de-identified] : trace edma lower ext  [de-identified] : as per gyn  [de-identified] : bruising noted RLE     [de-identified] : in wheelchair   weakness lower ext

## 2024-05-30 NOTE — HISTORY OF PRESENT ILLNESS
[FreeTextEntry1] : Medication renewal  [de-identified] : Mrs. WASHINGTON is a 89 year- female, with a past medical history as noted below, who present for TeleVideo video appointment.  Patient requesting a renewal of her antifungal cream as well as a prescription for Xanax which she takes for agitation.  Denies any adverse side effects to current medication regimen.  Also states yesterday left the house and you have a blood.  Would like to go over the test results. Using the Zakiya lift to transfer from bed to chair.  Continues to use oxygen by nasal cannula   [Home] : at home, [unfilled] , at the time of the visit. [Medical Office: ([facility])___] : at the medical office located in  [Family Member] : family member [Formal Caregiver] : formal caregiver [Verbal consent obtained from patient] : the patient, [unfilled]

## 2024-05-30 NOTE — REVIEW OF SYSTEMS
[Pain] : no pain [Redness] : no redness [Vision Problems] : no vision problems [Earache] : no earache [Hearing Loss] : no hearing loss [Nasal Discharge] : no nasal discharge [Fever] : no fever [Chills] : no chills [Sore Throat] : no sore throat [Chest Pain] : no chest pain [Palpitations] : no palpitations [Shortness Of Breath] : no shortness of breath [Cough] : no cough [Abdominal Pain] : no abdominal pain [Vomiting] : no vomiting [Joint Pain] : no joint pain [Itching] : no itching [Headache] : no headache [Memory Loss] : no memory loss [Unsteady Walking] : ataxia [Easy Bruising] : easy bruising [Negative] : Psychiatric [de-identified] :  under the breast rash

## 2024-05-30 NOTE — HISTORY OF PRESENT ILLNESS
[Home] : at home, [unfilled] , at the time of the visit. [Medical Office: ([facility])___] : at the medical office located in  [Formal Caregiver] : formal caregiver [Verbal consent obtained from patient] : the patient, [unfilled] [Family Member] : family member [FreeTextEntry1] : Medication renewal  [de-identified] : Mrs. WASHINGTON is a 89 year- female, with a past medical history as noted below, who present for TeleVideo video appointment.  Patient requesting a renew of medication.  Requesting a renewal of f Xanax which she takes for agitation.  Denies any adverse side effects to current medication regimen.  Patient also requesting refill on Neurontin which she takes peripheral neuropathy again no side effects to current medication regimen Pt daughter states she been more agitated recently.  Daughter states she continues to use home oxygen with good results.  Denies doing incentive spirometry as discussed in the past.  Believes the weight has been stable. Patient denies having chest pain, shortness of breath, dyspnea on exertion, recent fever chills or night sweats

## 2024-05-30 NOTE — REVIEW OF SYSTEMS
[Pain] : no pain [Redness] : no redness [Vision Problems] : no vision problems [Earache] : no earache [Hearing Loss] : no hearing loss [Nasal Discharge] : no nasal discharge [Fever] : no fever [Chills] : no chills [Sore Throat] : no sore throat [Chest Pain] : no chest pain [Palpitations] : no palpitations [Shortness Of Breath] : no shortness of breath [Cough] : no cough [Abdominal Pain] : no abdominal pain [Vomiting] : no vomiting [Joint Pain] : no joint pain [Itching] : no itching [Headache] : no headache [Memory Loss] : no memory loss [Unsteady Walking] : ataxia [Easy Bruising] : easy bruising [Negative] : Psychiatric [de-identified] :  under the breast rash

## 2024-05-30 NOTE — HEALTH RISK ASSESSMENT
[Intercurrent hospitalizations] : was admitted to the hospital  [No] : No [Former] : Former [> 15 Years] : > 15 Years [de-identified] : small cva  [de-identified] : cardio     Neuro  Dr. Kwan  [Never] : Never

## 2024-05-30 NOTE — REVIEW OF SYSTEMS
[Unsteady Walking] : ataxia [Easy Bruising] : easy bruising [Fever] : no fever [Chills] : no chills [Sore Throat] : no sore throat [Chest Pain] : no chest pain [Palpitations] : no palpitations [Shortness Of Breath] : shortness of breath [Cough] : no cough [Abdominal Pain] : no abdominal pain [Vomiting] : no vomiting [Joint Pain] : no joint pain [Muscle Weakness] : no muscle weakness [Itching] : no itching [Skin Rash] : no skin rash [Headache] : no headache [Memory Loss] : no memory loss [Negative] : Neurological [de-identified] : Argumentative with her daughter

## 2024-05-30 NOTE — PLAN
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  Advised patient to follow-up with cardiology and vascular.  Keep legs elevated.  Psychiatry-General anxiety disorder continue with Escitalopram.  Reviewed  refilled Xanax to take 0.25 as needed as needed anxiety.  Advised abuse potential and side effects to the medication. Advised importance of return to office   for an evaluation. Advised patient to increase bupropion to 100 mg twice daily.  Continue with all other medications.  Check labs at the end  GI -  history of GERD- continue with Protonix 1 tab daily. Advised low acidic diet   Orthopedic-lower extremity weakness-advised patient to remain as active as she possibly could.  Advised stretching exercises she can do in her wheelchair. education was provided to the patient regarding tele-video appointments and the lack of diagnostic ability  Pulm  - SOB - VELÁZQUEZ   Advised patient to the wear the oxygen on a daily basis  as well as when she is sleeping.  advised to follow-up with cardiology and pulmonology.  I spent 30-31  Minutes with the patient, half of which we discussed finding on physical exam  and coordinated care.  As well as reviewed my plans and follow ups. Dragon speech recognition software was used to create portions of this document.  An attempt at proofreading has been made to minimize errors please call if any questions arise.   Request copy of recent blood work done by another provider.

## 2024-05-30 NOTE — PHYSICAL EXAM
[Speech Grossly Normal] : speech grossly normal [Normal Affect] : the affect was normal [Normal Mood] : the mood was normal [de-identified] :  patient sitting upright comfortable in a chair using oxygen

## 2024-05-30 NOTE — PHYSICAL EXAM
[PERRL] : pupils equal round and reactive to light [EOMI] : extraocular movements intact [Normal Outer Ear/Nose] : the outer ears and nose were normal in appearance [Normal Oropharynx] : the oropharynx was normal [No Lymphadenopathy] : no lymphadenopathy [Supple] : supple [Thyroid Normal, No Nodules] : the thyroid was normal and there were no nodules present [No Respiratory Distress] : no respiratory distress  [No Accessory Muscle Use] : no accessory muscle use [Normal Rate] : normal rate  [No Carotid Bruits] : no carotid bruits [No Abdominal Bruit] : a ~M bruit was not heard ~T in the abdomen [Pedal Pulses Present] : the pedal pulses are present [No Palpable Aorta] : no palpable aorta [Non Tender] : non-tender [Non-distended] : non-distended [No Masses] : no abdominal mass palpated [No HSM] : no HSM [Normal Bowel Sounds] : normal bowel sounds [Normal Posterior Cervical Nodes] : no posterior cervical lymphadenopathy [Normal Anterior Cervical Nodes] : no anterior cervical lymphadenopathy [No CVA Tenderness] : no CVA  tenderness [No Spinal Tenderness] : no spinal tenderness [No Joint Swelling] : no joint swelling [Speech Grossly Normal] : speech grossly normal [Normal Affect] : the affect was normal [Normal Mood] : the mood was normal [de-identified] :  patient sitting upright comfortable in a chair using oxygen on 3 liters via nasal cannula [de-identified] : b/l scattered crepitus  [de-identified] : w/ murmur  [de-identified] : trace edma lower ext  [de-identified] : as per gyn  [de-identified] : bruising noted RLE     [de-identified] : in wheelchair   weakness lower ext

## 2024-05-30 NOTE — HISTORY OF PRESENT ILLNESS
[FreeTextEntry1] : Medication renewal  [de-identified] : Mrs. WASHINGTON is a 89 year- female, with a past medical history as noted below, who present for TeleVideo video appointment.  Patient requesting a renewal of her antifungal cream as well as a prescription for Xanax which she takes for agitation.  Denies any adverse side effects to current medication regimen.  Also states yesterday left the house and you have a blood.  Would like to go over the test results. Using the Zakiya lift to transfer from bed to chair.  Continues to use oxygen by nasal cannula   [Home] : at home, [unfilled] , at the time of the visit. [Medical Office: ([facility])___] : at the medical office located in  [Family Member] : family member [Formal Caregiver] : formal caregiver [Verbal consent obtained from patient] : the patient, [unfilled]

## 2024-06-12 ENCOUNTER — RX RENEWAL (OUTPATIENT)
Age: 89
End: 2024-06-12

## 2024-06-16 NOTE — REVIEW OF SYSTEMS
Patient's AM BP 86/56, pt not symptomatic. Recheck 10 minutes later 101/60. Holding metoprolol for now Cardiology APRN Yany Bennett notified. Ok to hold per APRN.    [Negative] : Respiratory

## 2024-06-20 ENCOUNTER — APPOINTMENT (OUTPATIENT)
Dept: INTERNAL MEDICINE | Facility: CLINIC | Age: 89
End: 2024-06-20
Payer: MEDICARE

## 2024-06-20 DIAGNOSIS — I10 ESSENTIAL (PRIMARY) HYPERTENSION: ICD-10-CM

## 2024-06-20 DIAGNOSIS — F41.9 ANXIETY DISORDER, UNSPECIFIED: ICD-10-CM

## 2024-06-20 DIAGNOSIS — E78.5 HYPERLIPIDEMIA, UNSPECIFIED: ICD-10-CM

## 2024-06-20 DIAGNOSIS — F32.A ANXIETY DISORDER, UNSPECIFIED: ICD-10-CM

## 2024-06-20 DIAGNOSIS — R45.1 RESTLESSNESS AND AGITATION: ICD-10-CM

## 2024-06-20 DIAGNOSIS — R41.3 OTHER AMNESIA: ICD-10-CM

## 2024-06-20 PROCEDURE — G2211 COMPLEX E/M VISIT ADD ON: CPT

## 2024-06-20 PROCEDURE — 99214 OFFICE O/P EST MOD 30 MIN: CPT

## 2024-06-20 RX ORDER — GABAPENTIN 100 MG/1
100 CAPSULE ORAL
Qty: 180 | Refills: 0 | Status: ACTIVE | COMMUNITY
Start: 2019-07-01 | End: 1900-01-01

## 2024-06-20 RX ORDER — ESCITALOPRAM OXALATE 10 MG/1
10 TABLET ORAL
Qty: 90 | Refills: 0 | Status: ACTIVE | COMMUNITY
Start: 2020-07-23 | End: 1900-01-01

## 2024-06-20 NOTE — PLAN
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  Advised patient to follow-up with cardiology and vascular.  Keep legs elevated.Advised patient she will notice more bruising secondary to being on blood thinners  Psychiatry-General anxiety disorder continue with Escitalopram.  Reviewed  Continue with Xanax to take 0.25 as needed as needed anxiety.  Advised abuse potential and side effects to the medication. Advised importance of return to office   for an evaluation. Advised patient to increase bupropion to 100 mg twice daily.  Continue with all other medications.   Increase escitalopram to 10 mg daily.  Advised side effects.  Advised patient we will order home blood sugar log for CBC, comprehensive metabolic liver function test and lipid panel.Urinalysis.  GI -  history of GERD- continue with Protonix 1 tab daily. Advised low acidic diet   Orthopedic-lower extremity weakness-advised patient to remain as active as she possibly could.  Advised stretching exercises she can do in her wheelchair. education was provided to the patient regarding tele-video appointments and the lack of diagnostic ability  Pulm  - SOB - VELÁZQUEZ   Advised patient to the wear the oxygen on a daily basis  as well as when she is sleeping.  advised to follow-up with cardiology and pulmonology.Check CBC.  Filled out FMLA papers for the daughter.  See scanned for details.  I spent 40  Minutes with the patient, half of which we discussed finding on physical exam  and coordinated care.  As well as reviewed my plans and follow ups. Dragon speech recognition software was used to create portions of this document.  An attempt at proofreading has been made to minimize errors please call if any questions arise.   Order at home blood test.

## 2024-06-20 NOTE — HISTORY OF PRESENT ILLNESS
[Home] : at home, [unfilled] , at the time of the visit. [Medical Office: ([facility])___] : at the medical office located in  [Family Member] : family member [Verbal consent obtained from patient] : the patient, [unfilled] [Formal Caregiver] : formal caregiver [FreeTextEntry1] : Medication renewal  [de-identified] : Mrs. WASHINGTON is a 89 year- female, with a past medical history as noted below, who present for TeleVideo video appointment.  Patient requesting a renew of medication.  Requesting a renewal of f Xanax which she takes for agitation. Patient's daughter states since increasing to twice daily per endocrine patient has subsided slightly still gets somewhat agitated mid afternoon.  Denies any adverse side effects to current medication regimen.  Patient also requesting refill on Neurontin which she takes peripheral neuropathy again no side effects to current medication regimen Patient's daughter requesting FMLA papers to be filled out for her so she can take days off to take care of her mother to bring it to different doctor visits when she has exacerbation of anxiety Patient denies having chest pain, shortness of breath, dyspnea on exertion, recent fever chills or night sweats

## 2024-06-20 NOTE — HEALTH RISK ASSESSMENT
[Never] : Never [No] : No [One fall no injury in past year] : Patient reported one fall in the past year without injury [Audit-CScore] : 0 [de-identified] : denies  [de-identified] : no injury transfer to the bed

## 2024-06-20 NOTE — REVIEW OF SYSTEMS
[Shortness Of Breath] : shortness of breath [Unsteady Walking] : ataxia [Easy Bruising] : easy bruising [Negative] : Neurological [Fever] : no fever [Chills] : no chills [Discharge] : no discharge [Vision Problems] : no vision problems [Earache] : no earache [Hearing Loss] : no hearing loss [Nasal Discharge] : no nasal discharge [Sore Throat] : no sore throat [Chest Pain] : no chest pain [Palpitations] : no palpitations [Cough] : no cough [Abdominal Pain] : no abdominal pain [Vomiting] : no vomiting [Joint Pain] : no joint pain [Muscle Weakness] : no muscle weakness [Itching] : no itching [Skin Rash] : no skin rash [Headache] : no headache [Memory Loss] : no memory loss [Anxiety] : anxiety [FreeTextEntry4] : wears hearing aids [de-identified] : Argumentative with her daughter

## 2024-06-20 NOTE — COUNSELING
[Encouraged to increase physical activity] : Encouraged to increase physical activity [Good understanding] : Patient has a good understanding of disease, goals and obesity follow-up plan [Behavioral health counseling provided] : Behavioral health counseling provided

## 2024-06-20 NOTE — PHYSICAL EXAM
[Speech Grossly Normal] : speech grossly normal [Normal Affect] : the affect was normal [Normal Mood] : the mood was normal [de-identified] :  patient sitting upright comfortable in a chair not  using oxygen  eating breakfast  [de-identified] : Ecchymosis noted on upper extremity

## 2024-06-20 NOTE — ASSESSMENT
Department of Anesthesiology  Postprocedure Note    Patient: Jamia Huber  MRN: [id number]  [place]: 1958  Date of evaluation: 1/27/2023      Procedure Summary     Date: 01/26/23 Room / Location: [address]    Anesthesia Start: 3223 Anesthesia Stop: 1304    Procedure: LAPAROSCOPIC APPENDECTOMY (Abdomen) Diagnosis:       Acute appendicitis      (Acute appendicitis [F20.25])    Surgeons: Reji Zaragoza DO Responsible Provider: ABNER Ann CRNA    Anesthesia Type: general ASA Status: 3          Anesthesia Type: No value filed.     Jewels Phase I: Jewels Score: 8    Jewels Phase II:        Anesthesia Post Evaluation    Patient location during evaluation: bedside  Patient participation: complete - patient participated  Level of consciousness: awake and alert  Pain score: 0  Airway patency: patent  Nausea & Vomiting: no nausea and no vomiting  Complications: no  Cardiovascular status: hemodynamically stable  Respiratory status: acceptable  Hydration status: stable [FreeTextEntry1] : Ms. WASHINGTON is a 89 year female, with a past medical history as noted above, who present to the office today for medication Management, Discussed recent increase in her agitation

## 2024-06-24 NOTE — H&P ADULT - PSH
Today the patient's weight is the same as it was when she was last seen here a year ago.  She was still up 4 kg since the time of her sleep study.  The association between obstructive sleep apnea and obesity was rediscussed.  Diet, exercise and weight loss were encouraged.   S/P hip replacement  left  S/P hysterectomy

## 2024-07-17 ENCOUNTER — RX RENEWAL (OUTPATIENT)
Age: 89
End: 2024-07-17

## 2024-07-17 ENCOUNTER — INPATIENT (INPATIENT)
Facility: HOSPITAL | Age: 89
LOS: 1 days | Discharge: HOME | DRG: 948 | End: 2024-07-19
Attending: INTERNAL MEDICINE | Admitting: INTERNAL MEDICINE
Payer: MEDICARE

## 2024-07-17 VITALS
DIASTOLIC BLOOD PRESSURE: 92 MMHG | WEIGHT: 145.06 LBS | TEMPERATURE: 99 F | SYSTOLIC BLOOD PRESSURE: 125 MMHG | RESPIRATION RATE: 18 BRPM | OXYGEN SATURATION: 95 % | HEART RATE: 95 BPM | HEIGHT: 59 IN

## 2024-07-17 DIAGNOSIS — Z90.710 ACQUIRED ABSENCE OF BOTH CERVIX AND UTERUS: Chronic | ICD-10-CM

## 2024-07-17 DIAGNOSIS — Z96.60 PRESENCE OF UNSPECIFIED ORTHOPEDIC JOINT IMPLANT: Chronic | ICD-10-CM

## 2024-07-17 DIAGNOSIS — R41.82 ALTERED MENTAL STATUS, UNSPECIFIED: ICD-10-CM

## 2024-07-17 LAB
ALBUMIN SERPL ELPH-MCNC: 3.2 G/DL — LOW (ref 3.3–5)
ALP SERPL-CCNC: 81 U/L — SIGNIFICANT CHANGE UP (ref 40–120)
ALT FLD-CCNC: 22 U/L — SIGNIFICANT CHANGE UP (ref 12–78)
ANION GAP SERPL CALC-SCNC: 8 MMOL/L — SIGNIFICANT CHANGE UP (ref 5–17)
APPEARANCE UR: ABNORMAL
APTT BLD: 34.2 SEC — SIGNIFICANT CHANGE UP (ref 24.5–35.6)
AST SERPL-CCNC: 39 U/L — HIGH (ref 15–37)
BACTERIA # UR AUTO: ABNORMAL /HPF
BASOPHILS # BLD AUTO: 0.05 K/UL — SIGNIFICANT CHANGE UP (ref 0–0.2)
BASOPHILS NFR BLD AUTO: 0.4 % — SIGNIFICANT CHANGE UP (ref 0–2)
BILIRUB SERPL-MCNC: 0.6 MG/DL — SIGNIFICANT CHANGE UP (ref 0.2–1.2)
BILIRUB UR-MCNC: NEGATIVE — SIGNIFICANT CHANGE UP
BUN SERPL-MCNC: 34 MG/DL — HIGH (ref 7–23)
CALCIUM SERPL-MCNC: 9.6 MG/DL — SIGNIFICANT CHANGE UP (ref 8.5–10.1)
CHLORIDE SERPL-SCNC: 104 MMOL/L — SIGNIFICANT CHANGE UP (ref 96–108)
CK SERPL-CCNC: 92 U/L — SIGNIFICANT CHANGE UP (ref 26–192)
CO2 SERPL-SCNC: 30 MMOL/L — SIGNIFICANT CHANGE UP (ref 22–31)
COLOR SPEC: YELLOW — SIGNIFICANT CHANGE UP
CREAT SERPL-MCNC: 1.8 MG/DL — HIGH (ref 0.5–1.3)
DIFF PNL FLD: NEGATIVE — SIGNIFICANT CHANGE UP
EGFR: 26 ML/MIN/1.73M2 — LOW
EOSINOPHIL # BLD AUTO: 0.1 K/UL — SIGNIFICANT CHANGE UP (ref 0–0.5)
EOSINOPHIL NFR BLD AUTO: 0.9 % — SIGNIFICANT CHANGE UP (ref 0–6)
EPI CELLS # UR: PRESENT
FLUAV AG NPH QL: SIGNIFICANT CHANGE UP
FLUBV AG NPH QL: SIGNIFICANT CHANGE UP
GLUCOSE SERPL-MCNC: 113 MG/DL — HIGH (ref 70–99)
GLUCOSE UR QL: NEGATIVE MG/DL — SIGNIFICANT CHANGE UP
HCT VFR BLD CALC: 37.9 % — SIGNIFICANT CHANGE UP (ref 34.5–45)
HGB BLD-MCNC: 12.1 G/DL — SIGNIFICANT CHANGE UP (ref 11.5–15.5)
IMM GRANULOCYTES NFR BLD AUTO: 0.4 % — SIGNIFICANT CHANGE UP (ref 0–0.9)
INR BLD: 0.99 RATIO — SIGNIFICANT CHANGE UP (ref 0.85–1.18)
KETONES UR-MCNC: 15 MG/DL
LACTATE SERPL-SCNC: 2 MMOL/L — SIGNIFICANT CHANGE UP (ref 0.7–2)
LEUKOCYTE ESTERASE UR-ACNC: ABNORMAL
LYMPHOCYTES # BLD AUTO: 1.47 K/UL — SIGNIFICANT CHANGE UP (ref 1–3.3)
LYMPHOCYTES # BLD AUTO: 13.1 % — SIGNIFICANT CHANGE UP (ref 13–44)
MCHC RBC-ENTMCNC: 31.9 GM/DL — LOW (ref 32–36)
MCHC RBC-ENTMCNC: 32.1 PG — SIGNIFICANT CHANGE UP (ref 27–34)
MCV RBC AUTO: 100.5 FL — HIGH (ref 80–100)
MONOCYTES # BLD AUTO: 0.75 K/UL — SIGNIFICANT CHANGE UP (ref 0–0.9)
MONOCYTES NFR BLD AUTO: 6.7 % — SIGNIFICANT CHANGE UP (ref 2–14)
NEUTROPHILS # BLD AUTO: 8.82 K/UL — HIGH (ref 1.8–7.4)
NEUTROPHILS NFR BLD AUTO: 78.5 % — HIGH (ref 43–77)
NITRITE UR-MCNC: NEGATIVE — SIGNIFICANT CHANGE UP
NRBC # BLD: 0 /100 WBCS — SIGNIFICANT CHANGE UP (ref 0–0)
NT-PROBNP SERPL-SCNC: 1600 PG/ML — HIGH (ref 0–450)
PH UR: 5.5 — SIGNIFICANT CHANGE UP (ref 5–8)
PLATELET # BLD AUTO: 276 K/UL — SIGNIFICANT CHANGE UP (ref 150–400)
POTASSIUM SERPL-MCNC: 4.4 MMOL/L — SIGNIFICANT CHANGE UP (ref 3.5–5.3)
POTASSIUM SERPL-SCNC: 4.4 MMOL/L — SIGNIFICANT CHANGE UP (ref 3.5–5.3)
PROT SERPL-MCNC: 8.1 G/DL — SIGNIFICANT CHANGE UP (ref 6–8.3)
PROT UR-MCNC: SIGNIFICANT CHANGE UP MG/DL
PROTHROM AB SERPL-ACNC: 11.6 SEC — SIGNIFICANT CHANGE UP (ref 9.5–13)
RBC # BLD: 3.77 M/UL — LOW (ref 3.8–5.2)
RBC # FLD: 13.9 % — SIGNIFICANT CHANGE UP (ref 10.3–14.5)
RBC CASTS # UR COMP ASSIST: 1 /HPF — SIGNIFICANT CHANGE UP (ref 0–4)
RSV RNA NPH QL NAA+NON-PROBE: SIGNIFICANT CHANGE UP
SARS-COV-2 RNA SPEC QL NAA+PROBE: SIGNIFICANT CHANGE UP
SODIUM SERPL-SCNC: 142 MMOL/L — SIGNIFICANT CHANGE UP (ref 135–145)
SP GR SPEC: 1.02 — SIGNIFICANT CHANGE UP (ref 1–1.03)
TROPONIN I, HIGH SENSITIVITY RESULT: 54.2 NG/L — HIGH
UROBILINOGEN FLD QL: 0.2 MG/DL — SIGNIFICANT CHANGE UP (ref 0.2–1)
WBC # BLD: 11.23 K/UL — HIGH (ref 3.8–10.5)
WBC # FLD AUTO: 11.23 K/UL — HIGH (ref 3.8–10.5)
WBC UR QL: 10 /HPF — HIGH (ref 0–5)

## 2024-07-17 PROCEDURE — 99285 EMERGENCY DEPT VISIT HI MDM: CPT

## 2024-07-17 PROCEDURE — 99222 1ST HOSP IP/OBS MODERATE 55: CPT

## 2024-07-17 PROCEDURE — 93010 ELECTROCARDIOGRAM REPORT: CPT

## 2024-07-17 PROCEDURE — 71045 X-RAY EXAM CHEST 1 VIEW: CPT | Mod: 26

## 2024-07-17 PROCEDURE — 70450 CT HEAD/BRAIN W/O DYE: CPT | Mod: 26,MC

## 2024-07-17 RX ORDER — SODIUM CHLORIDE 0.9 % (FLUSH) 0.9 %
1000 SYRINGE (ML) INJECTION ONCE
Refills: 0 | Status: COMPLETED | OUTPATIENT
Start: 2024-07-17 | End: 2024-07-17

## 2024-07-17 RX ORDER — DEXTROSE MONOHYDRATE AND SODIUM CHLORIDE 5; .3 G/100ML; G/100ML
1000 INJECTION, SOLUTION INTRAVENOUS
Refills: 0 | Status: DISCONTINUED | OUTPATIENT
Start: 2024-07-17 | End: 2024-07-19

## 2024-07-17 RX ORDER — AZTREONAM 2 G
500 VIAL (EA) INJECTION ONCE
Refills: 0 | Status: COMPLETED | OUTPATIENT
Start: 2024-07-17 | End: 2024-07-17

## 2024-07-17 RX ORDER — AZTREONAM 2 G
500 VIAL (EA) INJECTION EVERY 8 HOURS
Refills: 0 | Status: DISCONTINUED | OUTPATIENT
Start: 2024-07-18 | End: 2024-07-18

## 2024-07-17 RX ORDER — AZTREONAM 2 G
VIAL (EA) INJECTION
Refills: 0 | Status: DISCONTINUED | OUTPATIENT
Start: 2024-07-17 | End: 2024-07-18

## 2024-07-17 RX ADMIN — Medication 50 MILLIGRAM(S): at 23:07

## 2024-07-17 RX ADMIN — Medication 1000 MILLILITER(S): at 18:39

## 2024-07-17 NOTE — H&P ADULT - PROBLEM SELECTOR PLAN 6
chronic  - continue home ppi chronic  - Continue home xanax once a day instead of bid as patient takes at home according to family to avoid withdrawal complications  - continue buproprion and escitalopram chronic  - May be contributing to AMS  - Continue home xanax once a day instead of bid as patient takes at home according to family to avoid withdrawal complications  - continue buproprion and escitalopram - IVC filter in place  - on sub q heparin prophylaxis  -f/u doppler US of LE

## 2024-07-17 NOTE — ED PROVIDER NOTE - CARE PLAN
1 Principal Discharge DX:	Altered mental status  Secondary Diagnosis:	Weakness  Secondary Diagnosis:	Acute renal insufficiency  Secondary Diagnosis:	Acute UTI

## 2024-07-17 NOTE — H&P ADULT - PROBLEM SELECTOR PLAN 4
- Cr. 1.8 elevated  - Monitor AM CMP - Cr. 1.8 elevated  - f/u renal US  - Monitor AM CMP - IVC filter in place  - on sub q heparin prophylaxis  -f/u doppler US of LE Elevated troponin, likely demand ischemia in the setting of dehydration and limited water intake due to agitation and AMS  - Trend trop

## 2024-07-17 NOTE — ED PROVIDER NOTE - IV ALTEPLASE DOOR HIDDEN
Patient's inr 2.6 today.    5mg /  5mg / 7.5mg     Pt advised to stay on same dose and recheck next Monday as always. We will call back if any changes ordered by     show

## 2024-07-17 NOTE — H&P ADULT - PROBLEM SELECTOR PLAN 9
Given history of DVT, sub q heparin 5k q12 chronic  - May be contributing to AMS  - Continue home xanax once a day instead of bid as patient takes at home according to family to avoid withdrawal complications  - continue buproprion and escitalopram  - Avoid use of xanax during bedtime, use Mirtazapine PRN to help sleep

## 2024-07-17 NOTE — H&P ADULT - HISTORY OF PRESENT ILLNESS
· HPI Objective Statement: 89-year-old female with a history of dementia, DVT with IVC filter, intracranial hemorrhage, chronic renal sufficiency, TIA presents with alter mental status. She is being aggressive towards family members and is not eating, refusing to take her medications.  Patient is on home oxygen, 3 L nasal cannula.  Patient denies any acute pain.  No recent shortness of breath.  No known fever or chills.  No cough/URI.  No known COVID exposure or recent infection.  No aggravating or alleviating factors otherwise noted.    ED COURSE:  Vitals: T 98.7 F , HR 86  , BP  125/92 , RR 18  , SpO2 98 % on 3.5 L NC  Labs significant for:  wbc 11.23, bun 34, cr 1.8, trop 54.2, bnp 1600   UA: cloudy, small, leukoesterase, wbc 10  Imaging: CT Head: Imaging: No acute intracranial hemorrhage, mass effect, or midline shift.  EKG: Normal sinus rhythm Left anterior fascicular block   · HPI Objective Statement: 89-year-old female with a history of dementia, DVT with IVC filter, h/o subdural hematoma, PUD, chronic renal sufficiency, TIA presents with alter mental status. She is being aggressive towards family members and is not eating, refusing to take her medications.  Patient is on home oxygen, 3 L nasal cannula.  Patient denies any acute pain.  No recent shortness of breath.  No known fever or chills.  No cough/URI.  No known COVID exposure or recent infection.  No aggravating or alleviating factors otherwise noted.    ED COURSE:  Vitals: T 98.7 F , HR 86  , BP  125/92 , RR 18  , SpO2 98 % on 3.5 L NC  Labs significant for:  wbc 11.23, bun 34, cr 1.8, trop 54.2, bnp 1600   UA: cloudy, small, leukoesterase, wbc 10  Imaging: CT Head: Imaging: No acute intracranial hemorrhage, mass effect, or midline shift.  EKG: Normal sinus rhythm Left anterior fascicular block

## 2024-07-17 NOTE — H&P ADULT - ATTENDING COMMENTS
pt seen and examined at bedside   89 Y.O.F with PMH of dementia ,CVA, DVT and previous h/o PE with IVC filter , subdural hematoma in past , PUD and h/o GIB , on home oxygen baseline ~ 2L NC  patient is very poor historian , history obtained from Daughter Kathy over the phone @ [phone]  she stated that her mother condition was worsen over the last 2 days as she was aggressive with her daughter tried to punch her as well as tried to hit the home aid and she has been confused on and off more than the usual   patient is known to have underlying dementia and due to aggressive behaviour over the last couple of days they increased her xanax to twice a day and she has been having less PO intake so decided to come to ED  stated not sure about her home meds , but her sister Johana would known , tried to contact her but goes directly to voice msg   on last admission pt was DNR/I , daughter Kathy confirmed to be same she is secondary on HCP paper , asked to sign over the phone but she did not want that and stated she will be in today to sign any paper     of note pt was recently discharged on march 2024 after she was found to have Acute infarct in subcortical white matter of posterior R parietal lobe     on exam   GEN: elderly, NAD  HEENT: NC/AT, PERRL , no JVD  lungs: equal air entry b/l, no wheezing or Rhonchi  heart: + murmur   abd: soft , not tender or distended , BS+  ext: b/l LE edema 1+ , no cyanosis   neuro: AOx2 person and place , seem baseline  per last chart from march   moving all extremities   on home 2L NC      # Acute metabolic encephalopathy possible sec to UTI vs worsening underlying dementia with behaviour changes   - CT showed no acute findings   - UA is positive for possible UTI , penicillin allergy , started on Azactam , f/u urine Cx and blood cx  -IVF  - f/u WBCs and monitor for fever   - MRI w/o contrast to r/o any further CVA given recent CVA as per HPI  - c/w home ASA and statin   - try to avoid Bz in elderly will c/w xanax 0.25 mg daily PRN as to avoid withdrawal as has been on it for years per daughter   - c/w buproprion , home dose to confirm with pharmacy in am   - if  agitated or having insomnia to consider adding mirtazapine at bedtime , monitor QTC  - NPO , speech and swallow eval   - check CXR to r/o infection     # MARYLOU  IVF  renal u/s stat ruled out obstructive uropathy   per last admission serum cr baseline ~ 1.3   trend renal function if no improvement with IVF consider nephro consult     # elevated troponin possible demand ischemia  EKG on admission showed NSR , with non specific ST-T wave changes , pt denies any chest pain or SOB  had recent ECHO on march of 2024 , no significant valvular disease and EF of 65- 70 %  cardiac monitoring   as troponin increased , cardiology consulted   c/w to trend troponin   c/w home ASA and statin     # macrocytosis   check B12, folate  , TFT    # h/o DVT with IVC filter in place   with b/l Le edema   f/u doppler to assess for b/l DVT    c/w PPI  DVT ppx: heparin SQ q12 hrs   monitor H/H , if significant drop or showed any signs or symptoms of bleeding to consider d/c    please confirm medications in am with pharmacy or family ( Johana ) as well as molst form as above mentioned

## 2024-07-17 NOTE — ED ADULT NURSE NOTE - OBJECTIVE STATEMENT
Pt received in 15A. Pt is a&ox2, family at bedside reporting that the pt has had a declining mental status over the past 2 days. Pt ams described as a failure to thrive almost, not eating and refusing to take her meds, appearing agitated at times and unable to control that at home. Pt is not offering any complaints at interview, no pain reported, she appears comfortable. Pt is not ambulatory at baseline, she has multiple bruising sites on her arms and hands - daughters report that is from when she is flailing her arms around during agitated states and hitting her hands on things. Pt is incontinent at home, she uses purewick at night. 20G IV placed in L ac, labs drawn and sent per orders. IV fluids administered per orders. Pt has pungent urine smell on her at time of interview, she is changed/cleaned and placed on primafit for urine. Safety precautions in place and will be maintained. Will continue to monitor.

## 2024-07-17 NOTE — PROVIDER CONTACT NOTE (CRITICAL VALUE NOTIFICATION) - NAME OF MD/NP/PA/DO NOTIFIED:
Ventricular Rate : 76  Atrial Rate : 76  P-R Interval : 134  QRS Duration : 88  Q-T Interval : 388  QTC Calculation(Bazett) : 436  P Axis : 19  R Axis : 8  T Axis : 68  Diagnosis : Normal sinus rhythm  Counterclockwise rotation in horizontal plane  Minimal voltage criteria for LVH, may be normal variant  Borderline ECG  When compared with ECG of 25-APR-2018 14:48,  No significant change was found  Confirmed by KESHAV MEEKS ([id number]) on 12/30/2019 9:24:59 PM  
Dr. Wise

## 2024-07-17 NOTE — ED PROVIDER NOTE - OBJECTIVE STATEMENT
89-year-old female with a history of dementia, DVT with IVC filter, intracranial hemorrhage, chronic renal sufficiency, TIA presents with has been having some increased altered mental status at home, failure to thrive.  Patient is not eating, refusing to take her medications.  Patient is on home oxygen, 3 L nasal cannula.  Patient denies any acute pain.  No recent shortness of breath.  No known fever or chills.  No cough/URI.  No known COVID exposure or recent infection.  No aggravating or alleviating factors otherwise noted.  No other acute complaints.

## 2024-07-17 NOTE — H&P ADULT - ASSESSMENT
89-year-old female with a history of dementia, DVT with IVC filter, intracranial hemorrhage, chronic renal sufficiency, TIA presents with alter mental status. Patient is alert and oriented to hospital, not currently aggressive and says she is not in pain.

## 2024-07-17 NOTE — H&P ADULT - PROBLEM SELECTOR PLAN 2
UA concerning for infection in the setting of AMS, afebrile  UA: wbc 11.23, UA: cloudy, small leukoesterase presence, wbc 10  - Treating UTI with Azactam - AMS mainly in the form of aggression,   - AMS 2/2 baseline dementia vs UTI, likely UTI contributing given UA concerning for infection: wbc 11.23, UA: cloudy, small, leukoesterase, wbc 10  - Treating UTI with Azactam  - Possible benzo use may also be contributing to AMS UA concerning for infection in the setting of AMS, afebrile  UA: wbc 11.23, UA: cloudy, small leukoesterase presence, wbc 10  - Treating UTI with Azactam given allergic rxn to penicillin

## 2024-07-17 NOTE — ED PROVIDER NOTE - CLINICAL SUMMARY MEDICAL DECISION MAKING FREE TEXT BOX
Acute altered mental status over past several days, without trauma or known fever.  Will check labs, x-ray, UA, flu/COVID, IV fluids, admission

## 2024-07-17 NOTE — H&P ADULT - PROBLEM SELECTOR PLAN 3
- IVC filter in place  - on sub q heparin prophylaxis - IVC filter in place  - on sub q heparin prophylaxis  -f/u doppler US of LE UA concerning for infection in the setting of AMS, afebrile  UA: wbc 11.23, UA: cloudy, small leukoesterase presence, wbc 10  - Treating UTI with Azactam - Recent stroke in March, possible new stroke may be causing AMS  - f/u MRI to rule out new stroke  - Consult Neuro if MRI concerning for new stroke  - f/u speech swallow eval  - continue home aspirin

## 2024-07-17 NOTE — H&P ADULT - NSHPPHYSICALEXAM_GEN_ALL_CORE
{{0:vitals}}    CONSTITUTIONAL: awake, alert, no acute distress  HEENT: Atraumatic normocephalic. Moist mucous membranes.  No conjunctival injection.  RESP: No respiratory distress; CTA b/l, no WRR  CV: RRR, murmur present, loudest in 2nd intercostal space, left sternal border  GI: Bowel sounds present. Soft, nontender, nondistended, no rebound or guarding  MSK: Moving all four extremities spontaneously. No peripheral edema. No calf tenderness.  SKIN: Warm and dry. No rashes noted. Multiple ecchymosis present on arms and legs.bilaterally  NEURO: AOx3, answering questions and following commands appropriately  PSYCH: Mood and affect appropriate, recent memory intact Vital Signs Last 24 Hrs  T(C): 36.5 (17 Jul 2024 21:10), Max: 37.1 (17 Jul 2024 17:55)  T(F): 97.7 (17 Jul 2024 21:10), Max: 98.7 (17 Jul 2024 17:55)  HR: 88 (17 Jul 2024 21:10) (88 - 95)  BP: 143/84 (17 Jul 2024 21:10) (125/92 - 143/84)  BP(mean): --  RR: 18 (17 Jul 2024 21:10) (18 - 18)  SpO2: 98% (17 Jul 2024 21:10) (95% - 98%)    Parameters below as of 17 Jul 2024 21:10  Patient On (Oxygen Delivery Method): nasal cannula  O2 Flow (L/min): 4      CONSTITUTIONAL: awake, alert, no acute distress  HEENT: Atraumatic normocephalic. Moist mucous membranes.  No conjunctival injection.  RESP: No respiratory distress; CTA b/l, no WRR  CV: RRR, murmur present, loudest in 2nd intercostal space, left sternal border  GI: Bowel sounds present. Soft, nontender, nondistended, no rebound or guarding  MSK: Moving all four extremities spontaneously. No peripheral edema. No calf tenderness.  SKIN: Warm and dry. No rashes noted. Multiple ecchymosis present on arms and legs.bilaterally  NEURO: AOx3, answering questions and following commands appropriately  PSYCH: Mood and affect appropriate, recent memory intact Vital Signs Last 24 Hrs  T(C): 36.5 (17 Jul 2024 21:10), Max: 37.1 (17 Jul 2024 17:55)  T(F): 97.7 (17 Jul 2024 21:10), Max: 98.7 (17 Jul 2024 17:55)  HR: 88 (17 Jul 2024 21:10) (88 - 95)  BP: 143/84 (17 Jul 2024 21:10) (125/92 - 143/84)  BP(mean): --  RR: 18 (17 Jul 2024 21:10) (18 - 18)  SpO2: 98% (17 Jul 2024 21:10) (95% - 98%)    Parameters below as of 17 Jul 2024 21:10  Patient On (Oxygen Delivery Method): nasal cannula  O2 Flow (L/min): 4      CONSTITUTIONAL: awake, alert, no acute distress  HEENT: Atraumatic normocephalic. Moist mucous membranes.  No conjunctival injection.  RESP: No respiratory distress; CTA b/l, no WRR  CV: RRR, murmur present, loudest in 2nd intercostal space, left sternal border  GI: Bowel sounds present. Soft, nontender, nondistended, no rebound or guarding  MSK: Moving all four extremities spontaneously. No peripheral edema. No calf tenderness.  SKIN: Warm and dry. No rashes noted. Multiple ecchymosis present on arms and legs.bilaterally  NEURO: Alert, oriented, answering questions and following commands, baseline dementia, will go off topic and repeat herself  PSYCH: Mood and affect appropriate, recent memory intact

## 2024-07-17 NOTE — ED PROVIDER NOTE - PROGRESS NOTE DETAILS
Patient doing well, no acute complaints.  Discussed with family regarding nature of her symptoms.  They are in agreement with admission to the hospital, IV antibiotics, will monitor. Discussed with Dr. Gandara, will see patient to admit.

## 2024-07-17 NOTE — H&P ADULT - PROBLEM SELECTOR PLAN 7
chronic  - Continue home xanax once a day instead of bid as patient takes at home according to family to avoid withdrawal complications  - continue buproprion and escitalopram chronic  - Continue home statin chronic  - May be contributing to AMS  - Continue home xanax once a day instead of bid as patient takes at home according to family to avoid withdrawal complications  - continue buproprion and escitalopram - Cr. 1.8 elevated likely pre renal given in the setting of dehydration and limited water intake due to agitation and AMS  - f/u renal US  - Monitor AM CMP

## 2024-07-17 NOTE — ED PROVIDER NOTE - DIFFERENTIAL DIAGNOSIS
Differential Diagnosis Rule out acute infectious process, UTI, pneumonia, ICH, electrolyte abnormality, leukocytosis

## 2024-07-17 NOTE — ED ADULT TRIAGE NOTE - TEMP AT ED ARRIVAL (C)
----- Message from Antonia Osullivan sent at 12/4/2023 11:42 AM CST -----  Regarding: lab orders  .Type:  Needs Medical Advice    Who Called: Pt  Symptoms (please be specific):    How long has patient had these symptoms:   Pharmacy name and phone #:    Would the patient rather a call back or a response via [facility]? Call back  Best Call Back Number: [phone]  Additional Information: Needs lab orders sent to [facility] on [address]         37.1

## 2024-07-17 NOTE — ED PROVIDER NOTE - CHRONIC CONDITION OTHER
Dementia, DVT with IVC filter, ICH, chronic renal sufficiency, TIA Dementia, DVT with IVC filter, ICH, chronic renal insufficiency, TIA

## 2024-07-17 NOTE — H&P ADULT - PROBLEM SELECTOR PLAN 1
- AMS mainly in the form of aggression,   - AMS 2/2 baseline dementia vs UTI, likely UTI contributing given UA concerning for infection: wbc 11.23, UA: cloudy, small, leukoesterase, wbc 10  - Treating UTI with Azactam - AMS mainly in the form of aggression,   - AMS 2/2 baseline dementia vs UTI, likely UTI contributing given UA concerning for infection: wbc 11.23, UA: cloudy, small, leukoesterase, wbc 10  - Treating UTI with Azactam  - Possible benzo use may also be contributing to AMS - AMS mainly in the form of aggression,   - AMS 2/2 baseline dementia vs UTI, likely UTI contributing given UA concerning for infection: wbc 11.23, UA: cloudy, small, leukoesterase, wbc 10  - Treating UTI with Azactam  - Possible benzo use may also be contributing to AMS, as she was given increased amounts of xanax to calm her down by family which led to creased agitation.  - Continue use of xanax to avoid withdrawal symptoms - AMS mainly in the form of aggression,   - AMS 2/2 baseline dementia vs UTI, likely UTI contributing given UA concerning for infection: wbc 11.23, UA: cloudy, small, leukoesterase, wbc 10  - Treating UTI with Azactam  - f/u tsh and t4  - Possible benzo use may also be contributing to AMS, as she was given increased amounts of xanax to calm her down by family which led to creased agitation.  - Continue use of xanax to avoid withdrawal symptoms  - Educate family about adverse effects of xanax.

## 2024-07-17 NOTE — ED ADULT NURSE NOTE - NSFALLUNIVINTERV_ED_ALL_ED
Bed/Stretcher in lowest position, wheels locked, appropriate side rails in place/Call bell, personal items and telephone in reach/Instruct patient to call for assistance before getting out of bed/chair/stretcher/Non-slip footwear applied when patient is off stretcher/[place] to call system/Physically safe environment - no spills, clutter or unnecessary equipment/Purposeful proactive rounding/Room/bathroom lighting operational, light cord in reach

## 2024-07-17 NOTE — H&P ADULT - PROBLEM SELECTOR PLAN 5
chronic  - chronic  - continue home ppi - Cr. 1.8 elevated  - f/u renal US  - Monitor AM CMP MCV elevated  - f/u folate and b12

## 2024-07-17 NOTE — H&P ADULT - NSHPADDITIONALINFOADULT_GEN_ALL_CORE
Medication history was obtained from daughter stating she is on the medications in her hospital chart, will need to verify with other daughter in AM

## 2024-07-17 NOTE — ED PROVIDER NOTE - ENMT, MLM
Airway patent, Nasal mucosa clear. Mouth with normal mucosa. Throat has no vesicles, no oropharyngeal exudates and uvula is midline. neck supple. no meningeal signs. no signs of trauma.

## 2024-07-17 NOTE — ED PROVIDER NOTE - MUSCULOSKELETAL, MLM
Spine appears normal, neck supple. no meningeal signs. range of motion is not limited, no muscle or joint tenderness

## 2024-07-17 NOTE — ED ADULT NURSE REASSESSMENT NOTE - NS ED NURSE REASSESS COMMENT FT1
Pt resting comfortably in bed, she is awake and alert but confused at this time - daughter that was at bedside reports that this is normal for her. Pt reporting that she is nervous because she can't go home tonight - pt is informed that she needs IV antibiotics for an infection and that she is going to stay in the hospital. Vitals as noted, continuous cardiac monitoring shows normal sinus rhythm. Medicated per orders. Will maintain safety and continue to monitor.

## 2024-07-17 NOTE — H&P ADULT - PROBLEM SELECTOR PLAN 8
chronic  - Continue home statin Given history of DVT, sub q heparin 5k q12 chronic  - continue home ppi

## 2024-07-18 DIAGNOSIS — R41.82 ALTERED MENTAL STATUS, UNSPECIFIED: ICD-10-CM

## 2024-07-18 DIAGNOSIS — D75.89 OTHER SPECIFIED DISEASES OF BLOOD AND BLOOD-FORMING ORGANS: ICD-10-CM

## 2024-07-18 DIAGNOSIS — Z86.73 PERSONAL HISTORY OF TRANSIENT ISCHEMIC ATTACK (TIA), AND CEREBRAL INFARCTION WITHOUT RESIDUAL DEFICITS: ICD-10-CM

## 2024-07-18 DIAGNOSIS — R79.89 OTHER SPECIFIED ABNORMAL FINDINGS OF BLOOD CHEMISTRY: ICD-10-CM

## 2024-07-18 DIAGNOSIS — E78.5 HYPERLIPIDEMIA, UNSPECIFIED: ICD-10-CM

## 2024-07-18 DIAGNOSIS — Z29.9 ENCOUNTER FOR PROPHYLACTIC MEASURES, UNSPECIFIED: ICD-10-CM

## 2024-07-18 DIAGNOSIS — F41.9 ANXIETY DISORDER, UNSPECIFIED: ICD-10-CM

## 2024-07-18 DIAGNOSIS — N17.9 ACUTE KIDNEY FAILURE, UNSPECIFIED: ICD-10-CM

## 2024-07-18 DIAGNOSIS — F03.90 UNSPECIFIED DEMENTIA, UNSPECIFIED SEVERITY, WITHOUT BEHAVIORAL DISTURBANCE, PSYCHOTIC DISTURBANCE, MOOD DISTURBANCE, AND ANXIETY: ICD-10-CM

## 2024-07-18 DIAGNOSIS — Z86.718 PERSONAL HISTORY OF OTHER VENOUS THROMBOSIS AND EMBOLISM: ICD-10-CM

## 2024-07-18 DIAGNOSIS — K27.9 PEPTIC ULCER, SITE UNSPECIFIED, UNSPECIFIED AS ACUTE OR CHRONIC, WITHOUT HEMORRHAGE OR PERFORATION: ICD-10-CM

## 2024-07-18 DIAGNOSIS — G93.40 ENCEPHALOPATHY, UNSPECIFIED: ICD-10-CM

## 2024-07-18 DIAGNOSIS — N39.0 URINARY TRACT INFECTION, SITE NOT SPECIFIED: ICD-10-CM

## 2024-07-18 LAB
ALBUMIN SERPL ELPH-MCNC: 2.8 G/DL — LOW (ref 3.3–5)
ALP SERPL-CCNC: 68 U/L — SIGNIFICANT CHANGE UP (ref 40–120)
ALT FLD-CCNC: 18 U/L — SIGNIFICANT CHANGE UP (ref 12–78)
ANION GAP SERPL CALC-SCNC: 5 MMOL/L — SIGNIFICANT CHANGE UP (ref 5–17)
AST SERPL-CCNC: 26 U/L — SIGNIFICANT CHANGE UP (ref 15–37)
BILIRUB SERPL-MCNC: 0.4 MG/DL — SIGNIFICANT CHANGE UP (ref 0.2–1.2)
BUN SERPL-MCNC: 33 MG/DL — HIGH (ref 7–23)
CALCIUM SERPL-MCNC: 9 MG/DL — SIGNIFICANT CHANGE UP (ref 8.5–10.1)
CHLORIDE SERPL-SCNC: 108 MMOL/L — SIGNIFICANT CHANGE UP (ref 96–108)
CO2 SERPL-SCNC: 31 MMOL/L — SIGNIFICANT CHANGE UP (ref 22–31)
CREAT SERPL-MCNC: 1.3 MG/DL — SIGNIFICANT CHANGE UP (ref 0.5–1.3)
CULTURE RESULTS: SIGNIFICANT CHANGE UP
EGFR: 38 ML/MIN/1.73M2 — LOW
FOLATE SERPL-MCNC: >20 NG/ML — SIGNIFICANT CHANGE UP
GLUCOSE SERPL-MCNC: 117 MG/DL — HIGH (ref 70–99)
HCT VFR BLD CALC: 31.9 % — LOW (ref 34.5–45)
HGB BLD-MCNC: 10.2 G/DL — LOW (ref 11.5–15.5)
MCHC RBC-ENTMCNC: 31.8 PG — SIGNIFICANT CHANGE UP (ref 27–34)
MCHC RBC-ENTMCNC: 32 GM/DL — SIGNIFICANT CHANGE UP (ref 32–36)
MCV RBC AUTO: 99.4 FL — SIGNIFICANT CHANGE UP (ref 80–100)
NRBC # BLD: 0 /100 WBCS — SIGNIFICANT CHANGE UP (ref 0–0)
PLATELET # BLD AUTO: 219 K/UL — SIGNIFICANT CHANGE UP (ref 150–400)
POTASSIUM SERPL-MCNC: 3.4 MMOL/L — LOW (ref 3.5–5.3)
POTASSIUM SERPL-SCNC: 3.4 MMOL/L — LOW (ref 3.5–5.3)
PROT SERPL-MCNC: 6.5 G/DL — SIGNIFICANT CHANGE UP (ref 6–8.3)
RBC # BLD: 3.21 M/UL — LOW (ref 3.8–5.2)
RBC # FLD: 13.9 % — SIGNIFICANT CHANGE UP (ref 10.3–14.5)
SODIUM SERPL-SCNC: 144 MMOL/L — SIGNIFICANT CHANGE UP (ref 135–145)
SPECIMEN SOURCE: SIGNIFICANT CHANGE UP
T4 AB SER-ACNC: 9 UG/DL — SIGNIFICANT CHANGE UP (ref 4.6–12)
TROPONIN I, HIGH SENSITIVITY RESULT: 66 NG/L — HIGH
TROPONIN I, HIGH SENSITIVITY RESULT: 69.8 NG/L — HIGH
TSH SERPL-MCNC: 2.67 UIU/ML — SIGNIFICANT CHANGE UP (ref 0.36–3.74)
VIT B12 SERPL-MCNC: >2000 PG/ML — HIGH (ref 232–1245)
WBC # BLD: 9.7 K/UL — SIGNIFICANT CHANGE UP (ref 3.8–10.5)
WBC # FLD AUTO: 9.7 K/UL — SIGNIFICANT CHANGE UP (ref 3.8–10.5)

## 2024-07-18 PROCEDURE — 99497 ADVNCD CARE PLAN 30 MIN: CPT

## 2024-07-18 PROCEDURE — 71045 X-RAY EXAM CHEST 1 VIEW: CPT | Mod: 26

## 2024-07-18 PROCEDURE — 70551 MRI BRAIN STEM W/O DYE: CPT | Mod: 26

## 2024-07-18 PROCEDURE — 99222 1ST HOSP IP/OBS MODERATE 55: CPT

## 2024-07-18 PROCEDURE — 99233 SBSQ HOSP IP/OBS HIGH 50: CPT

## 2024-07-18 PROCEDURE — 93970 EXTREMITY STUDY: CPT | Mod: 26

## 2024-07-18 PROCEDURE — 76775 US EXAM ABDO BACK WALL LIM: CPT | Mod: 26

## 2024-07-18 RX ORDER — ASPIRIN 325 MG/1
81 TABLET, FILM COATED ORAL DAILY
Refills: 0 | Status: DISCONTINUED | OUTPATIENT
Start: 2024-07-18 | End: 2024-07-19

## 2024-07-18 RX ORDER — PANTOPRAZOLE SODIUM 40 MG/10ML
40 INJECTION, POWDER, FOR SOLUTION INTRAVENOUS
Refills: 0 | Status: DISCONTINUED | OUTPATIENT
Start: 2024-07-18 | End: 2024-07-19

## 2024-07-18 RX ORDER — MIRTAZAPINE 15 MG/1
7.5 TABLET, FILM COATED ORAL AT BEDTIME
Refills: 0 | Status: DISCONTINUED | OUTPATIENT
Start: 2024-07-18 | End: 2024-07-19

## 2024-07-18 RX ORDER — ALPRAZOLAM 2 MG/1
0.25 TABLET ORAL
Refills: 0 | Status: DISCONTINUED | OUTPATIENT
Start: 2024-07-18 | End: 2024-07-19

## 2024-07-18 RX ORDER — SUCRALFATE 1 G
1 TABLET ORAL
Refills: 0 | Status: DISCONTINUED | OUTPATIENT
Start: 2024-07-18 | End: 2024-07-19

## 2024-07-18 RX ORDER — BUPROPION HYDROCHLORIDE 150 MG/1
100 TABLET, EXTENDED RELEASE ORAL
Refills: 0 | Status: DISCONTINUED | OUTPATIENT
Start: 2024-07-18 | End: 2024-07-19

## 2024-07-18 RX ORDER — HEPARIN SODIUM 50 [USP'U]/ML
5000 INJECTION, SOLUTION INTRAVENOUS EVERY 12 HOURS
Refills: 0 | Status: DISCONTINUED | OUTPATIENT
Start: 2024-07-18 | End: 2024-07-19

## 2024-07-18 RX ORDER — BUPROPION HYDROCHLORIDE 150 MG/1
100 TABLET, EXTENDED RELEASE ORAL
Refills: 0 | Status: DISCONTINUED | OUTPATIENT
Start: 2024-07-18 | End: 2024-07-18

## 2024-07-18 RX ORDER — ESCITALOPRAM OXALATE 20 MG/1
5 TABLET, FILM COATED ORAL DAILY
Refills: 0 | Status: DISCONTINUED | OUTPATIENT
Start: 2024-07-18 | End: 2024-07-19

## 2024-07-18 RX ORDER — ATORVASTATIN CALCIUM 20 MG/1
40 TABLET, FILM COATED ORAL AT BEDTIME
Refills: 0 | Status: DISCONTINUED | OUTPATIENT
Start: 2024-07-18 | End: 2024-07-19

## 2024-07-18 RX ORDER — GABAPENTIN
100 POWDER (GRAM) MISCELLANEOUS
Refills: 0 | Status: DISCONTINUED | OUTPATIENT
Start: 2024-07-18 | End: 2024-07-19

## 2024-07-18 RX ORDER — CEFTRIAXONE SODIUM 500 MG
1000 VIAL (EA) INJECTION EVERY 24 HOURS
Refills: 0 | Status: DISCONTINUED | OUTPATIENT
Start: 2024-07-18 | End: 2024-07-19

## 2024-07-18 RX ORDER — ALPRAZOLAM 2 MG/1
1 TABLET ORAL DAILY
Refills: 0 | Status: DISCONTINUED | OUTPATIENT
Start: 2024-07-18 | End: 2024-07-18

## 2024-07-18 RX ADMIN — BUPROPION HYDROCHLORIDE 100 MILLIGRAM(S): 150 TABLET, EXTENDED RELEASE ORAL at 01:22

## 2024-07-18 RX ADMIN — BUPROPION HYDROCHLORIDE 100 MILLIGRAM(S): 150 TABLET, EXTENDED RELEASE ORAL at 12:21

## 2024-07-18 RX ADMIN — ASPIRIN 81 MILLIGRAM(S): 325 TABLET, FILM COATED ORAL at 12:21

## 2024-07-18 RX ADMIN — DEXTROSE MONOHYDRATE AND SODIUM CHLORIDE 75 MILLILITER(S): 5; .3 INJECTION, SOLUTION INTRAVENOUS at 03:02

## 2024-07-18 RX ADMIN — Medication 100 MILLIGRAM(S): at 17:13

## 2024-07-18 RX ADMIN — ALPRAZOLAM 0.25 MILLIGRAM(S): 2 TABLET ORAL at 21:04

## 2024-07-18 RX ADMIN — ATORVASTATIN CALCIUM 40 MILLIGRAM(S): 20 TABLET, FILM COATED ORAL at 21:04

## 2024-07-18 RX ADMIN — Medication 5 MILLIGRAM(S): at 21:04

## 2024-07-18 RX ADMIN — ALPRAZOLAM 0.25 MILLIGRAM(S): 2 TABLET ORAL at 12:21

## 2024-07-18 RX ADMIN — Medication 50 MILLIGRAM(S): at 13:44

## 2024-07-18 RX ADMIN — BUPROPION HYDROCHLORIDE 100 MILLIGRAM(S): 150 TABLET, EXTENDED RELEASE ORAL at 22:54

## 2024-07-18 RX ADMIN — Medication 1 GRAM(S): at 17:13

## 2024-07-18 RX ADMIN — Medication 100 MILLIGRAM(S): at 05:30

## 2024-07-18 RX ADMIN — ESCITALOPRAM OXALATE 5 MILLIGRAM(S): 20 TABLET, FILM COATED ORAL at 12:21

## 2024-07-18 RX ADMIN — Medication 50 MILLIGRAM(S): at 06:12

## 2024-07-18 NOTE — CONSULT NOTE ADULT - SUBJECTIVE AND OBJECTIVE BOX
[facility]  INFECTIOUS DISEASES - [address], [place] [address]  Tel: [phone]     Fax: [phone]  =======================================================    [id number]  GIACOMO FINE     CC: Patient is a 89y old  Female who presents with a chief complaint of AMS (2024 14:29)    HPI:  89-year-old female with dementia, DVT with IVC filter, h/o subdural hematoma, PUD, chronic renal sufficiency, TIA, who presented with altered mental status. Per daughter she was being aggressive towards family members and not eating for at least a day.  Patient is on home oxygen, 3 L nasal cannula.  Daughter now thinks patient is doing good and at her baseline. Patient denies any pain, cough or SOB. She denies any headache, chest pain, abdominal pain or back pain.      PAST MEDICAL & SURGICAL HISTORY:  Hyperlipidemia      Hypertension      Fall      Hip fracture      Anxiety      CVA (cerebral vascular accident)      PUD (peptic ulcer disease)      GI bleed      SDH (subdural hematoma)      Dementia      S/P hysterectomy      S/P hip replacement  left          Social Hx:     FAMILY HISTORY:  FHx: congestive heart failure (Father)    Family history of malignant neoplasm of colon (Mother)        Allergies    penicillins (Unknown)  mold, cheese and wine (Unknown)    Intolerances        Antibiotics:  MEDICATIONS  (STANDING):  aspirin  chewable 81 milliGRAM(s) Oral daily  atorvastatin 40 milliGRAM(s) Oral at bedtime  buPROPion SR (12-Hour) 100 milliGRAM(s) Oral <User Schedule>  cefTRIAXone   IVPB 1000 milliGRAM(s) IV Intermittent every 24 hours  dextrose 5% + sodium chloride 0.9%. 1000 milliLiter(s) (75 mL/Hr) IV Continuous <Continuous>  escitalopram 5 milliGRAM(s) Oral daily  gabapentin 100 milliGRAM(s) Oral two times a day  heparin   Injectable 5000 Unit(s) SubCutaneous every 12 hours  melatonin 5 milliGRAM(s) Oral at bedtime  pantoprazole    Tablet 40 milliGRAM(s) Oral before breakfast  sucralfate 1 Gram(s) Oral two times a day    MEDICATIONS  (PRN):  ALPRAZolam 0.25 milliGRAM(s) Oral two times a day PRN Agitaion/Anxiety  mirtazapine 7.5 milliGRAM(s) Oral at bedtime PRN sleep       REVIEW OF SYSTEMS: *limited*  CONSTITUTIONAL:  No Fevers  CARDIOVASCULAR:  No chest pain or SOB.  RESPIRATORY:  No cough, shortness of breath  GASTROINTESTINAL:  No abdominal pain  GENITOURINARY:  No dysuria  MUSCULOSKELETAL:  no back pain  NEUROLOGIC:  No headache     Physical Exam:  Vital Signs Last 24 Hrs  T(C): 36.2 (2024 12:53), Max: 37.1 (2024 17:55)  T(F): 97.2 (2024 12:53), Max: 98.7 (2024 17:55)  HR: 91 (2024 12:53) (88 - 110)  BP: 123/81 (2024 12:53) (123/81 - 156/86)  BP(mean): --  RR: 17 (2024 12:53) (17 - 18)  SpO2: 93% (2024 12:53) (92% - 98%)    Parameters below as of 2024 12:53  Patient On (Oxygen Delivery Method): nasal cannula  O2 Flow (L/min): 4    Height (cm): 149.9 ( @ 17:55)  Weight (kg): 49.5 ( @ 02:11)  BMI (kg/m2): 22 ( @ 02:11)  BSA (m2): 1.43 ( @ 02:11)  GEN: NAD  HEENT: normocephalic and atraumatic.   NECK: Supple.   LUNGS: Normal respiratory effort  HEART: Regular rate and rhythm   ABDOMEN: Soft, nontender, and nondistended.    EXTREMITIES: No leg edema.  NEUROLOGIC: Answering some simple questions    Labs:      142  |  104  |  34<H>  ----------------------------<  113<H>  4.4   |  30  |  1.80<H>    Ca    9.6      2024 18:40    TPro  8.1  /  Alb  3.2<L>  /  TBili  0.6  /  DBili  x   /  AST  39<H>  /  ALT  22  /  AlkPhos  81                            12.1   11.23 )-----------( 276      ( 2024 18:40 )             37.9     PT/INR - ( 2024 18:40 )   PT: 11.6 sec;   INR: 0.99 ratio         PTT - ( 2024 18:40 )  PTT:34.2 sec  Urinalysis Basic - ( 2024 21:25 )    Color: Yellow / Appearance: Cloudy / S.020 / pH: x  Gluc: x / Ketone: 15 mg/dL  / Bili: Negative / Urobili: 0.2 mg/dL   Blood: x / Protein: Trace mg/dL / Nitrite: Negative   Leuk Esterase: Small / RBC: 1 /HPF / WBC 10 /HPF   Sq Epi: x / Non Sq Epi: x / Bacteria: Too Numerous to count /HPF      LIVER FUNCTIONS - ( 2024 18:40 )  Alb: 3.2 g/dL / Pro: 8.1 g/dL / ALK PHOS: 81 U/L / ALT: 22 U/L / AST: 39 U/L / GGT: x           CARDIAC MARKERS ( 2024 18:40 )  x     / x     / 92 U/L / x     / x                  SARS-CoV-2 [facility]: [facility] (24 @ 19:10)      RECENT CULTURES:        All imaging and other data have been reviewed.

## 2024-07-18 NOTE — SWALLOW BEDSIDE ASSESSMENT ADULT - COMMENTS
As per H&P dated 7/17/24 "89-year-old female with a history of dementia, DVT with IVC filter, intracranial hemorrhage, chronic renal sufficiency, TIA presents with alter mental status. Patient is alert and oriented to hospital, not currently aggressive and says she is not in pain."    CTH 7/17 "IMPRESSION: Age-appropriate involutional changes. Atherosclerotic changes. No acute intracranial hemorrhage, mass effect, or midline shift. If altered mental status persists, consider further evaluation via MR imaging to include DWI and ADC mapping techniques, provided there are no contraindications."    No available chest imaging.    Patient visited at bedside for clinical swallow evaluation with daughter present. Patient present as awake and alert, periods of confusion/easily distracted, however able to follow 1-step directives and make basic wants/needs known. Per daughter, no history of dysphagia however intermittently "holds food".

## 2024-07-18 NOTE — PATIENT PROFILE ADULT - FALL HARM RISK - RISK INTERVENTIONS
Assistance OOB with selected safe patient handling equipment/Assistance with ambulation/Communicate Fall Risk and Risk Factors to all staff, patient, and family/Discuss with provider need for PT consult/Monitor gait and stability/Reinforce activity limits and safety measures with patient and family/Visual Cue: Yellow wristband/Bed in lowest position, wheels locked, appropriate side rails in place/Call bell, personal items and telephone in reach/Instruct patient to call for assistance before getting out of bed or chair/Non-slip footwear when patient is out of bed/[place] to call system/Physically safe environment - no spills, clutter or unnecessary equipment/Purposeful Proactive Rounding/Room/bathroom lighting operational, light cord in reach

## 2024-07-18 NOTE — CONSULT NOTE ADULT - ATTENDING COMMENTS
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 Patient showing improvement now AAOx3 and agreeable to exam and interview. Toponins elevated likely 2/2 dehydration related demand ischemia.      - Troponin (+) 2/2 demand ischemia.  Doubt ACS  - Can check echo to assess murmur  - Encourage gentle hydration; oral hydration preferred if possible  - BP stable currently

## 2024-07-18 NOTE — SWALLOW BEDSIDE ASSESSMENT ADULT - SWALLOW EVAL: RECOMMENDED FEEDING/EATING TECHNIQUES
Limit distractions/alternate food with liquid/check mouth frequently for oral residue/pocketing/oral hygiene/position upright (90 degrees)/small sips/bites

## 2024-07-18 NOTE — PHYSICAL THERAPY INITIAL EVALUATION ADULT - PERTINENT HX OF CURRENT PROBLEM, REHAB EVAL
89-year-old female with a history of dementia, DVT with IVC filter, intracranial hemorrhage, chronic renal sufficiency, TIA presents with acute agitation.

## 2024-07-18 NOTE — CONSULT NOTE ADULT - ASSESSMENT
#Acute on chronic CHF exacerbation   - Pro bnp   - S/p Lasix   - Echo from   - Will repeat TTE, follow up results   - Continue Fluid restriction  - Strict I/Os, daily weights    #Ischemia   - No clear evidence of acute ischemia  - Troponin negative   - EKG: NSR  - Hx of CAD: Continue   - Continue to monitor for signs or symptoms of ischemia     #Arrhythmia  - EKG: NSR, unchanged from prior EKG  - Monitor and replete lytes, keep K>4, Mg>2.    #HTN  - BP stable currently  - Continue home meds:   - Continue to monitor hemodynamics     - Other cardiovascular workup will depend on clinical course.  - All other workup per primary team.  - Will continue to follow.       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 Patient showing improvement now AAOx3 and agreeable to exam and interview. Toponins elevated likely 2/2 dehydration related demand ischemia.      #Ischemia   - No clear evidence of acute ischemia  - Troponin (+) 2/2 demand ischemia   - Encourage gentle hydration; oral hydration   - EKG: NSR  - Continue to monitor for signs or symptoms of ischemia     #Arrhythmia  - EKG: NSR, unchanged from prior EKG  - Monitor and replete lytes, keep K>4, Mg>2.    #HTN  - BP stable currently  - Continue to monitor hemodynamics     - Other cardiovascular workup will depend on clinical course.  - All other workup per primary team.  - Will continue to follow.

## 2024-07-18 NOTE — CARE COORDINATION ASSESSMENT. - NSPASTMEDSURGHISTORY_GEN_ALL_CORE_FT
PAST MEDICAL & SURGICAL HISTORY:  Anxiety      Hip fracture      Fall      Hypertension      Hyperlipidemia      S/P hip replacement  left      S/P hysterectomy      CVA (cerebral vascular accident)      GI bleed      PUD (peptic ulcer disease)      SDH (subdural hematoma)      Dementia

## 2024-07-18 NOTE — CARE COORDINATION ASSESSMENT. - NSCAREPROVIDERS_GEN_ALL_CORE_FT
CARE PROVIDERS:  Accepting Physician: Jose Gandara  Access Services: Viv Malone  Admitting: Jose Gandara  Attending: Jose Gandara  Consultant: Arsalan Toribio  Consultant: Anil Amaya  Consultant: Ezekiel Covarrubias  Consultant: Aleksey Manzo  Covering Team: Jose Gandraa  Covering Team: Martha Walls  ED Attending: Grey Wise  ED Nurse: Rachel Kimbrough  ED Nurse 2: Rachel Kimbrough  Emergency Medicine: Grey Wise  Nurse: Adrianna oNe  Nurse: Nghia Hannah  Nurse: Joslyn Mark  Nurse: Priscila Blair  Ordered: [facility]  Outpatient Provider: Josue Mendoza  Outpatient Provider: Jillian Chu  Outpatient Provider: Anil Amaya  Override: Angelito Ruiz  Override: Joslyn Mark  PCA/Nursing Assistant: Jory Cali  PCA/Nursing Assistant: Angelika Gonzáles  Primary Team: Cee Javed  Primary Team: Romel Denton  Primary Team: Angela Callahan  Primary Team: Jyoti Esparza  Primary Team: Sandy Bocanegra  Registered Dietitian: Treasure Del Cid  Speech Pathology: Marija Burnett  Team: [facility], Team

## 2024-07-18 NOTE — PROGRESS NOTE ADULT - CONVERSATION DETAILS
Patient had a prior MOLST which was lost.  Met with daughter/HCP Johana at bedside  MOLST discussed.   patient is DNR/DNI, with trial of NIV.   decline aggressive measure such as feeding tube or dialysis.   May use IV antibiotics/IVF if needed. May hospitalized if needed.   MOLST completed, copy provided to daughter.

## 2024-07-18 NOTE — ED ADULT NURSE REASSESSMENT NOTE - NS ED NURSE REASSESS COMMENT FT1
0128 verbal report given to JN Jorgensen.  pt repositioned in bed and warm sheets provided. pure-wick placed and perineal care rendered. vs as noted , pt medicated as ordered. no acute distress at present pt pending transfer to floor. Mauro   0145 Pt transferred to floor on cardiac monitor. she remains at her baseline, no acute distress noted. Mauro

## 2024-07-18 NOTE — SWALLOW BEDSIDE ASSESSMENT ADULT - SWALLOW EVAL: RECOMMENDED DIET
1. Easy to Chew Solids with Thin Liquids. 1:1 supervision for all PO intake given reduced cognition.

## 2024-07-18 NOTE — SWALLOW BEDSIDE ASSESSMENT ADULT - SWALLOW EVAL: DIAGNOSIS
1. Mild oral dysphagia for puree, soft bite sized, easy to chew solids, regular solids, mildly-thick and thin liquids marked by adequate acceptance and containment, slow mastication for regular solids > easy to chew/soft bite sized however functional for breakdown of solids, adequate oral transit and adequate oral clearance. Of note, patient easily distracted with benefit from intermittent verbal cues for redirection to task.. 2. Functional pharyngeal phase for aforementioned consistencies marked by hyolaryngeal excursion present upon palpation and no overt s/s of penetration/aspiration evidenced.

## 2024-07-18 NOTE — PROGRESS NOTE ADULT - SUBJECTIVE AND OBJECTIVE BOX
Patient is a 89y old  Female who presents with a chief complaint of AMS (2024 09:18)    INTERVAL HPI/OVERNIGHT EVENTS: Patient was seen and examined. Seen at bedside with daughter. Reports mother appears calmer and at baseline now. MRI screening completed. patient wants to eat.     I&O's Summary      LABS:                        12.1   11.23 )-----------( 276      ( 2024 18:40 )             37.9     07-17    142  |  104  |  34<H>  ----------------------------<  113<H>  4.4   |  30  |  1.80<H>    Ca    9.6      2024 18:40    TPro  8.1  /  Alb  3.2<L>  /  TBili  0.6  /  DBili  x   /  AST  39<H>  /  ALT  22  /  AlkPhos  81  07-17    PT/INR - ( 2024 18:40 )   PT: 11.6 sec;   INR: 0.99 ratio         PTT - ( 2024 18:40 )  PTT:34.2 sec  Urinalysis Basic - ( 2024 21:25 )    Color: Yellow / Appearance: Cloudy / S.020 / pH: x  Gluc: x / Ketone: 15 mg/dL  / Bili: Negative / Urobili: 0.2 mg/dL   Blood: x / Protein: Trace mg/dL / Nitrite: Negative   Leuk Esterase: Small / RBC: 1 /HPF / WBC 10 /HPF   Sq Epi: x / Non Sq Epi: x / Bacteria: Too Numerous to count /HPF      CAPILLARY BLOOD GLUCOSE            Urinalysis Basic - ( 2024 21:25 )    Color: Yellow / Appearance: Cloudy / S.020 / pH: x  Gluc: x / Ketone: 15 mg/dL  / Bili: Negative / Urobili: 0.2 mg/dL   Blood: x / Protein: Trace mg/dL / Nitrite: Negative   Leuk Esterase: Small / RBC: 1 /HPF / WBC 10 /HPF   Sq Epi: x / Non Sq Epi: x / Bacteria: Too Numerous to count /HPF        MEDICATIONS  (STANDING):  aspirin  chewable 81 milliGRAM(s) Oral daily  atorvastatin 40 milliGRAM(s) Oral at bedtime  aztreonam  IVPB      aztreonam  IVPB 500 milliGRAM(s) IV Intermittent every 8 hours  buPROPion SR (12-Hour) 100 milliGRAM(s) Oral <User Schedule>  dextrose 5% + sodium chloride 0.9%. 1000 milliLiter(s) (75 mL/Hr) IV Continuous <Continuous>  escitalopram 5 milliGRAM(s) Oral daily  gabapentin 100 milliGRAM(s) Oral two times a day  heparin   Injectable 5000 Unit(s) SubCutaneous every 12 hours  melatonin 5 milliGRAM(s) Oral at bedtime  pantoprazole    Tablet 40 milliGRAM(s) Oral before breakfast  sucralfate 1 Gram(s) Oral two times a day    MEDICATIONS  (PRN):  ALPRAZolam 0.25 milliGRAM(s) Oral two times a day PRN Agitaion/Anxiety  mirtazapine 7.5 milliGRAM(s) Oral at bedtime PRN sleep      REVIEW OF SYSTEMS:  difficult to assess     RADIOLOGY & ADDITIONAL TESTS:    Imaging Personally Reviewed:  [x] YES  [ ] NO    Consultant(s) Notes Reviewed:  [x] YES  [ ] NO    PHYSICAL EXAM:  T(C): 36.2 (24 @ 12:53), Max: 37.1 (24 @ 17:55)  HR: 91 (24 @ 12:53) (88 - 110)  BP: 123/81 (24 @ 12:53) (123/81 - 156/86)  RR: 17 (24 @ 12:53) (17 - 18)  SpO2: 93% (24 @ 12:53) (92% - 98%)    GENERAL: NAD, well-developed, on 3LNC   HEENT:  anicteric  CHEST/LUNG:  CTA b/l, no rales, wheezes, or rhonchi  HEART:  RRR, + murmur   ABDOMEN:  BS+, soft, nontender, nondistended  EXTREMITIES: no edema  NERVOUS SYSTEM: answers questions and follows commands appropriately  PSYCH: normal affect    Care Discussed with Consultants/Other Providers [x] YES  [ ] NO

## 2024-07-18 NOTE — PROGRESS NOTE ADULT - ASSESSMENT
89-year-old female with a history of dementia, DVT with IVC filter, intracranial hemorrhage, chronic renal sufficiency, TIA presents with acute agitation.     Agitation   dementia   UTI    - cont azactam     - BC/UC pending    - SLP: easy to chew     - MR brain pending. neurology following     - cont home xanax 0.25mg BID prn     - ID consulted Dr Ramey     - cont wellbutrin and lexapro     elevated trop     - likely secondary to demand    - cardiology following      - ECHO pending     MARYLOU    - encourage po intake     DVT proph: heparin 5000 BID   spoke with daughter Johana at bedside.  aware of planning for imaging and culture results.   PT consult

## 2024-07-18 NOTE — ED ADULT NURSE REASSESSMENT NOTE - NS ED NURSE REASSESS COMMENT FT1
2315 received report from JN Wall, pt is awake in bed. she is assisted into a comfortable position. EKG rendered at bedside. pt awaiting bed assignment [email or web address]

## 2024-07-18 NOTE — PHYSICAL THERAPY INITIAL EVALUATION ADULT - ADDITIONAL COMMENTS
Patient lives alone in a private house, has 2 x 12 hr aides, daughter involved in care, patient uses codie lift for bed to w/c transfers. Has a chair lift to the upstairs bedroom however does not use due to many falls with transferring on/off chair lift, bedroom has been relocated to main level of house.

## 2024-07-18 NOTE — SWALLOW BEDSIDE ASSESSMENT ADULT - ASR SWALLOW RECOMMEND DIAG
Objective testing is NOT indicated given functional swallow for recommended diet, no overt s/s of aspiration evidenced and no chest imaging

## 2024-07-18 NOTE — SWALLOW BEDSIDE ASSESSMENT ADULT - ORAL PREPARATORY PHASE
Slow yet functional mastication Within functional limits Slow yet functional mastication. Easily distracted with benefit from intermittent verbal cues for redirection to task.

## 2024-07-18 NOTE — SWALLOW BEDSIDE ASSESSMENT ADULT - CONSISTENCIES ADMINISTERED
1 whole cracker/regular solid x3 trials; x3 trials/soft & bite-sized/easy to chew 4oz; 2oz/thin liquid/mildly thick 2oz/pureed

## 2024-07-18 NOTE — SOCIAL WORK PROGRESS NOTE - NSSWPROGRESSNOTE_GEN_ALL_CORE
Md has met with pt's daughter today as family requested to complete a Molst for pt. Edilberto has witnessed MOLST form completed by Dr Callahan today. Sw will remain available.

## 2024-07-18 NOTE — CONSULT NOTE ADULT - SUBJECTIVE AND OBJECTIVE BOX
Patient is a 89y old  Female who presents with a chief complaint of AMS (2024 23:58)      HPI:  · HPI Objective Statement: 89-year-old female with a history of dementia, DVT with IVC filter, h/o subdural hematoma, PUD, chronic renal sufficiency, TIA presents with alter mental status. She is being aggressive towards family members and is not eating, refusing to take her medications.  Patient is on home oxygen, 3 L nasal cannula.  Patient denies any acute pain.  No recent shortness of breath.  No known fever or chills.  No cough/URI.  No known COVID exposure or recent infection.  No aggravating or alleviating factors otherwise noted.    ED COURSE:  Vitals: T 98.7 F , HR 86  , BP  125/92 , RR 18  , SpO2 98 % on 3.5 L NC  Labs significant for:  wbc 11.23, bun 34, cr 1.8, trop 54.2, bnp 1600   UA: cloudy, small, leukoesterase, wbc 10  Imaging: CT Head: Imaging: No acute intracranial hemorrhage, mass effect, or midline shift.  EKG: Normal sinus rhythm Left anterior fascicular block   (2024 23:58)      PAST MEDICAL & SURGICAL HISTORY:  Hyperlipidemia      Hypertension      Fall      Hip fracture      Anxiety      CVA (cerebral vascular accident)      PUD (peptic ulcer disease)      GI bleed      SDH (subdural hematoma)      Dementia      S/P hysterectomy      S/P hip replacement  left                ECHO  FINDINGS:      MEDICATIONS  (STANDING):  aspirin  chewable 81 milliGRAM(s) Oral daily  atorvastatin 40 milliGRAM(s) Oral at bedtime  aztreonam  IVPB 500 milliGRAM(s) IV Intermittent every 8 hours  aztreonam  IVPB      buPROPion SR (12-Hour) 100 milliGRAM(s) Oral <User Schedule>  dextrose 5% + sodium chloride 0.9%. 1000 milliLiter(s) (75 mL/Hr) IV Continuous <Continuous>  escitalopram 5 milliGRAM(s) Oral daily  gabapentin 100 milliGRAM(s) Oral two times a day  heparin   Injectable 5000 Unit(s) SubCutaneous every 12 hours  melatonin 5 milliGRAM(s) Oral at bedtime  pantoprazole    Tablet 40 milliGRAM(s) Oral before breakfast  sucralfate 1 Gram(s) Oral two times a day    MEDICATIONS  (PRN):  ALPRAZolam 1 milliGRAM(s) Oral daily PRN for agitation  mirtazapine 7.5 milliGRAM(s) Oral at bedtime PRN sleep      FAMILY HISTORY:  FHx: congestive heart failure (Father)    Family history of malignant neoplasm of colon (Mother)      Denies Family history of CAD or early MI    ROS:  Constitutional: denies fever, chills  HEENT: denies blurry vision, difficulty hearing  Respiratory: denies SOB, VELÁZQUEZ, cough  Cardiovascular: denies CP, palpitations, orthopnea, PND, LE edema  Gastrointestinal: denies nausea, vomiting, abdominal pain  Genitourinary: denies urinary changes  Skin: Denies rashes, itching  Neurologic: denies headache, weakness, dizziness  Hematology/Oncology: denies bleeding, easy bruising  ROS negative except as noted above      SOCIAL HISTORY:    No tobacco, Alcohol or Ddrug use    Vital Signs Last 24 Hrs  T(C): 36.4 (2024 05:16), Max: 37.1 (2024 17:55)  T(F): 97.6 (2024 05:16), Max: 98.7 (2024 17:55)  HR: 102 (2024 05:16) (88 - 110)  BP: 130/80 (2024 05:16) (125/92 - 156/86)  BP(mean): --  RR: 18 (2024 05:16) (17 - 18)  SpO2: 96% (2024 05:16) (92% - 98%)    Parameters below as of 2024 05:16  Patient On (Oxygen Delivery Method): nasal cannula  O2 Flow (L/min): 4      Physical Exam:  General: Well developed, well nourished, NAD  HEENT: NCAT, PERRLA, EOMI bl, moist mucous membranes   Neck: Supple, nontender, no mass  Neurology: A&Ox3, nonfocal, sensation intact   Respiratory: CTA B/L, No W/R/R  CV: RRR, +S1/S2, no murmurs, rubs or gallops  Abdominal: Soft, NT, ND +BSx4, no palpable masses  Extremities: No C/C/E, + peripheral pulses  MSK: Normal ROM, no joint erythema or warmth, no joint swelling   Heme: No obvious ecchymosis or petechiae   Skin: warm, dry, normal color      ECG:    I&O's Detail      LABS:                        12.1   11.23 )-----------( 276      ( 2024 18:40 )             37.9     07-    142  |  104  |  34<H>  ----------------------------<  113<H>  4.4   |  30  |  1.80<H>    Ca    9.6      2024 18:40    TPro  8.1  /  Alb  3.2<L>  /  TBili  0.6  /  DBili  x   /  AST  39<H>  /  ALT  22  /  AlkPhos  81  07-    CARDIAC MARKERS ( 2024 18:40 )  x     / x     / 92 U/L / x     / x          PT/INR - ( 2024 18:40 )   PT: 11.6 sec;   INR: 0.99 ratio         PTT - ( 2024 18:40 )  PTT:34.2 sec  Urinalysis Basic - ( 2024 21:25 )    Color: Yellow / Appearance: Cloudy / S.020 / pH: x  Gluc: x / Ketone: 15 mg/dL  / Bili: Negative / Urobili: 0.2 mg/dL   Blood: x / Protein: Trace mg/dL / Nitrite: Negative   Leuk Esterase: Small / RBC: 1 /HPF / WBC 10 /HPF   Sq Epi: x / Non Sq Epi: x / Bacteria: Too Numerous to count /HPF      I&O's Summary    BNP  RADIOLOGY & ADDITIONAL STUDIES: Patient is a 89y old  Female who presents with a chief complaint of AMS (2024 23:58)      HPI:  · HPI Objective Statement: 89-year-old female with a history of dementia, DVT with IVC filter, h/o subdural hematoma, PUD, chronic renal sufficiency, TIA presents with alter mental status. She is being aggressive towards family members and is not eating, refusing to take her medications.  Patient is on home oxygen, 3 L nasal cannula.  Patient denies any acute pain.  No recent shortness of breath.  No known fever or chills.  No cough/URI.  No known COVID exposure or recent infection.  No aggravating or alleviating factors otherwise noted.    ED COURSE:  Vitals: T 98.7 F , HR 86  , BP  125/92 , RR 18  , SpO2 98 % on 3.5 L NC  Labs significant for:  wbc 11.23, bun 34, cr 1.8, trop 54.2, bnp 1600   UA: cloudy, small, leukoesterase, wbc 10  Imaging: CT Head: Imaging: No acute intracranial hemorrhage, mass effect, or midline shift.  EKG: Normal sinus rhythm Left anterior fascicular block   (2024 23:58)      PAST MEDICAL & SURGICAL HISTORY:  Hyperlipidemia      Hypertension      Fall      Hip fracture      Anxiety      CVA (cerebral vascular accident)      PUD (peptic ulcer disease)      GI bleed      SDH (subdural hematoma)      Dementia      S/P hysterectomy      S/P hip replacement  left                ECHO  FINDINGS:      MEDICATIONS  (STANDING):  aspirin  chewable 81 milliGRAM(s) Oral daily  atorvastatin 40 milliGRAM(s) Oral at bedtime  aztreonam  IVPB 500 milliGRAM(s) IV Intermittent every 8 hours  aztreonam  IVPB      buPROPion SR (12-Hour) 100 milliGRAM(s) Oral <User Schedule>  dextrose 5% + sodium chloride 0.9%. 1000 milliLiter(s) (75 mL/Hr) IV Continuous <Continuous>  escitalopram 5 milliGRAM(s) Oral daily  gabapentin 100 milliGRAM(s) Oral two times a day  heparin   Injectable 5000 Unit(s) SubCutaneous every 12 hours  melatonin 5 milliGRAM(s) Oral at bedtime  pantoprazole    Tablet 40 milliGRAM(s) Oral before breakfast  sucralfate 1 Gram(s) Oral two times a day    MEDICATIONS  (PRN):  ALPRAZolam 1 milliGRAM(s) Oral daily PRN for agitation  mirtazapine 7.5 milliGRAM(s) Oral at bedtime PRN sleep      FAMILY HISTORY:  FHx: congestive heart failure (Father)    Family history of malignant neoplasm of colon (Mother)      Denies Family history of CAD or early MI    ROS:  Constitutional: denies fever, chills  HEENT: denies blurry vision, difficulty hearing  Respiratory: denies SOB, VELÁZQUEZ, cough  Cardiovascular: denies CP, palpitations, orthopnea, PND, LE edema  Gastrointestinal: denies nausea, vomiting, abdominal pain  Genitourinary: denies urinary changes  Neurologic: denies headache, weakness, dizziness  ROS negative except as noted above      SOCIAL HISTORY:    No tobacco, Alcohol or Drug use    Vital Signs Last 24 Hrs  T(C): 36.4 (2024 05:16), Max: 37.1 (2024 17:55)  T(F): 97.6 (2024 05:16), Max: 98.7 (2024 17:55)  HR: 102 (2024 05:16) (88 - 110)  BP: 130/80 (2024 05:16) (125/92 - 156/86)  BP(mean): --  RR: 18 (2024 05:16) (17 - 18)  SpO2: 96% (2024 05:16) (92% - 98%)    Parameters below as of 2024 05:16  Patient On (Oxygen Delivery Method): nasal cannula  O2 Flow (L/min): 4      Physical Exam:  General: Well developed, well nourished, NAD  Neck: Supple, nontender, no mass  Neurology: A&Ox3, nonfocal  Respiratory: CTA B/L, No W/R/R  CV: RRR, +S1/S2, systolic murmur appreciated best at 2nd right IC space  Abdominal: Soft, NT, ND, no palpable masses  Heme: ecchymosis over dorsal hands, forearms, and shins  Skin: warm, dry, normal color      ECG:    I&O's Detail      LABS:                        12.1   11 )-----------( 276      ( 2024 18:40 )             37.9     07-17    142  |  104  |  34<H>  ----------------------------<  113<H>  4.4   |  30  |  1.80<H>    Ca    9.6      2024 18:40    TPro  8.1  /  Alb  3.2<L>  /  TBili  0.6  /  DBili  x   /  AST  39<H>  /  ALT  22  /  AlkPhos  81  07-17    CARDIAC MARKERS ( 2024 18:40 )  x     / x     / 92 U/L / x     / x          PT/INR - ( 2024 18:40 )   PT: 11.6 sec;   INR: 0.99 ratio         PTT - ( 2024 18:40 )  PTT:34.2 sec  Urinalysis Basic - ( 2024 21:25 )    Color: Yellow / Appearance: Cloudy / S.020 / pH: x  Gluc: x / Ketone: 15 mg/dL  / Bili: Negative / Urobili: 0.2 mg/dL   Blood: x / Protein: Trace mg/dL / Nitrite: Negative   Leuk Esterase: Small / RBC: 1 /HPF / WBC 10 /HPF   Sq Epi: x / Non Sq Epi: x / Bacteria: Too Numerous to count /HPF      I&O's Summary    BNP  RADIOLOGY & ADDITIONAL STUDIES:

## 2024-07-18 NOTE — CONSULT NOTE ADULT - ASSESSMENT
89-year-old female with dementia, DVT with IVC filter, h/o subdural hematoma, PUD, chronic renal sufficiency, TIA, who presented with altered mental status. Per daughter she was being aggressive towards family members and not eating for at least a day.  Patient is on home oxygen, 3 L nasal cannula.     Patient currently denies any complaints. Noted to have mild leukocytosis and MARYLOU, but no fever here. Urinalysis showed minimal pyuria, unclear if she really had UTI but can continue empiric antibiotics pending cultures. Daughter says no known hx of severe reaction to penicillin including rash, swelling or SOB.    #UTI  #Leukocytosis  #Hx of penicillin allergy    -suggest ceftriaxone  -discontinue aztreonam  -follow blood and urine cultures  -monitor WBC, LFT's  -discussed with daughter at bedside    Thank you for courtesy of this consult.     Will follow.  Discussed with Dr. London Ramey MD  Division of Infectious Diseases   Cell [phone] between 8am and 6pm   After 6pm and weekends please call ID service at [phone].     55 minutes spent on total encounter assessing patient, examination, chart review, counseling and coordinating care by the attending physician/nurse/care manager.

## 2024-07-19 ENCOUNTER — TRANSCRIPTION ENCOUNTER (OUTPATIENT)
Age: 89
End: 2024-07-19

## 2024-07-19 VITALS
RESPIRATION RATE: 17 BRPM | TEMPERATURE: 97 F | SYSTOLIC BLOOD PRESSURE: 126 MMHG | OXYGEN SATURATION: 100 % | HEART RATE: 63 BPM | DIASTOLIC BLOOD PRESSURE: 73 MMHG

## 2024-07-19 LAB
ALBUMIN SERPL ELPH-MCNC: 2.7 G/DL — LOW (ref 3.3–5)
ALP SERPL-CCNC: 70 U/L — SIGNIFICANT CHANGE UP (ref 40–120)
ALT FLD-CCNC: 19 U/L — SIGNIFICANT CHANGE UP (ref 12–78)
ANION GAP SERPL CALC-SCNC: 5 MMOL/L — SIGNIFICANT CHANGE UP (ref 5–17)
AST SERPL-CCNC: 27 U/L — SIGNIFICANT CHANGE UP (ref 15–37)
BILIRUB SERPL-MCNC: 0.3 MG/DL — SIGNIFICANT CHANGE UP (ref 0.2–1.2)
BUN SERPL-MCNC: 29 MG/DL — HIGH (ref 7–23)
CALCIUM SERPL-MCNC: 8.9 MG/DL — SIGNIFICANT CHANGE UP (ref 8.5–10.1)
CHLORIDE SERPL-SCNC: 109 MMOL/L — HIGH (ref 96–108)
CO2 SERPL-SCNC: 29 MMOL/L — SIGNIFICANT CHANGE UP (ref 22–31)
CREAT SERPL-MCNC: 1.3 MG/DL — SIGNIFICANT CHANGE UP (ref 0.5–1.3)
EGFR: 38 ML/MIN/1.73M2 — LOW
GLUCOSE SERPL-MCNC: 103 MG/DL — HIGH (ref 70–99)
HCT VFR BLD CALC: 31 % — LOW (ref 34.5–45)
HGB BLD-MCNC: 10.1 G/DL — LOW (ref 11.5–15.5)
MCHC RBC-ENTMCNC: 32.6 GM/DL — SIGNIFICANT CHANGE UP (ref 32–36)
MCHC RBC-ENTMCNC: 33 PG — SIGNIFICANT CHANGE UP (ref 27–34)
MCV RBC AUTO: 101.3 FL — HIGH (ref 80–100)
NRBC # BLD: 0 /100 WBCS — SIGNIFICANT CHANGE UP (ref 0–0)
PLATELET # BLD AUTO: 192 K/UL — SIGNIFICANT CHANGE UP (ref 150–400)
POTASSIUM SERPL-MCNC: 3.9 MMOL/L — SIGNIFICANT CHANGE UP (ref 3.5–5.3)
POTASSIUM SERPL-SCNC: 3.9 MMOL/L — SIGNIFICANT CHANGE UP (ref 3.5–5.3)
PROT SERPL-MCNC: 6.4 G/DL — SIGNIFICANT CHANGE UP (ref 6–8.3)
RBC # BLD: 3.06 M/UL — LOW (ref 3.8–5.2)
RBC # FLD: 14 % — SIGNIFICANT CHANGE UP (ref 10.3–14.5)
SODIUM SERPL-SCNC: 143 MMOL/L — SIGNIFICANT CHANGE UP (ref 135–145)
WBC # BLD: 8.5 K/UL — SIGNIFICANT CHANGE UP (ref 3.8–10.5)
WBC # FLD AUTO: 8.5 K/UL — SIGNIFICANT CHANGE UP (ref 3.8–10.5)

## 2024-07-19 PROCEDURE — 92610 EVALUATE SWALLOWING FUNCTION: CPT

## 2024-07-19 PROCEDURE — 93970 EXTREMITY STUDY: CPT

## 2024-07-19 PROCEDURE — 84443 ASSAY THYROID STIM HORMONE: CPT

## 2024-07-19 PROCEDURE — 83605 ASSAY OF LACTIC ACID: CPT

## 2024-07-19 PROCEDURE — 82550 ASSAY OF CK (CPK): CPT

## 2024-07-19 PROCEDURE — 87637 SARSCOV2&INF A&B&RSV AMP PRB: CPT

## 2024-07-19 PROCEDURE — 85610 PROTHROMBIN TIME: CPT

## 2024-07-19 PROCEDURE — 85730 THROMBOPLASTIN TIME PARTIAL: CPT

## 2024-07-19 PROCEDURE — 36415 COLL VENOUS BLD VENIPUNCTURE: CPT

## 2024-07-19 PROCEDURE — 99239 HOSP IP/OBS DSCHRG MGMT >30: CPT

## 2024-07-19 PROCEDURE — 85025 COMPLETE CBC W/AUTO DIFF WBC: CPT

## 2024-07-19 PROCEDURE — 99232 SBSQ HOSP IP/OBS MODERATE 35: CPT

## 2024-07-19 PROCEDURE — 81001 URINALYSIS AUTO W/SCOPE: CPT

## 2024-07-19 PROCEDURE — 82607 VITAMIN B-12: CPT

## 2024-07-19 PROCEDURE — 93005 ELECTROCARDIOGRAM TRACING: CPT

## 2024-07-19 PROCEDURE — 84484 ASSAY OF TROPONIN QUANT: CPT

## 2024-07-19 PROCEDURE — 70551 MRI BRAIN STEM W/O DYE: CPT | Mod: MC

## 2024-07-19 PROCEDURE — 97162 PT EVAL MOD COMPLEX 30 MIN: CPT

## 2024-07-19 PROCEDURE — 80053 COMPREHEN METABOLIC PANEL: CPT

## 2024-07-19 PROCEDURE — 99285 EMERGENCY DEPT VISIT HI MDM: CPT | Mod: 25

## 2024-07-19 PROCEDURE — 84436 ASSAY OF TOTAL THYROXINE: CPT

## 2024-07-19 PROCEDURE — 76775 US EXAM ABDO BACK WALL LIM: CPT

## 2024-07-19 PROCEDURE — 87040 BLOOD CULTURE FOR BACTERIA: CPT

## 2024-07-19 PROCEDURE — 71045 X-RAY EXAM CHEST 1 VIEW: CPT

## 2024-07-19 PROCEDURE — 70450 CT HEAD/BRAIN W/O DYE: CPT | Mod: MC

## 2024-07-19 PROCEDURE — 87086 URINE CULTURE/COLONY COUNT: CPT

## 2024-07-19 PROCEDURE — 82746 ASSAY OF FOLIC ACID SERUM: CPT

## 2024-07-19 PROCEDURE — 85027 COMPLETE CBC AUTOMATED: CPT

## 2024-07-19 PROCEDURE — 83880 ASSAY OF NATRIURETIC PEPTIDE: CPT

## 2024-07-19 RX ORDER — POTASSIUM CHLORIDE 600 MG/1
40 TABLET, FILM COATED, EXTENDED RELEASE ORAL ONCE
Refills: 0 | Status: DISCONTINUED | OUTPATIENT
Start: 2024-07-19 | End: 2024-07-19

## 2024-07-19 RX ORDER — POTASSIUM CHLORIDE 600 MG/1
40 TABLET, FILM COATED, EXTENDED RELEASE ORAL ONCE
Refills: 0 | Status: COMPLETED | OUTPATIENT
Start: 2024-07-19 | End: 2024-07-19

## 2024-07-19 RX ORDER — CEFPODOXIME PROXETIL 50 MG/5 ML
1 SUSPENSION, RECONSTITUTED, ORAL (ML) ORAL
Qty: 2 | Refills: 0
Start: 2024-07-19 | End: 2024-07-19

## 2024-07-19 RX ORDER — MIRTAZAPINE 15 MG/1
1 TABLET, FILM COATED ORAL
Qty: 30 | Refills: 0
Start: 2024-07-19 | End: 2024-08-17

## 2024-07-19 RX ADMIN — BUPROPION HYDROCHLORIDE 100 MILLIGRAM(S): 150 TABLET, EXTENDED RELEASE ORAL at 11:14

## 2024-07-19 RX ADMIN — Medication 1 GRAM(S): at 11:05

## 2024-07-19 RX ADMIN — HEPARIN SODIUM 5000 UNIT(S): 50 INJECTION, SOLUTION INTRAVENOUS at 11:05

## 2024-07-19 RX ADMIN — Medication 100 MILLIGRAM(S): at 14:43

## 2024-07-19 RX ADMIN — Medication 100 MILLIGRAM(S): at 17:26

## 2024-07-19 RX ADMIN — PANTOPRAZOLE SODIUM 40 MILLIGRAM(S): 40 INJECTION, POWDER, FOR SOLUTION INTRAVENOUS at 11:05

## 2024-07-19 RX ADMIN — ASPIRIN 81 MILLIGRAM(S): 325 TABLET, FILM COATED ORAL at 11:12

## 2024-07-19 RX ADMIN — Medication 1 GRAM(S): at 17:27

## 2024-07-19 RX ADMIN — Medication 100 MILLIGRAM(S): at 11:04

## 2024-07-19 RX ADMIN — POTASSIUM CHLORIDE 40 MILLIEQUIVALENT(S): 600 TABLET, FILM COATED, EXTENDED RELEASE ORAL at 13:06

## 2024-07-19 RX ADMIN — ESCITALOPRAM OXALATE 5 MILLIGRAM(S): 20 TABLET, FILM COATED ORAL at 11:12

## 2024-07-19 NOTE — DISCHARGE NOTE PROVIDER - NSDCCPCAREPLAN_GEN_ALL_CORE_FT
PRINCIPAL DISCHARGE DIAGNOSIS  Diagnosis: Altered mental status  Assessment and Plan of Treatment: likely due to worsening dementia component of urinary tract infection  -TAKE VANTIN 200mg TWICE DAILY FOR ONE MORE DAY START TOMORROW  -Resume home medications  -Follow up with your primary care physician within the week  -Follow up with pyschiatry for continued medication adjustments

## 2024-07-19 NOTE — DISCHARGE NOTE PROVIDER - CARE PROVIDER_API CALL
Josue Mendoza  Internal Medicine  [address] [place] [address], [address]  [place] [address]  Phone: [phone]  Fax: [phone]  Follow Up Time:

## 2024-07-19 NOTE — DISCHARGE NOTE NURSING/CASE MANAGEMENT/SOCIAL WORK - PATIENT PORTAL LINK FT
You can access the FollowMyHealth Patient Portal offered by [facility] by registering at the following website: [email or web address]. By joining [facility]’s FollowMyHealth portal, you will also be able to view your health information using other applications (apps) compatible with our system.

## 2024-07-19 NOTE — PROGRESS NOTE ADULT - SUBJECTIVE AND OBJECTIVE BOX
Neurology follow up note    GIACOMO [id number]      Interval History:    Patient feels ok no new complaints.    Allergies    penicillins (Unknown)  mold, cheese and wine (Unknown)    Intolerances        MEDICATIONS    ALPRAZolam 0.25 milliGRAM(s) Oral two times a day PRN  aspirin  chewable 81 milliGRAM(s) Oral daily  atorvastatin 40 milliGRAM(s) Oral at bedtime  buPROPion SR (12-Hour) 100 milliGRAM(s) Oral <User Schedule>  cefTRIAXone   IVPB 1000 milliGRAM(s) IV Intermittent every 24 hours  dextrose 5% + sodium chloride 0.9%. 1000 milliLiter(s) IV Continuous <Continuous>  escitalopram 5 milliGRAM(s) Oral daily  gabapentin 100 milliGRAM(s) Oral two times a day  heparin   Injectable 5000 Unit(s) SubCutaneous every 12 hours  melatonin 5 milliGRAM(s) Oral at bedtime  mirtazapine 7.5 milliGRAM(s) Oral at bedtime PRN  pantoprazole    Tablet 40 milliGRAM(s) Oral before breakfast  sucralfate 1 Gram(s) Oral two times a day              Vital Signs Last 24 Hrs  T(C): 36.4 (19 Jul 2024 05:31), Max: 36.7 (18 Jul 2024 19:44)  T(F): 97.5 (19 Jul 2024 05:31), Max: 98 (18 Jul 2024 19:44)  HR: 65 (19 Jul 2024 05:31) (65 - 91)  BP: 132/73 (19 Jul 2024 05:31) (123/81 - 134/76)  BP(mean): --  RR: 17 (19 Jul 2024 05:31) (17 - 17)  SpO2: 97% (19 Jul 2024 05:31) (92% - 97%)    Parameters below as of 19 Jul 2024 05:31  Patient On (Oxygen Delivery Method): nasal cannula  O2 Flow (L/min): 2      REVIEW OF SYSTEMS:  Slightly limited.  CONSTITUTIONAL:  She denies fever, chills, or night sweats.  HEAD:  No headache.  EYES:  No double vision or blurry vision.  EARS:  No ringing in the ears.  NECK:  No neck pain.  CARDIOVASCULAR:  No chest pain.  RESPIRATORY:  No shortness of breath.  ABDOMEN:  No nausea, vomiting, or abdominal pain.  MUSCULOSKELETAL:  Occasional joint pain.    PHYSICAL EXAMINATION:  HEAD:  Normocephalic, atraumatic.  EYES:  No scleral icterus.  EARS:  Slightly hard of hearing.  NECK:  Supple.  CARDIOVASCULAR:  S1 and S2 heard.  RESPIRATORY:  Air entry bilaterally.  ABDOMEN:  Soft, nontender.  EXTREMITIES:  No clubbing or cyanosis were noted.    NEUROLOGIC:  The patient was awake, alert.  [facility].  Was able to tell 2 daughters' name, but forgot the third one.  Was able to name simple objects.  Extraocular movements were intact.  The patient has on and off blink to visual threat.  Was able to make out number fingers.  Speech was fluent.  Smile symmetric.  Motor, had decreased range of motion of bilateral shoulders, suspect arthritis, would say overall 4/5, bilateral lower extremities with 3-/5.  Sensory, bilateral upper and lower intact to light touch.      LABS:  CBC Full  -  ( 18 Jul 2024 16:00 )  WBC Count : 9.70 K/uL  RBC Count : 3.21 M/uL  Hemoglobin : 10.2 g/dL  Hematocrit : 31.9 %  Platelet Count - Automated : 219 K/uL  Mean Cell Volume : 99.4 fl  Mean Cell Hemoglobin : 31.8 pg  Mean Cell Hemoglobin Concentration : 32.0 gm/dL  Auto Neutrophil # : x  Auto Lymphocyte # : x  Auto Monocyte # : x  Auto Eosinophil # : x  Auto Basophil # : x  Auto Neutrophil % : x  Auto Lymphocyte % : x  Auto Monocyte % : x  Auto Eosinophil % : x  Auto Basophil % : x    Urinalysis Basic - ( 18 Jul 2024 16:00 )    Color: x / Appearance: x / SG: x / pH: x  Gluc: 117 mg/dL / Ketone: x  / Bili: x / Urobili: x   Blood: x / Protein: x / Nitrite: x   Leuk Esterase: x / RBC: x / WBC x   Sq Epi: x / Non Sq Epi: x / Bacteria: x      07-18    144  |  108  |  33<H>  ----------------------------<  117<H>  3.4<L>   |  31  |  1.30    Ca    9.0      18 Jul 2024 16:00    TPro  6.5  /  Alb  2.8<L>  /  TBili  0.4  /  DBili  x   /  AST  26  /  ALT  18  /  AlkPhos  68  07-18    Hemoglobin A1C:     LIVER FUNCTIONS - ( 18 Jul 2024 16:00 )  Alb: 2.8 g/dL / Pro: 6.5 g/dL / ALK PHOS: 68 U/L / ALT: 18 U/L / AST: 26 U/L / GGT: x           Vitamin B12   PT/INR - ( 17 Jul 2024 18:40 )   PT: 11.6 sec;   INR: 0.99 ratio         PTT - ( 17 Jul 2024 18:40 )  PTT:34.2 sec      RADIOLOGY  INTERPRETATION:  EXAM: MRI OF THE BRAIN WITHOUT CONTRAST    HISTORY: Altered mental status for a few days    TECHNIQUE: Multi-planar multi-sequential MR imaging of the brain was   performed without intravenous contrast.    COMPARISON: CT of the head July 17, 2024, MRI of the brain March 13, 2024.    FINDINGS:    Punctate focus of diffusion restriction in the periventricular white   matter of theleft parietal lobe, image 16 of series 3, likely acute   infarct. Foci of increased T2/FLAIR signal throughout the deep and   periventricular white matter as well as within the yuli, likely related   to chronic small vessel disease. Small chronic infarcts in the left   cerebral hemisphere. Foci of susceptibility artifact in the bilateral   cerebellar hemispheres and in the high right parietal lobe, likely   sequela of prior microhemorrhages and/or amyloid angiopathy. Parenchymal   volume loss resulting in a prominent ex vacuo dilatation appearance of   the bilateral ventricles. The visualized extra axial spaces and basal   cisterns are within normal limits. No midline shift or mass effect   present.    The craniocervical junction is within normal limits. The sella is largely   CSF filled. The major intracranial vessels demonstrate the expected   signal void related to vascular flow. The paranasal sinuses are well   aerated. The mastoid air cells are well aerated. The visualized orbits   are status post cataract surgery.      IMPRESSION:    1.  Probable punctate acute infarct in the left parietal lobe   periventricular white matter.  2.  Chronic ischemic changes as discussed above.    ANALYSIS AND PLAN:  This is a 89-year-old with episode of agitation, altered mental status.    1.For episode of agitation and altered mental status, questionable metabolic encephalopathy versus possibly underlying psychological dementia, agitation, behavioral issues.  2.We will recommend to rule out other metabolic etiologies of altered mental status.  Check basic chemistries.  3.For history of TIA, CVA, the patient had a history of bleeding from Eliquis.  She is on aspirin.  Also this is for DVT as well.  4.For history of hyperlipidemia, continue the patient on statin.  5.For history of depression, continue on psychiatric medication.  6.For history of hemifacial spasms, continue the patient on gabapentin.  7.Psychiatry followup, adjustment of medication for agitation as needed.  8 MRI noted suspect this is an incidental finding spoke to dtr yesterday    .I spoke with daughter, Johana, at [phone].  She understands the reason and thought process.    47minutes of time was spent with patient, plan of care, reviewing data, speaking to multidisciplinary healthcare team with greater than 50% of time in counseling, care and coordination.    PATIENT HAS NOT YET BEEN SEEN AND EXAMINED TODAY. NOTE AND CHART REVIEWED IN AM AND EXAM FORM PREVIOUS.  ONCE PATIENT SEEN, CHART WILL BE UPDATE AT PRESENT NOTE IS INCOMPLETE     Neurology follow up note    GIACOMO [id number]      Interval History:    Patient resting in bed     Allergies    penicillins (Unknown)  mold, cheese and wine (Unknown)    Intolerances        MEDICATIONS    ALPRAZolam 0.25 milliGRAM(s) Oral two times a day PRN  aspirin  chewable 81 milliGRAM(s) Oral daily  atorvastatin 40 milliGRAM(s) Oral at bedtime  buPROPion SR (12-Hour) 100 milliGRAM(s) Oral <User Schedule>  cefTRIAXone   IVPB 1000 milliGRAM(s) IV Intermittent every 24 hours  dextrose 5% + sodium chloride 0.9%. 1000 milliLiter(s) IV Continuous <Continuous>  escitalopram 5 milliGRAM(s) Oral daily  gabapentin 100 milliGRAM(s) Oral two times a day  heparin   Injectable 5000 Unit(s) SubCutaneous every 12 hours  melatonin 5 milliGRAM(s) Oral at bedtime  mirtazapine 7.5 milliGRAM(s) Oral at bedtime PRN  pantoprazole    Tablet 40 milliGRAM(s) Oral before breakfast  sucralfate 1 Gram(s) Oral two times a day              Vital Signs Last 24 Hrs  T(C): 36.4 (19 Jul 2024 05:31), Max: 36.7 (18 Jul 2024 19:44)  T(F): 97.5 (19 Jul 2024 05:31), Max: 98 (18 Jul 2024 19:44)  HR: 65 (19 Jul 2024 05:31) (65 - 91)  BP: 132/73 (19 Jul 2024 05:31) (123/81 - 134/76)  BP(mean): --  RR: 17 (19 Jul 2024 05:31) (17 - 17)  SpO2: 97% (19 Jul 2024 05:31) (92% - 97%)    Parameters below as of 19 Jul 2024 05:31  Patient On (Oxygen Delivery Method): nasal cannula  O2 Flow (L/min): 2      REVIEW OF SYSTEMS:  Slightly limited.  CONSTITUTIONAL:  She denies fever, chills, or night sweats.  HEAD:  No headache.  EYES:  No double vision or blurry vision.  EARS:  No ringing in the ears.  NECK:  No neck pain.  CARDIOVASCULAR:  No chest pain.  RESPIRATORY:  No shortness of breath.  ABDOMEN:  No nausea, vomiting, or abdominal pain.  MUSCULOSKELETAL:  Occasional joint pain.    PHYSICAL EXAMINATION:  HEAD:  Normocephalic, atraumatic.  EYES:  No scleral icterus.  EARS:  Slightly hard of hearing.  NECK:  Supple.  CARDIOVASCULAR:  S1 and S2 heard.  RESPIRATORY:  Air entry bilaterally.  ABDOMEN:  Soft, nontender.  EXTREMITIES:  No clubbing or cyanosis were noted.    NEUROLOGIC:  The patient was awake.   Location [facility].  Was able to tell 2 daughters' name, but forgot the third one.  Was able to name simple objects.  Extraocular movements were intact.  The patient has on and off blink to visual threat.  Was able to make out number fingers.  Speech was fluent.  Smile symmetric.  Motor, had decreased range of motion of bilateral shoulders, suspect arthritis, would say overall 4/5, bilateral lower extremities with 3-/5.  Sensory, bilateral upper and lower intact to light touch.  did get agitated today       LABS:  CBC Full  -  ( 18 Jul 2024 16:00 )  WBC Count : 9.70 K/uL  RBC Count : 3.21 M/uL  Hemoglobin : 10.2 g/dL  Hematocrit : 31.9 %  Platelet Count - Automated : 219 K/uL  Mean Cell Volume : 99.4 fl  Mean Cell Hemoglobin : 31.8 pg  Mean Cell Hemoglobin Concentration : 32.0 gm/dL  Auto Neutrophil # : x  Auto Lymphocyte # : x  Auto Monocyte # : x  Auto Eosinophil # : x  Auto Basophil # : x  Auto Neutrophil % : x  Auto Lymphocyte % : x  Auto Monocyte % : x  Auto Eosinophil % : x  Auto Basophil % : x    Urinalysis Basic - ( 18 Jul 2024 16:00 )    Color: x / Appearance: x / SG: x / pH: x  Gluc: 117 mg/dL / Ketone: x  / Bili: x / Urobili: x   Blood: x / Protein: x / Nitrite: x   Leuk Esterase: x / RBC: x / WBC x   Sq Epi: x / Non Sq Epi: x / Bacteria: x      07-18    144  |  108  |  33<H>  ----------------------------<  117<H>  3.4<L>   |  31  |  1.30    Ca    9.0      18 Jul 2024 16:00    TPro  6.5  /  Alb  2.8<L>  /  TBili  0.4  /  DBili  x   /  AST  26  /  ALT  18  /  AlkPhos  68  07-18    Hemoglobin A1C:     LIVER FUNCTIONS - ( 18 Jul 2024 16:00 )  Alb: 2.8 g/dL / Pro: 6.5 g/dL / ALK PHOS: 68 U/L / ALT: 18 U/L / AST: 26 U/L / GGT: x           Vitamin B12   PT/INR - ( 17 Jul 2024 18:40 )   PT: 11.6 sec;   INR: 0.99 ratio         PTT - ( 17 Jul 2024 18:40 )  PTT:34.2 sec      RADIOLOGY  INTERPRETATION:  EXAM: MRI OF THE BRAIN WITHOUT CONTRAST    HISTORY: Altered mental status for a few days    TECHNIQUE: Multi-planar multi-sequential MR imaging of the brain was   performed without intravenous contrast.    COMPARISON: CT of the head July 17, 2024, MRI of the brain March 13, 2024.    FINDINGS:    Punctate focus of diffusion restriction in the periventricular white   matter of theleft parietal lobe, image 16 of series 3, likely acute   infarct. Foci of increased T2/FLAIR signal throughout the deep and   periventricular white matter as well as within the yuli, likely related   to chronic small vessel disease. Small chronic infarcts in the left   cerebral hemisphere. Foci of susceptibility artifact in the bilateral   cerebellar hemispheres and in the high right parietal lobe, likely   sequela of prior microhemorrhages and/or amyloid angiopathy. Parenchymal   volume loss resulting in a prominent ex vacuo dilatation appearance of   the bilateral ventricles. The visualized extra axial spaces and basal   cisterns are within normal limits. No midline shift or mass effect   present.    The craniocervical junction is within normal limits. The sella is largely   CSF filled. The major intracranial vessels demonstrate the expected   signal void related to vascular flow. The paranasal sinuses are well   aerated. The mastoid air cells are well aerated. The visualized orbits   are status post cataract surgery.      IMPRESSION:    1.  Probable punctate acute infarct in the left parietal lobe   periventricular white matter.  2.  Chronic ischemic changes as discussed above.    ANALYSIS AND PLAN:  This is a 89-year-old with episode of agitation, altered mental status.    1.For episode of agitation and altered mental status, questionable metabolic encephalopathy versus possibly underlying psychological dementia, agitation, behavioral issues.  2.We will recommend to rule out other metabolic etiologies of altered mental status.  Check basic chemistries.  3.For history of TIA, CVA, the patient had a history of bleeding from Eliquis.  She is on aspirin.  Also this is for DVT as well.  4.For history of hyperlipidemia, continue the patient on statin.  5.For history of depression, continue on psychiatric medication.  6.For history of hemifacial spasms, continue the patient on gabapentin.  7.Psychiatry followup, adjustment of medication for agitation as needed.  8 MRI noted suspect this is an incidental finding spoke to dtr   9 psych follow up   I spoke with daughter, Johana, at [phone].  She understands the reason and thought process.    44minutes of time was spent with patient, plan of care, reviewing data, speaking to multidisciplinary healthcare team with greater than 50% of time in counseling, care and coordination.

## 2024-07-19 NOTE — PROGRESS NOTE ADULT - ASSESSMENT
89F with PMH dementia, DVT s/p IVC filter, hx of intracranial hemorrhage, CKD 3, hx of TIA presents with acute agitation metabolic encephalopathy likely 2/2 UTI.     #Metabolic encephalopathy 2/2 UTI  #Dementia   mental status appears back to baseline   -Continue IV Rocephin  -Stop Azactam   -Blood cultures x 2 no growth  -UCx reviewed  - SLP: easy to chew   -MRI brain reviewed with daughter and neuro - likely incidental finding  -Continue  home xanax 0.25mg BID prn   -ID consulted Dr Ramey recs appreciated  -Continue Wellbutrin, Lexapro  -Continue Xanax, Remeron  -Psych Dr Silver consulted     #elevated trop   -likely secondary to demand ischemia  -TTE performed  -Cardio following, recs appreciated    #MARYLOU on CKD 3  -Encourage po intake   -Avoid nephrotoxic medications  -Follow up AM BMP    #Hypokalemia  -Replete KCl 40meq  -Follow up AM BMP    #HLD  #Hx of TIA  -Continue ASA, Lipitor    DVT proph: heparin 5000 BID   PT consult - at baseline no skilled PT needs 89F with PMH dementia, DVT s/p IVC filter, hx of intracranial hemorrhage, CKD 3, hx of TIA presents with acute agitation metabolic encephalopathy likely 2/2 UTI.     #Metabolic encephalopathy 2/2 UTI  #Dementia   mental status appears back to baseline   -Continue IV Rocephin  -Stop Azactam   -Blood cultures x 2 no growth  -UCx reviewed  - SLP: easy to chew   -MRI brain reviewed with daughter and neuro - likely incidental finding  -Continue  home xanax 0.25mg BID prn   -ID consulted Dr Mkcenzie recs appreciated  -Continue Wellbutrin, Lexapro  -Continue Xanax, Remeron  -Psych Dr Silver consulted     #elevated trop   -likely secondary to demand ischemia  -TTE performed  -Cardio following, recs appreciated    #MARYLOU on CKD 3  -Encourage po intake   -Avoid nephrotoxic medications  -Follow up AM BMP    #Hypokalemia  -Replete KCl 40meq  -Follow up AM BMP    #HLD  #Hx of TIA  -Continue ASA, Lipitor    DVT proph: heparin 5000 BID   PT consult - at baseline no skilled PT needs      discussed with daughter she would like patient to return home today - feels mental status is worsening in hospitalization. discussed with ID dr mckenzie will switch to PO abx for 1 more day to complete 3 day course   declining psych eval for med adjustment at this time as she would prefer to get patient home today

## 2024-07-19 NOTE — DISCHARGE NOTE PROVIDER - HOSPITAL COURSE
PCP: Dr Anastasia Handy
[id number] with PMH dementia, DVT s/p IVC filter, hx of intracranial hemorrhage, CKD 3, hx of TIA presents with acute agitation metabolic encephalopathy likely 2/2 UTI.   Metabolic encephalopathy 2/2 UTI  Dementia   Continue IV Rocephin - discussed with ID Dr Ramey switch to PO Vantin BID x 1 more day (total 3 days)  Blood cultures x 2 no growth  MRI brain reviewed with daughter and neuro - likely incidental finding    Discussed with daughter she would like patient to return home reports worsening mental status during hospitalization.   discussed psych eval and possibly switching medications as she would like patient to stop xanax and start something else - reports she will follow up outpatient instead as she would like patient home today  Elevated trop,  likely secondary to demand ischemia  Cardio following, recs appreciated    Found to have CVA - incidental finding per neuro. Continue ASA, Lipitor. PT eval - no needs.

## 2024-07-19 NOTE — PROGRESS NOTE ADULT - ATTENDING COMMENTS
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 Patient showing improvement now AAOx3 and agreeable to exam and interview. Troponins elevated likely 2/2 dehydration related demand ischemia.    - confused and aggressive during my exam  - Troponin (+) 2/2 demand ischemia.  Doubt ACS  - no need to repeat echocardiogram  - Encourage gentle hydration; oral hydration preferred if possible  - BP stable currently

## 2024-07-19 NOTE — PROGRESS NOTE ADULT - SUBJECTIVE AND OBJECTIVE BOX
[facility] -- Dyllan Valles, Morro Rivers Savella, , Liz Dos Santos  Office # [phone]    Follow Up:      Subjective/Observations:     REVIEW OF SYSTEMS: All other review of systems is negative unless indicated above  PAST MEDICAL & SURGICAL HISTORY:  Hyperlipidemia      Hypertension      Fall      Hip fracture      Anxiety      CVA (cerebral vascular accident)      PUD (peptic ulcer disease)      GI bleed      SDH (subdural hematoma)      Dementia      S/P hysterectomy      S/P hip replacement  left        MEDICATIONS  (STANDING):  aspirin  chewable 81 milliGRAM(s) Oral daily  atorvastatin 40 milliGRAM(s) Oral at bedtime  buPROPion SR (12-Hour) 100 milliGRAM(s) Oral <User Schedule>  cefTRIAXone   IVPB 1000 milliGRAM(s) IV Intermittent every 24 hours  dextrose 5% + sodium chloride 0.9%. 1000 milliLiter(s) (75 mL/Hr) IV Continuous <Continuous>  escitalopram 5 milliGRAM(s) Oral daily  gabapentin 100 milliGRAM(s) Oral two times a day  heparin   Injectable 5000 Unit(s) SubCutaneous every 12 hours  melatonin 5 milliGRAM(s) Oral at bedtime  pantoprazole    Tablet 40 milliGRAM(s) Oral before breakfast  sucralfate 1 Gram(s) Oral two times a day    MEDICATIONS  (PRN):  ALPRAZolam 0.25 milliGRAM(s) Oral two times a day PRN Agitaion/Anxiety  mirtazapine 7.5 milliGRAM(s) Oral at bedtime PRN sleep    Allergies    penicillins (Unknown)  mold, cheese and wine (Unknown)    Intolerances      Vital Signs Last 24 Hrs  T(C): 36.4 (19 Jul 2024 05:31), Max: 36.7 (18 Jul 2024 19:44)  T(F): 97.5 (19 Jul 2024 05:31), Max: 98 (18 Jul 2024 19:44)  HR: 65 (19 Jul 2024 05:31) (65 - 91)  BP: 132/73 (19 Jul 2024 05:31) (123/81 - 134/76)  BP(mean): --  RR: 17 (19 Jul 2024 05:31) (17 - 17)  SpO2: 97% (19 Jul 2024 05:31) (92% - 97%)    Parameters below as of 19 Jul 2024 05:31  Patient On (Oxygen Delivery Method): nasal cannula  O2 Flow (L/min): 2    I&O's Summary    18 Jul 2024 07:01  -  19 Jul 2024 06:17  --------------------------------------------------------  IN: 0 mL / OUT: 150 mL / NET: -150 mL        PHYSICAL EXAM:  TELE:   Constitutional: NAD, awake and alert, well-developed  HEENT: Moist Mucous Membranes, Anicteric  Pulmonary: Non-labored, breath sounds are clear bilaterally, No wheezing, rales or rhonchi  Cardiovascular: Regular, S1 and S2, No murmurs, rubs, gallops or clicks  Gastrointestinal: Bowel Sounds present, soft, nontender.   Lymph: No peripheral edema. No lymphadenopathy.  Skin: No visible rashes or ulcers.  Psych:  Mood & affect appropriate  LABS: All Labs Reviewed:                        10.2   9.70  )-----------( 219      ( 18 Jul 2024 16:00 )             31.9                         12.1   11.23 )-----------( 276      ( 17 Jul 2024 18:40 )             37.9     18 Jul 2024 16:00    144    |  108    |  33     ----------------------------<  117    3.4     |  31     |  1.30   17 Jul 2024 18:40    142    |  104    |  34     ----------------------------<  113    4.4     |  30     |  1.80     Ca    9.0        18 Jul 2024 16:00  Ca    9.6        17 Jul 2024 18:40    TPro  6.5    /  Alb  2.8    /  TBili  0.4    /  DBili  x      /  AST  26     /  ALT  18     /  AlkPhos  68     18 Jul 2024 16:00  TPro  8.1    /  Alb  3.2    /  TBili  0.6    /  DBili  x      /  AST  39     /  ALT  22     /  AlkPhos  81     17 Jul 2024 18:40    PT/INR - ( 17 Jul 2024 18:40 )   PT: 11.6 sec;   INR: 0.99 ratio         PTT - ( 17 Jul 2024 18:40 )  PTT:34.2 sec  CARDIAC MARKERS ( 17 Jul 2024 18:40 )  x     / x     / 92 U/L / x     / x          12 Lead ECG:   Ventricular Rate 81 BPM    Atrial Rate 81 BPM    P-R Interval 198 ms    QRS Duration 74 ms    Q-T Interval 416 ms    QTC Calculation(Bazett) 483 ms    P Axis 17 degrees    R Axis -45 degrees    T Axis -1 degrees    Diagnosis Line Normal sinus rhythm  Left anterior fascicular block  Cannot rule out Inferior infarct (masked by fascicular block?) , age undetermined    Confirmed by SERENITY RIVERS ([id number]) on 7/18/2024 7:25:36 AM (07-17-24 @ 23:59)      TRANSTHORACIC ECHOCARDIOGRAM REPORT  ________________________________________________________________________________                                      _______       Pt. Name:       GIACOMO WASHINGTON Study Date:    3/14/2024  MRN:            [id number]  YOB: 1929  Accession #:    [id number]   Age:           89 years  Account#:       [id number]  Gender:        F  Heart Rate:                 Height:        ( )  Rhythm:                     Weight:        130.07 lb (59.00 kg)  Blood Pressure: 124/64 mmHg BSA/BMI:       1.52 m² /  ________________________________________________________________________________________  Referring Physician:    [id number] Darren Erickson  Interpreting Physician: Carlos Rascon  Primary Sonographer:    Jaziel Onofre    CPT:               ECHO TTE WO CON COMP W DOPP - 19986.m  Indication(s):     Cerebral infarction due to unspecified occlusion or stenosis                     of unspecified cerebral artery - I63.50  Procedure:         Transthoracic echocardiogram with 2-D, M-mode and complete                     spectral and color flow Doppler.  Ordering Location: TELE  Admission Status:  Inpatient  Study Information: Image quality for this study is good.    _______________________________________________________________________________________     CONCLUSIONS:      1. Left ventricular wall thickness is mildly increased. Left ventricular systolic function is normal with an ejection fraction visually estimated at 65 to 70 %.   2. The left atrium is mildly dilated.    ________________________________________________________________________________________  FINDINGS:     Left Ventricle:  The left ventricular cavity is normal in size. Left ventricular wall thickness is mildly increased. Left ventricular systolic function is normal with an ejection fraction visually estimated at 65 to 70%. There is mild (grade 1) left ventricular diastolic dysfunction.     Right Ventricle:  The right ventricular cavity is normal in size and probably normal systolic function. Tricuspid annular plane systolic excursion (TAPSE) is 2.0 cm (normal >=1.7 cm).     Left Atrium:  The left atrium is mildly dilated.     Right Atrium:  The right atrium is normal in size.     Aortic Valve:  There is fibrocalcific aortic valve sclerosis without stenosis. There is trace aortic regurgitation.     Mitral Valve:  Structurally normal mitral valve with normal leaflet excursion. There is trace mitral regurgitation.     Tricuspid Valve:  There is trace tricuspid regurgitation. There is insufficient tricuspid regurgitation detected to calculate pulmonary artery systolic pressure.     Pulmonic Valve:  The pulmonic valve was not well visualized. There is mild pulmonic regurgitation.     Aorta:  The aortic root at the sinuses of Valsalva is normal in size, measuring 3.30 cm (indexed 2.18 cm/m²). The ascending aorta diameter is normal in size, measuring 2.90 cm (indexed 1.91 cm/m²).     Pericardium:  There is a trace pericardial effusion.     Systemic Veins:  The inferior venacava is normal in size measuring 1.72 cm in diameter, (normal <2.1cm) with normal inspiratory collapse (normal >50%) consistent with normal right atrial pressure (~3, range 0-5mmHg).  ____________________________________________________________________  QUANTITATIVE DATA:  Left Ventricle Measurements: (Indexed to BSA)     IVSd (2D):   1.2 cm  LVPWd (2D):  1.1 cm  LVIDd (2D):  2.8 cm  LVIDs (2D):  1.6 cm  LV Mass:     94 g   61.7 g/m²  Visualized LV EF%: 65 to 70%     MV E Vmax:    1.35 m/s  MV A Vmax:    1.79 m/s  MV E/A:       0.75  e' lateral:   7.83 cm/s  e' medial:    11.60 cm/s  E/e' lateral: 17.24  E/e' medial:  11.64  E/e' Average: 13.90  MV DT:        214 msec    Aorta Measurements: (Normal range) (Indexed to BSA)     Sinuses of Valsalva: 3.30 cm (2.7 - 3.3 cm)  Ao Asc prox:         2.90 cm       Left Atrium Measurements: (Indexed to BSA)  LA Diam 2D: 3.30 cm    Right Ventricle Measurements:     TAPSE: 2.0 cm    LVOT / RVOT/ Qp/Qs Data: (Indexed to BSA)  LVOT Diameter: 1.70 cm    Mitral Valve Measurements:     MV Vmax:      2.01 m/s  MV VTI, daniella   0.347 m  MV Mean Grad: 4.0 mmHg  MV Peak Grad: 16.2 mmHg  MV E Vmax:    1.4 m/s  MV A Vmax:    1.8 m/s  MV E/A:       0.8       Tricuspid Valve Measurements:     RA Pressure: 3 mmHg    ________________________________________________________________________________________  Electronically signed on 3/14/2024 at 12:17:16 PM by Carlos Rascon         *** Final ***      [facility] -- Dyllan Valles, Morro Rivers Savella, , Liz Dos Santos  Office # [phone]    Follow Up:  TIA/CVA, AMS    Subjective/Observations: Awake but confused and disoriented.  Not following commands.  Not answering questions appropriately.  Not in distress, though, NC at 5L/min in use.    REVIEW OF SYSTEMS: All other review of systems is negative unless indicated above  PAST MEDICAL & SURGICAL HISTORY:  Hyperlipidemia      Hypertension      Fall      Hip fracture      Anxiety      CVA (cerebral vascular accident)      PUD (peptic ulcer disease)      GI bleed      SDH (subdural hematoma)      Dementia      S/P hysterectomy      S/P hip replacement  left    MEDICATIONS  (STANDING):  aspirin  chewable 81 milliGRAM(s) Oral daily  atorvastatin 40 milliGRAM(s) Oral at bedtime  buPROPion SR (12-Hour) 100 milliGRAM(s) Oral <User Schedule>  cefTRIAXone   IVPB 1000 milliGRAM(s) IV Intermittent every 24 hours  dextrose 5% + sodium chloride 0.9%. 1000 milliLiter(s) (75 mL/Hr) IV Continuous <Continuous>  escitalopram 5 milliGRAM(s) Oral daily  gabapentin 100 milliGRAM(s) Oral two times a day  heparin   Injectable 5000 Unit(s) SubCutaneous every 12 hours  melatonin 5 milliGRAM(s) Oral at bedtime  pantoprazole    Tablet 40 milliGRAM(s) Oral before breakfast  sucralfate 1 Gram(s) Oral two times a day    MEDICATIONS  (PRN):  ALPRAZolam 0.25 milliGRAM(s) Oral two times a day PRN Agitaion/Anxiety  mirtazapine 7.5 milliGRAM(s) Oral at bedtime PRN sleep    Allergies    penicillins (Unknown)  mold, cheese and wine (Unknown)    Intolerances      Vital Signs Last 24 Hrs  T(C): 36.4 (19 Jul 2024 05:31), Max: 36.7 (18 Jul 2024 19:44)  T(F): 97.5 (19 Jul 2024 05:31), Max: 98 (18 Jul 2024 19:44)  HR: 65 (19 Jul 2024 05:31) (65 - 91)  BP: 132/73 (19 Jul 2024 05:31) (123/81 - 134/76)  BP(mean): --  RR: 17 (19 Jul 2024 05:31) (17 - 17)  SpO2: 97% (19 Jul 2024 05:31) (92% - 97%)    Parameters below as of 19 Jul 2024 05:31  Patient On (Oxygen Delivery Method): nasal cannula  O2 Flow (L/min): 2    I&O's Summary    18 Jul 2024 07:01  -  19 Jul 2024 06:17  --------------------------------------------------------  IN: 0 mL / OUT: 150 mL / NET: -150 mL     PHYSICAL EXAM:  TELE: Not on tele  Constitutional: NAD, awake and alert, frail  HEENT: Moist Mucous Membranes, Anicteric  Pulmonary: Non-labored, breath sounds are clear but diminished bilaterally, No wheezing, rales or rhonchi  Cardiovascular: Regular, S1 and S2, No murmurs, rubs, gallops or clicks  Gastrointestinal: Bowel Sounds present, soft, nontender.   Lymph: No peripheral edema. No lymphadenopathy.  Skin: No visible rashes or ulcers.  Psych:  Mood & affect: confused and disoriented  LABS: All Labs Reviewed:                        10.2   9.70  )-----------( 219      ( 18 Jul 2024 16:00 )             31.9                         12.1   11.23 )-----------( 276      ( 17 Jul 2024 18:40 )             37.9     18 Jul 2024 16:00    144    |  108    |  33     ----------------------------<  117    3.4     |  31     |  1.30   17 Jul 2024 18:40    142    |  104    |  34     ----------------------------<  113    4.4     |  30     |  1.80     Ca    9.0        18 Jul 2024 16:00  Ca    9.6        17 Jul 2024 18:40    TPro  6.5    /  Alb  2.8    /  TBili  0.4    /  DBili  x      /  AST  26     /  ALT  18     /  AlkPhos  68     18 Jul 2024 16:00  TPro  8.1    /  Alb  3.2    /  TBili  0.6    /  DBili  x      /  AST  39     /  ALT  22     /  AlkPhos  81     17 Jul 2024 18:40    PT/INR - ( 17 Jul 2024 18:40 )   PT: 11.6 sec;   INR: 0.99 ratio      PTT - ( 17 Jul 2024 18:40 )  PTT:34.2 sec  CARDIAC MARKERS ( 17 Jul 2024 18:40 )  x     / x     / 92 U/L / x     / x        12 Lead ECG:   Ventricular Rate 81 BPM    Atrial Rate 81 BPM    P-R Interval 198 ms    QRS Duration 74 ms    Q-T Interval 416 ms    QTC Calculation(Bazett) 483 ms    P Axis 17 degrees    R Axis -45 degrees    T Axis -1 degrees    Diagnosis Line Normal sinus rhythm  Left anterior fascicular block  Cannot rule out Inferior infarct (masked by fascicular block?) , age undetermined    Confirmed by SERENITY RIVERS ([id number]) on 7/18/2024 7:25:36 AM (07-17-24 @ 23:59)    TRANSTHORACIC ECHOCARDIOGRAM REPORT  ________________________________________________________________________________                                      _______  Pt. Name:       GIACOMO WASHINGTON Study Date:    3/14/2024  MRN:            [id number]  YOB: 1929  Accession #:    [id number]   Age:           89 years  Account#:       [id number]  Gender:        F  Heart Rate:                 Height:        ( )  Rhythm:                     Weight:        130.07 lb (59.00 kg)  Blood Pressure: 124/64 mmHg BSA/BMI:       1.52 m² /  ________________________________________________________________________________________  Referring Physician:    [id number] Darren Erickson  Interpreting Physician: Carlos Rascon  Primary Sonographer:    Jaziel Onofre    CPT:               ECHO TTE WO CON COMP W DOPP - 70635.m  Indication(s):     Cerebral infarction due to unspecified occlusion or stenosis                     of unspecified cerebral artery - I63.50  Procedure:         Transthoracic echocardiogram with 2-D, M-mode and complete                     spectral and color flow Doppler.  Ordering Location: TELE  Admission Status:  Inpatient  Study Information: Image quality for this study is good.    _______________________________________________________________________________________     CONCLUSIONS:      1. Left ventricular wall thickness is mildly increased. Left ventricular systolic function is normal with an ejection fraction visually estimated at 65 to 70 %.   2. The left atrium is mildly dilated.    ________________________________________________________________________________________  FINDINGS:     Left Ventricle:  The left ventricular cavity is normal in size. Left ventricular wall thickness is mildly increased. Left ventricular systolic function is normal with an ejection fraction visually estimated at 65 to 70%. There is mild (grade 1) left ventricular diastolic dysfunction.     Right Ventricle:  The right ventricular cavity is normal in size and probably normal systolic function. Tricuspid annular plane systolic excursion (TAPSE) is 2.0 cm (normal >=1.7 cm).     Left Atrium:  The left atrium is mildly dilated.     Right Atrium:  The right atrium is normal in size.     Aortic Valve:  There is fibrocalcific aortic valve sclerosis without stenosis. There is trace aortic regurgitation.     Mitral Valve:  Structurally normal mitral valve with normal leaflet excursion. There is trace mitral regurgitation.     Tricuspid Valve:  There is trace tricuspid regurgitation. There is insufficient tricuspid regurgitation detected to calculate pulmonary artery systolic pressure.     Pulmonic Valve:  The pulmonic valve was not well visualized. There is mild pulmonic regurgitation.     Aorta:  The aortic root at the sinuses of Valsalva is normal in size, measuring 3.30 cm (indexed 2.18 cm/m²). The ascending aorta diameter is normal in size, measuring 2.90 cm (indexed 1.91 cm/m²).     Pericardium:  There is a trace pericardial effusion.     Systemic Veins:  The inferior vena cava is normal in size measuring 1.72 cm in diameter, (normal <2.1cm) with normal inspiratory collapse (normal >50%) consistent with normal right atrial pressure (~3, range 0-5mmHg).  ____________________________________________________________________  QUANTITATIVE DATA:  Left Ventricle Measurements: (Indexed to BSA)     IVSd (2D):   1.2 cm  LVPWd (2D):  1.1 cm  LVIDd (2D):  2.8 cm  LVIDs (2D):  1.6 cm  LV Mass:     94 g   61.7 g/m²  Visualized LV EF%: 65 to 70%     MV E Vmax:    1.35 m/s  MV A Vmax:    1.79 m/s  MV E/A:       0.75  e' lateral:   7.83 cm/s  e' medial:    11.60 cm/s  E/e' lateral: 17.24  E/e' medial:  11.64  E/e' Average: 13.90  MV DT:        214 msec    Aorta Measurements: (Normal range) (Indexed to BSA)     Sinuses of Valsalva: 3.30 cm (2.7 - 3.3 cm)  Ao Asc prox:         2.90 cm       Left Atrium Measurements: (Indexed to BSA)  LA Diam 2D: 3.30 cm    Right Ventricle Measurements:     TAPSE: 2.0 cm    LVOT / RVOT/ Qp/Qs Data: (Indexed to BSA)  LVOT Diameter: 1.70 cm    Mitral Valve Measurements:     MV Vmax:      2.01 m/s  MV VTI, daniella   0.347 m  MV Mean Grad: 4.0 mmHg  MV Peak Grad: 16.2 mmHg  MV E Vmax:    1.4 m/s  MV A Vmax:    1.8 m/s  MV E/A:       0.8       Tricuspid Valve Measurements:     RA Pressure: 3 mmHg    ________________________________________________________________________________________  Electronically signed on 3/14/2024 at 12:17:16 PM by Carlos Rascon         *** Final ***

## 2024-07-19 NOTE — SOCIAL WORK PROGRESS NOTE - NSSWPROGRESSNOTE_GEN_ALL_CORE
Sw has received referral by family today. They inquired about a hospice referral Sw has reached out to Rachell STEPHENSON for Dr Albarran today. As per Hospitalist pt isn't hospice eligible at this time. Family was given list of hospice agencies as a resource. They will follow up with Todd sevilla. Edilberto has spoken with  and she requested Sw assist with ambulance transport via [facility]. Contacted Hayder at [facility] who confirmed transport time for 6:30 pm today. Edilberto has contacted Zoe at [facility] who booked ambulance and Sw will remain available.

## 2024-07-19 NOTE — PROGRESS NOTE ADULT - TIME BILLING
Reviewing chart notes and data, reviewing telemetry monitor records, face to face time counseling the patient, coordinating care with SW/CM at [facility].

## 2024-07-19 NOTE — DISCHARGE NOTE PROVIDER - NSDCMRMEDTOKEN_GEN_ALL_CORE_FT
acetaminophen 325 mg oral tablet: 2 tab(s) orally every 6 hours As needed Temp greater or equal to 38C (100.4F), Mild Pain (1 - 3)  aspirin 81 mg oral tablet, chewable: 1 tab(s) orally once a day  buPROPion 100 mg/12 hours (SR) oral tablet, extended release: 1 tab(s) orally once a day  cefpodoxime 200 mg oral tablet: 1 tab(s) orally 2 times a day  Easy Iron 28 mg oral capsule: 1 cap(s) orally every other day  escitalopram 5 mg oral tablet: 1 tab(s) orally once a day (at bedtime)  furosemide 20 mg oral tablet: 1 tab(s) orally once a day  melatonin 5 mg oral tablet: 1 tab(s) orally once a day (at bedtime)  mirtazapine 7.5 mg oral tablet: 1 tab(s) orally once a day (at bedtime) as needed for sleep  multivitamin: 1 tab(s) orally once a day  Neurontin 100 mg oral capsule: 1 cap(s) orally 2 times a day  Protonix 40 mg oral delayed release tablet: 1 tab(s) orally once a day  rosuvastatin 10 mg oral tablet: 1 tab(s) orally once a day (at bedtime)  sucralfate 1 g oral tablet: 1 tab(s) orally 2 times a day Prescribed 1 tablet 3 times a day with meals; takes 1 tablet twice daily with meals  Vitamin D3 1000 intl units (25 mcg) oral tablet: 1 tab(s) orally every other day  Xanax 0.25 mg oral tablet: 1 tablet orally 2 times a day as needed for  agitation Prescribed 1 tablet TID prn; takes 1 tablet daily in the morning per daughter.

## 2024-07-19 NOTE — PROGRESS NOTE ADULT - ASSESSMENT
89-year-old female with dementia, DVT with IVC filter, h/o subdural hematoma, PUD, chronic renal sufficiency, TIA, who presented with altered mental status. Per daughter she was being aggressive towards family members and not eating for at least a day.  Patient is on home oxygen, 3 L nasal cannula.     Urinalysis showed minimal pyuria and urine culture seems more consistent with contamination. Unclear if she really had UTI but can complete 3 day course for possible cystitis. She has no fevers and no leukocytosis. Blood cultures currently no growth.    #UTI  #Leukocytosis  #Hx of penicillin allergy    -continue ceftriaxone until today, then tomorrow can take cefpodoxime 200mg PO BID  x 1 days  -follow cultures to completion  -discussed with Dr. Walker  -will follow PRN, please call ID if any questions or acute issues arise. Thank you.    Marija Ramey MD  Division of Infectious Diseases   Cell [phone] between 8am and 6pm   After 6pm and weekends please call ID service at [phone].     35 minutes spent on total encounter assessing patient, examination, chart review, counseling and coordinating care by the attending physician/nurse/care manager.

## 2024-07-19 NOTE — PROGRESS NOTE ADULT - SUBJECTIVE AND OBJECTIVE BOX
Patient is a 89y old  Female who presents with a chief complaint of AMS (19 Jul 2024 12:40)      INTERVAL HPI/OVERNIGHT EVENTS: Patient seen and examined at bedside. No overnight events.  Aide present - reports patient has continued confusion    MEDICATIONS  (STANDING):  aspirin  chewable 81 milliGRAM(s) Oral daily  atorvastatin 40 milliGRAM(s) Oral at bedtime  buPROPion SR (12-Hour) 100 milliGRAM(s) Oral <User Schedule>  cefTRIAXone   IVPB 1000 milliGRAM(s) IV Intermittent every 24 hours  dextrose 5% + sodium chloride 0.9%. 1000 milliLiter(s) (75 mL/Hr) IV Continuous <Continuous>  escitalopram 5 milliGRAM(s) Oral daily  gabapentin 100 milliGRAM(s) Oral two times a day  heparin   Injectable 5000 Unit(s) SubCutaneous every 12 hours  melatonin 5 milliGRAM(s) Oral at bedtime  pantoprazole    Tablet 40 milliGRAM(s) Oral before breakfast  potassium chloride   Powder 40 milliEquivalent(s) Oral once  sucralfate 1 Gram(s) Oral two times a day    MEDICATIONS  (PRN):  ALPRAZolam 0.25 milliGRAM(s) Oral two times a day PRN Agitaion/Anxiety  mirtazapine 7.5 milliGRAM(s) Oral at bedtime PRN sleep      Allergies    penicillins (Unknown)  mold, cheese and wine (Unknown)    Intolerances        REVIEW OF SYSTEMS: unable to obtain due to mental status      Vital Signs Last 24 Hrs  T(C): 36.3 (19 Jul 2024 11:55), Max: 36.7 (18 Jul 2024 19:44)  T(F): 97.3 (19 Jul 2024 11:55), Max: 98 (18 Jul 2024 19:44)  HR: 63 (19 Jul 2024 11:55) (63 - 91)  BP: 126/73 (19 Jul 2024 11:55) (123/81 - 134/76)  BP(mean): --  RR: 17 (19 Jul 2024 11:55) (17 - 17)  SpO2: 100% (19 Jul 2024 11:55) (92% - 100%)    Parameters below as of 19 Jul 2024 11:55  Patient On (Oxygen Delivery Method): nasal cannula      I&O's Summary    18 Jul 2024 07:01  -  19 Jul 2024 07:00  --------------------------------------------------------  IN: 0 mL / OUT: 150 mL / NET: -150 mL      BMI (kg/m2): 22 (07-18-24 @ 02:11)    PHYSICAL EXAM:  GENERAL: NAD  HEENT:  AT/NC, anicteric, moist mucous membranes, EOMI, PERRL, conjunctiva and sclera clear  CHEST/LUNG:  CTA b/l, no rales, wheezes, or rhonchi,  normal respiratory effort, no intercostal retractions  HEART:  RRR, S1, S2, no murmurs; no pitting edema  ABDOMEN:  BS+, soft, nontender, nondistended  MSK/EXTREMITIES: palpable peripheral pulses, no clubbing or cyanosis  NERVOUS SYSTEM: Alert to name, follows some simple commands, grossly moves extremities   PSYCH: flat affect, Alert & Awake; poor judgement      LABS: Personally reviewed  CBC                        10.1   8.50  )-----------( 192      ( 19 Jul 2024 11:52 )             31.0     CMP  07-19    143  |  109  |  29  ----------------------------<  103  3.9   |  29  |  1.30    Ca    8.9      19 Jul 2024 11:52    TPro  6.4  /  Alb  2.7  /  TBili  0.3  /  DBili  x   /  AST  27  /  ALT  19  /  AlkPhos  70  07-19          PT/INR - ( 17 Jul 2024 18:40 )   PT: 11.6 sec;   INR: 0.99 ratio         PTT - ( 17 Jul 2024 18:40 )  PTT:34.2 sec  Lactate, Blood: 2.0 mmol/L (07-17 @ 18:40)      CARDIAC MARKERS ( 18 Jul 2024 05:33 )  x     / 69.8 ng/L / x     / x     / x      CARDIAC MARKERS ( 18 Jul 2024 02:18 )  x     / 66.0 ng/L / x     / x     / x      CARDIAC MARKERS ( 17 Jul 2024 18:40 )  x     / 54.2 ng/L / 92 U/L / x     / x            TSH 2.67   TSH with FT4 reflex --  Total T3 --                  Urinalysis Basic - ( 19 Jul 2024 11:52 )    Color: x / Appearance: x / SG: x / pH: x  Gluc: 103 mg/dL / Ketone: x  / Bili: x / Urobili: x   Blood: x / Protein: x / Nitrite: x   Leuk Esterase: x / RBC: x / WBC x   Sq Epi: x / Non Sq Epi: x / Bacteria: x        Culture - Urine (collected 17 Jul 2024 21:25)  Source: Clean Catch Clean Catch (Midstream)  Final Report (18 Jul 2024 20:30):    >=3 organisms. Probable collection contamination.    Culture - Blood (collected 17 Jul 2024 18:40)  Source: .Blood Blood-Peripheral  Preliminary Report (19 Jul 2024 02:03):    No growth at 24 hours    Culture - Blood (collected 17 Jul 2024 18:35)  Source: .Blood Blood-Peripheral  Preliminary Report (19 Jul 2024 02:03):    No growth at 24 hours            Culture - Urine (collected 07-17-24 @ 21:25)  Source: Clean Catch Clean Catch (Midstream)  Final Report (07-18-24 @ 20:30):    >=3 organisms. Probable collection contamination.    Urinalysis with Rflx Culture (collected 07-17-24 @ 21:25)    Culture - Blood (collected 07-17-24 @ 18:40)  Source: .Blood Blood-Peripheral  Preliminary Report (07-19-24 @ 02:03):    No growth at 24 hours    Culture - Blood (collected 07-17-24 @ 18:35)  Source: .Blood Blood-Peripheral  Preliminary Report (07-19-24 @ 02:03):    No growth at 24 hours        RADIOLOGY & ADDITIONAL TESTS: Personally reviewed.     Consultant(s) Notes Reviewed:  [x] YES  [ ] NO   Discussed with SW/CORINNA, RN

## 2024-07-19 NOTE — PROGRESS NOTE ADULT - ASSESSMENT
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 Patient showing improvement now AAOx3 and agreeable to exam and interview. Toponins elevated likely 2/2 dehydration related demand ischemia.    TIA/CVA, Elevated Troponin  - MRI head showed acute punctate infarct  - Neuro following  - Continue ASA and statin    - No clear evidence of acute ischemia  - Troponin (+) 2/2 demand ischemia   - Had a recent TTE with normal LVF with no significant valvular disease.  No need to repeat  - EKG: NSR  - No evidence of arrhythmia    - BP stable currently  - Monitor electrolytes, replete to keep K>4 and Mag>2  - Will continue to follow.    Stephanie Kowalski DNP, NP-C, AGACNP-C  Cardiology   Call TEAMS

## 2024-07-19 NOTE — PROGRESS NOTE ADULT - SUBJECTIVE AND OBJECTIVE BOX
[facility]  INFECTIOUS DISEASES - [address], [place] [address]  Tel: [phone]     Fax: [phone]  =======================================================    GIACOMO WASHINGTON [id number]    Follow up: No fevers. Patient easily arousable. Appears more irritable today, but denies any pain or SOB.    Allergies:  penicillins (Unknown)  mold, cheese and wine (Unknown)      Antibiotics:  ALPRAZolam 0.25 milliGRAM(s) Oral two times a day PRN  aspirin  chewable 81 milliGRAM(s) Oral daily  atorvastatin 40 milliGRAM(s) Oral at bedtime  buPROPion SR (12-Hour) 100 milliGRAM(s) Oral <User Schedule>  cefTRIAXone   IVPB 1000 milliGRAM(s) IV Intermittent every 24 hours  dextrose 5% + sodium chloride 0.9%. 1000 milliLiter(s) IV Continuous <Continuous>  escitalopram 5 milliGRAM(s) Oral daily  gabapentin 100 milliGRAM(s) Oral two times a day  heparin   Injectable 5000 Unit(s) SubCutaneous every 12 hours  melatonin 5 milliGRAM(s) Oral at bedtime  mirtazapine 7.5 milliGRAM(s) Oral at bedtime PRN  pantoprazole    Tablet 40 milliGRAM(s) Oral before breakfast  potassium chloride   Powder 40 milliEquivalent(s) Oral once  sucralfate 1 Gram(s) Oral two times a day       REVIEW OF SYSTEMS:  Limited 2/2 dementia, as per HPI     Physical Exam:  ICU Vital Signs Last 24 Hrs  T(C): 36.3 (19 Jul 2024 11:55), Max: 36.7 (18 Jul 2024 19:44)  T(F): 97.3 (19 Jul 2024 11:55), Max: 98 (18 Jul 2024 19:44)  HR: 63 (19 Jul 2024 11:55) (63 - 91)  BP: 126/73 (19 Jul 2024 11:55) (123/81 - 134/76)  BP(mean): --  ABP: --  ABP(mean): --  RR: 17 (19 Jul 2024 11:55) (17 - 17)  SpO2: 100% (19 Jul 2024 11:55) (92% - 100%)    O2 Parameters below as of 19 Jul 2024 11:55  Patient On (Oxygen Delivery Method): nasal cannula        GEN: NAD  HEENT: normocephalic and atraumatic.   NECK: Supple.   LUNGS: Normal respiratory effort  HEART: Regular rate and rhythm   ABDOMEN: Soft, nontender, and nondistended.    EXTREMITIES: No leg edema.  NEUROLOGIC: Answered some simple questions    Labs:  07-19    143  |  109<H>  |  29<H>  ----------------------------<  103<H>  3.9   |  29  |  1.30    Ca    8.9      19 Jul 2024 11:52    TPro  6.4  /  Alb  2.7<L>  /  TBili  0.3  /  DBili  x   /  AST  27  /  ALT  19  /  AlkPhos  70  07-19                          10.1   8.50  )-----------( 192      ( 19 Jul 2024 11:52 )             31.0     PT/INR - ( 17 Jul 2024 18:40 )   PT: 11.6 sec;   INR: 0.99 ratio         PTT - ( 17 Jul 2024 18:40 )  PTT:34.2 sec  Urinalysis Basic - ( 19 Jul 2024 11:52 )    Color: x / Appearance: x / SG: x / pH: x  Gluc: 103 mg/dL / Ketone: x  / Bili: x / Urobili: x   Blood: x / Protein: x / Nitrite: x   Leuk Esterase: x / RBC: x / WBC x   Sq Epi: x / Non Sq Epi: x / Bacteria: x      LIVER FUNCTIONS - ( 19 Jul 2024 11:52 )  Alb: 2.7 g/dL / Pro: 6.4 g/dL / ALK PHOS: 70 U/L / ALT: 19 U/L / AST: 27 U/L / GGT: x             RECENT CULTURES:  07-17 @ 21:25 Clean Catch Clean Catch (Midstream)     >=3 organisms. Probable collection contamination.        07-17 @ 18:40 .Blood Blood-Peripheral     No growth at 24 hours        07-17 @ 18:35 .Blood Blood-Peripheral     No growth at 24 hours              All imaging and data are reviewed.

## 2024-07-22 PROBLEM — F03.90 UNSPECIFIED DEMENTIA, UNSPECIFIED SEVERITY, WITHOUT BEHAVIORAL DISTURBANCE, PSYCHOTIC DISTURBANCE, MOOD DISTURBANCE, AND ANXIETY: Chronic | Status: ACTIVE | Noted: 2024-07-18

## 2024-07-22 RX ORDER — CEFPODOXIME PROXETIL 200 MG/1
200 TABLET, FILM COATED ORAL
Refills: 0 | Status: ACTIVE | COMMUNITY
Start: 2024-07-22

## 2024-07-29 ENCOUNTER — RX RENEWAL (OUTPATIENT)
Age: 89
End: 2024-07-29

## 2024-08-06 ENCOUNTER — APPOINTMENT (OUTPATIENT)
Dept: INTERNAL MEDICINE | Facility: CLINIC | Age: 89
End: 2024-08-06

## 2024-08-06 PROCEDURE — G2211 COMPLEX E/M VISIT ADD ON: CPT

## 2024-08-06 PROCEDURE — 99214 OFFICE O/P EST MOD 30 MIN: CPT

## 2024-08-06 NOTE — PLAN
[FreeTextEntry1] : Psychiatry-General anxiety disorder continue with Escitalopram.  Reviewed  d/c Xanax to take 0.25 . Start Klonopin 0.5 mg daily to bid as needed anxiety/ agitation.  Advised abuse potential and side effects to the medication. Refilled remoron 7.5 mg nightly. Advised to call office next week for a follow up on the new medication Advised patient to increase bupropion to 100 mg twice daily.  Continue with all other medications.    GI - history of GERD- continue with Protonix 1 tab daily. Advised low acidic diet   Orthopedic-lower extremity weakness-advised patient to remain as active as she possibly could.  Advised stretching exercises she can do in her wheelchair. education was provided to the patient regarding tele-video appointments and the lack of diagnostic ability  Pulm  - SOB - VEL[place]ZQUEZ   Advised patient to the wear the oxygen on a daily basis as well as when she is sleeping.  advised to follow-up with cardiology and pulmonology. Monitor CBC   Heme - anemia -  Advised to restart fe tabs. Increse iron in diet monitor cbc   Advised flu shot 09/24  I spent 30 Minutes with the patient, half of which we discussed finding on physical exam and coordinated care.  As well as reviewed my plans and follow ups. Dragon speech recognition software was used to create portions of this document.  An attempt at proofreading has been made to minimize errors please call if any questions arise.

## 2024-08-06 NOTE — HISTORY OF PRESENT ILLNESS
[Verbal consent obtained from patient] : the patient, [unfilled] [Home] : at home, [unfilled] , at the time of the visit. [Medical Office: ([facility])___] : at the medical office located in  [Family Member] : family member [Formal Caregiver] : formal caregiver [FreeTextEntry1] : Medication renewal - tele video  [de-identified] : Ms. WASHINGTON is a 89 year- female, with a past medical history as noted below, who present to the office today for medication renewal. PT daughter states recently went to ED for mental status changes.  Had CT - was placed on abx for UTI- Was recommended to d/c Xanax and start klonopin  for better control of agitation. PT daughter states she stopped her MVI, iron tab and vitamin d.  Having a tough time swallowing Carafate tab.  requesting to be switched to liquid.

## 2024-08-06 NOTE — PHYSICAL EXAM
[Speech Grossly Normal] : speech grossly normal [Normal Affect] : the affect was normal [Normal Mood] : the mood was normal [de-identified] :  patient sitting upright comfortable in a chair  using oxygen   looks fatigued  [de-identified] : Ecchymosis noted on upper extremity

## 2024-08-06 NOTE — HEALTH RISK ASSESSMENT
[No] : No [One fall no injury in past year] : Patient reported one fall in the past year without injury [Never] : Never [de-identified] : denies  [Audit-CScore] : 0 [de-identified] : no injury transfer to the bed

## 2024-08-06 NOTE — REVIEW OF SYSTEMS
[Shortness Of Breath] : shortness of breath [Unsteady Walking] : ataxia [Anxiety] : anxiety [Easy Bruising] : easy bruising [Negative] : Neurological [Fever] : no fever [Chills] : no chills [Fatigue] : no fatigue [Discharge] : no discharge [Vision Problems] : no vision problems [Earache] : no earache [Hearing Loss] : no hearing loss [Nasal Discharge] : no nasal discharge [Sore Throat] : no sore throat [Chest Pain] : no chest pain [Palpitations] : no palpitations [Cough] : no cough [Abdominal Pain] : no abdominal pain [Vomiting] : no vomiting [Joint Pain] : no joint pain [Muscle Weakness] : no muscle weakness [Itching] : no itching [Skin Rash] : no skin rash [Headache] : no headache [Memory Loss] : no memory loss [FreeTextEntry4] : wears hearing aids [FreeTextEntry2] : looks fatigued

## 2024-08-16 ENCOUNTER — RX RENEWAL (OUTPATIENT)
Age: 89
End: 2024-08-16

## 2024-08-28 ENCOUNTER — RX RENEWAL (OUTPATIENT)
Age: 89
End: 2024-08-28

## 2024-08-30 ENCOUNTER — RX RENEWAL (OUTPATIENT)
Age: 89
End: 2024-08-30

## 2024-09-06 ENCOUNTER — EMERGENCY (EMERGENCY)
Facility: HOSPITAL | Age: 89
LOS: 1 days | Discharge: HOME | End: 2024-09-06
Attending: STUDENT IN AN ORGANIZED HEALTH CARE EDUCATION/TRAINING PROGRAM | Admitting: STUDENT IN AN ORGANIZED HEALTH CARE EDUCATION/TRAINING PROGRAM
Payer: MEDICARE

## 2024-09-06 VITALS
DIASTOLIC BLOOD PRESSURE: 56 MMHG | SYSTOLIC BLOOD PRESSURE: 108 MMHG | TEMPERATURE: 97 F | RESPIRATION RATE: 22 BRPM | OXYGEN SATURATION: 95 % | HEART RATE: 92 BPM | HEIGHT: 59 IN

## 2024-09-06 DIAGNOSIS — Z90.710 ACQUIRED ABSENCE OF BOTH CERVIX AND UTERUS: Chronic | ICD-10-CM

## 2024-09-06 DIAGNOSIS — Z96.60 PRESENCE OF UNSPECIFIED ORTHOPEDIC JOINT IMPLANT: Chronic | ICD-10-CM

## 2024-09-06 PROCEDURE — 99283 EMERGENCY DEPT VISIT LOW MDM: CPT

## 2024-09-06 PROCEDURE — 99284 EMERGENCY DEPT VISIT MOD MDM: CPT

## 2024-09-06 RX ORDER — CLINDAMYCIN PHOSPHATE 150 MG/ML
300 VIAL (ML) INJECTION ONCE
Refills: 0 | Status: COMPLETED | OUTPATIENT
Start: 2024-09-06 | End: 2024-09-06

## 2024-09-06 RX ORDER — BENZONATATE 100 MG
100 CAPSULE ORAL ONCE
Refills: 0 | Status: COMPLETED | OUTPATIENT
Start: 2024-09-06 | End: 2024-09-06

## 2024-09-06 RX ORDER — CLINDAMYCIN PHOSPHATE 150 MG/ML
20 VIAL (ML) INJECTION
Qty: 500 | Refills: 0
Start: 2024-09-06 | End: 2024-09-12

## 2024-09-06 RX ORDER — GUAIFENESIN 100 MG/5ML
100 LIQUID ORAL ONCE
Refills: 0 | Status: COMPLETED | OUTPATIENT
Start: 2024-09-06 | End: 2024-09-06

## 2024-09-06 RX ORDER — CLINDAMYCIN PHOSPHATE 150 MG/ML
300 VIAL (ML) INJECTION ONCE
Refills: 0 | Status: DISCONTINUED | OUTPATIENT
Start: 2024-09-06 | End: 2024-09-06

## 2024-09-06 RX ADMIN — Medication 300 MILLIGRAM(S): at 18:22

## 2024-09-06 RX ADMIN — GUAIFENESIN 100 MILLIGRAM(S): 100 LIQUID ORAL at 23:18

## 2024-09-06 RX ADMIN — Medication 500 MILLIGRAM(S): at 18:12

## 2024-09-06 NOTE — ED PROVIDER NOTE - CARE PROVIDER_API CALL
Josue Mendoza  Internal Medicine  [address] [place] [address], [address]  [place] [address]  Phone: [phone]  Fax: [phone]  Follow Up Time: 4-6 Days
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
## 2024-09-06 NOTE — ED PROVIDER NOTE - PHYSICAL EXAMINATION
Constitutional: Awake, Alert to verbal stimuli, participates in minimal conversation  HEAD: Normocephalic, atraumatic.   EYES: PERRL, EOM intact, conjunctiva and sclera are clear bilaterally.  ENT: External ears normal. No rhinorrhea, no tracheal deviation   NECK: Supple, non-tender  CARDIOVASCULAR: regular rate and rhythm.  RESPIRATORY: Normal respiratory effort; breath sounds CTAB, no wheezes, rhonchi, or rales. No accessory muscle use.   ABDOMEN: Soft; non-tender, non-distended. No rebound or guarding.   MSK:  no lower extremity edema, no deformities  SKIN: Warm, dry, skin tear to right forearm  NEURO: A&O x1.

## 2024-09-06 NOTE — ED PROVIDER NOTE - CLINICAL SUMMARY MEDICAL DECISION MAKING FREE TEXT BOX
Patient with a wound to the right forearm that looks like a skin tear.  Was cleaned at home by patient's home health aide.  I discussed with family regards to treating with antibiotics discharge versus extensive workup for the waxing and waning mental status.  Family more comfortable with discharge at this time and states that they would follow-up for hospice as needed as an outpatient.  Given this, will give antibiotics, discharge and set up medical transport.

## 2024-09-06 NOTE — ED PROVIDER NOTE - NSFOLLOWUPINSTRUCTIONS_ED_ALL_ED_FT
Human Bite  Human bite wounds tend to become infected, even when they seem minor at first. Infection can develop quickly, sometimes in a matter of hours. Bite wounds of the hand have a higher chance of infection compared to bites in other places and can be serious because the tendons and joints are close to the skin.    What are the causes?  The condition is caused when one person bites another person. It may also occur when the closed fist of one person makes contact with the teeth of another person.    What are the signs or symptoms?  Common symptoms of a human bite include:  Bruising.  Broken skin.  Bleeding.  Pain.  How is this diagnosed?  This condition may be diagnosed based on a physical exam, medical history, or tests. Your health care provider will examine the wound and ask for details about how the bite happened. If you have details about the medical history of the person who bit you, it is important to tell your health care provider. This will help determine if there is any chance that a disease may have been spread. You may have tests, such as:  Blood tests. This may be done if there is a chance of infection from diseases such as hepatitis or human immunodeficiency virus (HIV).  X-rays to check for damage to bones or joints.  Culture test. This uses a sample of fluid from the wound to check for infection.  How is this treated?  Treatment depends on the location and severity of the bite and medical history. The main treatment for bite wounds is extensive cleaning. The wound should be cleaned right away with a mild antibacterial soap and water. Your health care provider may flush the wound with saline solution and ensure there are no foreign bodies left in the wound.    Further treatment of the wound may include:  Applying a bandage (dressing).  Leaving the wound open to heal because of the high risk of infection. In some cases, the wound may be closed with stitches (sutures), staples, skin glue, or adhesive strips.  Taking an antibiotic medicine to prevent or treat an infection.  Having a tetanus shot.  In some cases, bites that have become infected may need:  Antibiotics through an IV tube.  Surgical treatment.  Follow these instructions at home:  Wound care    Follow instructions from your health care provider about how to take care of your wound. Make sure you:  Wash your hands with soap and water for at least 20 seconds before and after you change your bandage (dressing). If soap and water are not available, use hand .  Change your dressing as told by your health care provider.  Leave stitches (sutures), skin glue, or adhesive strips in place. These skin closures may need to stay in place for 2 weeks or longer. If adhesive strip edges start to loosen and curl up, you may trim the loose edges. Do not remove adhesive strips completely unless your health care provider tells you to do that.  Check your wound every day for signs of infection. Check for:  More redness, swelling, or pain.  More fluid or blood.  Warmth.  Pus or a bad smell.  Managing pain, stiffness, and swelling    Bag of ice on a towel on the skin.  If directed, put ice on the injured area. To do this:  Put ice in a plastic bag.  Place a towel between your skin and the bag.  Leave the ice on for 20 minutes, 2–3 times a day.  If your skin turns bright red, remove the ice right away to prevent skin damage. The risk of skin damage is higher if you cannot feel pain, heat, or cold.  Raise (elevate) the injured area above the level of your heart while you are sitting or lying down.  General instructions    Take or apply over-the-counter and prescription medicines as told by your health care provider.  If you were prescribed antibiotics, take or apply them as told by your health care provider. Do not stop using the antibiotic even if you start to feel better.  Keep all follow-up visits. Your health care provider will monitor your wound healing.  Contact a health care provider if:  You have increased pain or difficulty when moving your injured area.  Your wound has signs of infection, such as:  More redness, swelling, or pain.  More fluid or blood.  Warmth.  Pus or a bad smell.  You have chills or a fever.  You are not improving, or you are getting worse.  Get help right away if:  You have redness, swelling, or pain at the site of your wound that is rapidly getting worse.  You have a red streak extending away from your wound.  Summary  Human bite wounds tend to become infected, even when they seem minor at first. Infection can develop quickly, sometimes in a matter of hours.  Treatment depends on the location and severity of the bite and medical history.  Follow instructions from your health care provider about how to take care of your wound.  Contact a health care provider if you have signs of infection.  This information is not intended to replace advice given to you by your health care provider. Make sure you discuss any questions you have with your health care provider.

## 2024-09-06 NOTE — ED PROVIDER NOTE - PATIENT PORTAL LINK FT
You can access the FollowMyHealth Patient Portal offered by [facility] by registering at the following website: [email or web address]. By joining [employer]’s FollowMyHealth portal, you will also be able to view your health information using other applications (apps) compatible with our system.

## 2024-09-06 NOTE — ED ADULT NURSE REASSESSMENT NOTE - NS ED NURSE REASSESS COMMENT FT1
received pt in rm 6b, hx of dementia, family @ bedside. pt on 2L o2, pulse ox 96%. no labored breathing or sob noted. pt requesting cough syrup medication, given and tolerated well. pending ambulance. NAD.
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
## 2024-09-06 NOTE — ED ADULT NURSE NOTE - NSFALLHARMRISKINTERV_ED_ALL_ED
Assistance OOB with selected safe patient handling equipment if applicable/Assistance with ambulation/Communicate risk of Fall with Harm to all staff, patient, and family/Monitor gait and stability/Monitor for mental status changes and reorient to person, place, and time, as needed/Provide visual cue: red socks, yellow wristband, yellow gown, etc/Reinforce activity limits and safety measures with patient and family/Toileting schedule using arm’s reach rule for commode and bathroom/Use of alarms - bed, stretcher, chair and/or video monitoring/Bed in lowest position, wheels locked, appropriate side rails in place/Call bell, personal items and telephone in reach/Instruct patient to call for assistance before getting out of bed/chair/stretcher/Non-slip footwear applied when patient is off stretcher/[place] to call system/Physically safe environment - no spills, clutter or unnecessary equipment/Purposeful Proactive Rounding/Room/bathroom lighting operational, light cord in reach

## 2024-09-06 NOTE — ED ADULT NURSE NOTE - NSICDXPASTMEDICALHX_GEN_ALL_CORE_FT
PAST MEDICAL HISTORY:  Anxiety     CVA (cerebral vascular accident)     Dementia     Fall     GI bleed     Hip fracture     Hyperlipidemia     Hypertension     PUD (peptic ulcer disease)     SDH (subdural hematoma)
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
## 2024-09-07 VITALS
HEART RATE: 90 BPM | DIASTOLIC BLOOD PRESSURE: 60 MMHG | OXYGEN SATURATION: 95 % | SYSTOLIC BLOOD PRESSURE: 87 MMHG | RESPIRATION RATE: 20 BRPM | TEMPERATURE: 98 F

## 2024-09-09 ENCOUNTER — RX RENEWAL (OUTPATIENT)
Age: 89
End: 2024-09-09

## 2024-09-09 NOTE — ED ADULT NURSE NOTE - BREATHING
[facility])  Plastic Surgery  Operative Note    SUMMARY     Date of Procedure: 9/9/2024     Location:  [facility]    Procedure(s): Procedure(s) (LRB):  RECONSTRUCTION, BREAST, USING RODRIGUEZ FREE FLAP / BILATERAL RODRIGUEZ FLAP (Bilateral)  INJECTION, AGENT, INTRAVENOUS, FOR ASSESSMENT OF BLOOD FLOW IN FLAP OR GRAFT (N/A)  MASTECTOMY, BILATERAL (Bilateral)  BIOPSY, LYMPH NODE, SENTINEL (Right)  INJECTION, FOR SENTINEL NODE IDENTIFICATION (Right)     Immediate left breast reconstruction with RODRIGUEZ flap  Immediate right breast reconstruction with RODRIGUEZ flap  ICG angiography to assess flap perfusion  Bilateral TAP and intercostal rib blocks with Exparel for post-operative pain control    Surgeons and Role:  Panel 1:     * Pito Holloway DO - Primary     * Luis Daniel Mcneal MD - Co-Surgeon  Panel 2:     * NADIRA Carroll MD - Primary    Assistant: SARAH Butterfield and Viraj Goldstein MD, Fellow    Pre-Operative Diagnosis: Malignant neoplasm of upper-outer quadrant of right female breast [C50.411]    Post-Operative Diagnosis: same    Anesthesia: General    Indications for Procedure:  70-year-old woman with a history of right breast cancer.  She previously underwent lumpectomy and adjuvant radiotherapy.  She has a recurrence in the right breast.  A right mastectomy was recommended.  She preferred bilateral mastectomy with autologous reconstruction    Operative Findings (including complications, if any):   Right mastectomy weight 656 gm  Left Mastectomy weight 578 gm  Micro information:  Donor site: right keira-abdomen  Recipient site: left breast  Weight:  344 gm  Number of perforators:  2  Pedicle Artery: DIEA  Recipient Artery: YESI  Arterial anastomosis size:  2.5 mm  Recipient vein: IMV  Vein  size:  2.5 mm  Additional Vein:  None   Total right keira abdominal flap ischemia time 1 hour 35 minutes    Micro information:  Donor site: left keira-abdomen  Recipient site: right breast  Weight:   342 gm  Number of perforators:  3  Pedicle Artery: DIEA  Recipient Artery: YESI  Arterial anastomosis size:  2.5 mm  Recipient vein: IMV  Vein  size:  3.0 mm  Additional Vein:  SI Ev to retrograde IMV, 2.5 mm  Total left keira abdominal flap ischemia time 41 minutes      Description of Procedures:  The patient was seen in the preoperative holding area.  Surgical consents were signed at her preoperative visit.  All questions were answered.  Marked with the borders of the breast, the midline of the abdomen and lower abdominal skin fold.  I discussed mastectomy incisions with Sandrine Carroll MD and we decided upon vertical teardrop skin sparing.    The patient was taken to the operating room.  General anesthesia was induced.  Based on the preoperative CTA the dominant perforators of the lower abdomen were marked.  Bilateral RODRIGUEZ flaps were designed and measured for symmetry.  The breasts and abdomen were then prepped and draped in the sterile fashion    Please see the op note of Sandrine Carroll MD for that portion of the procedure.    I began by elevating the right keira abdominal flap.  The lower border of the flap was incised.  The superficial vein was identified.  It was felt to be small.  Several cm of length were dissected before it was divided with clips.  I then divided the lower border of the flap using the Bovie down to the anterior abdominal wall.  Next the upper border of the flap was incised.  Idid not  need to bevel superiorly for additional volume or perforators.  The umbilicus was grasped with single hooks.  It was incised circumferentially using a 15 blade in the midline between the 2 flaps were incised.  The flap was then elevated from lateral to medial until the lateral perforators were identified.  Flap was also elevated from medial to lateral until medial perforators were identified.  There were 2 dominant perforators were identified.  One was located centrally in the flap over the rectus muscle and  spontaneous/unlabored it was also a medial periumbilical .  I opened the fascia between these 2 perforators and extended somewhat inferiorly the fascial incision.  The lower  was skeletonized 1st.  It was a short course down to the lateral row.  I continued the dissection inferiorly until I discovered the junction of the medial and lateral rows.  Next the  dissection for the upper  was done.  Mid had a short course on top of the muscle before having a moderate length intramuscular course and finally joining the main pedicle.  I did have to open some rectus muscle partial-thickness between the medial and lateral .  After that I then continued the dissection inferiorly  With the flap was isolated on these perforators sufficient length of the pedicle was dissected before windows were created.  The flap was temporarily stapled back down to the abdomen    In a similar fashion, the borders of the left keira abdominal flap were incised.  The superficial vein on that side was felt to be medium.  Flap was then elevated from lateral to medial and medial to lateral until perforators were identified  On this side there was 1 medium  located medially, it was a small  in the medial periumbilical area though did not have a very strong Doppler signal.  At 2 small perforators laterally that were about 1 cm apart.  They each had a strong signal so I chose to take both of them with a 1 island of fascia between them.  I began by incising the fascia at the  extended the fascial incision medially and then continuing down inferiorly.  These small perforators had a short course down to the main pedicle.  The distal continuation was clipped.  Then continued the dissection down to the lateral border of the rectus muscle.  Next I performed the preferred greater dissection for the medial .  It was has a long course on top of the muscle before a long lower intramuscular course  and finally joining the main pedicle very low, much lower than it was normally seen.  Did have to divide some muscle between  With the flap was isolated on these perforators sufficient length of the pedicle was dissected before windows were created.  The flap was temporarily stapled back down to the abdomen.    ICG angiography was then performed.  4 cc of reconstituted ICG was injected through the IV.  Using the Vision sense system ICG angiography was done.  There was slow filling of both RODRIGUEZ flaps.  After about 30 seconds the left flap was not filling as well.  The skin was also a little darker and when part of it was de-epithelialized it bled a little darker.  I then cut the clip off of the superficial vein.  The flap then briskly filled after that.  After about 2 minutes the lateral tip of the each flap along with some of the lower lateral portion of the right flap were marked and were removed.    Both flaps were de-epithelialized on the abdomen.  Arc of skin was left along the medial portion of the flap that correlated with the IMF but the rest of the flap was de-epithelialized    Dr. Mcneal performed the recipient vessel exposure and the chest.  Beginning on the right, the skin sparing mastectomy flaps were retracted using self-retaining tractors.  The pectoralis muscle was incised and retracted back laterally the prominent rib space was identified.  A portion of the 3rd rib was removed.  The internal mammary artery and vein were then identified.  Several small branches were ligated with clips until adequate length the vessels were able to be visualized there were one medial and one lateal IMV, the vessels were then bathed in 4% lidocaine and covered with a moist Ray-Alexandre.    Using the same technique, the contralateral YESI and IMV were exposed.  The dominant rib space was identified and was exposed in the same fashion  Bilateral intercostal rib blocks were done using the mixture Marcaine and Exparel.    Both  abdominal flaps were then harvested.  Hemoclips were placed on the pedicle and were checked for hemostasis.  Flaps were irrigated with 10 cc of heparinized saline.  The pedicle was oriented with a marking pen.  Both flaps were weighed.  Final weights are listed above.    Began with the anastomosis on the right breast.  The pedicle vessels were cleaned under the microscope The IMV was oriented with a marking pen.  It was divided distally with a hemoclip a single green clamp was placed on the vein the vein was divided.  A 3.0 mm  was felt to be appropriate.  That size  was then used for an end-to-end venous anastomosis.  When the clamp was removed flow was seen across the .  The green approximating clamp was then placed on the artery.  It was divided distally with a hemoclip.  The artery was divided.  Using a double approximating clamp, anastomosis was done using interrupted 9 0 nylon sutures.  Anastomosis was uneventful.  When the clamps were removed there was pulsatile flow in the pedicle  It was a palpable pulse in the pedicle.  An internal cook Doppler was placed on the pedicle artery just distal to the anastomosis.  The wire was coiled.    Next the flap was repositioned.  It was stapled down to the chest.  It was able to get the superficial vein down to the retrograde IMV.  When the clip was removed from the superficial vein there was a gush of dark blood.  A 2.5 mm  was used from the retrograde IMV to the superficial vein  The flap was then placed into the mastectomy cavity.  It was sutured to the chest wall using 2-0 Vicryl suture.  Skin was then temporarily stapled over the flap.  Fifteen Wolof Louie drains were brought out the anterior axillary line bilaterally    Next the anastomosis was done on the 2nd side.  It was done in the same fashion.  On this side a 2.5 mm  was used and the arterial anastomosis was also done using interrupted 9 0 nylon sutures.  The end of the  anastomosis there was initially pulse in the pedicle.  Once I went to put the internal Doppler on pulsatile flow could not be heard.  The pulse we had also diminished in the pedicle.  I tried to strip the artery did not bring it briskly refill.  It was concerned of the clot at the anastomosis.  A double green approximating clamp was replaced in the anastomosis was cut out.  There was thrombus at the anastomosis.  The anastomosis was then redone all with interrupted 9 0 nylon suture.  Then it was a strong palpable pulse in the pedicle.  A [employer] Doppler was placed and it was coiled.  The flap was also sutured into the chest.    The mastectomy utilized vertical teardrop incisions.  The patient's back was sat up.  We then tailor tacked into a wise pattern bilaterally.  We checked horizontal legs and the meridian marks for symmetry  The flap was then inset using several 2-0 Vicryl sutures including the high lateral border of the Jarrell muscle.  The skin was closed with buried 3-0 Monocryl and running 3-0 Stratafix.    Dr. Mcneal assisted with the abdominal closure, flap inset, and breast closure.    After the flaps were harvested, bilateral tap blocks were performed using direct injection of the Marcaine and Exparel mixture.  The rectus abdominis muscle was reapproximated using interrupted 2-0 Vicryl suture.  Anterior abdominal wall fascia was then closed using horizontal mattress 0 Prolene and running 0 V lock sutures.  Fifteen Bulgarian Louie drains were placed and brought out laterally below the incisions.  The central portion of the upper abdomen was undermined.  Plication was was done in the upper and lower abdomen using 1. PDO barbed suture.  The bed was flexed.  The lower abdominal incisions were brought together.  A 2-0 Vicryl was used to hold the Conor's in the midline.  The position of the umbilicus was marked.  The Conor's layer was then closed with a running 2-0 PDO barbed suture.  The skin was closed with buried  3-0 Monocryl and running 3-0 Stratafix.  A small horizontal box incision was made overlying the umbilicus.  The umbilicus was grasped and brought through the incision.  It was then trimmed as needed.  Several 3-0 Monocryl sutures were used to hold it in position.  The skin was then closed with interrupted 4-0 Prolene.    CHG drain dressings were then used on all of the drains.  Tegaderm were used to secure the cook Doppler wires.  It was Xeroform was placed in the umbilicus it was covered with a Mepilex dressing.  Prineo tape was placed on the lower abdomen it was covered with Mepilex dressings.  The breasts were dressed with Prineo tape and Mepilex dressing.  The patient was then padded and a postoperative bra and an abdominal binder.  She tolerated the procedure well was taken to recovery in a stable condition      Significant Surgical Tasks Conducted by the Assistant(s), if Applicable: The presence of an additional attending surgeon functioning as a co-surgeon  was required due to the complexity of the procedure relative to any available residents    Estimated Blood Loss (EBL): 200mL           Implants:   Implant Name Type Inv. Item Serial No.  Lot No. LRB No. Used Action    MICROVAS ANSTMS 3.0MM - [id number]   MICROVAS ANSTMS 3.0MM  [employer] [id number] Right 1 Implanted    MICROVAS ANSTMS 2.5MM - [id number]   MICROVAS ANSTMS 2.5MM  [employer] [id number] Right 1 Implanted    MICROVAS ANSTMS 2.5MM - [id number]   MICROVAS ANSTMS 2.5MM  [employer] [id number] Left 1 Implanted       Specimens:   Specimen (24h ago, onward)       Start     Ordered    24 0833  Specimen to Pathology, Surgery Breast  Once        Comments: Pre-op Diagnosis: Malignant neoplasm of upper-outer quadrant of right female breast [C50.411]Procedure(s):RECONSTRUCTION, BREAST, USING RODRIGUEZ FREE FLAP / BILATERAL RODRIGUEZ FLAPINJECTION, AGENT,  INTRAVENOUS, FOR ASSESSMENT OF BLOOD FLOW IN FLAP OR GRAFTMASTECTOMY, BILATERALBIOPSY, LYMPH NODE, SENTINELINJECTION, FOR SENTINEL NODE IDENTIFICATION Number of specimens: 3Name of specimens: 1. Left Mastectomy Short Stitch Superior Long Stitch Lateral2. Right Mastectomy Short Stitch Superior Long Stitch Lateral3. Right Axillary [place] Lymph Node #1 Hot 307     References:    Click here for ordering Quick Tip   Question Answer Comment   Procedure Type: Breast    Release to patient Immediate        09/09/24 0933                            Condition: Good    Disposition: PACU - hemodynamically stable., DC home    Attestation: I was present and scrubbed for the key portions of the procedure.

## 2024-09-13 ENCOUNTER — APPOINTMENT (OUTPATIENT)
Dept: INTERNAL MEDICINE | Facility: CLINIC | Age: 89
End: 2024-09-13
Payer: MEDICARE

## 2024-09-13 DIAGNOSIS — S41.119A LACERATION W/OUT FOREIGN BODY OF UNSPECIFIED UPPER ARM, INITIAL ENCOUNTER: ICD-10-CM

## 2024-09-13 DIAGNOSIS — R45.1 RESTLESSNESS AND AGITATION: ICD-10-CM

## 2024-09-13 DIAGNOSIS — F41.9 ANXIETY DISORDER, UNSPECIFIED: ICD-10-CM

## 2024-09-13 PROCEDURE — G2211 COMPLEX E/M VISIT ADD ON: CPT

## 2024-09-13 PROCEDURE — 99214 OFFICE O/P EST MOD 30 MIN: CPT

## 2024-09-13 RX ORDER — MUPIROCIN 20 MG/G
2 OINTMENT TOPICAL
Qty: 30 | Refills: 0 | Status: ACTIVE | COMMUNITY
Start: 2024-09-13 | End: 1900-01-01

## 2024-09-13 NOTE — DATA REVIEWED
[FreeTextEntry1] : [facility] reviewed [id number] reviewed labs 06/24  cbc, lipids, cmp , u/a

## 2024-09-13 NOTE — PLAN
[FreeTextEntry1] : Psychiatry-General anxiety disorder continue with Escitalopram.  Reviewed  d/c  Klonopin 0.5 mg daily Secondary to sedation.  Restart Xanax 0.251 tab p.o. 3 times daily as needed anxiety/ agitation.  Advised abuse potential and side effects to the medication. Refilled remoron 7.5 mg nightly. Advised to call office next week for a follow up. Continue with all other medications.    Dermatology right upper extremity skin tear.  Advised patient continue with antibiotic therapy.  Also advised patient to start using mupirocin ointment until lesions completely healed.  Monitor for cellulitisParent and.  If notice any abnormal discharge went to notify the office immediately or go to ED/urgent care for evaluation  GI - history of GERD- continue with Protonix 1 tab daily. Advised low acidic diet   Orthopedic-lower extremity weakness-advised patient to remain as active as she possibly could.  Advised stretching exercises she can do in her wheelchair. education was provided to the patient regarding tele-video appointments and the lack of diagnostic ability  Pulm  - SOB - VELÁZQUEZ   Advised patient to the wear the oxygen on a daily basis as well as when she is sleeping.  advised to follow-up with cardiology and pulmonology. Monitor CBC   Heme - anemia - Continue with supplementation.  Monitor CBC.  Advised flu shot 10/24   I spent 30 Minutes with the patient, half of which we discussed finding on physical exam and coordinated care.  As well as reviewed my plans and follow ups. Dragon speech recognition software was used to create portions of this document.  An attempt at proofreading has been made to minimize errors please call if any questions arise.   Advised patient's daughter that patient seems more sedated and out of it and having difficulty breathing go to ED for evaluation

## 2024-09-13 NOTE — HISTORY OF PRESENT ILLNESS
[Home] : at home, [unfilled] , at the time of the visit. [Medical Office: ([facility])___] : at the medical office located in  [Family Member] : family member [Formal Caregiver] : formal caregiver [FreeTextEntry1] : Medication renewal - tele video  [de-identified] : Ms. WASHINGTON is a 89 year- old female, with a past medical history as noted below, who present to the office today for Medication renewal for anxiety/agitation.  Also follow-up from ED visit secondary to skin tear on her right upper extremity.  Patient currently on clindamycin oral antibiotics.  Patient started states since the ED visit the lesion looks much better.  Denies any redness around the borders. Patient daughter also states the mother is has been really sedated and out of it for the last couple of days.  Does state some days she is like this and other days she has more energy.   [Verbal consent obtained from patient] : the patient, [unfilled]

## 2024-09-13 NOTE — ASSESSMENT
[FreeTextEntry1] : Ms. WASHINGTON is a 89 year old female, with a past medical history as noted above, who present to the office today for medication renewal, follow up on skin tear.

## 2024-09-13 NOTE — PHYSICAL EXAM
[Speech Grossly Normal] : speech grossly normal [Normal Affect] : the affect was normal [Normal Mood] : the mood was normal [No Acute Distress] : no acute distress [Well Nourished] : well nourished [Well Developed] : well developed [Well-Appearing] : well-appearing [de-identified] :  patient sitting upright comfortable in a chair  seems sedeated  very sleepy  [de-identified] : right upper ext skin tear  limited exam secondary to video apt   lesion looks clean and no erythma appreciated

## 2024-09-13 NOTE — HEALTH RISK ASSESSMENT
[No] : No [One fall no injury in past year] : Patient reported one fall in the past year without injury [Never] : Never [Audit-CScore] : 0 [de-identified] : denies  [de-identified] : no injury transfer to the bed

## 2024-09-13 NOTE — REVIEW OF SYSTEMS
[Shortness Of Breath] : shortness of breath [Unsteady Walking] : ataxia [Anxiety] : anxiety [Easy Bruising] : easy bruising [Negative] : Neurological [Fever] : no fever [Chills] : no chills [Fatigue] : no fatigue [Discharge] : no discharge [Vision Problems] : no vision problems [Earache] : no earache [Hearing Loss] : no hearing loss [Nasal Discharge] : no nasal discharge [Sore Throat] : no sore throat [Chest Pain] : no chest pain [Palpitations] : no palpitations [Cough] : no cough [Abdominal Pain] : no abdominal pain [Vomiting] : no vomiting [Joint Pain] : no joint pain [Muscle Weakness] : no muscle weakness [Itching] : no itching [Skin Rash] : no skin rash [Headache] : no headache [Memory Loss] : no memory loss [FreeTextEntry2] : looks fatigued  [FreeTextEntry4] : wears hearing aids

## 2024-09-16 ENCOUNTER — RX RENEWAL (OUTPATIENT)
Age: 89
End: 2024-09-16

## 2024-10-02 ENCOUNTER — APPOINTMENT (OUTPATIENT)
Dept: INTERNAL MEDICINE | Facility: CLINIC | Age: 89
End: 2024-10-02

## 2025-04-04 NOTE — CARE COORDINATION ASSESSMENT. - OTHER PERTINENT REFERRAL INFORMATION
CM met with patient and daughter Johana at bedside to explain role and transitions of care. Patient lives alone with 2/12 hrs private hire aides in a private house with 5 steps to get in and 12 to the second floor wit chair-lift. However, patient stays in the main floor.   Daughter Johana is the caregiver.  No home care prior to admission.  Patient has home O2 at 3L, wheelchair, commode and a hoya lift.  She will need an ambulance to transport. Unclear needs at this time. Home MD Josue Mendoza.  CM explained  home care expectation, process, insurance provision and home safety with good understanding.  CM to make referral if appropriate. Daughter verbalized understanding of plans after discharge and is in agreement.  Resource folder left at bedside.  All questions answered to the best of my abilities.  CM remains available throughout the hospital stay.   Yes